# Patient Record
Sex: MALE | Race: BLACK OR AFRICAN AMERICAN | NOT HISPANIC OR LATINO | Employment: OTHER | URBAN - METROPOLITAN AREA
[De-identification: names, ages, dates, MRNs, and addresses within clinical notes are randomized per-mention and may not be internally consistent; named-entity substitution may affect disease eponyms.]

---

## 2017-01-26 ENCOUNTER — OFFICE VISIT (OUTPATIENT)
Dept: INTERNAL MEDICINE | Facility: CLINIC | Age: 62
End: 2017-01-26
Payer: MEDICARE

## 2017-01-26 ENCOUNTER — TELEPHONE (OUTPATIENT)
Dept: INTERNAL MEDICINE | Facility: CLINIC | Age: 62
End: 2017-01-26

## 2017-01-26 VITALS
OXYGEN SATURATION: 97 % | BODY MASS INDEX: 31.4 KG/M2 | HEART RATE: 72 BPM | TEMPERATURE: 98 F | SYSTOLIC BLOOD PRESSURE: 144 MMHG | WEIGHT: 257.94 LBS | DIASTOLIC BLOOD PRESSURE: 90 MMHG

## 2017-01-26 DIAGNOSIS — G89.29 CHRONIC MIDLINE LOW BACK PAIN WITHOUT SCIATICA: ICD-10-CM

## 2017-01-26 DIAGNOSIS — M25.562 CHRONIC PAIN OF BOTH KNEES: ICD-10-CM
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
## 2017-01-26 DIAGNOSIS — M54.2 NECK PAIN ON LEFT SIDE: Primary | ICD-10-CM

## 2017-01-26 DIAGNOSIS — G89.29 CHRONIC PAIN OF BOTH KNEES: ICD-10-CM

## 2017-01-26 DIAGNOSIS — M54.50 CHRONIC MIDLINE LOW BACK PAIN WITHOUT SCIATICA: ICD-10-CM

## 2017-01-26 DIAGNOSIS — M25.561 CHRONIC PAIN OF BOTH KNEES: ICD-10-CM

## 2017-01-26 DIAGNOSIS — Z23 IMMUNIZATION DUE: ICD-10-CM

## 2017-01-26 PROCEDURE — 90686 IIV4 VACC NO PRSV 0.5 ML IM: CPT | Mod: S$GLB,,, | Performed by: FAMILY MEDICINE

## 2017-01-26 PROCEDURE — 3080F DIAST BP >= 90 MM HG: CPT | Mod: S$GLB,,, | Performed by: FAMILY MEDICINE

## 2017-01-26 PROCEDURE — 1159F MED LIST DOCD IN RCRD: CPT | Mod: S$GLB,,, | Performed by: FAMILY MEDICINE

## 2017-01-26 PROCEDURE — G0008 ADMIN INFLUENZA VIRUS VAC: HCPCS | Mod: S$GLB,,, | Performed by: FAMILY MEDICINE

## 2017-01-26 PROCEDURE — 3077F SYST BP >= 140 MM HG: CPT | Mod: S$GLB,,, | Performed by: FAMILY MEDICINE

## 2017-01-26 PROCEDURE — 99999 PR PBB SHADOW E&M-EST. PATIENT-LVL IV: CPT | Mod: PBBFAC,,, | Performed by: FAMILY MEDICINE

## 2017-01-26 PROCEDURE — 99214 OFFICE O/P EST MOD 30 MIN: CPT | Mod: S$GLB,,, | Performed by: FAMILY MEDICINE

## 2017-01-26 RX ORDER — MELOXICAM 15 MG/1
15 TABLET ORAL DAILY
Qty: 30 TABLET | Refills: 0 | Status: SHIPPED | OUTPATIENT
Start: 2017-01-26 | End: 2017-08-21 | Stop reason: SDUPTHER
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
## 2017-01-26 NOTE — TELEPHONE ENCOUNTER
----- Message from Debbie Herrera sent at 1/26/2017  2:29 PM CST -----  Contact: pt  Pt is calling Nurse staff regarding orders for a handicap tag. Pt call back to be advise today about how pt is to get the orders. [phone] thanks

## 2017-01-27 NOTE — TELEPHONE ENCOUNTER
Unfortunately, patient did not bring up the topic of a handicapped license with me.   Extended discussion with the patient over the phone reveals that his knees are a constant problem.  He feels they may give out if he walks a distance.  He would like a handicapped license so that he doesn't have to walk a long distance in a parking lot.    He was seen by Dr. Markos Ibrahim in the past and had some injections done which helped.  This is been about 4 years ago.  He currently wears these copper fit braces on both knees but they're wearing out.    I advised the patient that I will place orders for x-rays for both his knees and refer to orthopedics.    Please schedule patient to see orthopedics department.  Schedule his x-ray to be obtained here prior to that appt or prior to his appointment at the same location on the day he will be seen by the orthopedist.

## 2017-02-01 ENCOUNTER — HOSPITAL ENCOUNTER (OUTPATIENT)
Dept: RADIOLOGY | Facility: HOSPITAL | Age: 62
Discharge: HOME | End: 2017-02-01
Attending: FAMILY MEDICINE
Payer: MEDICARE

## 2017-02-01 ENCOUNTER — OFFICE VISIT (OUTPATIENT)
Dept: ORTHOPEDICS | Facility: CLINIC | Age: 62
End: 2017-02-01
Payer: MEDICARE

## 2017-02-01 VITALS
HEART RATE: 90 BPM | SYSTOLIC BLOOD PRESSURE: 121 MMHG | HEIGHT: 76 IN | DIASTOLIC BLOOD PRESSURE: 72 MMHG | BODY MASS INDEX: 31.29 KG/M2 | WEIGHT: 257 LBS

## 2017-02-01 DIAGNOSIS — M25.561 BILATERAL CHRONIC KNEE PAIN: ICD-10-CM

## 2017-02-01 DIAGNOSIS — M17.0 PRIMARY OSTEOARTHRITIS OF BOTH KNEES: Primary | ICD-10-CM

## 2017-02-01 DIAGNOSIS — M25.562 CHRONIC PAIN OF BOTH KNEES: ICD-10-CM

## 2017-02-01 DIAGNOSIS — Z87.828 HISTORY OF CLOSED HIP DISLOCATION: ICD-10-CM

## 2017-02-01 DIAGNOSIS — M21.161 GENU VARUM OF BOTH LOWER EXTREMITIES: ICD-10-CM

## 2017-02-01 DIAGNOSIS — M25.562 BILATERAL CHRONIC KNEE PAIN: ICD-10-CM

## 2017-02-01 DIAGNOSIS — G89.29 CHRONIC PAIN OF BOTH KNEES: ICD-10-CM

## 2017-02-01 DIAGNOSIS — G89.29 BILATERAL CHRONIC KNEE PAIN: ICD-10-CM

## 2017-02-01 DIAGNOSIS — M21.162 GENU VARUM OF BOTH LOWER EXTREMITIES: ICD-10-CM

## 2017-02-01 DIAGNOSIS — M25.561 CHRONIC PAIN OF BOTH KNEES: ICD-10-CM

## 2017-02-01 PROCEDURE — 99999 PR PBB SHADOW E&M-EST. PATIENT-LVL III: CPT | Mod: PBBFAC,,, | Performed by: PHYSICIAN ASSISTANT

## 2017-02-01 PROCEDURE — 3078F DIAST BP <80 MM HG: CPT | Mod: S$GLB,,, | Performed by: PHYSICIAN ASSISTANT

## 2017-02-01 PROCEDURE — 99204 OFFICE O/P NEW MOD 45 MIN: CPT | Mod: S$GLB,,, | Performed by: PHYSICIAN ASSISTANT

## 2017-02-01 PROCEDURE — 3074F SYST BP LT 130 MM HG: CPT | Mod: S$GLB,,, | Performed by: PHYSICIAN ASSISTANT

## 2017-02-01 PROCEDURE — 73562 X-RAY EXAM OF KNEE 3: CPT | Mod: TC,50

## 2017-02-01 PROCEDURE — 73562 X-RAY EXAM OF KNEE 3: CPT | Mod: 26,50,, | Performed by: RADIOLOGY

## 2017-02-01 NOTE — LETTER
February 1, 2017      Kimber Thompson MD  [address] [place] [address] [address] [place] [address]           [facility]  [address] [place] [address]  [place] [address]  Phone: [phone]  Fax: [phone]          Patient: Mitchell Mcmillan   MR Number: [id number]   YOB: 1955   Date of Visit: 2/1/2017       Dear Dr. Kimber Thompson:    Thank you for referring Mitchell Mcmillan to me for evaluation. Attached you will find relevant portions of my assessment and plan of care.    If you have questions, please do not hesitate to call me. I look forward to following Mitchell Mcmillan along with you.    Sincerely,    Harshil Hyman PA-C    Enclosure  CC:  No Recipients    If you would like to receive this communication electronically, please contact [email or web address] or [phone] to request more information on [employer] [facility] access.    For providers and/or their staff who would like to refer a patient to [facility], please contact us through our one-stop-shop provider referral line, [facility], at [phone].    If you feel you have received this communication in error or would no longer like to receive these types of communications, please e-mail [email or web address]

## 2017-02-01 NOTE — PROGRESS NOTES
Subjective:      Patient ID: Mitchell Mcmillan is a 61 y.o. male.    Chief Complaint: Pain of the Left Knee and Pain of the Right Knee    HPI Comments: Body part: Bilateral Knee    Occupation: Retired/ Worked for [employer]     Dominant hand: Right    Referred by: Kimber Thompson MD    Date of Injury: None    Patient's visit goal: To find out what his condition is; diagnosis    Problem Description: Bilateral Knee Pain for over 3 years, R>L. He has pain with extended walking. He has stiffness with standing. He also has hx of L hip dislocation on the job many years ago. This was treated by Dr. Lisa with ORIF for stabilization, he states.   He has tried the copper fit braces and biofreeze. Also, he has taken mobic and muscle relaxer with some benefit.  No physical therapy. Has used cold compresses in the past. Hx of knee injections with minimal relief.  Has been told he will need knee surgery.  He denies any numbness or tingling in his legs.  The knees do often attempt to give way on him.  There is some locking at times.    Pain   This is a new problem. The current episode started more than 1 year ago (pain for about 3 years). The problem occurs intermittently. The problem has been gradually worsening. Associated symptoms include joint swelling. Pertinent negatives include no abdominal pain, chest pain, chills, congestion, coughing, fever, nausea, numbness, rash or vomiting. The symptoms are aggravated by standing and walking (climbing, sitting to standing, ). He has tried NSAIDs (Bio Freeze, copper fit knee sleeve, exercise, knee aspiration) for the symptoms. The treatment provided no relief.       Review of Systems   Constitution: Negative for chills, fever and weight loss.   HENT: Positive for hearing loss. Negative for congestion.    Eyes: Negative for double vision and pain.   Cardiovascular: Negative for chest pain and irregular heartbeat.   Respiratory: Negative for cough and shortness of breath.     Endocrine: Negative for polyuria.   Hematologic/Lymphatic: Does not bruise/bleed easily.   Skin: Negative for poor wound healing, rash and suspicious lesions.   Musculoskeletal: Positive for arthritis, joint pain, joint swelling and stiffness.   Gastrointestinal: Negative for abdominal pain, nausea and vomiting.   Genitourinary: Negative for bladder incontinence and frequency.   Neurological: Positive for loss of balance. Negative for numbness, paresthesias, sensory change and tremors.   Psychiatric/Behavioral: Negative for depression. The patient is not nervous/anxious.    Allergic/Immunologic: Negative for hives.         Objective:            General    Nursing note and vitals reviewed.  Constitutional: He is oriented to person, place, and time. He appears well-developed and well-nourished. No distress.   Neurological: He is alert and oriented to person, place, and time.   Psychiatric: He has a normal mood and affect. His behavior is normal. Judgment and thought content normal.     General Musculoskeletal Exam   Gait: abnormal and antalgic     Right Ankle/Foot Exam     Alignment   Knee Alignment: varus    Left Ankle/Foot Exam     Alignment   Knee Alignment: varus    Right Knee Exam     Inspection   Erythema: absent  Swelling: present  Effusion: present  Deformity: deformity  Bruising: absent    Tenderness   The patient is tender to palpation of the medial joint line.    Range of Motion   The patient has normal right knee ROM.    Tests   Meniscus   Roseanne:  Medial - negative Lateral - negative  Ligament Examination   MCL - Valgus: normal (0 to 2mm)  LCL - Varus: normal  Patella   Patellar Apprehension: negative  Passive Patellar Tilt: squinting  Patellar Grind: negative    Other   Muscle Tightness: hamstring tightness and quadriceps tightness  Sensation: normal    Left Knee Exam     Inspection   Erythema: absent  Swelling: present  Effusion: present  Deformity: deformity  Bruising: absent    Tenderness   The  patient tender to palpation of the medial joint line.    Range of Motion   The patient has normal left knee ROM.    Tests   Meniscus   Roseanne:  Medial - negative Lateral - negative  Stability   MCL - Valgus: normal (0 to 2mm)  LCL - Varus: normal (0 to 2mm)  Patella   Patellar Apprehension: negative  Passive Patellar Tilt: squinting  Patellar Grind: negative    Other   Muscle Tightness: hamstring tightness and quadriceps tightness  Sensation: normal    Right Hip Exam     Range of Motion   Internal Rotation: normal   External Rotation: normal   Abduction: normal   Adduction: normal     Tests   Pain w/ forced internal rotation (STELLA): absent  Pain w/ forced external rotation (FADIR): absent  Left Hip Exam     Range of Motion   Internal Rotation: abnormal   External Rotation: abnormal   Abduction: abnormal   Adduction: normal     Tests   Pain w/ forced internal rotation (STELLA): present  Pain w/ forced external rotation (FADIR): present      Back (L-Spine & T-Spine) / Neck (C-Spine) Exam     Back (L-Spine & T-Spine) Range of Motion   The patient has abnormal back ROM.  Extension: normal   Flexion: abnormal   Lateral Bend Right: normal   Lateral Bend Left: normal   Rotation Right: normal   Rotation Left: normal     Neck (C-Spine) Range of Motion   Flexion:     Normal  Extension: Mild and limited  Right Lateral Bend: normal  Left Lateral Bend: normal  Right Rotation: abnormal  Left Rotation: abnormal      Muscle Strength   Right Lower Extremity   Hip Abduction: 4/5   Hip Adduction: 4/5 (+)   Hip Flexion: 4/5 (+)   Quadriceps:  4/5 and 5/5   Hamstrin/5 and 5/5   Left Lower Extremity   Hip Abduction: 4/5   Hip Adduction: 4/5   Hip Flexion: 4/5   Quadriceps:  4/5   Hamstrin/5     Vascular Exam       Edema  Right Upper Leg: absent  Right Lower Leg: absent  Left Upper Leg: absent  Left Lower Leg: absent    TESTS: Xray films and report were reviewed by myself and demonstrate med. js narrowing bilaterally. There is  squaring of the medial and lat tib and fem condyles. Narrowing PFJS with facet arthropathy and anterior bossing.          Assessment:       Encounter Diagnoses   Name Primary?    Primary osteoarthritis of both knees Yes    Genu varum of both lower extremities     History of closed hip dislocation     Bilateral chronic knee pain           Plan:       Mitchell was seen today for pain and pain.    Diagnoses and all orders for this visit:    Primary osteoarthritis of both knees  -     Ambulatory Referral to Physical/Occupational Therapy    Genu varum of both lower extremities  -     Ambulatory Referral to Physical/Occupational Therapy    History of closed hip dislocation  -     Ambulatory Referral to Physical/Occupational Therapy    Bilateral chronic knee pain  -     Ambulatory Referral to Physical/Occupational Therapy     we reviewed his x-rays in the room today.  He has advanced degenerative knee arthritis.  We discussed his treatment options include doing nothing at all, conservative care, and surgical intervention.  Surgically, he needs bilateral total knee arthroplasty, but he is not interested in this at this time.  He would like to further contemplate doing this.  Conservatively, he takes anti-inflammatory medication already.  He has tried and failed injection therapy and likely will not give much benefit given his advanced changes.    I think we should concentrate on function and so we've placed him in physical therapy for his hips and knees to work on core conditioning, motion, and strength.  He will follow-up with physiatry in 1 month for evaluation.  In the future, he may see orthopedic surgery again depending on his decision to move forward with surgical intervention.    The patient understands, chooses and consents to this plan and accepts all   the risks which include but are not limited to the risks mentioned above.   Pt understands the alternative of having no testing, intervention or       treatment  at this time. Pt left content and without questions.     Disclaimer: This note was prepared using a voice recognition system and is likely to have sound alike errors within the text.

## 2017-02-28 DIAGNOSIS — M54.2 NECK PAIN ON LEFT SIDE: ICD-10-CM

## 2017-02-28 DIAGNOSIS — M54.50 LEFT-SIDED LOW BACK PAIN WITHOUT SCIATICA: ICD-10-CM

## 2017-02-28 RX ORDER — TIZANIDINE 4 MG/1
TABLET ORAL
Qty: 20 TABLET | Refills: 0 | Status: SHIPPED | OUTPATIENT
Start: 2017-02-28 | End: 2017-09-21 | Stop reason: SDUPTHER

## 2017-03-10 DIAGNOSIS — I10 ESSENTIAL HYPERTENSION: ICD-10-CM

## 2017-03-10 RX ORDER — LISINOPRIL AND HYDROCHLOROTHIAZIDE 20; 25 MG/1; MG/1
1 TABLET ORAL DAILY
Qty: 90 TABLET | Refills: 3 | Status: SHIPPED | OUTPATIENT
Start: 2017-03-10 | End: 2017-09-21 | Stop reason: SDUPTHER

## 2017-03-10 NOTE — TELEPHONE ENCOUNTER
Last appt 1/26/2017   Would like a handicap license plate due to his knees, he states that he has bone rubbing against bone and his knees hurt.

## 2017-03-10 NOTE — TELEPHONE ENCOUNTER
----- Message from Laurence Willis sent at 3/10/2017  8:27 AM CST -----  Contact: pt  Patient needs a refill on Lisinpriol called into [facility] pharmacy at [facility] [place], pt also need a form for handicap license plate.Please call patient at [phone], if you have any questions. Thanks!

## 2017-03-11 NOTE — TELEPHONE ENCOUNTER
See 1/26/17 visit - i have completed a handicapped form and placed in the basket in nurse's station.    His BP med has been refilled.

## 2017-03-28 ENCOUNTER — TELEPHONE (OUTPATIENT)
Dept: INTERNAL MEDICINE | Facility: CLINIC | Age: 62
End: 2017-03-28

## 2017-03-28 NOTE — TELEPHONE ENCOUNTER
Pt was calling to make sure that his paperwork for his handicap tag was filled out and ready for .  Advised pt that it was ready for . Pt will be picking it up today after 12pm

## 2017-08-07 ENCOUNTER — OFFICE VISIT (OUTPATIENT)
Dept: FAMILY MEDICINE | Facility: CLINIC | Age: 62
End: 2017-08-07
Payer: MEDICARE

## 2017-08-07 VITALS
BODY MASS INDEX: 31.41 KG/M2 | HEIGHT: 76 IN | DIASTOLIC BLOOD PRESSURE: 70 MMHG | HEART RATE: 91 BPM | OXYGEN SATURATION: 97 % | WEIGHT: 257.94 LBS | SYSTOLIC BLOOD PRESSURE: 110 MMHG

## 2017-08-07 DIAGNOSIS — E66.9 OBESITY (BMI 30.0-34.9): ICD-10-CM

## 2017-08-07 DIAGNOSIS — G89.29 CHRONIC HIP PAIN, LEFT: ICD-10-CM

## 2017-08-07 DIAGNOSIS — E78.5 HYPERLIPIDEMIA, UNSPECIFIED HYPERLIPIDEMIA TYPE: ICD-10-CM

## 2017-08-07 DIAGNOSIS — H91.93 HEARING PROBLEM OF BOTH EARS: ICD-10-CM

## 2017-08-07 DIAGNOSIS — Z00.00 ENCOUNTER FOR PREVENTIVE HEALTH EXAMINATION: Primary | ICD-10-CM

## 2017-08-07 DIAGNOSIS — M25.552 CHRONIC HIP PAIN, LEFT: ICD-10-CM

## 2017-08-07 DIAGNOSIS — M17.0 PRIMARY OSTEOARTHRITIS OF BOTH KNEES: ICD-10-CM

## 2017-08-07 DIAGNOSIS — I10 ESSENTIAL HYPERTENSION: ICD-10-CM

## 2017-08-07 PROCEDURE — 99499 UNLISTED E&M SERVICE: CPT | Mod: S$GLB,,, | Performed by: NURSE PRACTITIONER

## 2017-08-07 PROCEDURE — G0439 PPPS, SUBSEQ VISIT: HCPCS | Mod: S$GLB,,, | Performed by: FAMILY MEDICINE

## 2017-08-07 PROCEDURE — 99999 PR PBB SHADOW E&M-EST. PATIENT-LVL III: CPT | Mod: PBBFAC,,, | Performed by: NURSE PRACTITIONER

## 2017-08-07 NOTE — PATIENT INSTRUCTIONS
Counseling and Referral of Other Preventative  (Italic type indicates deductible and co-insurance are waived)    Patient Name: Mitchell Mcmillan  Today's Date: 8/7/2017      SERVICE LIMITATIONS RECOMMENDATION    Vaccines    · Pneumococcal (once after 65)    · Influenza (annually)    · Hepatitis B (if medium/high risk)    · Prevnar 13      Hepatitis B medium/high risk factors:       - End-stage renal disease       - Hemophiliacs who received Factor VII or         IX concentrates       - Clients of institutions for the mentally             retarded       - Persons who live in the same house as          a HepB carrier       - Homosexual men       - Illicit injectable drug abusers     Pneumococcal: Done, no repeat necessary     Influenza: DUE in [place]     Hepatitis B: N/A     Prevnar 13: N/A    Prostate cancer screening (annually to age 75)     Prostate specific antigen (PSA) Shared decision making with Provider. Sometimes a co-pay may be required if the patient decides to have this test. The USPSTF no longer recommends prostate cancer screening routinely in medicine: every 1 year    Colorectal cancer screening (to age 75)    · Fecal occult blood test (annual)  · Flexible sigmoidoscopy (5y)  · Screening colonoscopy (10y)  · Barium enema   up date    Diabetes self-management training (no USPSTF recommendations)  Requires referral by treating physician for patient with diabetes or renal disease. 10 hours of initial DSMT sessions of no less than 30 minutes each in a continuous 12-month period. 2 hours of follow-up DSMT in subsequent years.  N/A    Glaucoma screening (no USPSTF recommendation)  Diabetes mellitus, family history   , age 50 or over    American, age 65 or over  Scheduled, see appointments    Medical nutrition therapy for diabetes or renal disease (no recommended schedule)  Requires referral by treating physician for patient with diabetes or renal disease or kidney transplant within the  past 3 years.  Can be provided in same year as diabetes self-management training (DSMT), and CMS recommends medical nutrition therapy take place after DSMT. Up to 3 hours for initial year and 2 hours in subsequent years.  N/A    Cardiovascular screening blood tests (every 5 years)  · Fasting lipid panel  Order as a panel if possible  Scheduled, see appointments    Diabetes screening tests (at least every 3 years, Medicare covers annually or at 6-month intervals for prediabetic patients)  · Fasting blood sugar (FBS) or glucose tolerance test (GTT)  Patient must be diagnosed with one of the following:       - Hypertension       - Dyslipidemia       - Obesity (BMI 30kg/m2)       - Previous elevated impaired FBS or GTT       ... or any two of the following:       - Overweight (BMI 25 but <30)       - Family history of diabetes       - Age 65 or older       - History of gestational diabetes or birth of baby weighing more than 9 pounds  Done this year, repeat every year    Abdominal aortic aneurysm screening (once)  · Sonogram   Limited to patients who meet one of the following criteria:       - Men who are 65-75 years old and have smoked more than 100 cigarette in their lifetime       - Anyone with a family history of abdominal aortic aneurysm       - Anyone recommended for screening by the USPSTF  N/A    HIV screening (annually for increased risk patients)  · HIV-1 and HIV-2 by EIA, or BIGG, rapid antibody test or oral mucosa transudate  Patients must be at increased risk for HIV infection per USPSTF guidelines or pregnant. Tests covered annually for patient at increased risk or as requested by the patient. Pregnant patients may receive up to 3 tests during pregnancy.  Risks discussed, screening is not recommended    Smoking cessation counseling (up to 8 sessions per year)  Patients must be asymptomatic of tobacco-related conditions to receive as a preventative service.  Non-smoker    Subsequent annual wellness visit   At least 12 months since last AWV  Return in one year     The following information is provided to all patients.  This information is to help you find resources for any of the problems found today that may be affecting your health:                Living healthy guide: www.[facility].[place].[facility]      Understanding Diabetes: www.diabetes.org      Eating healthy: www.cdc.gov/healthyweight      [facility] home safety checklist: www.cdc.gov/steadi/patient.html      Agency on Aging: www.goea.[place].[facility]      Alcoholics anonymous (AA): www.aa.org      Physical Activity: www.bharathi.nih.gov/db8foho      Tobacco use: www.quitwithusla.org

## 2017-08-07 NOTE — Clinical Note
Your patient was seen today for a HRA visit. Abnormalities have been identified during this visit that may require additional testing and follow up. I have included a copy of my visit note, please review the note and feel free to contact me with any questions.  Thank you for allowing me to participate in the care of your patients.  Gisselle Palomino NP

## 2017-08-07 NOTE — PROGRESS NOTES
"Mitchell Mcmillan presented for a  Medicare AWV and comprehensive Health Risk Assessment today. The following components were reviewed and updated:    · Medical history  · Family History  · Social history  · Allergies and Current Medications  · Health Risk Assessment  · Health Maintenance  · Care Team     ** See Completed Assessments for Annual Wellness Visit within the encounter summary.**       The following assessments were completed:  · Living Situation  · CAGE  · Depression Screening  · Timed Get Up and Go  · Whisper Test  · Cognitive Function Screening  · Nutrition Screening  · ADL Screening  · PAQ Screening    Vitals:    08/07/17 1257   BP: 110/70   BP Location: Right arm   Patient Position: Sitting   Pulse: 91   SpO2: 97%   Weight: 117 kg (257 lb 15 oz)   Height: 6' 4" (1.93 m)     Body mass index is 31.4 kg/m².  Physical Exam   Constitutional: He appears well-developed. No distress.   HENT:   Head: Normocephalic and atraumatic.   Eyes: Pupils are equal, round, and reactive to light.   Neck: Carotid bruit is not present.   Cardiovascular: Normal rate, regular rhythm, normal heart sounds, intact distal pulses and normal pulses.  Exam reveals no gallop.    No murmur heard.  Pulmonary/Chest: Effort normal and breath sounds normal.   Abdominal: Soft. Bowel sounds are normal. He exhibits no distension. There is no tenderness.   Musculoskeletal: Normal range of motion. He exhibits no edema.   Neurological: He exhibits normal muscle tone.   Skin: Skin is warm, dry and intact.   Psychiatric: He has a normal mood and affect. His speech is normal and behavior is normal. Judgment and thought content normal. Cognition and memory are normal.   Nursing note and vitals reviewed.        Diagnoses and health risks identified today and associated recommendations/orders:    1. Encounter for preventive health examination    2. Essential hypertension  Stable and controlled on Zestoretic daily.. Continue current treatment plan as " previously prescribed with your PCP.       3. Obesity (BMI 30.0-34.9)  This problem is currently not controlled BMI 31.4- states no diet or exercise plan at this time. Please follow up with your PCP as planned to discuss adjustments to your treatment plan.      4. Primary osteoarthritis of both knees  Chronic and ongoing problem. Takes Zanaflex and Mobic daily. Followed by orthopedic- recommended bilateral knee replacement. S/P arthroscope 2013.    5. Chronic hip pain, left  Chronic and ongoing problem. History of left  Hip replacement  2006 by Dr. Lisa Takes Zanaflex and Mobic daily. Followed by orthopedic.      6. Hyperlipidemia, unspecified hyperlipidemia type  Stable and controlled on Lipitor daily . Continue current treatment plan as previously prescribed with your PCP.       7. Hearing problem of both ears  This is a new problem that has been identified during today's visit. Pt sates  Decreasing low tone hearing for few yrs. Pt referred to audiologist for exam    Pt referred to Dr Chinchilla for physical exam, eye exam and hearing test.    Provided Mitchell with a 5-10 year written screening schedule and personal prevention plan. Recommendations were developed using the USPSTF age appropriate recommendations. Education, counseling, and referrals were provided as needed. After Visit Summary printed and given to patient which includes a list of additional screenings\tests needed.    Return in about 1 year (around 8/7/2018).    Gisselle Palomino NP

## 2017-08-07 NOTE — Clinical Note
Amor Leblanc, this pt request to change PCP to you. I have scheduled him annual exam  Your patient was seen today for a HRA visit. . I have included a copy of my visit note, please review the note and feel free to contact me with any questions.  Thank you for allowing me to participate in the care of your patients.  Gisselle Palomino NP

## 2017-08-17 ENCOUNTER — OFFICE VISIT (OUTPATIENT)
Dept: OTOLARYNGOLOGY | Facility: CLINIC | Age: 62
End: 2017-08-17
Payer: MEDICARE

## 2017-08-17 ENCOUNTER — CLINICAL SUPPORT (OUTPATIENT)
Dept: AUDIOLOGY | Facility: CLINIC | Age: 62
End: 2017-08-17
Payer: MEDICARE

## 2017-08-17 VITALS
HEART RATE: 76 BPM | RESPIRATION RATE: 18 BRPM | TEMPERATURE: 97 F | SYSTOLIC BLOOD PRESSURE: 122 MMHG | DIASTOLIC BLOOD PRESSURE: 78 MMHG | WEIGHT: 256.19 LBS | HEIGHT: 76 IN | BODY MASS INDEX: 31.2 KG/M2

## 2017-08-17 DIAGNOSIS — H90.3 HEARING LOSS, SENSORINEURAL, ASYMMETRICAL: Primary | ICD-10-CM

## 2017-08-17 DIAGNOSIS — H90.3 ASYMMETRIC SNHL (SENSORINEURAL HEARING LOSS): Primary | ICD-10-CM

## 2017-08-17 DIAGNOSIS — H93.13 TINNITUS, BILATERAL: ICD-10-CM

## 2017-08-17 PROCEDURE — 92557 COMPREHENSIVE HEARING TEST: CPT | Mod: S$GLB,,, | Performed by: AUDIOLOGIST

## 2017-08-17 PROCEDURE — 3008F BODY MASS INDEX DOCD: CPT | Mod: S$GLB,,, | Performed by: PHYSICIAN ASSISTANT

## 2017-08-17 PROCEDURE — 3074F SYST BP LT 130 MM HG: CPT | Mod: S$GLB,,, | Performed by: PHYSICIAN ASSISTANT

## 2017-08-17 PROCEDURE — 99999 PR PBB SHADOW E&M-EST. PATIENT-LVL III: CPT | Mod: PBBFAC,,, | Performed by: PHYSICIAN ASSISTANT

## 2017-08-17 PROCEDURE — 3078F DIAST BP <80 MM HG: CPT | Mod: S$GLB,,, | Performed by: PHYSICIAN ASSISTANT

## 2017-08-17 PROCEDURE — 99203 OFFICE O/P NEW LOW 30 MIN: CPT | Mod: S$GLB,,, | Performed by: PHYSICIAN ASSISTANT

## 2017-08-17 PROCEDURE — 92567 TYMPANOMETRY: CPT | Mod: S$GLB,,, | Performed by: AUDIOLOGIST

## 2017-08-17 NOTE — PROGRESS NOTES
Mitchell Mcmillan was seen 08/17/2017 for an audiological evaluation.  Patient complains of possible gradual bilateral hearing loss.  His wife has been complaining for a while now.  He has a history of noise exposure which includes working at [employer] for over 20 years.     Results reveal a mild-to-moderate sensorineural hearing loss 3402-8151 Hz for the right ear, and  mild-to-severe sensorineural hearing loss 500 & 3417-9128 Hz for the left ear.   Speech Reception Thresholds were  10 dBHL for the right ear and 20 dBHL for the left ear.   Word recognition scores were excellent for the right ear and excellent for the left ear.   Tympanograms were Type A, normal for the right ear and Type A, normal for the left ear.    Patient was counseled on the above findings.    Recommendations include:    1.  ENT followup re:asymmetry  2.  Hearing aid consult (information was given to patient at today's visit)  3.  Wear hearing protective devices around loud noise  4.  Annual audiograms

## 2017-08-17 NOTE — PROGRESS NOTES
Subjective:       Patient ID: Mitchell Mcmillan is a 62 y.o. male.    Chief Complaint: Hearing Loss (review audio)    Patient is a very pleasant 62 year old male here to see me today for the first time for evaluation of hearing loss.  He reports hearing loss that has been gradually progressing over the last 10 years.  He has noted any difference in hearing between the ears, with the right ear being the better hearing ear.  He has noted occasional tinnitus in both ears.  He has not had any recent issues with ear pain or ear drainage.  He denies a family history of hearing loss, and has not had any previous otologic surgery.  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 He admits to issues with dizziness only if bending over for prolonged period of time.        Review of Systems   Constitutional: Negative for activity change, appetite change and fever.   HENT: Positive for hearing loss and tinnitus. Negative for congestion, ear discharge, ear pain, nosebleeds, rhinorrhea, sinus pressure and sore throat.    Eyes: Negative for discharge.   Respiratory: Negative for cough, shortness of breath and wheezing.    Cardiovascular: Negative for chest pain and palpitations.   Gastrointestinal: Negative for diarrhea, nausea and vomiting.   Musculoskeletal: Positive for arthralgias (hips). Negative for neck pain.   Allergic/Immunologic: Negative for food allergies.   Neurological: Negative for light-headedness and headaches.   Hematological: Negative for adenopathy.   Psychiatric/Behavioral: Negative for sleep disturbance.       Objective:      Physical Exam   Constitutional: He is oriented to person, place, and time. He appears well-developed and well-nourished. He is cooperative.   HENT:   Head: Normocephalic and atraumatic.   Right Ear: Tympanic membrane, external ear and ear canal normal. No middle ear effusion. Decreased hearing is noted.   Left Ear: Tympanic membrane, external  ear and ear canal normal.  No middle ear effusion. Decreased hearing is noted.   Nose: Nose normal. No mucosal edema, rhinorrhea, nasal deformity or septal deviation. No epistaxis. Right sinus exhibits no maxillary sinus tenderness and no frontal sinus tenderness. Left sinus exhibits no maxillary sinus tenderness and no frontal sinus tenderness.   Mouth/Throat: Uvula is midline, oropharynx is clear and moist and mucous membranes are normal. Mucous membranes are not pale and not dry. No trismus in the jaw. No uvula swelling or dental caries. No oropharyngeal exudate or posterior oropharyngeal erythema.   Eyes: Conjunctivae, EOM and lids are normal. Pupils are equal, round, and reactive to light. Right eye exhibits no chemosis. Left eye exhibits no chemosis. Right conjunctiva is not injected. Left conjunctiva is not injected. No scleral icterus. Right eye exhibits normal extraocular motion and no nystagmus. Left eye exhibits normal extraocular motion and no nystagmus.   eyeglasses   Neck: Trachea normal and phonation normal. No tracheal tenderness present. No tracheal deviation present. No thyroid mass and no thyromegaly present.   Cardiovascular: Intact distal pulses.    Pulmonary/Chest: Effort normal. No stridor. No respiratory distress.   Abdominal: He exhibits no distension.   Lymphadenopathy:        Head (right side): No submental, no submandibular, no preauricular and no posterior auricular adenopathy present.        Head (left side): No submental, no submandibular, no preauricular and no posterior auricular adenopathy present.     He has no cervical adenopathy.   Neurological: He is alert and oriented to person, place, and time. No cranial nerve deficit.   Skin: Skin is warm and dry. No rash noted. No erythema.   Psychiatric: He has a normal mood and affect. His speech is normal and behavior is normal.   Vitals reviewed.        AUDIOGRAM:  Results reveal a mild-to-moderate sensorineural hearing loss 7159-9451  Hz for the right ear, and  mild-to-severe sensorineural hearing loss 500 & 6990-5514 Hz for the left ear.   Speech Reception Thresholds were  10 dBHL for the right ear and 20 dBHL for the left ear.   Word recognition scores were excellent for the right ear and excellent for the left ear.   Tympanograms were Type A, normal for the right ear and Type A, normal for the left ear.         Assessment:       1. Asymmetric SNHL (sensorineural hearing loss)    2. Tinnitus, bilateral        Plan:         1.  Asymmetrical SNHL:  We reviewed the recent audiogram, and the fact that there is a distinct asymmetry of at least 20 dB across 3 frequencies.  We discussed that as humans we are not entirely symmetric, and that many people have one ear that hears better than the other.  However, considering the degree of asymmetry, I would recommend an MRI of the IAC with contrast to evaluate for any retrocochlear lesions.  If that is normal, the patient will continue to follow with annual audiograms.  He's not interested in MRI or hearing aids at this point.      2.  Tinnitus:  We reviewed his audiogram together in detail.  We also discussed that tinnitus is most often caused by a hearing loss, and that as the hair cells are damaged, either genetic or as a result of loud noise exposure, they then cause tinnitus.  Some patients find that restricting the salt or caffeine in their diet helps, and there is also an OTC supplement, lipoflavinoids, that some people find to be effective though their benefit is not fully proven.  Tinnitus tends to be louder in times of stress and fatigue, and may decrease with time.  Sound machines may also be an effective masking technique if needed at night.

## 2017-08-17 NOTE — LETTER
August 17, 2017      [place]  [address] [place] [address]           [facility] - ENT  [address] [place] [address]  Phone: [phone]  Fax: [phone]          Patient: Mitchell Mcmillan   MR Number: [id number]   YOB: 1955   Date of Visit: 8/17/2017       Dear Gisselle Palomino:    Thank you for referring Mitchell Mcmillan to me for evaluation. Attached you will find relevant portions of my assessment and plan of care.    If you have questions, please do not hesitate to call me. I look forward to following Mitchell Mcmillan along with you.    Sincerely,    Eun Palomino PA-C    Enclosure  CC:  No Recipients    If you would like to receive this communication electronically, please contact [email or web address] or [phone] to request more information on [employer] access.    For providers and/or their staff who would like to refer a patient to [facility], please contact us through our one-stop-shop provider referral line, [facility] , at [phone].    If you feel you have received this communication in error or would no longer like to receive these types of communications, please e-mail [email or web address]

## 2017-08-21 DIAGNOSIS — M54.50 CHRONIC MIDLINE LOW BACK PAIN WITHOUT SCIATICA: ICD-10-CM

## 2017-08-21 DIAGNOSIS — G89.29 CHRONIC MIDLINE LOW BACK PAIN WITHOUT SCIATICA: ICD-10-CM

## 2017-08-21 DIAGNOSIS — M54.2 NECK PAIN ON LEFT SIDE: ICD-10-CM

## 2017-08-21 RX ORDER — MELOXICAM 15 MG/1
TABLET ORAL
Qty: 30 TABLET | Refills: 0 | Status: SHIPPED | OUTPATIENT
Start: 2017-08-21 | End: 2019-01-14

## 2017-08-24 ENCOUNTER — OFFICE VISIT (OUTPATIENT)
Dept: OPHTHALMOLOGY | Facility: CLINIC | Age: 62
End: 2017-08-24
Payer: MEDICARE

## 2017-08-24 DIAGNOSIS — H25.13 NUCLEAR SCLEROSIS, BILATERAL: Primary | ICD-10-CM

## 2017-08-24 DIAGNOSIS — H52.03 HYPEROPIA WITH ASTIGMATISM AND PRESBYOPIA, BILATERAL: ICD-10-CM

## 2017-08-24 DIAGNOSIS — H52.4 HYPEROPIA WITH ASTIGMATISM AND PRESBYOPIA, BILATERAL: ICD-10-CM

## 2017-08-24 DIAGNOSIS — H25.012 CORTICAL AGE-RELATED CATARACT OF LEFT EYE: ICD-10-CM

## 2017-08-24 DIAGNOSIS — H52.203 HYPEROPIA WITH ASTIGMATISM AND PRESBYOPIA, BILATERAL: ICD-10-CM

## 2017-08-24 PROCEDURE — 99999 PR PBB SHADOW E&M-EST. PATIENT-LVL I: CPT | Mod: PBBFAC,,, | Performed by: OPTOMETRIST

## 2017-08-24 PROCEDURE — 92015 DETERMINE REFRACTIVE STATE: CPT | Mod: S$GLB,,, | Performed by: OPTOMETRIST

## 2017-08-24 PROCEDURE — 92004 COMPRE OPH EXAM NEW PT 1/>: CPT | Mod: S$GLB,,, | Performed by: OPTOMETRIST

## 2017-08-24 NOTE — PROGRESS NOTES
HPI     Concerns About Ocular Health    Additional comments: new pt to dr. aguilera /  it has been about 1 year   since his last exam           Comments   No dm  No sx's       Last edited by Lyn Allan on 8/24/2017 12:59 PM. (History)            Assessment /Plan     For exam results, see Encounter Report.    Nuclear sclerosis, bilateral  Cortical age-related cataract of left eye  Mild with good va OU  Surgery is not indicated at this time. Monitor 12 months.    Hyperopia with astigmatism and presbyopia, bilateral  Eyeglass Final Rx     Eyeglass Final Rx       Sphere Cylinder Axis Dist VA Add    Right +1.50 +1.50 075 20/20-1 +2.75    Left +3.00 +0.25 130 20/20-1 +2.75    Expiration Date:  8/25/2018              RTC 1 yr for dilated eye exam or PRN if any problems.   Discussed above and answered questions.

## 2017-09-21 ENCOUNTER — OFFICE VISIT (OUTPATIENT)
Dept: FAMILY MEDICINE | Facility: CLINIC | Age: 62
End: 2017-09-21
Payer: MEDICARE

## 2017-09-21 VITALS
DIASTOLIC BLOOD PRESSURE: 72 MMHG | HEART RATE: 91 BPM | WEIGHT: 255.75 LBS | HEIGHT: 76 IN | OXYGEN SATURATION: 97 % | BODY MASS INDEX: 31.14 KG/M2 | SYSTOLIC BLOOD PRESSURE: 120 MMHG | TEMPERATURE: 98 F

## 2017-09-21 DIAGNOSIS — E78.5 HYPERLIPIDEMIA, UNSPECIFIED HYPERLIPIDEMIA TYPE: ICD-10-CM

## 2017-09-21 DIAGNOSIS — M17.0 PRIMARY OSTEOARTHRITIS OF BOTH KNEES: ICD-10-CM

## 2017-09-21 DIAGNOSIS — Z00.00 ANNUAL PHYSICAL EXAM: ICD-10-CM

## 2017-09-21 DIAGNOSIS — E66.9 OBESITY (BMI 30-39.9): ICD-10-CM

## 2017-09-21 DIAGNOSIS — I10 ESSENTIAL HYPERTENSION: ICD-10-CM

## 2017-09-21 DIAGNOSIS — Z12.5 PROSTATE CANCER SCREENING: ICD-10-CM

## 2017-09-21 PROCEDURE — 99499 UNLISTED E&M SERVICE: CPT | Mod: S$GLB,,, | Performed by: FAMILY MEDICINE

## 2017-09-21 PROCEDURE — 99396 PREV VISIT EST AGE 40-64: CPT | Mod: S$GLB,,, | Performed by: FAMILY MEDICINE

## 2017-09-21 PROCEDURE — 99999 PR PBB SHADOW E&M-EST. PATIENT-LVL III: CPT | Mod: PBBFAC,,, | Performed by: FAMILY MEDICINE

## 2017-09-21 RX ORDER — ATORVASTATIN CALCIUM 40 MG/1
40 TABLET, FILM COATED ORAL DAILY
Qty: 30 TABLET | Refills: 5 | Status: SHIPPED | OUTPATIENT
Start: 2017-09-21 | End: 2020-06-09 | Stop reason: SDUPTHER

## 2017-09-21 RX ORDER — LISINOPRIL AND HYDROCHLOROTHIAZIDE 20; 25 MG/1; MG/1
1 TABLET ORAL DAILY
Qty: 30 TABLET | Refills: 5 | Status: SHIPPED | OUTPATIENT
Start: 2017-09-21 | End: 2018-04-10 | Stop reason: SDUPTHER

## 2017-09-21 RX ORDER — TIZANIDINE 4 MG/1
4 TABLET ORAL NIGHTLY PRN
Qty: 30 TABLET | Refills: 5 | Status: SHIPPED | OUTPATIENT
Start: 2017-09-21 | End: 2020-08-28

## 2017-09-21 NOTE — PROGRESS NOTES
HISTORY OF PRESENT ILLNESS:  Mr. Mcmillan comes in today to establish care with me and for annual wellness examination. He is not fasting but has taken medications today.    END OF LIFE DECISION:  He does not have a living will but desires life support.    Current Outpatient Prescriptions   Medication Sig    lisinopril-hydrochlorothiazide (PRINZIDE,ZESTORETIC) 20-25 mg Tab Take 1 tablet by mouth once daily.    meloxicam (MOBIC) 15 MG tablet TAKE ONE TABLET BY MOUTH ONCE DAILY AS NEEDED WITH FOOD    tizanidine (ZANAFLEX) 4 MG tablet TAKE ONE TABLET BY MOUTH THREE TIMES DAILY AS NEEDED FOR MUSCLE SPASM    atorvastatin (LIPITOR) 40 MG tablet Take 1 tablet (40 mg total) by mouth once daily.     SCREENINGS:       Cholesterol: February 17, 2016.     FFS/Colonoscopy: October 5, 2016 - okay; repeat in 10 years.     Prostate/PSA: Prostate/Genital exam at [place] this year - okay per patient.     Dexa Scan: Never.     Eye Exam: August 24, 2017 with Dr. Piper.  He wears glasses.     PPD: Negative in the past.     Immunizations:  Td/Tdap - Unsure.  Advised insurance-covered benefit only if injury.                              Zostavax - March 2, 2016.                              Pneumovax - February 17, 2016.                              Seasonal Flu - January 26, 2017.                         ROS:  GENERAL: No fever, chills, fatigue or unusual weight change. Appetite normal. Exercises 2 - 3 times per week.  Monitors diet.  SKIN: No rashes, itching, changes in mole, color or texture of skin or easy bruising.  HEAD: No headaches or recent head trauma.  EYES: No change in vision,   no pain, diplopia, redness or discharge. Wears glasses.  EARS: Denies ear pain, discharge, vertigo but reports decreased hearing.  NOSE: No epistaxis or rhinitis. Nontender external nose.  MOUTH & THROAT: No hoarseness or change in voice. No excessive gum bleeding or mouth sores.  No sore throat.  NODES: Denies swollen glands.  CHEST: Denies  "BUTCHER, wheezing, cough, hemoptysis or sputum production.  CARDIOVASCULAR: Denies chest pain, No palpitations.  ABDOMEN: Denies diarrhea, constipation, vomiting, abdominal pain, or blood in stool.  GENITOURINARY: No flank pain, dysuria or hematuria.No nocturia or frequency. No lesions, pain or swelling in genital area. Performs monthly self testicular exam.  ENDOCRINE: Denies diabetes or thyroid problems.  HEME/LYMPH: Denies bleeding problems.  PERIPHERAL VASCULAR: No claudication or cyanosis  MUSCULOSKELETAL: No joint stiffness, pain or swelling except report chronic arthritis hip and knee pains; takes NSAID's with help. No edema. [facility] osteoarthritis.  NEUROLOGIC: No history of seizures, tremors, alteration of gait or coordination.  PSYCHIATRIC: Denies mood swings, depression, anxiety, homicidal or suicidal thoughts. Denies sleep problems.    PE:   VS:  /72 (BP Location: Left arm, Patient Position: Sitting)   Pulse 91   Temp 97.8 °F (36.6 °C) (Tympanic)   Ht 6' 4" (1.93 m)   Wt 116 kg (255 lb 11.7 oz)   SpO2 97%   BMI 31.13 kg/m²   APPEARANCE:  Well nourished, well developed male, obese and pleasant, alert and oriented in no acute distress.    HEAD: Non tender . Full range of motion.  EYES: PERRL, conjunctiva pink, lids no edema. He wears glasses.  EARS: External canal patent, no swelling or redness. TM's shiny and clear.  NOSE: Mucosa and turbinates pink, not swollen. No discharge  THROAT: No pharyngeal erythema or exudate. No stridor.    NECK: Supple, no mass, thyroid not enlarged, no mass. No carotid bruit.  NODES: No cervical, axillary lymph node enlargement.  CHEST: Normal respiratory effort. Lungs clear to auscultation.  CARDIOVASCULAR: Normal S1, S2. No rubs, murmurs or gallops.No edema.Pedal pulses palpable bilaterally.  ABDOMEN: Bowel sounds present. Not distended. Soft. No tenderness, masses or organomegaly.  BREAST: Nontender, no asymmetry, " nipple discharge, abnormal masses, nodules, lumps.  GENITALIA: Not examined per patient request.  RECTAL: Not examined per patient request.  MUSCULOSKELETAL: No joint deformities or stiffness. He is ambulatory without problems.  SKIN: No rashes or suspicious lesions, normal color and turgor.  NEUROLOGIC:   Cranial Nerves: II-XII grossly intact.  DTR's: Knees, Ankles 2+ and equal bilaterally Gait & Posture: Normal gait and fine motion.  PSYCHIATRIC: Patient alert, oriented x 3. Mood/Affect normal without acute anxiety and depression noted.Judgement and insight-good as he makes appropriate decisions on today's examination.    DIAGNOSIS:    ICD-10-CM ICD-9-CM    1. Annual physical exam Z00.00 V70.0    2. Hyperlipidemia, unspecified hyperlipidemia type E78.5 272.4 atorvastatin (LIPITOR) 40 MG tablet      Comprehensive metabolic panel      Lipid panel   3. Essential hypertension I10 401.9 lisinopril-hydrochlorothiazide (PRINZIDE,ZESTORETIC) 20-25 mg Tab      CBC auto differential      TSH      Urinalysis      Comprehensive metabolic panel      Lipid panel   4. Primary osteoarthritis of both knees M17.0 715.16    5. Obesity (BMI 30-39.9) E66.9 278.00    6. Prostate cancer screening Z12.5 V76.44 PSA, Screening     PLAN:   1. Age-appropriate counseling-appropriate low-sodium, low-cholesterol, low carbohydrate diet and exercise daily, annual wellness examination.  2. Patient advised to call for results.  3. Continue current medications.  4. Prescription refill - Tizanidine 4 mg nightly, #30, 5 refills; Lipitor 40 mg nightly, #30, 5 refills; lisinopril-hydrochlorothiazide 20-25 mg daily, #30, 5 refills.   5. See me in 6 months for hypertension follow up.    6. Flu shot this fall.  7. Mobility impairment form completed and given to patient.

## 2017-10-23 ENCOUNTER — LAB VISIT (OUTPATIENT)
Dept: LAB | Facility: HOSPITAL | Age: 62
End: 2017-10-23
Attending: FAMILY MEDICINE
Payer: MEDICARE

## 2017-10-23 DIAGNOSIS — I10 ESSENTIAL HYPERTENSION: ICD-10-CM

## 2017-10-23 DIAGNOSIS — E78.5 HYPERLIPIDEMIA, UNSPECIFIED HYPERLIPIDEMIA TYPE: ICD-10-CM

## 2017-10-23 DIAGNOSIS — Z12.5 PROSTATE CANCER SCREENING: ICD-10-CM

## 2017-10-23 LAB
ALBUMIN SERPL BCP-MCNC: 3.9 G/DL
ALP SERPL-CCNC: 62 U/L
ALT SERPL W/O P-5'-P-CCNC: 34 U/L
ANION GAP SERPL CALC-SCNC: 10 MMOL/L
AST SERPL-CCNC: 27 U/L
BASOPHILS # BLD AUTO: 0.03 K/UL
BASOPHILS NFR BLD: 0.4 %
BILIRUB SERPL-MCNC: 0.6 MG/DL
BUN SERPL-MCNC: 14 MG/DL
CALCIUM SERPL-MCNC: 9.8 MG/DL
CHLORIDE SERPL-SCNC: 102 MMOL/L
CHOLEST SERPL-MCNC: 216 MG/DL
CHOLEST/HDLC SERPL: 3 {RATIO}
CO2 SERPL-SCNC: 27 MMOL/L
COMPLEXED PSA SERPL-MCNC: 6.4 NG/ML
CREAT SERPL-MCNC: 1 MG/DL
DIFFERENTIAL METHOD: ABNORMAL
EOSINOPHIL # BLD AUTO: 0.1 K/UL
EOSINOPHIL NFR BLD: 1.2 %
ERYTHROCYTE [DISTWIDTH] IN BLOOD BY AUTOMATED COUNT: 13.8 %
EST. GFR  (AFRICAN AMERICAN): >60 ML/MIN/1.73 M^2
EST. GFR  (NON AFRICAN AMERICAN): >60 ML/MIN/1.73 M^2
GLUCOSE SERPL-MCNC: 97 MG/DL
HCT VFR BLD AUTO: 41.9 %
HDLC SERPL-MCNC: 73 MG/DL
HDLC SERPL: 33.8 %
HGB BLD-MCNC: 13.4 G/DL
IMM GRANULOCYTES # BLD AUTO: 0.01 K/UL
IMM GRANULOCYTES NFR BLD AUTO: 0.1 %
LDLC SERPL CALC-MCNC: 110.6 MG/DL
LYMPHOCYTES # BLD AUTO: 3.5 K/UL
LYMPHOCYTES NFR BLD: 52.1 %
MCH RBC QN AUTO: 27.8 PG
MCHC RBC AUTO-ENTMCNC: 32 G/DL
MCV RBC AUTO: 87 FL
MONOCYTES # BLD AUTO: 0.6 K/UL
MONOCYTES NFR BLD: 8.5 %
NEUTROPHILS # BLD AUTO: 2.6 K/UL
NEUTROPHILS NFR BLD: 37.7 %
NONHDLC SERPL-MCNC: 143 MG/DL
NRBC BLD-RTO: 0 /100 WBC
PLATELET # BLD AUTO: 255 K/UL
PMV BLD AUTO: 10.9 FL
POTASSIUM SERPL-SCNC: 3.8 MMOL/L
PROT SERPL-MCNC: 8.1 G/DL
RBC # BLD AUTO: 4.82 M/UL
SODIUM SERPL-SCNC: 139 MMOL/L
TRIGL SERPL-MCNC: 162 MG/DL
TSH SERPL DL<=0.005 MIU/L-ACNC: 1.3 UIU/ML
WBC # BLD AUTO: 6.8 K/UL

## 2017-10-23 PROCEDURE — 84443 ASSAY THYROID STIM HORMONE: CPT

## 2017-10-23 PROCEDURE — 80061 LIPID PANEL: CPT

## 2017-10-23 PROCEDURE — 85025 COMPLETE CBC W/AUTO DIFF WBC: CPT

## 2017-10-23 PROCEDURE — 80053 COMPREHEN METABOLIC PANEL: CPT

## 2017-10-23 PROCEDURE — 84153 ASSAY OF PSA TOTAL: CPT

## 2017-10-23 PROCEDURE — 36415 COLL VENOUS BLD VENIPUNCTURE: CPT | Mod: PO

## 2018-04-10 DIAGNOSIS — I10 ESSENTIAL HYPERTENSION: ICD-10-CM

## 2018-04-11 RX ORDER — LISINOPRIL AND HYDROCHLOROTHIAZIDE 20; 25 MG/1; MG/1
TABLET ORAL
Qty: 90 TABLET | Refills: 0 | Status: SHIPPED | OUTPATIENT
Start: 2018-04-11 | End: 2018-08-08 | Stop reason: SDUPTHER

## 2018-04-20 ENCOUNTER — HOSPITAL ENCOUNTER (OUTPATIENT)
Dept: RADIOLOGY | Facility: HOSPITAL | Age: 63
Discharge: HOME | End: 2018-04-20
Attending: FAMILY MEDICINE
Payer: MEDICARE

## 2018-04-20 ENCOUNTER — OFFICE VISIT (OUTPATIENT)
Dept: FAMILY MEDICINE | Facility: CLINIC | Age: 63
End: 2018-04-20
Payer: MEDICARE

## 2018-04-20 VITALS
DIASTOLIC BLOOD PRESSURE: 80 MMHG | HEART RATE: 100 BPM | HEIGHT: 76 IN | SYSTOLIC BLOOD PRESSURE: 126 MMHG | OXYGEN SATURATION: 97 % | BODY MASS INDEX: 31.46 KG/M2 | RESPIRATION RATE: 18 BRPM | TEMPERATURE: 99 F | WEIGHT: 258.38 LBS

## 2018-04-20 DIAGNOSIS — I10 ESSENTIAL HYPERTENSION: Primary | ICD-10-CM

## 2018-04-20 DIAGNOSIS — M79.672 CHRONIC FOOT PAIN, LEFT: ICD-10-CM

## 2018-04-20 DIAGNOSIS — G89.29 CHRONIC HIP PAIN, UNSPECIFIED LATERALITY: ICD-10-CM

## 2018-04-20 DIAGNOSIS — G89.29 CHRONIC FOOT PAIN, LEFT: ICD-10-CM

## 2018-04-20 DIAGNOSIS — M25.559 CHRONIC HIP PAIN, UNSPECIFIED LATERALITY: ICD-10-CM

## 2018-04-20 DIAGNOSIS — E66.9 OBESITY (BMI 30-39.9): ICD-10-CM

## 2018-04-20 DIAGNOSIS — R97.20 ELEVATED PSA: ICD-10-CM

## 2018-04-20 DIAGNOSIS — E78.5 HYPERLIPIDEMIA, UNSPECIFIED HYPERLIPIDEMIA TYPE: ICD-10-CM

## 2018-04-20 PROCEDURE — 73630 X-RAY EXAM OF FOOT: CPT | Mod: TC,FY,PO,LT

## 2018-04-20 PROCEDURE — 99214 OFFICE O/P EST MOD 30 MIN: CPT | Mod: S$GLB,,, | Performed by: FAMILY MEDICINE

## 2018-04-20 PROCEDURE — 3074F SYST BP LT 130 MM HG: CPT | Mod: CPTII,S$GLB,, | Performed by: FAMILY MEDICINE

## 2018-04-20 PROCEDURE — 99499 UNLISTED E&M SERVICE: CPT | Mod: S$GLB,,, | Performed by: FAMILY MEDICINE

## 2018-04-20 PROCEDURE — 73521 X-RAY EXAM HIPS BI 2 VIEWS: CPT | Mod: 26,,, | Performed by: RADIOLOGY

## 2018-04-20 PROCEDURE — 99999 PR PBB SHADOW E&M-EST. PATIENT-LVL IV: CPT | Mod: PBBFAC,,, | Performed by: FAMILY MEDICINE

## 2018-04-20 PROCEDURE — 3079F DIAST BP 80-89 MM HG: CPT | Mod: CPTII,S$GLB,, | Performed by: FAMILY MEDICINE

## 2018-04-20 PROCEDURE — 73630 X-RAY EXAM OF FOOT: CPT | Mod: 26,LT,, | Performed by: RADIOLOGY

## 2018-04-20 PROCEDURE — 73521 X-RAY EXAM HIPS BI 2 VIEWS: CPT | Mod: TC,FY,PO

## 2018-04-20 NOTE — PROGRESS NOTES
Subjective:       Patient ID: Mitchell Mcmillan is a 62 y.o. male.    Chief Complaint: Hypertension; Hyperlipidemia; and Follow-up    Mr. Mcmillan comes in today for 6-month hypertension and hyperlipidemia follow-up.  He is not fasting but has taken medication today.  He states he monitors his diet some times and performs yard work for exercise.    He reports having left foot pain with walking on his feet in the morning; he states foot pain resolves across the day.  However, he does state foot hurts more with standing for too long.    He also reports having bilateral knee pain due to arthritis and right hip pain now which causes insomnia.  He states he takes Advil p.m. for pain which seems to work better than taking Mobic and tizanidine.      He states he takes pumpkin seed with help for urine flow.  He reports today not reporting in the past history of elevated PSA despite information listed on the chart.    He reports having rare headache.    Otherwise, he denies having fever, chills, fatigue, appetite change; shortness of breath, cough, wheezing; chest pain, palpitations, leg swelling; abdominal pain, nausea, vomiting, diarrhea, constipation; unusual urinary symptoms; back pain; anxiety, depression, homicidal or suicidal thoughts.      Current Outpatient Prescriptions:  atorvastatin (LIPITOR) 40 MG tablet, Take 1 tablet (40 mg total) by mouth once daily.  lisinopril-hydrochlorothiazide (PRINZIDE,ZESTORETIC) 20-25 mg Tab, TAKE 1 TABLET EVERY DAY  meloxicam (MOBIC) 15 MG tablet, TAKE ONE TABLET BY MOUTH ONCE DAILY AS NEEDED WITH FOOD  tizanidine (ZANAFLEX) 4 MG tablet, Take 1 tablet (4 mg total) by mouth nightly as needed.        Labs:                   WBC                      6.80                10/23/2017                 HGB                      13.4 (L)            10/23/2017                 HCT                      41.9                10/23/2017                 PLT                      255                  10/23/2017                 CHOL                     216 (H)             10/23/2017                 TRIG                     162 (H)             10/23/2017                 HDL                      73                  10/23/2017                 ALT                      34                  10/23/2017                 AST                      27                  10/23/2017                 NA                       139                 10/23/2017                 K                        3.8                 10/23/2017                 CL                       102                 10/23/2017                 CREATININE               1.0                 10/23/2017                 BUN                      14                  10/23/2017                 CO2                      27                  10/23/2017                 TSH                      1.302               10/23/2017                 PSA                      6.4 (H)             10/23/2017               LDLCALC                  110.6               10/23/2017                Hypertension   Associated symptoms include headaches. Pertinent negatives include no chest pain, palpitations or shortness of breath.   Hyperlipidemia   Associated symptoms include myalgias. Pertinent negatives include no chest pain or shortness of breath.     Review of Systems   Constitutional: Negative for activity change, appetite change, chills, fatigue and fever.        Weight  116 kg (255 lb 11.7 oz) at September 21, 2017 visit.   Respiratory: Negative for cough, shortness of breath and wheezing.    Cardiovascular: Negative for chest pain, palpitations and leg swelling.   Gastrointestinal: Negative for abdominal pain, constipation, diarrhea, nausea and vomiting.   Genitourinary: Negative for difficulty urinating.        See history of present illness.   Musculoskeletal: Positive for arthralgias and myalgias. Negative for back pain.   Neurological: Positive for headaches.        Rare.    Psychiatric/Behavioral: Negative for dysphoric mood and suicidal ideas. The patient is not nervous/anxious.         Negative for homicidal ideas.       Objective:      Physical Exam   Constitutional: He is oriented to person, place, and time. He appears well-developed and well-nourished. No distress.   Pleasant.   Neck: Normal range of motion. Neck supple. No thyromegaly present.   Cardiovascular: Normal rate, regular rhythm, normal heart sounds and intact distal pulses.    No murmur heard.  Pulses:       Dorsalis pedis pulses are 3+ on the right side, and 3+ on the left side.   Pulmonary/Chest: Effort normal and breath sounds normal. No respiratory distress. He has no wheezes.   Abdominal: Soft. Bowel sounds are normal. He exhibits no distension and no mass. There is no tenderness. There is no rebound and no guarding.   Musculoskeletal: Normal range of motion. He exhibits no edema or tenderness.   He is ambulatory without problems. Non tender hips, knees, left foot with full range motion noted.   Feet:   Left Foot:   Protective Sensation: 5 sites tested. 5 sites sensed.   Skin Integrity: Negative for ulcer or skin breakdown.   Lymphadenopathy:     He has no cervical adenopathy.   Neurological: He is alert and oriented to person, place, and time.   Skin: He is not diaphoretic.   Psychiatric: He has a normal mood and affect. His behavior is normal. Judgment and thought content normal.   Vitals reviewed.      Assessment:       1. Essential hypertension    2. Hyperlipidemia, unspecified hyperlipidemia type    3. Elevated PSA    4. Chronic hip pain, unspecified laterality    5. Chronic foot pain, left    6. Obesity (BMI 30-39.9)        Plan:       1.  Lab:  PSA.  Left foot; bilateral hip and pelvic x-rays. Patient advised to call for results.  2.  If PSA elevation persists, urology consultation advised.  3.  Based on x-ray results, orthopedic versus physiatry (pain management) consultation.  4.  Continue current  medications, follow low sodium, low cholesterol, low carb diet, daily walks.  5.  Flu shot this fall.  6.  See me in September 2018 for fasting annual wellness examination.

## 2018-04-26 ENCOUNTER — TELEPHONE (OUTPATIENT)
Dept: FAMILY MEDICINE | Facility: CLINIC | Age: 63
End: 2018-04-26

## 2018-04-26 DIAGNOSIS — R97.20 ELEVATED PSA: Primary | ICD-10-CM

## 2018-04-26 NOTE — TELEPHONE ENCOUNTER
----- Message from Shani Kasper sent at 4/26/2018  9:15 AM CDT -----  Contact: pt  He's calling in regards to results pls call pt back at [phone] (home)

## 2018-04-26 NOTE — TELEPHONE ENCOUNTER
Advise pt left foot x-ray shows arthritis, bone spur.  Pelvic x-ray shows  Possible sclerosis in left hip where previous surgery done.  He did not get PSA lab done; really need that.  Come back for it, then call for result Thanks.

## 2018-04-29 PROBLEM — R97.20 ELEVATED PSA: Status: ACTIVE | Noted: 2018-04-29

## 2018-05-15 ENCOUNTER — LAB VISIT (OUTPATIENT)
Dept: LAB | Facility: HOSPITAL | Age: 63
End: 2018-05-15
Attending: FAMILY MEDICINE
Payer: MEDICARE

## 2018-05-15 DIAGNOSIS — R97.20 ELEVATED PSA: ICD-10-CM

## 2018-05-15 LAB — COMPLEXED PSA SERPL-MCNC: 10.1 NG/ML

## 2018-05-15 PROCEDURE — 36415 COLL VENOUS BLD VENIPUNCTURE: CPT | Mod: PO

## 2018-05-15 PROCEDURE — 84153 ASSAY OF PSA TOTAL: CPT

## 2018-05-21 ENCOUNTER — TELEPHONE (OUTPATIENT)
Dept: FAMILY MEDICINE | Facility: CLINIC | Age: 63
End: 2018-05-21

## 2018-05-21 DIAGNOSIS — R97.20 ELEVATED PSA: Primary | ICD-10-CM

## 2018-05-22 DIAGNOSIS — R97.20 ELEVATED PSA: Primary | ICD-10-CM

## 2018-07-17 ENCOUNTER — PES CALL (OUTPATIENT)
Dept: ADMINISTRATIVE | Facility: CLINIC | Age: 63
End: 2018-07-17

## 2018-08-01 ENCOUNTER — OFFICE VISIT (OUTPATIENT)
Dept: UROLOGY | Facility: CLINIC | Age: 63
End: 2018-08-01
Payer: MEDICARE

## 2018-08-01 VITALS — BODY MASS INDEX: 31.06 KG/M2 | WEIGHT: 255.19 LBS

## 2018-08-01 DIAGNOSIS — R97.20 ELEVATED PSA: Primary | ICD-10-CM

## 2018-08-01 DIAGNOSIS — N40.0 BENIGN PROSTATIC HYPERPLASIA, UNSPECIFIED WHETHER LOWER URINARY TRACT SYMPTOMS PRESENT: ICD-10-CM

## 2018-08-01 DIAGNOSIS — Z12.5 ENCOUNTER FOR SCREENING FOR MALIGNANT NEOPLASM OF PROSTATE: ICD-10-CM

## 2018-08-01 LAB
BILIRUB SERPL-MCNC: NORMAL MG/DL
BLOOD URINE, POC: NORMAL
COLOR, POC UA: NORMAL
GLUCOSE UR QL STRIP: NORMAL
KETONES UR QL STRIP: NORMAL
LEUKOCYTE ESTERASE URINE, POC: NORMAL
NITRITE, POC UA: NORMAL
PH, POC UA: 5
PROTEIN, POC: NORMAL
SPECIFIC GRAVITY, POC UA: 1.01
UROBILINOGEN, POC UA: NORMAL

## 2018-08-01 PROCEDURE — 99204 OFFICE O/P NEW MOD 45 MIN: CPT | Mod: 25,S$GLB,, | Performed by: UROLOGY

## 2018-08-01 PROCEDURE — 99999 PR PBB SHADOW E&M-EST. PATIENT-LVL I: CPT | Mod: PBBFAC,,, | Performed by: UROLOGY

## 2018-08-01 PROCEDURE — 81002 URINALYSIS NONAUTO W/O SCOPE: CPT | Mod: S$GLB,,, | Performed by: UROLOGY

## 2018-08-01 PROCEDURE — 3008F BODY MASS INDEX DOCD: CPT | Mod: CPTII,S$GLB,, | Performed by: UROLOGY

## 2018-08-01 RX ORDER — TAMSULOSIN HYDROCHLORIDE 0.4 MG/1
0.4 CAPSULE ORAL DAILY
Qty: 30 CAPSULE | Refills: 11 | Status: SHIPPED | OUTPATIENT
Start: 2018-08-01 | End: 2018-11-01 | Stop reason: SDUPTHER

## 2018-08-01 NOTE — LETTER
August 1, 2018      Jessica Chinchilla MD  [address]  [facility] [address]           [facility]  [address] [place], [place] [address] [facility] [address]  Phone: [phone]  Fax: [phone]          Patient: Mitchell Mcmillan   MR Number: [id number]   YOB: 1955   Date of Visit: 8/1/2018       Dear Dr. Jessica Chinchilla:    Thank you for referring Mitchell Mcmillan to me for evaluation. Attached you will find relevant portions of my assessment and plan of care.    If you have questions, please do not hesitate to call me. I look forward to following Mitchell Mcmillan along with you.    Sincerely,    Shaun Zimmerman IV, MD    Enclosure  CC:  No Recipients    If you would like to receive this communication electronically, please contact [email or web address] or [phone] to request more information on [employer] Link access.    For providers and/or their staff who would like to refer a patient to [facility], please contact us through our one-stop-shop provider referral line, [facility] , at [phone].    If you feel you have received this communication in error or would no longer like to receive these types of communications, please e-mail [email or web address]

## 2018-08-01 NOTE — PROGRESS NOTES
Chief Complaint: Elevated PSA    HPI:   8/1/18:  63 yo man referred by Dr. Chinchilla for elevated PSA.  Prostate biopsy was done long ago, 7-8 years.  No abd/pelvic pain and no exac/rel factors.  No hematuria.  No urolithiasis.  Nocturia x2. No urinary bother.  No  history.  Normal sexual function.    Allergies:  Patient has no known allergies.    Medications:  has a current medication list which includes the following prescription(s): atorvastatin, lisinopril-hydrochlorothiazide, meloxicam, and tizanidine.    Review of Systems:  General: No fever, chills, fatigability, or weight loss.  Skin: No rashes, itching, or changes in color or texture of skin.  Chest: Denies BUTCHER, cyanosis, wheezing, cough, and sputum production.  Abdomen: Appetite fine. No weight loss. Denies diarrhea, abdominal pain, hematemesis, or blood in stool.  Musculoskeletal: No joint stiffness or swelling. Denies back pain.  : As above.  All other review of systems negative.    PMH:   has a past medical history of Arthritis; Asymmetric SNHL (sensorineural hearing loss) (08/17/2017); Chronic left hip pain (2006); Chronic prostatitis; Elevated PSA, less than 10 ng/ml; Hyperlipidemia (2010); Hypertension (2011); Joint effusion of knee; Midline low back pain without sciatica (6/13/2016); Obesity; Special screening for malignant neoplasms, colon (10/5/2016); Tobacco dependence; and Trouble in sleeping.    PSH:   has a past surgical history that includes Hip Arthroplasty (Left, 4/16/2006); arthrocentesis (Right, 2013); Prostate biopsy (2012); and Colonoscopy (N/A, 10/5/2016).    FamHx: family history includes Arthritis in his father; Depression in his mother; Diabetes in his sister; Early death in his mother; Heart disease (age of onset: 53) in his mother; Hypertension in his mother, sister, and sister; No Known Problems in his brother, brother, brother, and daughter; Stroke in his father.    SocHx:  reports that he quit smoking about 14 months ago. His  smoking use included Cigarettes. He started smoking about 7 years ago. He has a 0.50 pack-year smoking history. He has never used smokeless tobacco. He reports that he drinks about 1.2 oz of alcohol per week . He reports that he does not use drugs.      Physical Exam:  There were no vitals filed for this visit.  General: A&Ox3, no apparent distress, no deformities  Neck: No masses, normal thyroid  Lungs: normal inspiration, no use of accessory muscles  Heart: normal pulse, no arrhythmias  Abdomen: Soft, NT, ND, no masses, no hernias, no hepatosplenomegaly  Lymphatic: Neck and groin nodes negative  Skin: The skin is warm and dry. No jaundice.  Ext: No c/c/e.  : Test desc right, no abnormalities of epididymus, left surg absent. Penis normal, with normal penile and scrotal skin. Meatus normal. Normal rectal tone, no hemorrhoids. Prost 40 gm no nodules or masses appreciated. SV not palpable. Perineum and anus normal.    Labs/Studies:   Urinalysis performed in clinic, summary: UA normal  PSA    11/13: 7.1    9/13: 8.1    5/16: 8.7    10/17: 6.4    5/18: 10.2    Impression/Plan:   1. Elevated PSA but negative biopsy in years past with PSA 7-8 baseline.  PSA 3/6 with RTC 6 mo.  2. Flomax to see if it helps LUTS.

## 2018-08-08 DIAGNOSIS — I10 ESSENTIAL HYPERTENSION: ICD-10-CM

## 2018-08-10 RX ORDER — LISINOPRIL AND HYDROCHLOROTHIAZIDE 20; 25 MG/1; MG/1
TABLET ORAL
Qty: 90 TABLET | Refills: 0 | Status: SHIPPED | OUTPATIENT
Start: 2018-08-10 | End: 2018-11-07 | Stop reason: SDUPTHER

## 2018-09-20 ENCOUNTER — OFFICE VISIT (OUTPATIENT)
Dept: FAMILY MEDICINE | Facility: CLINIC | Age: 63
End: 2018-09-20
Payer: MEDICARE

## 2018-09-20 VITALS
DIASTOLIC BLOOD PRESSURE: 72 MMHG | TEMPERATURE: 99 F | BODY MASS INDEX: 30.92 KG/M2 | WEIGHT: 253.88 LBS | HEIGHT: 76 IN | SYSTOLIC BLOOD PRESSURE: 116 MMHG | HEART RATE: 98 BPM | OXYGEN SATURATION: 94 %

## 2018-09-20 DIAGNOSIS — M17.0 PRIMARY OSTEOARTHRITIS OF BOTH KNEES: ICD-10-CM

## 2018-09-20 DIAGNOSIS — I10 ESSENTIAL HYPERTENSION: ICD-10-CM

## 2018-09-20 DIAGNOSIS — Z23 NEED FOR INFLUENZA VACCINATION: ICD-10-CM

## 2018-09-20 DIAGNOSIS — Z00.00 ANNUAL PHYSICAL EXAM: ICD-10-CM

## 2018-09-20 DIAGNOSIS — R97.20 ELEVATED PSA: ICD-10-CM

## 2018-09-20 DIAGNOSIS — E66.9 OBESITY (BMI 30.0-34.9): ICD-10-CM

## 2018-09-20 DIAGNOSIS — Z87.891 HISTORY OF TOBACCO USE: ICD-10-CM

## 2018-09-20 DIAGNOSIS — Z23 NEED FOR PROPHYLACTIC VACCINATION AGAINST STREPTOCOCCUS PNEUMONIAE (PNEUMOCOCCUS): ICD-10-CM

## 2018-09-20 DIAGNOSIS — M19.90 ARTHRITIS: ICD-10-CM

## 2018-09-20 DIAGNOSIS — E78.5 HYPERLIPIDEMIA, UNSPECIFIED HYPERLIPIDEMIA TYPE: ICD-10-CM

## 2018-09-20 LAB
BILIRUB UR QL STRIP: NEGATIVE
CLARITY UR REFRACT.AUTO: CLEAR
COLOR UR AUTO: YELLOW
GLUCOSE UR QL STRIP: NEGATIVE
HGB UR QL STRIP: NEGATIVE
KETONES UR QL STRIP: NEGATIVE
LEUKOCYTE ESTERASE UR QL STRIP: NEGATIVE
NITRITE UR QL STRIP: NEGATIVE
PH UR STRIP: 5 [PH] (ref 5–8)
PROT UR QL STRIP: NEGATIVE
SP GR UR STRIP: 1.02 (ref 1–1.03)
URN SPEC COLLECT METH UR: NORMAL
UROBILINOGEN UR STRIP-ACNC: NEGATIVE EU/DL

## 2018-09-20 PROCEDURE — 3074F SYST BP LT 130 MM HG: CPT | Mod: CPTII,,, | Performed by: FAMILY MEDICINE

## 2018-09-20 PROCEDURE — 99999 PR PBB SHADOW E&M-EST. PATIENT-LVL IV: CPT | Mod: PBBFAC,,, | Performed by: FAMILY MEDICINE

## 2018-09-20 PROCEDURE — 3078F DIAST BP <80 MM HG: CPT | Mod: CPTII,,, | Performed by: FAMILY MEDICINE

## 2018-09-20 PROCEDURE — 81003 URINALYSIS AUTO W/O SCOPE: CPT

## 2018-09-20 PROCEDURE — 99499 UNLISTED E&M SERVICE: CPT | Mod: HCNC,S$GLB,, | Performed by: FAMILY MEDICINE

## 2018-09-20 PROCEDURE — 99397 PER PM REEVAL EST PAT 65+ YR: CPT | Mod: 25,S$PBB,, | Performed by: FAMILY MEDICINE

## 2018-09-20 PROCEDURE — 99214 OFFICE O/P EST MOD 30 MIN: CPT | Mod: PBBFAC,PO | Performed by: FAMILY MEDICINE

## 2018-09-20 PROCEDURE — 90662 IIV NO PRSV INCREASED AG IM: CPT | Mod: PBBFAC,PO

## 2018-09-20 NOTE — PROGRESS NOTES
HISTORY OF PRESENT ILLNESS:  Mr. Mcmillan comes in today for annual wellness examination. He is not fasting but has taken medications today.     END OF LIFE DECISION:  He does not have a living will but desires life support.    Current Outpatient Medications   Medication Sig    atorvastatin (LIPITOR) 40 MG tablet Take 1 tablet (40 mg total) by mouth once daily.    lisinopril-hydrochlorothiazide (PRINZIDE,ZESTORETIC) 20-25 mg Tab TAKE 1 TABLET EVERY DAY    meloxicam (MOBIC) 15 MG tablet TAKE ONE TABLET BY MOUTH ONCE DAILY AS NEEDED WITH FOOD    tamsulosin (FLOMAX) 0.4 mg Cap Take 1 capsule (0.4 mg total) by mouth once daily.    tizanidine (ZANAFLEX) 4 MG tablet Take 1 tablet (4 mg total) by mouth nightly as needed.     SCREENINGS:       Cholesterol: October 23, 2017.     FFS/Colonoscopy: October 5, 2016 - okay; repeat in 10 years.     Prostate/PSA: August 1, 2018 with urologist Dr. Zimmerman. May 15, 2018 - 10.1.      Dexa Scan: Never.     Eye Exam: August 24, 2017 with Dr. Piper.  He wears glasses.     PPD: Negative in the past.     Immunizations:  Td/Tdap - Unsure.  Advised insurance-covered benefit only if injury.                              Zostavax - March 2, 2016.                              Pneumovax - February 17, 2016. Due on/after February 12, 2021.                              Prevnar-13 shot - Never. He desires.                              Seasonal Flu - January 26, 2017.  He desires.                       ROS:  GENERAL: No fever, chills, fatigue or unusual weight change. Appetite normal. Exercises 3 times per week - 15 minutes each time.  Monitors diet. Weight 117.2 kg (258 lb 6.1 oz) at April 10, 2018 visit.  SKIN: No rashes, itching, changes in mole, color or texture of skin or easy bruising.  HEAD: No headaches or recent head trauma.  EYES: No change in vision,   no pain, diplopia, redness or discharge. Wears glasses.  EARS: Denies ear pain, discharge, vertigo but reports decreased  "hearing.  NOSE: No epistaxis or rhinitis. Nontender external nose. Reports slight sneezing cough x few days; took Coricidin-HBP with help.   MOUTH & THROAT: No hoarseness or change in voice. No excessive gum bleeding or mouth sores.  No sore throat.  NODES: Denies swollen glands.  CHEST: Denies BUTCHER, wheezing, cough, hemoptysis or sputum production. Reports slight sneezing cough x few days; took Coricidin-HBP with help.  CARDIOVASCULAR: Denies chest pain, No palpitations.  ABDOMEN: Denies diarrhea, constipation, vomiting, abdominal pain, or blood in stool.  GENITOURINARY: No flank pain, dysuria or hematuria.No nocturia or frequency. No lesions, pain or swelling in genital area. Performs monthly self testicular exam. Saw urologist Dr. Zimmerman on August 1, 2018 for surveillance of elevated PSA, BPH and added Flomax with help for symptoms and advised to follow up with PSA in 6 months - scheduled for February 6, 2019.  ENDOCRINE: Denies diabetes or thyroid problems.  HEME/LYMPH: Denies bleeding problems.  PERIPHERAL VASCULAR: No claudication or cyanosis  MUSCULOSKELETAL: No joint stiffness, pain or swelling except report chronic arthritis hip and knee pains; takes NSAID's with help. Reports occasional heel pain due to spurs. No edema.   NEUROLOGIC: No history of seizures, tremors, alteration of gait or coordination.  PSYCHIATRIC: Denies mood swings, depression, anxiety, homicidal or suicidal thoughts. Denies sleep problems.    PE:   VS:  /72   Pulse 98 Comment: Rechecked by Dr. Chinchilla.  Patient had coffee this morning.  Temp 98.9 °F (37.2 °C) (Oral)   Ht 6' 4" (1.93 m)   Wt 115.2 kg (253 lb 13.8 oz)   SpO2 (!) 94%   BMI 30.90 kg/m²   APPEARANCE:  Well nourished, well developed male, obese and pleasant, alert and oriented in no acute distress.    HEAD: Non tender . Full range of motion.  EYES: PERRL, conjunctiva pink, lids no edema. He wears glasses.  EARS: External canal patent, no swelling or redness. TM's " shiny and clear.  NOSE: Mucosa and turbinates pink, not swollen. No discharge  THROAT: No pharyngeal erythema or exudate. No stridor.    NECK: Supple, no mass, thyroid not enlarged, no mass. No carotid bruit.  NODES: No cervical, axillary lymph node enlargement.  CHEST: Normal respiratory effort. Lungs clear to auscultation.  CARDIOVASCULAR: Normal S1, S2. No rubs, murmurs or gallops.No edema.Pedal pulses palpable bilaterally.  ABDOMEN: Bowel sounds present. Not distended. Soft. No tenderness, masses or organomegaly.  BREAST: Nontender, no asymmetry, nipple discharge, abnormal masses, nodules, lumps.  GENITALIA: Not examined per patient request.  RECTAL: Not examined per patient request.  MUSCULOSKELETAL: No joint deformities or stiffness. He is ambulatory without problems.  SKIN: No rashes or suspicious lesions, normal color and turgor.  NEUROLOGIC:   Cranial Nerves: II-XII grossly intact.  DTR's: Knees, Ankles 2+ and equal bilaterally Gait & Posture: Normal gait and fine motion.  PSYCHIATRIC: Patient alert, oriented x 3. Mood/Affect normal without acute anxiety and depression noted.Judgement and insight-good as he makes appropriate decisions on today's examination.    DIAGNOSIS:    ICD-10-CM ICD-9-CM    1. Annual physical exam Z00.00 V70.0    2. Essential hypertension I10 401.9 CBC auto differential      TSH      Urinalysis      Comprehensive metabolic panel      Lipid panel   3. Hyperlipidemia, unspecified hyperlipidemia type E78.5 272.4 Comprehensive metabolic panel      Lipid panel   4. Elevated PSA R97.20 790.93    5. Arthritis M19.90 716.90    6. Primary osteoarthritis of both knees M17.0 715.16    7. History of tobacco use Z87.891 V15.82 US Abdominal Aorta   8. Obesity (BMI 30.0-34.9) E66.9 278.00    9. Need for influenza vaccination Z23 V04.81 Influenza - High Dose (65+) (PF) (IM)   10. Need for prophylactic vaccination against Streptococcus pneumoniae (pneumococcus) Z23 V03.82 Pneumococcal Conjugate  Vaccine (13 Valent) (IM) (when available)     PLAN:   1.Age-appropriate counseling-appropriate low-sodium, low-cholesterol, low carbohydrate diet and exercise daily, monthly self testicular examination, annual wellness examination.  2. Patient advised to call for results.  3. Continue current medications.  4. Keep follow up with specialists.  5. Follow-up in about 6 months (around 3/20/2019) for hypertension follow up.

## 2018-09-26 ENCOUNTER — TELEPHONE (OUTPATIENT)
Dept: RADIOLOGY | Facility: HOSPITAL | Age: 63
End: 2018-09-26

## 2018-09-27 ENCOUNTER — HOSPITAL ENCOUNTER (OUTPATIENT)
Dept: RADIOLOGY | Facility: HOSPITAL | Age: 63
Discharge: HOME | End: 2018-09-27
Attending: FAMILY MEDICINE
Payer: MEDICARE

## 2018-09-27 DIAGNOSIS — Z87.891 HISTORY OF TOBACCO USE: ICD-10-CM

## 2018-09-27 PROCEDURE — 76775 US EXAM ABDO BACK WALL LIM: CPT | Mod: 26,,, | Performed by: RADIOLOGY

## 2018-09-27 PROCEDURE — 76775 US EXAM ABDO BACK WALL LIM: CPT | Mod: TC,PO

## 2018-11-01 RX ORDER — TAMSULOSIN HYDROCHLORIDE 0.4 MG/1
0.4 CAPSULE ORAL DAILY
Qty: 90 CAPSULE | Refills: 3 | Status: SHIPPED | OUTPATIENT
Start: 2018-11-01 | End: 2019-11-01

## 2018-11-01 NOTE — TELEPHONE ENCOUNTER
----- Message from Lizeth Suaer sent at 11/1/2018 11:31 AM CDT -----  Contact: pt  He's calling to have Pharmacy changed to [facility] and get a refill...     1. What is the name of the medication you are requesting? 1.Tamsulosin 2. Lisinopril   2. What is the dose? 1. 0.4 mg 2. 25 mg  3. How do you take the medication? Orally, topically, etc? orally  4. How often do you take this medication? Once daily for both  5. Do you need a 30 day or 90 day supply? 90 day  6. How many refills are you requesting? 1  7. What is your preferred pharmacy and location of the pharmacy? [facility]  8. Who can we contact with further questions? [phone] (home)

## 2018-11-07 DIAGNOSIS — I10 ESSENTIAL HYPERTENSION: ICD-10-CM

## 2018-11-07 RX ORDER — LISINOPRIL AND HYDROCHLOROTHIAZIDE 20; 25 MG/1; MG/1
1 TABLET ORAL DAILY
Qty: 90 TABLET | Refills: 0 | Status: SHIPPED | OUTPATIENT
Start: 2018-11-07 | End: 2018-11-29 | Stop reason: SDUPTHER

## 2018-11-07 NOTE — TELEPHONE ENCOUNTER
----- Message from Lizeth Loaizae sent at 11/7/2018  9:14 AM CST -----  Contact: pt  He's calling to get a refill...    1. What is the name of the medication you are requesting? Lisinopril  2. What is the dose? 25 mg  3. How do you take the medication? Orally, topically, etc? orally  4. How often do you take this medication? Once daily  5. Do you need a 30 day or 90 day supply? 90 day  6. How many refills are you requesting? 1  7. What is your preferred pharmacy and location of the pharmacy? [facility]  8. Who can we contact with further questions? [phone] ([facility])

## 2018-11-29 DIAGNOSIS — I10 ESSENTIAL HYPERTENSION: ICD-10-CM

## 2018-11-29 RX ORDER — LISINOPRIL AND HYDROCHLOROTHIAZIDE 20; 25 MG/1; MG/1
1 TABLET ORAL DAILY
Qty: 90 TABLET | Refills: 0 | Status: SHIPPED | OUTPATIENT
Start: 2018-11-29 | End: 2019-02-20 | Stop reason: SDUPTHER

## 2018-11-29 NOTE — TELEPHONE ENCOUNTER
----- Message from Nicolasa Crystal sent at 11/29/2018  3:24 PM CST -----  Contact: self- [phone]  Would like to consult with the nurse. Patient is trying to get refill on blood pressure medication. patients states he is complete out of his medication. Please call back at [phone]. Thanks rosemary.       Pt use. [facility].  ..  [facility] [address] [facility] - [address] [address] [facility]  [address] [facility] [address]  Phone: [phone] Fax: [phone]    [facility] [address]  [address] [facility] [address]  Phone: [phone] Fax: [phone]    [facility] - [place], [place] - [address]  [address] [facility] [address]  Phone: [phone] Fax: [phone]

## 2018-11-29 NOTE — TELEPHONE ENCOUNTER
----- Message from Katharien Perez sent at 11/29/2018  2:14 PM CST -----  1. What is the name of the medication you are requesting? Lisinopril  2. What is the dose? 20/25mg  3. How do you take the medication? Orally, topically, etc? orally  4. How often do you take this medication? Takes once daily  5. Do you need a 30 day or 90 day supply? Would like to get a 90 day supply  6. How many refills are you requesting? 1  7. What is your preferred pharmacy and location of the pharmacy? [facility]  8. Who can we contact with further questions? [phone]/////He is out of the med for 3 days now//he thinks he may need to get the med at a local pharmacy//uses//[facility]//please call//sincere//dangelo

## 2019-01-14 ENCOUNTER — OFFICE VISIT (OUTPATIENT)
Dept: FAMILY MEDICINE | Facility: CLINIC | Age: 64
End: 2019-01-14
Payer: MEDICARE

## 2019-01-14 ENCOUNTER — HOSPITAL ENCOUNTER (OUTPATIENT)
Dept: RADIOLOGY | Facility: HOSPITAL | Age: 64
Discharge: HOME | End: 2019-01-14
Attending: REGISTERED NURSE
Payer: MEDICARE

## 2019-01-14 ENCOUNTER — TELEPHONE (OUTPATIENT)
Dept: FAMILY MEDICINE | Facility: CLINIC | Age: 64
End: 2019-01-14

## 2019-01-14 VITALS
HEART RATE: 86 BPM | DIASTOLIC BLOOD PRESSURE: 82 MMHG | SYSTOLIC BLOOD PRESSURE: 118 MMHG | OXYGEN SATURATION: 100 % | BODY MASS INDEX: 32.19 KG/M2 | TEMPERATURE: 98 F | WEIGHT: 264.31 LBS | HEIGHT: 76 IN

## 2019-01-14 DIAGNOSIS — M25.511 ACUTE PAIN OF RIGHT SHOULDER: ICD-10-CM

## 2019-01-14 DIAGNOSIS — M25.511 ACUTE PAIN OF RIGHT SHOULDER: Primary | ICD-10-CM

## 2019-01-14 PROCEDURE — 3079F PR MOST RECENT DIASTOLIC BLOOD PRESSURE 80-89 MM HG: ICD-10-PCS | Mod: CPTII,HCNC,S$GLB, | Performed by: REGISTERED NURSE

## 2019-01-14 PROCEDURE — 3079F DIAST BP 80-89 MM HG: CPT | Mod: CPTII,HCNC,S$GLB, | Performed by: REGISTERED NURSE

## 2019-01-14 PROCEDURE — 3074F PR MOST RECENT SYSTOLIC BLOOD PRESSURE < 130 MM HG: ICD-10-PCS | Mod: CPTII,HCNC,S$GLB, | Performed by: REGISTERED NURSE

## 2019-01-14 PROCEDURE — 99214 PR OFFICE/OUTPT VISIT, EST, LEVL IV, 30-39 MIN: ICD-10-PCS | Mod: HCNC,S$GLB,, | Performed by: REGISTERED NURSE

## 2019-01-14 PROCEDURE — 1101F PR PT FALLS ASSESS DOC 0-1 FALLS W/OUT INJ PAST YR: ICD-10-PCS | Mod: CPTII,HCNC,S$GLB, | Performed by: REGISTERED NURSE

## 2019-01-14 PROCEDURE — 73030 X-RAY EXAM OF SHOULDER: CPT | Mod: 26,HCNC,RT, | Performed by: RADIOLOGY

## 2019-01-14 PROCEDURE — 99999 PR PBB SHADOW E&M-EST. PATIENT-LVL IV: CPT | Mod: PBBFAC,HCNC,, | Performed by: REGISTERED NURSE

## 2019-01-14 PROCEDURE — 3008F BODY MASS INDEX DOCD: CPT | Mod: CPTII,HCNC,S$GLB, | Performed by: REGISTERED NURSE

## 2019-01-14 PROCEDURE — 1101F PT FALLS ASSESS-DOCD LE1/YR: CPT | Mod: CPTII,HCNC,S$GLB, | Performed by: REGISTERED NURSE

## 2019-01-14 PROCEDURE — 3008F PR BODY MASS INDEX (BMI) DOCUMENTED: ICD-10-PCS | Mod: CPTII,HCNC,S$GLB, | Performed by: REGISTERED NURSE

## 2019-01-14 PROCEDURE — 99999 PR PBB SHADOW E&M-EST. PATIENT-LVL IV: ICD-10-PCS | Mod: PBBFAC,HCNC,, | Performed by: REGISTERED NURSE

## 2019-01-14 PROCEDURE — 99214 OFFICE O/P EST MOD 30 MIN: CPT | Mod: HCNC,S$GLB,, | Performed by: REGISTERED NURSE

## 2019-01-14 PROCEDURE — 3074F SYST BP LT 130 MM HG: CPT | Mod: CPTII,HCNC,S$GLB, | Performed by: REGISTERED NURSE

## 2019-01-14 PROCEDURE — 73030 X-RAY EXAM OF SHOULDER: CPT | Mod: TC,FY,HCNC,PO,RT

## 2019-01-14 PROCEDURE — 73030 XR SHOULDER COMPLETE 2 OR MORE VIEWS RIGHT: ICD-10-PCS | Mod: 26,HCNC,RT, | Performed by: RADIOLOGY

## 2019-01-14 RX ORDER — MELOXICAM 15 MG/1
15 TABLET ORAL DAILY
Qty: 30 TABLET | Refills: 2 | Status: SHIPPED | OUTPATIENT
Start: 2019-01-14 | End: 2019-07-19 | Stop reason: SDUPTHER

## 2019-01-14 NOTE — PATIENT INSTRUCTIONS
Bursitis  You have bursitis. This is an inflammation of the bursa. These are small, fluid-filled sacs that surround the larger joints of the body. The bursa help the muscles and tendons move smoothly over the joints.  Bursitis often happens in the shoulder. But it can also affect the elbows, hips, pelvis, knees, toes, and heels. Bursitis can be caused by injury, overuse of the joint, or infection of the bursa. Symptoms include pain and tenderness over a joint. Symptoms get worse with movement.  Bursitis is treated with an anti-inflammatory medicine and by resting the joint. More severe cases require injection of medicine directly into the bursa.    Home care  · Rest the painful joint and protect it from movement. This will allow the inflammation to heal faster.  · Apply an ice pack over the injured area for no more than 15 to 20 minutes. Do this every 3 to 6 hours for the first 24 to 48 hours. Keep using ice packs 3 to 4 times a day until the pain and swelling improves.   · To make an ice pack, put ice cubes in a sealed plastic zip-lock bag. Wrap the bag in a clean, thin towel or cloth. Never put ice or an ice pack directly on the skin. As the ice melts, be careful to avoid getting any wrap or splint wet.  · You may take over-the-counter pain medicine to treat pain and inflammation, unless another medicine was prescribed. Anti-inflammatory pain medicines may be more effective. Talk with your provider beforeusing these medicines if you have chronic liver or kidney disease, or ever had a stomach ulcer or GI (gastrointestinal) bleeding.  · As your symptoms improve, slowly begin to move the joint. Do not overuse the joint. This may cause the symptoms to flare up again.  When to seek medical advice  Call your healthcare provider right away if any of these occur:  · Redness over the painful area  · Increasing pain or swelling at the joint  · Fever of 100.4°F (38°C) or above lasting for 24 to 48 hours  Date Last  Reviewed: 11/21/2015  © 7408-9829 [employer]. [address] [place] [address], [place] [address]. All rights reserved. This information is not intended as a substitute for professional medical care. Always follow your healthcare professional's instructions.

## 2019-01-14 NOTE — TELEPHONE ENCOUNTER
Shoulder xray shows chronic degenerative/arthritic changes, nothing acute.  Follow instructions as discussed at appt, contact us back if pain worsens or persists.

## 2019-01-15 NOTE — PROGRESS NOTES
"Subjective:       Patient ID: Mitchell Mcmillan is a 63 y.o. male.    Chief Complaint: Shoulder Pain      HPI    Mitchell Mcmillan is here today with c/o RT shoulder pain since heavy lifting last week.  Does work with the [employer] --- frequent lifting and handling of heavy objects, boxes, etc.  He has used heat to the area and has been taking ibuprofen 800 with slight noted improvement in pain.  ROM intact.      Review of Systems   Constitutional: Negative.    Respiratory: Negative.    Cardiovascular: Negative.    Musculoskeletal: Positive for arthralgias. Negative for back pain, joint swelling and neck pain.   Neurological: Negative.        Review of patient's allergies indicates:  No Known Allergies    Patient Active Problem List   Diagnosis    Hypertension    Hyperlipidemia    Chronic hip pain    Neck pain    Midline low back pain without sciatica    Genu varum of both lower extremities    Primary osteoarthritis of both knees    Obesity (BMI 30-39.9)    Hearing problem of both ears    Elevated PSA    Arthritis       Current Outpatient Medications on File Prior to Visit   Medication Sig Dispense Refill    lisinopril-hydrochlorothiazide (PRINZIDE,ZESTORETIC) 20-25 mg Tab Take 1 tablet by mouth once daily. 90 tablet 0    tamsulosin (FLOMAX) 0.4 mg Cap Take 1 capsule (0.4 mg total) by mouth once daily. 90 capsule 3    tizanidine (ZANAFLEX) 4 MG tablet Take 1 tablet (4 mg total) by mouth nightly as needed. 30 tablet 5    atorvastatin (LIPITOR) 40 MG tablet Take 1 tablet (40 mg total) by mouth once daily. 30 tablet 5     No current facility-administered medications on file prior to visit.        Past medical, surgical, family and social histories have been reviewed today.        Objective:     Vitals:    01/14/19 1450   BP: 118/82   Pulse: 86   Temp: 97.5 °F (36.4 °C)   TempSrc: Oral   SpO2: 100%   Weight: 119.9 kg (264 lb 5.3 oz)   Height: 6' 4" (1.93 m)   PainSc:   9   PainLoc: Shoulder "         Physical Exam   Constitutional: He is oriented to person, place, and time. He appears well-developed and well-nourished.   Musculoskeletal:        Right shoulder: He exhibits tenderness. He exhibits normal range of motion, no swelling, no effusion, no crepitus, normal pulse and normal strength.        Arms:  Neurological: He is alert and oriented to person, place, and time.   Vitals reviewed.        Diagnosis       1. Acute pain of right shoulder          Assessment/ Plan     Acute pain of right shoulder  · Pain to RT shoulder x 1 week after report of heavy lifting at work; does lift heavy objects frequently --- OA vs bursitis ???  · Ice/heat, rest, stretches.  · Xray pending.  · Medication as directed.  -     X-ray Shoulder 2 or More Views Right; Future; Expected date: 01/14/2019  -     meloxicam (MOBIC) 15 MG tablet; Take 1 tablet (15 mg total) by mouth once daily.  Dispense: 30 tablet; Refill: 2      Follow-up in clinic as needed.        RAFA Simmons  [facility]

## 2019-02-20 DIAGNOSIS — I10 ESSENTIAL HYPERTENSION: ICD-10-CM

## 2019-02-20 NOTE — TELEPHONE ENCOUNTER
----- Message from Judith Ye sent at 2019  1:50 PM CST -----  Contact: pt   Type:  RX Refill Request    Who Called: pt   Refill or New Rx:New Rx   RX Name and Strength:lisinopril-hydrochlorothiazide (PRINZIDE,ZESTORETIC) 20-25 mg Tab  How is the patient currently taking it? (ex. 1XDay): 1 x a day   Is this a 30 day or 90 day RX: 90   Preferred Pharmacy with phone number:  [facility] - [place], [place] - [address]  [address] [facility] [place] [address]  Phone: [phone] Fax: [phone]  Local or Mail Order:mail order  Ordering Provider:Dr. Chinchilla  Would the patient rather a call back or a response via [facility]? Call back   Best Call Back Number:[phone] (home)   Additional Information: pt Rx  and need a new Rx

## 2019-02-22 ENCOUNTER — LAB VISIT (OUTPATIENT)
Dept: LAB | Facility: HOSPITAL | Age: 64
End: 2019-02-22
Attending: UROLOGY
Payer: MEDICARE

## 2019-02-22 DIAGNOSIS — N40.0 BENIGN PROSTATIC HYPERPLASIA, UNSPECIFIED WHETHER LOWER URINARY TRACT SYMPTOMS PRESENT: ICD-10-CM

## 2019-02-22 DIAGNOSIS — Z12.5 ENCOUNTER FOR SCREENING FOR MALIGNANT NEOPLASM OF PROSTATE: ICD-10-CM

## 2019-02-22 DIAGNOSIS — R97.20 ELEVATED PSA: ICD-10-CM

## 2019-02-22 LAB — COMPLEXED PSA SERPL-MCNC: 7.1 NG/ML

## 2019-02-22 PROCEDURE — 36415 COLL VENOUS BLD VENIPUNCTURE: CPT | Mod: HCNC,PO

## 2019-02-22 PROCEDURE — 84153 ASSAY OF PSA TOTAL: CPT | Mod: HCNC

## 2019-02-26 RX ORDER — LISINOPRIL AND HYDROCHLOROTHIAZIDE 20; 25 MG/1; MG/1
1 TABLET ORAL DAILY
Qty: 90 TABLET | Refills: 0 | Status: SHIPPED | OUTPATIENT
Start: 2019-02-26 | End: 2019-05-27 | Stop reason: SDUPTHER

## 2019-03-01 ENCOUNTER — OFFICE VISIT (OUTPATIENT)
Dept: FAMILY MEDICINE | Facility: CLINIC | Age: 64
End: 2019-03-01
Payer: MEDICARE

## 2019-03-01 ENCOUNTER — LAB VISIT (OUTPATIENT)
Dept: LAB | Facility: HOSPITAL | Age: 64
End: 2019-03-01
Attending: FAMILY MEDICINE
Payer: MEDICARE

## 2019-03-01 VITALS
SYSTOLIC BLOOD PRESSURE: 124 MMHG | TEMPERATURE: 98 F | OXYGEN SATURATION: 96 % | BODY MASS INDEX: 32.13 KG/M2 | WEIGHT: 263.88 LBS | HEIGHT: 76 IN | DIASTOLIC BLOOD PRESSURE: 76 MMHG | HEART RATE: 90 BPM | RESPIRATION RATE: 17 BRPM

## 2019-03-01 DIAGNOSIS — Z23 NEED FOR PROPHYLACTIC VACCINATION AGAINST STREPTOCOCCUS PNEUMONIAE (PNEUMOCOCCUS): ICD-10-CM

## 2019-03-01 DIAGNOSIS — R97.20 ELEVATED PSA: ICD-10-CM

## 2019-03-01 DIAGNOSIS — E78.5 HYPERLIPIDEMIA, UNSPECIFIED HYPERLIPIDEMIA TYPE: ICD-10-CM

## 2019-03-01 DIAGNOSIS — E66.9 OBESITY (BMI 30-39.9): ICD-10-CM

## 2019-03-01 DIAGNOSIS — I10 ESSENTIAL HYPERTENSION: ICD-10-CM

## 2019-03-01 DIAGNOSIS — M19.90 ARTHRITIS: ICD-10-CM

## 2019-03-01 LAB
ALBUMIN SERPL BCP-MCNC: 3.9 G/DL
ALP SERPL-CCNC: 58 U/L
ALT SERPL W/O P-5'-P-CCNC: 25 U/L
ANION GAP SERPL CALC-SCNC: 7 MMOL/L
AST SERPL-CCNC: 20 U/L
BILIRUB SERPL-MCNC: 0.5 MG/DL
BUN SERPL-MCNC: 13 MG/DL
CALCIUM SERPL-MCNC: 9.6 MG/DL
CHLORIDE SERPL-SCNC: 103 MMOL/L
CO2 SERPL-SCNC: 29 MMOL/L
CREAT SERPL-MCNC: 1 MG/DL
EST. GFR  (AFRICAN AMERICAN): >60 ML/MIN/1.73 M^2
EST. GFR  (NON AFRICAN AMERICAN): >60 ML/MIN/1.73 M^2
GLUCOSE SERPL-MCNC: 77 MG/DL
POTASSIUM SERPL-SCNC: 4.3 MMOL/L
PROT SERPL-MCNC: 7.6 G/DL
SODIUM SERPL-SCNC: 139 MMOL/L

## 2019-03-01 PROCEDURE — 99214 OFFICE O/P EST MOD 30 MIN: CPT | Mod: HCNC,S$GLB,, | Performed by: FAMILY MEDICINE

## 2019-03-01 PROCEDURE — 99999 PR PBB SHADOW E&M-EST. PATIENT-LVL III: CPT | Mod: PBBFAC,HCNC,, | Performed by: FAMILY MEDICINE

## 2019-03-01 PROCEDURE — 1101F PR PT FALLS ASSESS DOC 0-1 FALLS W/OUT INJ PAST YR: ICD-10-PCS | Mod: HCNC,CPTII,S$GLB, | Performed by: FAMILY MEDICINE

## 2019-03-01 PROCEDURE — 3078F PR MOST RECENT DIASTOLIC BLOOD PRESSURE < 80 MM HG: ICD-10-PCS | Mod: HCNC,CPTII,S$GLB, | Performed by: FAMILY MEDICINE

## 2019-03-01 PROCEDURE — 3074F PR MOST RECENT SYSTOLIC BLOOD PRESSURE < 130 MM HG: ICD-10-PCS | Mod: HCNC,CPTII,S$GLB, | Performed by: FAMILY MEDICINE

## 2019-03-01 PROCEDURE — 36415 COLL VENOUS BLD VENIPUNCTURE: CPT | Mod: HCNC,PO

## 2019-03-01 PROCEDURE — 3078F DIAST BP <80 MM HG: CPT | Mod: HCNC,CPTII,S$GLB, | Performed by: FAMILY MEDICINE

## 2019-03-01 PROCEDURE — G0009 PNEUMOCOCCAL CONJUGATE VACCINE 13-VALENT LESS THAN 5YO & GREATER THAN: ICD-10-PCS | Mod: S$GLB,,, | Performed by: FAMILY MEDICINE

## 2019-03-01 PROCEDURE — 3008F BODY MASS INDEX DOCD: CPT | Mod: HCNC,CPTII,S$GLB, | Performed by: FAMILY MEDICINE

## 2019-03-01 PROCEDURE — 1101F PT FALLS ASSESS-DOCD LE1/YR: CPT | Mod: HCNC,CPTII,S$GLB, | Performed by: FAMILY MEDICINE

## 2019-03-01 PROCEDURE — 99999 PR PBB SHADOW E&M-EST. PATIENT-LVL III: ICD-10-PCS | Mod: PBBFAC,HCNC,, | Performed by: FAMILY MEDICINE

## 2019-03-01 PROCEDURE — 3074F SYST BP LT 130 MM HG: CPT | Mod: HCNC,CPTII,S$GLB, | Performed by: FAMILY MEDICINE

## 2019-03-01 PROCEDURE — 90670 PCV13 VACCINE IM: CPT | Mod: S$GLB,,, | Performed by: FAMILY MEDICINE

## 2019-03-01 PROCEDURE — 3008F PR BODY MASS INDEX (BMI) DOCUMENTED: ICD-10-PCS | Mod: HCNC,CPTII,S$GLB, | Performed by: FAMILY MEDICINE

## 2019-03-01 PROCEDURE — G0009 ADMIN PNEUMOCOCCAL VACCINE: HCPCS | Mod: S$GLB,,, | Performed by: FAMILY MEDICINE

## 2019-03-01 PROCEDURE — 90670 PNEUMOCOCCAL CONJUGATE VACCINE 13-VALENT LESS THAN 5YO & GREATER THAN: ICD-10-PCS | Mod: S$GLB,,, | Performed by: FAMILY MEDICINE

## 2019-03-01 PROCEDURE — 80053 COMPREHEN METABOLIC PANEL: CPT | Mod: HCNC

## 2019-03-01 PROCEDURE — 99214 PR OFFICE/OUTPT VISIT, EST, LEVL IV, 30-39 MIN: ICD-10-PCS | Mod: HCNC,S$GLB,, | Performed by: FAMILY MEDICINE

## 2019-03-01 NOTE — PROGRESS NOTES
Mitchell Mcmillan    Chief Complaint   Patient presents with    Hypertension    Follow-up       History of Present Illness:   Mr. Mcmillan comes in today for 6-month hypertension follow-up.  He is not fasting but has taken medications today.  He states he exercises a little more and monitors his diet.  He states home blood pressure levels ranged 120's/60's.    He reports having chronic, occasional left hip pain and knee(s) pain due to osteoarthritis and takes Mobic and tizanidine with help.    He follows with urologist Dr. Zimmerman for surveillance of elevated PSA, BPH with last visit on August 1, 2018 and 6-month follow-up advised and scheduled for March 6, 2019.    Otherwise, he denies having fever, chills, fatigue, appetite changes; shortness of breath, cough, wheezing; chest pain, palpitations, leg swelling; abdominal pain, nausea, vomiting, diarrhea, constipation; unusual urinary symptoms; back pain; headache; anxiety, depression, homicidal or suicidal thoughts.         Screening:  Pneumovax - February 17, 2016. Due on/after February 12, 2021.  Prevnar-13 shot - Never. He desires.    Labs:                 WBC                      6.80                10/23/2017                 HGB                      13.4 (L)            10/23/2017                 HCT                      41.9                10/23/2017                 PLT                      255                 10/23/2017                 CHOL                     216 (H)             10/23/2017                 TRIG                     162 (H)             10/23/2017                 HDL                      73                  10/23/2017                 ALT                      34                  10/23/2017                 AST                      27                  10/23/2017                 NA                       139                 10/23/2017                 K                        3.8                 10/23/2017                 CL                       102                  10/23/2017                 CREATININE               1.0                 10/23/2017                 BUN                      14                  10/23/2017                 CO2                      27                  10/23/2017                 TSH                      1.302               10/23/2017                 PSA                      7.1 (H)             02/22/2019               LDLCALC                  110.6               10/23/2017                  Current Outpatient Medications   Medication Sig    atorvastatin (LIPITOR) 40 MG tablet Take 1 tablet (40 mg total) by mouth once daily.    lisinopril-hydrochlorothiazide (PRINZIDE,ZESTORETIC) 20-25 mg Tab Take 1 tablet by mouth once daily.    meloxicam (MOBIC) 15 MG tablet Take 1 tablet (15 mg total) by mouth once daily.    tamsulosin (FLOMAX) 0.4 mg Cap Take 1 capsule (0.4 mg total) by mouth once daily.    tizanidine (ZANAFLEX) 4 MG tablet Take 1 tablet (4 mg total) by mouth nightly as needed.       Review of Systems   Constitutional: Negative for activity change, appetite change, chills, fatigue and fever.        Weight  115.2 kg (253 lb 13.8 oz) at September 20, 2018 visit.   Respiratory: Negative for cough, shortness of breath and wheezing.    Cardiovascular: Negative for chest pain, palpitations and leg swelling.   Gastrointestinal: Negative for abdominal pain, constipation, diarrhea, nausea and vomiting.   Genitourinary: Negative for difficulty urinating.        See history of present illness.   Musculoskeletal: Positive for arthralgias. Negative for back pain.   Neurological: Negative for headaches.   Psychiatric/Behavioral: Negative for dysphoric mood and suicidal ideas. The patient is not nervous/anxious.         Negative for homicidal ideas.       Objective:  Physical Exam   Constitutional: He is oriented to person, place, and time. He appears well-developed and well-nourished. No distress.   Pleasant.   Neck: Normal range of motion. Neck  supple. No thyromegaly present.   Cardiovascular: Normal rate, regular rhythm, normal heart sounds and intact distal pulses.   No murmur heard.  Pulses:       Dorsalis pedis pulses are 3+ on the right side, and 3+ on the left side.   Pulmonary/Chest: Effort normal and breath sounds normal. No respiratory distress. He has no wheezes.   Abdominal: Soft. Bowel sounds are normal. He exhibits no distension and no mass. There is no tenderness. There is no rebound and no guarding.   Musculoskeletal: Normal range of motion. He exhibits no edema or tenderness.   He is ambulatory without problems. Non tender hips, knees with full range motion noted.   Feet:   Left Foot:   Protective Sensation: 5 sites tested. 5 sites sensed.   Skin Integrity: Negative for ulcer or skin breakdown.   Lymphadenopathy:     He has no cervical adenopathy.   Neurological: He is alert and oriented to person, place, and time.   Skin: He is not diaphoretic.   Psychiatric: He has a normal mood and affect. His behavior is normal. Judgment and thought content normal.   Vitals reviewed.      ASSESSMENT:  1. Essential hypertension    2. Hyperlipidemia, unspecified hyperlipidemia type    3. Arthritis    4. Elevated PSA    5. Obesity (BMI 30-39.9)    6. Need for prophylactic vaccination against Streptococcus pneumoniae (pneumococcus)        PLAN:  Mitchell was seen today for hypertension and follow-up.    Diagnoses and all orders for this visit:    Essential hypertension  -     Comprehensive metabolic panel; Future    Hyperlipidemia, unspecified hyperlipidemia type  -     Comprehensive metabolic panel; Future    Arthritis    Elevated PSA    Obesity (BMI 30-39.9)    Need for prophylactic vaccination against Streptococcus pneumoniae (pneumococcus)  -     Pneumococcal Conjugate Vaccine (13 Valent) (IM)        Patient advised to call for results.  Continue current medications, follow low sodium, low cholesterol, low carb diet, daily walks.  Keep follow up with  specialists.  Follow-up in about 7 months (around 9/20/2019) for physical.

## 2019-03-06 ENCOUNTER — OFFICE VISIT (OUTPATIENT)
Dept: UROLOGY | Facility: CLINIC | Age: 64
End: 2019-03-06
Payer: MEDICARE

## 2019-03-06 VITALS — WEIGHT: 256 LBS | BODY MASS INDEX: 31.16 KG/M2

## 2019-03-06 DIAGNOSIS — Z12.5 PROSTATE CANCER SCREENING: ICD-10-CM

## 2019-03-06 DIAGNOSIS — N40.0 BENIGN PROSTATIC HYPERPLASIA, UNSPECIFIED WHETHER LOWER URINARY TRACT SYMPTOMS PRESENT: Primary | ICD-10-CM

## 2019-03-06 LAB
BILIRUB SERPL-MCNC: NORMAL MG/DL
BLOOD URINE, POC: NORMAL
COLOR, POC UA: YELLOW
GLUCOSE UR QL STRIP: NORMAL
KETONES UR QL STRIP: NORMAL
LEUKOCYTE ESTERASE URINE, POC: NORMAL
NITRITE, POC UA: NORMAL
PH, POC UA: 6
PROTEIN, POC: NORMAL
SPECIFIC GRAVITY, POC UA: 1.01
UROBILINOGEN, POC UA: NORMAL

## 2019-03-06 PROCEDURE — 99999 PR PBB SHADOW E&M-EST. PATIENT-LVL II: CPT | Mod: PBBFAC,HCNC,, | Performed by: UROLOGY

## 2019-03-06 PROCEDURE — 81002 POCT URINE DIPSTICK WITHOUT MICROSCOPE: ICD-10-PCS | Mod: HCNC,S$GLB,, | Performed by: UROLOGY

## 2019-03-06 PROCEDURE — 1101F PT FALLS ASSESS-DOCD LE1/YR: CPT | Mod: HCNC,CPTII,S$GLB, | Performed by: UROLOGY

## 2019-03-06 PROCEDURE — 3008F BODY MASS INDEX DOCD: CPT | Mod: HCNC,CPTII,S$GLB, | Performed by: UROLOGY

## 2019-03-06 PROCEDURE — 81002 URINALYSIS NONAUTO W/O SCOPE: CPT | Mod: HCNC,S$GLB,, | Performed by: UROLOGY

## 2019-03-06 PROCEDURE — 3008F PR BODY MASS INDEX (BMI) DOCUMENTED: ICD-10-PCS | Mod: HCNC,CPTII,S$GLB, | Performed by: UROLOGY

## 2019-03-06 PROCEDURE — 99214 OFFICE O/P EST MOD 30 MIN: CPT | Mod: 25,HCNC,S$GLB, | Performed by: UROLOGY

## 2019-03-06 PROCEDURE — 99999 PR PBB SHADOW E&M-EST. PATIENT-LVL II: ICD-10-PCS | Mod: PBBFAC,HCNC,, | Performed by: UROLOGY

## 2019-03-06 PROCEDURE — 99214 PR OFFICE/OUTPT VISIT, EST, LEVL IV, 30-39 MIN: ICD-10-PCS | Mod: 25,HCNC,S$GLB, | Performed by: UROLOGY

## 2019-03-06 PROCEDURE — 1101F PR PT FALLS ASSESS DOC 0-1 FALLS W/OUT INJ PAST YR: ICD-10-PCS | Mod: HCNC,CPTII,S$GLB, | Performed by: UROLOGY

## 2019-03-06 RX ORDER — FINASTERIDE 5 MG/1
5 TABLET, FILM COATED ORAL DAILY
Qty: 30 TABLET | Refills: 11 | Status: SHIPPED | OUTPATIENT
Start: 2019-03-06 | End: 2019-08-29 | Stop reason: SDUPTHER

## 2019-03-06 RX ORDER — SILDENAFIL CITRATE 20 MG/1
20 TABLET ORAL
Qty: 50 TABLET | Refills: 11 | Status: SHIPPED | OUTPATIENT
Start: 2019-03-06 | End: 2020-03-09

## 2019-03-06 NOTE — PROGRESS NOTES
Chief Complaint: Elevated PSA    HPI:   3/6/19:  PSA about the same again.  Flomax made his stream a lot better.   8/1/18:  63 yo man referred by Dr. Chinchilla for elevated PSA.  Prostate biopsy was done long ago, 7-8 years.  No abd/pelvic pain and no exac/rel factors.  No hematuria.  No urolithiasis.  Nocturia x2. No urinary bother.  No  history.  Normal sexual function.    Allergies:  Patient has no known allergies.    Medications:  has a current medication list which includes the following prescription(s): atorvastatin, lisinopril-hydrochlorothiazide, meloxicam, tamsulosin, and tizanidine.    Review of Systems:  General: No fever, chills, fatigability, or weight loss.  Skin: No rashes, itching, or changes in color or texture of skin.  Chest: Denies BUTCHER, cyanosis, wheezing, cough, and sputum production.  Abdomen: Appetite fine. No weight loss. Denies diarrhea, abdominal pain, hematemesis, or blood in stool.  Musculoskeletal: No joint stiffness or swelling. Denies back pain.  : As above.  All other review of systems negative.    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    PSH:   has a past surgical history that includes Hip Arthroplasty (Left, 4/16/2006); arthrocentesis (Right, 2013); Prostate biopsy (2012); Colonoscopy (N/A, 10/5/2016); and left orchiectomy.    FamHx: family history includes Arthritis in his father; Depression in his mother; Diabetes in his sister; Early death in his mother; Heart disease (age of onset: 53) in his mother; Hypertension in his mother, sister, and sister; No Known Problems in his brother, brother,  brother, and daughter; Stroke in his father.    SocHx:  reports that he quit smoking about 21 months ago. His smoking use included cigarettes. He started smoking about 8 years ago. He has a 0.50 pack-year smoking history. he has never used smokeless tobacco. He reports that he drinks about 1.2 oz of alcohol per week. He reports that he does not use drugs.      Physical Exam:  There were no vitals filed for this visit.  General: A&Ox3, no apparent distress, no deformities  Neck: No masses, normal thyroid  Lungs: normal inspiration, no use of accessory muscles  Heart: normal pulse, no arrhythmias  Abdomen: Soft, NT, ND  Skin: The skin is warm and dry. No jaundice.  Ext: No c/c/e.  :   8/18: Test desc right, no abnormalities of epididymus, left surg absent. Penis normal, with normal penile and scrotal skin. Meatus normal. Normal rectal tone, no hemorrhoids. Prost 40 gm no nodules or masses appreciated. SV not palpable. Perineum and anus normal.    Labs/Studies:   Urinalysis performed in clinic, summary: UA normal  PSA    11/13: 7.1    9/13: 8.1    5/16: 8.7    10/17: 6.4    5/18: 10.2    2/19: 7.1    Impression/Plan:   1. Elevated PSA but negative biopsy in years past with PSA 7-8 baseline.  PSA in 6 mo with PSA/RTC 12 mo.  2. Flomax continues, adding finasteride.  3. Sildenafil rx.

## 2019-04-15 ENCOUNTER — PES CALL (OUTPATIENT)
Dept: ADMINISTRATIVE | Facility: CLINIC | Age: 64
End: 2019-04-15

## 2019-05-27 DIAGNOSIS — I10 ESSENTIAL HYPERTENSION: ICD-10-CM

## 2019-05-27 RX ORDER — LISINOPRIL AND HYDROCHLOROTHIAZIDE 20; 25 MG/1; MG/1
1 TABLET ORAL DAILY
Qty: 90 TABLET | Refills: 0 | Status: SHIPPED | OUTPATIENT
Start: 2019-05-27 | End: 2019-08-16 | Stop reason: SDUPTHER

## 2019-05-27 NOTE — TELEPHONE ENCOUNTER
----- Message from Gregoria Scott sent at 5/27/2019 10:33 AM CDT -----  Contact: pt   Type:  RX Refill Request    Who Called: BRIANNA KIDD   Refill or New Rx: refill refill   RX Name and Strength: lisinopril 25 mg   How is the patient currently taking it? (ex. 1XDay): 1 daily   Is this a 30 day or 90 day RX: 90 day   Preferred Pharmacy with phone number:     [facility] - [facility] [address]  [address] [address]  Phone: [phone] Fax: [phone]    Local or Mail Order: mail   Ordering Provider: sachi   Would the patient rather a call back or a response via [facility]?  Call   Best Call Back Number: [phone] (home)   Additional Information:

## 2019-05-29 ENCOUNTER — TELEPHONE (OUTPATIENT)
Dept: FAMILY MEDICINE | Facility: CLINIC | Age: 64
End: 2019-05-29

## 2019-05-29 NOTE — TELEPHONE ENCOUNTER
Attempted call to patient to advise July appointment with Dr Chinchilla is unnecessary for medication refills. Dr Chinchilla wants him to cancel and come in to see her in September as previously discussed. Advised on voicemail to return call.

## 2019-05-31 ENCOUNTER — TELEPHONE (OUTPATIENT)
Dept: FAMILY MEDICINE | Facility: CLINIC | Age: 64
End: 2019-05-31

## 2019-05-31 NOTE — TELEPHONE ENCOUNTER
----- Message from Laurence Willis sent at 5/31/2019  8:07 AM CDT -----  Contact: pt  Type:  RX Refill Request    Who Called: Patient  Refill or New Rx:Refill  RX Name and Strength:Blood Pressure Medication  How is the patient currently taking it? (ex. 1XDay):na  Is this a 30 day or 90 day RX:na  Preferred Pharmacy with phone number:[facility]  Local or Mail Order:Local  Ordering Provider:Dr Chinchilla  Would the patient rather a call back or a response via [facility]?call back  Best Call Back Number:[phone]  Additional Information: pt states he left message last week, pt is out meds  
Pt notified of meds sent to the pharmacy.   
none

## 2019-07-01 ENCOUNTER — PES CALL (OUTPATIENT)
Dept: ADMINISTRATIVE | Facility: CLINIC | Age: 64
End: 2019-07-01

## 2019-07-19 DIAGNOSIS — M25.511 ACUTE PAIN OF RIGHT SHOULDER: ICD-10-CM

## 2019-07-22 RX ORDER — MELOXICAM 15 MG/1
TABLET ORAL
Qty: 30 TABLET | Refills: 2 | Status: SHIPPED | OUTPATIENT
Start: 2019-07-22 | End: 2020-08-28

## 2019-08-15 RX ORDER — TAMSULOSIN HYDROCHLORIDE 0.4 MG/1
CAPSULE ORAL
Qty: 90 CAPSULE | Refills: 3 | Status: SHIPPED | OUTPATIENT
Start: 2019-08-15 | End: 2020-03-09 | Stop reason: SDUPTHER

## 2019-08-16 DIAGNOSIS — I10 ESSENTIAL HYPERTENSION: ICD-10-CM

## 2019-08-16 RX ORDER — LISINOPRIL AND HYDROCHLOROTHIAZIDE 20; 25 MG/1; MG/1
1 TABLET ORAL DAILY
Qty: 90 TABLET | Refills: 0 | Status: SHIPPED | OUTPATIENT
Start: 2019-08-16 | End: 2019-12-04 | Stop reason: SDUPTHER

## 2019-08-29 RX ORDER — FINASTERIDE 5 MG/1
5 TABLET, FILM COATED ORAL DAILY
Qty: 90 TABLET | Refills: 3 | Status: SHIPPED | OUTPATIENT
Start: 2019-08-29 | End: 2020-03-09 | Stop reason: SDUPTHER

## 2019-10-14 ENCOUNTER — IMMUNIZATION (OUTPATIENT)
Dept: FAMILY MEDICINE | Facility: CLINIC | Age: 64
End: 2019-10-14
Payer: MEDICARE

## 2019-10-14 ENCOUNTER — TELEPHONE (OUTPATIENT)
Dept: FAMILY MEDICINE | Facility: CLINIC | Age: 64
End: 2019-10-14

## 2019-10-14 PROCEDURE — 90662 FLU VACCINE - HIGH DOSE (65+) PRESERVATIVE FREE IM: ICD-10-PCS | Mod: HCNC,S$GLB,, | Performed by: FAMILY MEDICINE

## 2019-10-14 PROCEDURE — G0008 ADMIN INFLUENZA VIRUS VAC: HCPCS | Mod: HCNC,S$GLB,, | Performed by: FAMILY MEDICINE

## 2019-10-14 PROCEDURE — G0008 FLU VACCINE - HIGH DOSE (65+) PRESERVATIVE FREE IM: ICD-10-PCS | Mod: HCNC,S$GLB,, | Performed by: FAMILY MEDICINE

## 2019-10-14 PROCEDURE — 90662 IIV NO PRSV INCREASED AG IM: CPT | Mod: HCNC,S$GLB,, | Performed by: FAMILY MEDICINE

## 2019-10-28 DIAGNOSIS — I10 ESSENTIAL HYPERTENSION: ICD-10-CM

## 2019-10-28 RX ORDER — LISINOPRIL AND HYDROCHLOROTHIAZIDE 20; 25 MG/1; MG/1
TABLET ORAL
Qty: 90 TABLET | Refills: 0 | Status: SHIPPED | OUTPATIENT
Start: 2019-10-28 | End: 2020-02-25

## 2019-11-13 ENCOUNTER — PES CALL (OUTPATIENT)
Dept: ADMINISTRATIVE | Facility: CLINIC | Age: 64
End: 2019-11-13

## 2019-12-04 ENCOUNTER — OFFICE VISIT (OUTPATIENT)
Dept: FAMILY MEDICINE | Facility: CLINIC | Age: 64
End: 2019-12-04
Payer: MEDICARE

## 2019-12-04 VITALS
TEMPERATURE: 98 F | BODY MASS INDEX: 31.62 KG/M2 | OXYGEN SATURATION: 99 % | HEART RATE: 79 BPM | DIASTOLIC BLOOD PRESSURE: 80 MMHG | WEIGHT: 259.69 LBS | HEIGHT: 76 IN | SYSTOLIC BLOOD PRESSURE: 130 MMHG

## 2019-12-04 DIAGNOSIS — J32.9 SINUSITIS, UNSPECIFIED CHRONICITY, UNSPECIFIED LOCATION: Primary | ICD-10-CM

## 2019-12-04 PROCEDURE — 3079F DIAST BP 80-89 MM HG: CPT | Mod: HCNC,CPTII,S$GLB, | Performed by: REGISTERED NURSE

## 2019-12-04 PROCEDURE — 3075F PR MOST RECENT SYSTOLIC BLOOD PRESS GE 130-139MM HG: ICD-10-PCS | Mod: HCNC,CPTII,S$GLB, | Performed by: REGISTERED NURSE

## 2019-12-04 PROCEDURE — 99999 PR PBB SHADOW E&M-EST. PATIENT-LVL IV: CPT | Mod: PBBFAC,HCNC,, | Performed by: REGISTERED NURSE

## 2019-12-04 PROCEDURE — 1159F MED LIST DOCD IN RCRD: CPT | Mod: HCNC,S$GLB,, | Performed by: REGISTERED NURSE

## 2019-12-04 PROCEDURE — 99214 PR OFFICE/OUTPT VISIT, EST, LEVL IV, 30-39 MIN: ICD-10-PCS | Mod: HCNC,S$GLB,, | Performed by: REGISTERED NURSE

## 2019-12-04 PROCEDURE — 1101F PR PT FALLS ASSESS DOC 0-1 FALLS W/OUT INJ PAST YR: ICD-10-PCS | Mod: HCNC,CPTII,S$GLB, | Performed by: REGISTERED NURSE

## 2019-12-04 PROCEDURE — 1101F PT FALLS ASSESS-DOCD LE1/YR: CPT | Mod: HCNC,CPTII,S$GLB, | Performed by: REGISTERED NURSE

## 2019-12-04 PROCEDURE — 3075F SYST BP GE 130 - 139MM HG: CPT | Mod: HCNC,CPTII,S$GLB, | Performed by: REGISTERED NURSE

## 2019-12-04 PROCEDURE — 99214 OFFICE O/P EST MOD 30 MIN: CPT | Mod: HCNC,S$GLB,, | Performed by: REGISTERED NURSE

## 2019-12-04 PROCEDURE — 99999 PR PBB SHADOW E&M-EST. PATIENT-LVL IV: ICD-10-PCS | Mod: PBBFAC,HCNC,, | Performed by: REGISTERED NURSE

## 2019-12-04 PROCEDURE — 1126F PR PAIN SEVERITY QUANTIFIED, NO PAIN PRESENT: ICD-10-PCS | Mod: HCNC,S$GLB,, | Performed by: REGISTERED NURSE

## 2019-12-04 PROCEDURE — 1159F PR MEDICATION LIST DOCUMENTED IN MEDICAL RECORD: ICD-10-PCS | Mod: HCNC,S$GLB,, | Performed by: REGISTERED NURSE

## 2019-12-04 PROCEDURE — 1126F AMNT PAIN NOTED NONE PRSNT: CPT | Mod: HCNC,S$GLB,, | Performed by: REGISTERED NURSE

## 2019-12-04 PROCEDURE — 3079F PR MOST RECENT DIASTOLIC BLOOD PRESSURE 80-89 MM HG: ICD-10-PCS | Mod: HCNC,CPTII,S$GLB, | Performed by: REGISTERED NURSE

## 2019-12-04 RX ORDER — PROMETHAZINE HYDROCHLORIDE AND DEXTROMETHORPHAN HYDROBROMIDE 6.25; 15 MG/5ML; MG/5ML
5 SYRUP ORAL NIGHTLY PRN
Qty: 35 ML | Refills: 0 | Status: SHIPPED | OUTPATIENT
Start: 2019-12-04 | End: 2020-08-28

## 2019-12-04 RX ORDER — AZITHROMYCIN 250 MG/1
TABLET, FILM COATED ORAL
Qty: 6 TABLET | Refills: 0 | Status: SHIPPED | OUTPATIENT
Start: 2019-12-04 | End: 2019-12-09

## 2019-12-04 RX ORDER — BENZONATATE 100 MG/1
100-200 CAPSULE ORAL 3 TIMES DAILY PRN
Qty: 60 CAPSULE | Refills: 0 | Status: SHIPPED | OUTPATIENT
Start: 2019-12-04 | End: 2020-08-28

## 2019-12-04 NOTE — PROGRESS NOTES
Subjective:       Patient ID: Mitchell Mcmillan is a 64 y.o. male.    Chief Complaint   Patient presents with    Cough       Cough   This is a new problem. The current episode started in the past 7 days. The problem has been gradually worsening. The problem occurs every few hours. The cough is productive of sputum. Associated symptoms include chills, ear congestion, a fever, headaches, myalgias, nasal congestion, postnasal drip, rhinorrhea, a sore throat and sweats. Pertinent negatives include no chest pain, ear pain, hemoptysis, shortness of breath or wheezing. The symptoms are aggravated by lying down. Treatments tried: Coricidin, cough drops, honey/lemon in tea, Magalys-[place]. The treatment provided mild relief. There is no history of COPD or emphysema.         Review of Systems   Constitutional: Positive for chills, diaphoresis, fatigue and fever.   HENT: Positive for congestion, postnasal drip, rhinorrhea, sinus pressure, sinus pain and sore throat. Negative for ear pain, nosebleeds, sneezing and tinnitus.    Eyes: Negative.    Respiratory: Positive for cough. Negative for hemoptysis, shortness of breath and wheezing.    Cardiovascular: Negative for chest pain.   Gastrointestinal: Negative for abdominal pain, diarrhea, nausea and vomiting.   Musculoskeletal: Positive for myalgias.   Neurological: Positive for light-headedness and headaches. Negative for weakness and numbness.   Hematological: Negative for adenopathy.         Review of patient's allergies indicates:  No Known Allergies      Medication List with Changes/Refills   Current Medications    ATORVASTATIN (LIPITOR) 40 MG TABLET    Take 1 tablet (40 mg total) by mouth once daily.    FINASTERIDE (PROSCAR) 5 MG TABLET    Take 1 tablet (5 mg total) by mouth once daily.    LISINOPRIL-HYDROCHLOROTHIAZIDE (PRINZIDE,ZESTORETIC) 20-25 MG TAB    TAKE 1 TABLET BY MOUTH ONCE DAILY **LAST  REFILL  NEED  APPT**    MELOXICAM (MOBIC) 15 MG TABLET    TAKE 1 TABLET BY MOUTH  "ONCE DAILY    SILDENAFIL (REVATIO) 20 MG TAB    Take 1 tablet (20 mg total) by mouth as needed. Take on empty stomach with no alcohol.  Safe to take up to 5 as needed.    TAMSULOSIN (FLOMAX) 0.4 MG CAP    TAKE 1 CAPSULE EVERY DAY    TIZANIDINE (ZANAFLEX) 4 MG TABLET    Take 1 tablet (4 mg total) by mouth nightly as needed.   Discontinued Medications    LISINOPRIL-HYDROCHLOROTHIAZIDE (PRINZIDE,ZESTORETIC) 20-25 MG TAB    Take 1 tablet by mouth once daily.       Patient Active Problem List   Diagnosis    Hypertension    Hyperlipidemia    Chronic hip pain    Midline low back pain without sciatica    Genu varum of both lower extremities    Primary osteoarthritis of both knees    Obesity (BMI 30-39.9)    Hearing problem of both ears    Elevated PSA    Arthritis         Past medical, surgical, family and social histories have been reviewed today.        Objective:     Vitals:    12/04/19 1011   BP: 130/80   Pulse: 79   Temp: 98.2 °F (36.8 °C)   SpO2: 99%   Weight: 117.8 kg (259 lb 11.2 oz)   Height: 6' 4" (1.93 m)   PainSc: 0-No pain         Physical Exam   Constitutional: He is oriented to person, place, and time. He appears well-developed and well-nourished.   HENT:   Head: Normocephalic and atraumatic.   Right Ear: Tympanic membrane is retracted (both TM dull, absent LR). Tympanic membrane is not injected, not perforated and not erythematous. No middle ear effusion.   Left Ear: Tympanic membrane is retracted. Tympanic membrane is not injected, not perforated and not erythematous.  No middle ear effusion.   Nose: Mucosal edema and rhinorrhea present. Right sinus exhibits frontal sinus tenderness. Left sinus exhibits frontal sinus tenderness.   Mouth/Throat: Mucous membranes are normal. No oropharyngeal exudate, posterior oropharyngeal edema or posterior oropharyngeal erythema (increased amount of PND noted). No tonsillar exudate.   Eyes: Pupils are equal, round, and reactive to light. Conjunctivae are normal. " Right eye exhibits no discharge. Left eye exhibits no discharge.   Cardiovascular: Normal rate and regular rhythm.   Pulmonary/Chest: Effort normal and breath sounds normal.   Musculoskeletal: Normal range of motion. He exhibits no edema.   Lymphadenopathy:     He has no cervical adenopathy.   Neurological: He is alert and oriented to person, place, and time.   Skin: Skin is warm and dry. Capillary refill takes less than 2 seconds. No rash noted.   Psychiatric: He has a normal mood and affect. His behavior is normal. Judgment and thought content normal.   Vitals reviewed.        Diagnosis       1. Sinusitis, unspecified chronicity, unspecified location          Assessment/ Plan     Sinusitis, unspecified chronicity, unspecified location  -     azithromycin (Z-IZZY) 250 MG tablet; Take 2 tablets by mouth on day 1; Take 1 tablet by mouth on days 2-5  Dispense: 6 tablet; Refill: 0  -     benzonatate (TESSALON) 100 MG capsule; Take 1-2 capsules (100-200 mg total) by mouth 3 (three) times daily as needed for Cough.  Dispense: 60 capsule; Refill: 0  -     promethazine-dextromethorphan (PROMETHAZINE-DM) 6.25-15 mg/5 mL Syrp; Take 5 mLs by mouth nightly as needed.  Dispense: 35 mL; Refill: 0      Medication discussed, take as directed.  Symptomatic care, rest and fluids.  Follow-up in clinic as needed.        Patient Care Team:  Jessica Chinchilla MD as PCP - General (Family Medicine)  David Don LPN as Care Coordinator (Internal Medicine)      RAFA Simmons  [facility]

## 2020-01-14 ENCOUNTER — OFFICE VISIT (OUTPATIENT)
Dept: FAMILY MEDICINE | Facility: CLINIC | Age: 65
End: 2020-01-14
Payer: MEDICARE

## 2020-01-14 VITALS
HEART RATE: 80 BPM | DIASTOLIC BLOOD PRESSURE: 78 MMHG | HEIGHT: 76 IN | BODY MASS INDEX: 31.69 KG/M2 | SYSTOLIC BLOOD PRESSURE: 128 MMHG | TEMPERATURE: 98 F | RESPIRATION RATE: 18 BRPM | OXYGEN SATURATION: 98 % | WEIGHT: 260.25 LBS

## 2020-01-14 DIAGNOSIS — E78.5 HYPERLIPIDEMIA, UNSPECIFIED HYPERLIPIDEMIA TYPE: ICD-10-CM

## 2020-01-14 DIAGNOSIS — H91.93 HEARING PROBLEM OF BOTH EARS: ICD-10-CM

## 2020-01-14 DIAGNOSIS — M17.0 PRIMARY OSTEOARTHRITIS OF BOTH KNEES: ICD-10-CM

## 2020-01-14 DIAGNOSIS — I10 ESSENTIAL HYPERTENSION: ICD-10-CM

## 2020-01-14 DIAGNOSIS — Z00.00 ENCOUNTER FOR PREVENTIVE HEALTH EXAMINATION: Primary | ICD-10-CM

## 2020-01-14 DIAGNOSIS — E66.9 OBESITY (BMI 30-39.9): ICD-10-CM

## 2020-01-14 DIAGNOSIS — G89.29 CHRONIC LEFT HIP PAIN: ICD-10-CM

## 2020-01-14 DIAGNOSIS — M25.552 CHRONIC LEFT HIP PAIN: ICD-10-CM

## 2020-01-14 DIAGNOSIS — R97.20 ELEVATED PSA: ICD-10-CM

## 2020-01-14 PROCEDURE — 3074F SYST BP LT 130 MM HG: CPT | Mod: HCNC,CPTII,S$GLB, | Performed by: NURSE PRACTITIONER

## 2020-01-14 PROCEDURE — 99999 PR PBB SHADOW E&M-EST. PATIENT-LVL IV: CPT | Mod: PBBFAC,HCNC,, | Performed by: NURSE PRACTITIONER

## 2020-01-14 PROCEDURE — G0439 PPPS, SUBSEQ VISIT: HCPCS | Mod: HCNC,S$GLB,, | Performed by: NURSE PRACTITIONER

## 2020-01-14 PROCEDURE — 99999 PR PBB SHADOW E&M-EST. PATIENT-LVL IV: ICD-10-PCS | Mod: PBBFAC,HCNC,, | Performed by: NURSE PRACTITIONER

## 2020-01-14 PROCEDURE — 3078F PR MOST RECENT DIASTOLIC BLOOD PRESSURE < 80 MM HG: ICD-10-PCS | Mod: HCNC,CPTII,S$GLB, | Performed by: NURSE PRACTITIONER

## 2020-01-14 PROCEDURE — 3078F DIAST BP <80 MM HG: CPT | Mod: HCNC,CPTII,S$GLB, | Performed by: NURSE PRACTITIONER

## 2020-01-14 PROCEDURE — G0439 PR MEDICARE ANNUAL WELLNESS SUBSEQUENT VISIT: ICD-10-PCS | Mod: HCNC,S$GLB,, | Performed by: NURSE PRACTITIONER

## 2020-01-14 PROCEDURE — 3074F PR MOST RECENT SYSTOLIC BLOOD PRESSURE < 130 MM HG: ICD-10-PCS | Mod: HCNC,CPTII,S$GLB, | Performed by: NURSE PRACTITIONER

## 2020-01-14 NOTE — PATIENT INSTRUCTIONS
Counseling and Referral of Other Preventative  (Italic type indicates deductible and co-insurance are waived)    Patient Name: Mitchell Mcmillan  Today's Date: 1/14/2020    Health Maintenance       Date Due Completion Date    Shingles Vaccine (2 of 3) 04/27/2016 3/2/2016    Lipid Panel 10/23/2018 10/23/2017    Pneumococcal Vaccine (65+ Low/Medium Risk) (2 of 2 - PPSV23) 02/17/2021 3/1/2019    Colonoscopy 10/05/2026 10/5/2016        No orders of the defined types were placed in this encounter.    The following information is provided to all patients.  This information is to help you find resources for any of the problems found today that may be affecting your health:                Living healthy guide: www.[facility].[place].gov      Understanding Diabetes: www.diabetes.org      Eating healthy: www.cdc.gov/healthyweight      [facility] [place] home safety checklist: www.cdc.gov/steadi/patient.html      Agency on Aging: www.goea.[place].gov      Alcoholics anonymous (AA): www.aa.org      Physical Activity: www.bharathi.nih.gov/fu3kabt      Tobacco use: www.quitwithusla.org

## 2020-01-14 NOTE — PROGRESS NOTES
"Mitchell Mcmillan presented for a  Medicare AWV and comprehensive Health Risk Assessment today. The following components were reviewed and updated:    · Medical history  · Family History  · Social history  · Allergies and Current Medications  · Health Risk Assessment  · Health Maintenance  · Care Team     ** See Completed Assessments for Annual Wellness Visit within the encounter summary.**       The following assessments were completed:  · Living Situation  · CAGE  · Depression Screening  · Timed Get Up and Go  · Whisper Test  · Cognitive Function Screening  · Nutrition Screening  · ADL Screening  · PAQ Screening    Vitals:    01/14/20 0801   BP: 128/78   BP Location: Right arm   Patient Position: Sitting   BP Method: Medium (Manual)   Pulse: 80   Resp: 18   Temp: 97.7 °F (36.5 °C)   TempSrc: Temporal   SpO2: 98%   Weight: 118.1 kg (260 lb 4.1 oz)   Height: 6' 4" (1.93 m)     Body mass index is 31.68 kg/m².  Physical Exam   Constitutional: He appears well-developed. No distress.   HENT:   Head: Normocephalic and atraumatic.   Eyes: Pupils are equal, round, and reactive to light.   Neck: Carotid bruit is not present.   Cardiovascular: Normal rate, regular rhythm, normal heart sounds, intact distal pulses and normal pulses. Exam reveals no gallop.   No murmur heard.  Pulmonary/Chest: Effort normal and breath sounds normal.   Abdominal: Soft. Bowel sounds are normal. He exhibits no distension. There is no tenderness.   Musculoskeletal: Normal range of motion. He exhibits no edema.   Neurological: He exhibits normal muscle tone.   Skin: Skin is warm, dry and intact.   Psychiatric: He has a normal mood and affect. His speech is normal and behavior is normal. Judgment and thought content normal. Cognition and memory are normal.   Nursing note and vitals reviewed.        Diagnoses and health risks identified today and associated recommendations/orders:    1. Encounter for preventive health examination  Completed     2. " Essential hypertension  Chronic on  Zestoretic daily.. Continue current treatment plan as previously prescribed with your PCP.     3. Hyperlipidemia, unspecified hyperlipidemia type  Chronic and Stable.- Due for labs. Schedule for annual with PCP    4. Primary osteoarthritis of both knees  Chronic and Stable on Mobic. Continue current treatment plan as previously prescribed with your PCP    5. Obesity (BMI 30-39.9)  This problem is currently not controlled . BMI 31.68 kg -encourage diet and exercise   Please follow up with your PCP as planned to discuss adjustments to your treatment plan.    6. Elevated PSA  PSA, SCREEN 0.00 - 4.00 ng/mL 7.High   6.4   Chronic and Stable urine follow on Proscar and Flomax . Pt has had neg prostate in the past  Followed by urologist     7. Chronic left hip pain  Chronic and Ongoing. States Mobic  Helps temporary. Decline to see orthopedic again  Continue current treatment plan as previously prescribed with your PCP    8. Hearing problem of both ears  Chronic and Stable. States hearing has not worsen and decline the need for hearing aides at this time     I offered to discuss end of life issues, including information on how to make advance directives that the patient could use to name someone who would make medical decisions on their behalf if they became too ill to make themselves.  _X_Patient is interested, I provided paper work and offered to discuss.    Provided Mitchell with a 5-10 year written screening schedule and personal prevention plan. Recommendations were developed using the USPSTF age appropriate recommendations. Education, counseling, and referrals were provided as needed. After Visit Summary printed and given to patient which includes a list of additional screenings\tests needed.    Follow up in about 1 year (around 1/14/2021).    Gisselle Palomino NP

## 2020-02-25 DIAGNOSIS — I10 ESSENTIAL HYPERTENSION: ICD-10-CM

## 2020-02-25 RX ORDER — LISINOPRIL AND HYDROCHLOROTHIAZIDE 20; 25 MG/1; MG/1
TABLET ORAL
Qty: 90 TABLET | Refills: 0 | Status: SHIPPED | OUTPATIENT
Start: 2020-02-25 | End: 2020-06-06

## 2020-03-02 ENCOUNTER — LAB VISIT (OUTPATIENT)
Dept: LAB | Facility: HOSPITAL | Age: 65
End: 2020-03-02
Attending: UROLOGY
Payer: MEDICARE

## 2020-03-02 DIAGNOSIS — N40.0 BENIGN PROSTATIC HYPERPLASIA, UNSPECIFIED WHETHER LOWER URINARY TRACT SYMPTOMS PRESENT: ICD-10-CM

## 2020-03-02 DIAGNOSIS — Z12.5 PROSTATE CANCER SCREENING: ICD-10-CM

## 2020-03-02 PROCEDURE — 84153 ASSAY OF PSA TOTAL: CPT | Mod: HCNC

## 2020-03-02 PROCEDURE — 36415 COLL VENOUS BLD VENIPUNCTURE: CPT | Mod: HCNC,PO

## 2020-03-03 LAB — COMPLEXED PSA SERPL-MCNC: 7.6 NG/ML (ref 0–4)

## 2020-03-09 ENCOUNTER — OFFICE VISIT (OUTPATIENT)
Dept: UROLOGY | Facility: CLINIC | Age: 65
End: 2020-03-09
Payer: MEDICARE

## 2020-03-09 VITALS
TEMPERATURE: 97 F | WEIGHT: 258.19 LBS | DIASTOLIC BLOOD PRESSURE: 80 MMHG | BODY MASS INDEX: 31.42 KG/M2 | SYSTOLIC BLOOD PRESSURE: 132 MMHG

## 2020-03-09 DIAGNOSIS — Z12.5 PROSTATE CANCER SCREENING: Primary | ICD-10-CM

## 2020-03-09 DIAGNOSIS — I10 HYPERTENSION, UNSPECIFIED TYPE: ICD-10-CM

## 2020-03-09 DIAGNOSIS — N52.9 ERECTILE DYSFUNCTION, UNSPECIFIED ERECTILE DYSFUNCTION TYPE: ICD-10-CM

## 2020-03-09 DIAGNOSIS — N40.0 BENIGN PROSTATIC HYPERPLASIA, UNSPECIFIED WHETHER LOWER URINARY TRACT SYMPTOMS PRESENT: ICD-10-CM

## 2020-03-09 PROCEDURE — 99214 OFFICE O/P EST MOD 30 MIN: CPT | Mod: 25,HCNC,S$GLB, | Performed by: UROLOGY

## 2020-03-09 PROCEDURE — 1126F PR PAIN SEVERITY QUANTIFIED, NO PAIN PRESENT: ICD-10-PCS | Mod: HCNC,S$GLB,, | Performed by: UROLOGY

## 2020-03-09 PROCEDURE — 3075F SYST BP GE 130 - 139MM HG: CPT | Mod: HCNC,CPTII,S$GLB, | Performed by: UROLOGY

## 2020-03-09 PROCEDURE — 99999 PR PBB SHADOW E&M-EST. PATIENT-LVL II: ICD-10-PCS | Mod: PBBFAC,HCNC,, | Performed by: UROLOGY

## 2020-03-09 PROCEDURE — 3079F PR MOST RECENT DIASTOLIC BLOOD PRESSURE 80-89 MM HG: ICD-10-PCS | Mod: HCNC,CPTII,S$GLB, | Performed by: UROLOGY

## 2020-03-09 PROCEDURE — 1101F PT FALLS ASSESS-DOCD LE1/YR: CPT | Mod: HCNC,CPTII,S$GLB, | Performed by: UROLOGY

## 2020-03-09 PROCEDURE — 99214 PR OFFICE/OUTPT VISIT, EST, LEVL IV, 30-39 MIN: ICD-10-PCS | Mod: 25,HCNC,S$GLB, | Performed by: UROLOGY

## 2020-03-09 PROCEDURE — 1159F MED LIST DOCD IN RCRD: CPT | Mod: HCNC,S$GLB,, | Performed by: UROLOGY

## 2020-03-09 PROCEDURE — 81002 URINALYSIS NONAUTO W/O SCOPE: CPT | Mod: HCNC,S$GLB,, | Performed by: UROLOGY

## 2020-03-09 PROCEDURE — 99999 PR PBB SHADOW E&M-EST. PATIENT-LVL II: CPT | Mod: PBBFAC,HCNC,, | Performed by: UROLOGY

## 2020-03-09 PROCEDURE — 3079F DIAST BP 80-89 MM HG: CPT | Mod: HCNC,CPTII,S$GLB, | Performed by: UROLOGY

## 2020-03-09 PROCEDURE — 3075F PR MOST RECENT SYSTOLIC BLOOD PRESS GE 130-139MM HG: ICD-10-PCS | Mod: HCNC,CPTII,S$GLB, | Performed by: UROLOGY

## 2020-03-09 PROCEDURE — 1101F PR PT FALLS ASSESS DOC 0-1 FALLS W/OUT INJ PAST YR: ICD-10-PCS | Mod: HCNC,CPTII,S$GLB, | Performed by: UROLOGY

## 2020-03-09 PROCEDURE — 1126F AMNT PAIN NOTED NONE PRSNT: CPT | Mod: HCNC,S$GLB,, | Performed by: UROLOGY

## 2020-03-09 PROCEDURE — 81002 POCT URINE DIPSTICK WITHOUT MICROSCOPE: ICD-10-PCS | Mod: HCNC,S$GLB,, | Performed by: UROLOGY

## 2020-03-09 PROCEDURE — 1159F PR MEDICATION LIST DOCUMENTED IN MEDICAL RECORD: ICD-10-PCS | Mod: HCNC,S$GLB,, | Performed by: UROLOGY

## 2020-03-09 RX ORDER — TAMSULOSIN HYDROCHLORIDE 0.4 MG/1
1 CAPSULE ORAL DAILY
Qty: 90 CAPSULE | Refills: 3 | Status: SHIPPED | OUTPATIENT
Start: 2020-03-09 | End: 2020-03-09 | Stop reason: SDUPTHER

## 2020-03-09 RX ORDER — FINASTERIDE 5 MG/1
5 TABLET, FILM COATED ORAL DAILY
Qty: 90 TABLET | Refills: 3 | Status: SHIPPED | OUTPATIENT
Start: 2020-03-09 | End: 2020-03-09 | Stop reason: SDUPTHER

## 2020-03-09 RX ORDER — FINASTERIDE 5 MG/1
5 TABLET, FILM COATED ORAL DAILY
Qty: 90 TABLET | Refills: 3 | Status: SHIPPED | OUTPATIENT
Start: 2020-03-09 | End: 2020-08-28 | Stop reason: SDUPTHER

## 2020-03-09 RX ORDER — SILDENAFIL 100 MG/1
100 TABLET, FILM COATED ORAL DAILY PRN
Qty: 30 TABLET | Refills: 11 | Status: SHIPPED | OUTPATIENT
Start: 2020-03-09 | End: 2020-08-28 | Stop reason: SDUPTHER

## 2020-03-09 RX ORDER — TAMSULOSIN HYDROCHLORIDE 0.4 MG/1
1 CAPSULE ORAL DAILY
Qty: 90 CAPSULE | Refills: 3 | Status: SHIPPED | OUTPATIENT
Start: 2020-03-09 | End: 2020-08-28 | Stop reason: SDUPTHER

## 2020-03-09 NOTE — PROGRESS NOTES
Chief Complaint: Elevated PSA    HPI:   3/9/20: Voiding better after a year on finasteride.  Reviewed history in detail. Sildenafil helps ED.    3/6/19:  PSA about the same again.  Flomax made his stream a lot better.   8/1/18:  63 yo man referred by Dr. Chinchilla for elevated PSA.  Prostate biopsy was done long ago, 7-8 years.  No abd/pelvic pain and no exac/rel factors.  No hematuria.  No urolithiasis.  Nocturia x2. No urinary bother.  No  history.  Normal sexual function.    Allergies:  Patient has no known allergies.    Medications:  has a current medication list which includes the following prescription(s): benzonatate, finasteride, lisinopril-hydrochlorothiazide, tamsulosin, tizanidine, atorvastatin, meloxicam, promethazine-dextromethorphan, and sildenafil.    Review of Systems:  General: No fever, chills, fatigability, or weight loss.  Skin: No rashes, itching, or changes in color or texture of skin.  Chest: Denies BUTCHER, cyanosis, wheezing, cough, and sputum production.  Abdomen: Appetite fine. No weight loss. Denies diarrhea, abdominal pain, hematemesis, or blood in stool.  Musculoskeletal: No joint stiffness or swelling. Denies back pain.  : As above.  All other review of systems negative.    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    PSH:   has a past surgical history that includes Hip Arthroplasty (Left, 4/16/2006); arthrocentesis (Right, 2013); Prostate biopsy (2012); Colonoscopy (N/A, 10/5/2016); and left orchiectomy.    FamHx: family history includes Arthritis in his father; Depression in his mother; Diabetes in his sister;  Early death in his mother; Heart disease (age of onset: 53) in his mother; Hypertension in his mother, sister, and sister; No Known Problems in his brother, brother, brother, and daughter; Stroke in his father.    SocHx:  reports that he quit smoking about 2 years ago. His smoking use included cigarettes. He started smoking about 9 years ago. He has a 0.50 pack-year smoking history. He has never used smokeless tobacco. He reports that he drinks about 2.0 standard drinks of alcohol per week. He reports that he does not use drugs.      Physical Exam:  Vitals:    03/09/20 0809   BP: 132/80   Temp: 97.3 °F (36.3 °C)     General: A&Ox3, no apparent distress, no deformities  Neck: No masses, normal thyroid  Lungs: normal inspiration, no use of accessory muscles  Heart: normal pulse, no arrhythmias  Abdomen: Soft, NT, ND  Skin: The skin is warm and dry. No jaundice.  Ext: No c/c/e.  :   8/18: Test desc right, no abnormalities of epididymus, left surg absent. Penis normal, with normal penile and scrotal skin. Meatus normal. Normal rectal tone, no hemorrhoids. Prost 40 gm no nodules or masses appreciated. SV not palpable. Perineum and anus normal.    Labs/Studies:   Urinalysis performed in clinic, summary: UA normal  PSA    11/13: 7.1    9/13: 8.1    5/16: 8.7    10/17: 6.4    5/18: 10.2    2/19: 7.1    3/20: 7.6    Impression/Plan:   1. Elevated PSA but negative biopsy in years past with PSA 7-8 baseline. PSA/RTC 12 mo.  2. BPH: Flomax/finasteride.  3. ED: Sildenafil rx.  4. HTN: stable on current meds

## 2020-06-06 DIAGNOSIS — E78.5 HYPERLIPIDEMIA, UNSPECIFIED HYPERLIPIDEMIA TYPE: ICD-10-CM

## 2020-06-06 DIAGNOSIS — I10 ESSENTIAL HYPERTENSION: ICD-10-CM

## 2020-06-06 RX ORDER — LISINOPRIL AND HYDROCHLOROTHIAZIDE 20; 25 MG/1; MG/1
TABLET ORAL
Qty: 90 TABLET | Refills: 0 | Status: SHIPPED | OUTPATIENT
Start: 2020-06-06 | End: 2020-06-09 | Stop reason: SDUPTHER

## 2020-06-08 ENCOUNTER — TELEPHONE (OUTPATIENT)
Dept: FAMILY MEDICINE | Facility: CLINIC | Age: 65
End: 2020-06-08

## 2020-06-08 DIAGNOSIS — Z01.84 ANTIBODY RESPONSE EXAM: Primary | ICD-10-CM

## 2020-06-08 NOTE — TELEPHONE ENCOUNTER
Spoke with patient. Lab appointment booked on Rose.  Annual booked in August. Patient voiced understanding.

## 2020-06-08 NOTE — TELEPHONE ENCOUNTER
----- Message from Hemant Pathak sent at 6/8/2020  9:57 AM CDT -----  .Type:  Patient Returning Call    Who Called:Mitchell Mcmillan  Who Left Message for Patient: Angelique  Does the patient know what this is regarding?:returning pt's call  Would the patient rather a call back or a response via [facility]? Call back  Best Call Back Number:[phone]  Additional Information:

## 2020-06-08 NOTE — TELEPHONE ENCOUNTER
----- Message from Antwan Francois sent at 6/8/2020  9:18 AM CDT -----  Contact: PT   Type:  RX Refill Request    Who Called: Pt   Refill or New Rx:refill   RX Name and Strength: lisinopriL-hydrochlorothiazide (PRINZIDE,ZESTORETIC) 20-25 mg Tab   How is the patient currently taking it? (ex. 1XDay): once daily   Is this a 30 day or 90 day RX: 90 day   Preferred Pharmacy with phone number:   [facility] [address] [place], [place] [address]  [address] [facility] [address]  Phone: [phone] Fax: [phone]  Local or Mail Order:local   Ordering Provider: sachi   Would the patient rather a call back or a response via [facility]? Call back   Best Call Back Number: [phone] (home)   Additional Information: n/a

## 2020-06-08 NOTE — TELEPHONE ENCOUNTER
Pt needs to be scheduled back in w/me for physical this summer. Order for Covid-19 antibody test is in. Thanks.

## 2020-06-08 NOTE — TELEPHONE ENCOUNTER
----- Message from Antwan Francois sent at 6/8/2020  9:21 AM CDT -----  Contact: PT   The patient is requesting a order for a antibodies COVID-19 test and he would like the appointment to scheduled at the '[place] location if possible, please advise #[phone] (home)

## 2020-06-09 DIAGNOSIS — I10 ESSENTIAL HYPERTENSION: ICD-10-CM

## 2020-06-09 RX ORDER — LISINOPRIL AND HYDROCHLOROTHIAZIDE 20; 25 MG/1; MG/1
TABLET ORAL
Qty: 30 TABLET | Refills: 0 | Status: SHIPPED | OUTPATIENT
Start: 2020-06-09 | End: 2020-06-10

## 2020-06-09 NOTE — TELEPHONE ENCOUNTER
----- Message from Cathleen Dunn sent at 6/9/2020 10:36 AM CDT -----  ..Type:  Patient Returning Call    Who Called:pt   Who Left Message for Patient:  Does the patient know what this is regarding?: refill   Would the patient rather a call back or a response via [employer]? Call back   Best Call Back Number: [phone]  Additional Information: Pt is requesting a call from nurse to f/u refill on lipitor/ pt sates he was approved for his medication. ( precription was called to the pharmacy

## 2020-06-10 ENCOUNTER — LAB VISIT (OUTPATIENT)
Dept: LAB | Facility: HOSPITAL | Age: 65
End: 2020-06-10
Attending: FAMILY MEDICINE
Payer: MEDICARE

## 2020-06-10 DIAGNOSIS — Z01.84 ANTIBODY RESPONSE EXAM: ICD-10-CM

## 2020-06-10 LAB — SARS-COV-2 IGG SERPLBLD QL IA.RAPID: NEGATIVE

## 2020-06-10 PROCEDURE — 86769 SARS-COV-2 COVID-19 ANTIBODY: CPT | Mod: HCNC

## 2020-06-10 PROCEDURE — 36415 COLL VENOUS BLD VENIPUNCTURE: CPT | Mod: HCNC

## 2020-06-10 RX ORDER — ATORVASTATIN CALCIUM 40 MG/1
40 TABLET, FILM COATED ORAL DAILY
Qty: 30 TABLET | Refills: 1 | Status: SHIPPED | OUTPATIENT
Start: 2020-06-10 | End: 2020-08-28 | Stop reason: SDUPTHER

## 2020-06-22 DIAGNOSIS — I10 ESSENTIAL HYPERTENSION: ICD-10-CM

## 2020-06-22 NOTE — TELEPHONE ENCOUNTER
----- Message from Jessica Chinchilla MD sent at 6/22/2020  3:44 AM CDT -----  Advise pt result is normal and shows he likely has not had Covid-19 infection in the past. Thanks.

## 2020-06-23 RX ORDER — LISINOPRIL AND HYDROCHLOROTHIAZIDE 20; 25 MG/1; MG/1
TABLET ORAL
Qty: 90 TABLET | Refills: 0 | Status: SHIPPED | OUTPATIENT
Start: 2020-06-23 | End: 2020-08-28 | Stop reason: SDUPTHER

## 2020-06-25 NOTE — TELEPHONE ENCOUNTER
----- Message from Rea Palomino sent at 3/28/2017  9:48 AM CDT -----  Contact: pt  Pt request all concerning handicap jesse, pt can be reached at [phone]///thxMW   How Severe Is This Condition?: mild Additional History: Patient states 0/10 on pain scale.

## 2020-08-28 ENCOUNTER — OFFICE VISIT (OUTPATIENT)
Dept: FAMILY MEDICINE | Facility: CLINIC | Age: 65
End: 2020-08-28
Payer: MEDICARE

## 2020-08-28 VITALS
SYSTOLIC BLOOD PRESSURE: 122 MMHG | TEMPERATURE: 98 F | HEIGHT: 76 IN | WEIGHT: 254.31 LBS | DIASTOLIC BLOOD PRESSURE: 76 MMHG | BODY MASS INDEX: 30.97 KG/M2 | HEART RATE: 82 BPM | OXYGEN SATURATION: 98 %

## 2020-08-28 DIAGNOSIS — I10 ESSENTIAL HYPERTENSION: ICD-10-CM

## 2020-08-28 DIAGNOSIS — M17.0 PRIMARY OSTEOARTHRITIS OF BOTH KNEES: ICD-10-CM

## 2020-08-28 DIAGNOSIS — R97.20 ELEVATED PSA: ICD-10-CM

## 2020-08-28 DIAGNOSIS — M19.90 ARTHRITIS: ICD-10-CM

## 2020-08-28 DIAGNOSIS — E66.9 OBESITY (BMI 30-39.9): ICD-10-CM

## 2020-08-28 DIAGNOSIS — E78.5 HYPERLIPIDEMIA, UNSPECIFIED HYPERLIPIDEMIA TYPE: ICD-10-CM

## 2020-08-28 DIAGNOSIS — Z00.00 ANNUAL PHYSICAL EXAM: Primary | ICD-10-CM

## 2020-08-28 LAB
BILIRUB UR QL STRIP: NEGATIVE
CLARITY UR REFRACT.AUTO: CLEAR
COLOR UR AUTO: YELLOW
GLUCOSE UR QL STRIP: NEGATIVE
HGB UR QL STRIP: NEGATIVE
KETONES UR QL STRIP: NEGATIVE
LEUKOCYTE ESTERASE UR QL STRIP: NEGATIVE
NITRITE UR QL STRIP: NEGATIVE
PH UR STRIP: 5 [PH] (ref 5–8)
PROT UR QL STRIP: NEGATIVE
SP GR UR STRIP: 1.02 (ref 1–1.03)
URN SPEC COLLECT METH UR: NORMAL

## 2020-08-28 PROCEDURE — 3078F PR MOST RECENT DIASTOLIC BLOOD PRESSURE < 80 MM HG: ICD-10-PCS | Mod: HCNC,CPTII,S$GLB, | Performed by: FAMILY MEDICINE

## 2020-08-28 PROCEDURE — 81003 URINALYSIS AUTO W/O SCOPE: CPT | Mod: HCNC

## 2020-08-28 PROCEDURE — 99999 PR PBB SHADOW E&M-EST. PATIENT-LVL III: ICD-10-PCS | Mod: PBBFAC,HCNC,, | Performed by: FAMILY MEDICINE

## 2020-08-28 PROCEDURE — 3078F DIAST BP <80 MM HG: CPT | Mod: HCNC,CPTII,S$GLB, | Performed by: FAMILY MEDICINE

## 2020-08-28 PROCEDURE — 99499 RISK ADDL DX/OHS AUDIT: ICD-10-PCS | Mod: HCNC,S$GLB,, | Performed by: FAMILY MEDICINE

## 2020-08-28 PROCEDURE — 99999 PR PBB SHADOW E&M-EST. PATIENT-LVL III: CPT | Mod: PBBFAC,HCNC,, | Performed by: FAMILY MEDICINE

## 2020-08-28 PROCEDURE — 3074F SYST BP LT 130 MM HG: CPT | Mod: HCNC,CPTII,S$GLB, | Performed by: FAMILY MEDICINE

## 2020-08-28 PROCEDURE — 99397 PER PM REEVAL EST PAT 65+ YR: CPT | Mod: HCNC,S$GLB,, | Performed by: FAMILY MEDICINE

## 2020-08-28 PROCEDURE — 3074F PR MOST RECENT SYSTOLIC BLOOD PRESSURE < 130 MM HG: ICD-10-PCS | Mod: HCNC,CPTII,S$GLB, | Performed by: FAMILY MEDICINE

## 2020-08-28 PROCEDURE — 99499 UNLISTED E&M SERVICE: CPT | Mod: HCNC,S$GLB,, | Performed by: FAMILY MEDICINE

## 2020-08-28 PROCEDURE — 99397 PR PREVENTIVE VISIT,EST,65 & OVER: ICD-10-PCS | Mod: HCNC,S$GLB,, | Performed by: FAMILY MEDICINE

## 2020-08-28 RX ORDER — ATORVASTATIN CALCIUM 40 MG/1
40 TABLET, FILM COATED ORAL DAILY
Qty: 30 TABLET | Refills: 1 | Status: CANCELLED | OUTPATIENT
Start: 2020-08-28 | End: 2021-08-28

## 2020-08-28 RX ORDER — LISINOPRIL AND HYDROCHLOROTHIAZIDE 20; 25 MG/1; MG/1
TABLET ORAL
Qty: 90 TABLET | Refills: 0 | Status: CANCELLED | OUTPATIENT
Start: 2020-08-28

## 2020-08-28 RX ORDER — ATORVASTATIN CALCIUM 40 MG/1
40 TABLET, FILM COATED ORAL DAILY
Qty: 90 TABLET | Refills: 1 | Status: SHIPPED | OUTPATIENT
Start: 2020-08-28 | End: 2020-09-03 | Stop reason: SDUPTHER

## 2020-08-28 RX ORDER — TIZANIDINE 4 MG/1
4 TABLET ORAL NIGHTLY PRN
Qty: 30 TABLET | Refills: 5 | OUTPATIENT
Start: 2020-08-28

## 2020-08-28 RX ORDER — LISINOPRIL AND HYDROCHLOROTHIAZIDE 20; 25 MG/1; MG/1
TABLET ORAL
Qty: 90 TABLET | Refills: 1 | Status: SHIPPED | OUTPATIENT
Start: 2020-08-28 | End: 2020-10-08 | Stop reason: SDUPTHER

## 2020-08-28 NOTE — PROGRESS NOTES
HISTORY OF PRESENT ILLNESS:  Mr. Mcmillan comes in today for annual wellness examination. He is not fasting but has taken medications today.     END OF LIFE DECISION:  He does not have a living will but desires life support.    Current Outpatient Medications   Medication Sig    atorvastatin (LIPITOR) 40 MG tablet Take 1 tablet (40 mg total) by mouth once daily.    finasteride (PROSCAR) 5 mg tablet Take 1 tablet (5 mg total) by mouth once daily.    lisinopriL-hydrochlorothiazide (PRINZIDE,ZESTORETIC) 20-25 mg Tab TAKE 1 TABLET BY MOUTH ONCE DAILY . APPOINTMENT REQUIRED FOR FUTURE REFILLS    sildenafiL (VIAGRA) 100 MG tablet Take 1 tablet (100 mg total) by mouth daily as needed.    tamsulosin (FLOMAX) 0.4 mg Cap Take 1 capsule (0.4 mg total) by mouth once daily.     SCREENINGS:       Cholesterol: October 23, 2017.     FFS/Colonoscopy: October 5, 2016 - okay; repeat in 10 years.     Prostate/PSA: March 9, 2020 with urologist Dr. Zimmerman. March 2, 2020 - 7.6.      Dexa Scan: Never.     Eye Exam: August 24, 2017 with Dr. Piper but reports [facility] since then.  He wears glasses.     PPD: Negative in the past.     Immunizations:  Td/Tdap - Unsure.  Advised insurance-covered benefit only if injury.                              Zostavax - March 2, 2016.                              Shingrix - Never. Advised patient insurance-covered benefit only at local pharmacy.                              Pneumovax - February 17, 2016. Due on/after February 17, 2021.                              Prevnar-13 shot - March 1, 2019.                              Seasonal Flu - October 14, 2019.                       ROS:  GENERAL: No fever, chills, fatigue or unusual weight change. Appetite normal. Exercises 3 times per week - 15 minutes each time.  Monitors diet. Weight 119.7 kg (263 lb 14.3 oz) at March 1, 2019 visit.  SKIN: No rashes, itching, changes in mole, color or texture of skin or easy bruising.  HEAD: No headaches or recent  "head trauma.  EYES: No change in vision,   no pain, diplopia, redness or discharge. Wears glasses.  EARS: Denies ear pain, discharge, vertigo but reports decreased hearing.  NOSE: No epistaxis or rhinitis. Nontender external nose.   MOUTH & THROAT: No hoarseness or change in voice. No excessive gum bleeding or mouth sores.  No sore throat.  NODES: Denies swollen glands.  CHEST: Denies BUTCHER, wheezing, cough, hemoptysis or sputum production.   CARDIOVASCULAR: Denies chest pain, No palpitations.  ABDOMEN: Denies diarrhea, constipation, vomiting, abdominal pain, or blood in stool.  GENITOURINARY: No flank pain, dysuria or hematuria.No nocturia or frequency. No lesions, pain or swelling in genital area. Performs monthly self testicular exam. Saw urologist Dr. Zimmerman on March 9, 2020 for surveillance of elevated PSA, BPH with 1-year follow up.   ENDOCRINE: Denies diabetes or thyroid problems.  HEME/LYMPH: Denies bleeding problems.  PERIPHERAL VASCULAR: No claudication or cyanosis  MUSCULOSKELETAL: No joint stiffness, pain or swelling except report chronic arthritis hip and knee pains; rarely takes NSAID's or Tylenol with help. No edema.   NEUROLOGIC: No history of seizures, tremors, alteration of gait or coordination.  PSYCHIATRIC: Denies mood swings, depression, anxiety, homicidal or suicidal thoughts. Denies sleep problems.    PE:   VS:  /76 (BP Location: Right arm, Patient Position: Sitting, BP Method: Medium (Manual))   Pulse 82   Temp 97.9 °F (36.6 °C) (Temporal)   Ht 6' 4" (1.93 m)   Wt 115.4 kg (254 lb 4.8 oz)   SpO2 98%   BMI 30.95 kg/m²   APPEARANCE:  Well nourished, well developed male, obese and pleasant, alert and oriented in no acute distress.    HEAD: Non tender . Full range of motion.  EYES: PERRL, conjunctiva pink, lids no edema. He wears glasses.  EARS: External canal patent, no swelling or redness. TM's shiny and clear.  NOSE: Mucosa and turbinates pink, not swollen. No discharge  THROAT: No " pharyngeal erythema or exudate. No stridor.    NECK: Supple, no mass, thyroid not enlarged, no mass. No carotid bruit.  NODES: No cervical, axillary lymph node enlargement.  CHEST: Normal respiratory effort. Lungs clear to auscultation.  CARDIOVASCULAR: Normal S1, S2. No rubs, murmurs or gallops.No edema.Pedal pulses palpable bilaterally.  ABDOMEN: Bowel sounds present. Not distended. Soft. No tenderness, masses or organomegaly.  BREAST: Nontender, no asymmetry, nipple discharge, abnormal masses, nodules, lumps.  GENITALIA: Not examined per patient request.  RECTAL: Not examined per patient request.  MUSCULOSKELETAL: No joint deformities or stiffness. He is ambulatory without problems.  SKIN: No rashes or suspicious lesions, normal color and turgor.  NEUROLOGIC:   Cranial Nerves: II-XII grossly intact.  DTR's: Knees, Ankles 2+ and equal bilaterally Gait & Posture: Normal gait and fine motion.  PSYCHIATRIC: Patient alert, oriented x 3. Mood/Affect normal without acute anxiety and depression noted.Judgement and insight-good as he makes appropriate decisions on today's examination.    DIAGNOSIS:    ICD-10-CM ICD-9-CM    1. Annual physical exam  Z00.00 V70.0    2. Essential hypertension  I10 401.9 CBC auto differential      TSH      Comprehensive metabolic panel      Lipid Panel      Urinalysis      lisinopriL-hydrochlorothiazide (PRINZIDE,ZESTORETIC) 20-25 mg Tab   3. Hyperlipidemia, unspecified hyperlipidemia type  E78.5 272.4 atorvastatin (LIPITOR) 40 MG tablet   4. Elevated PSA  R97.20 790.93    5. Arthritis  M19.90 716.90    6. Primary osteoarthritis of both knees  M17.0 715.16    7. Obesity (BMI 30-39.9)  E66.9 278.00      PLAN:   1. Age-appropriate counseling-appropriate low-sodium, low-cholesterol, low carbohydrate diet and exercise daily, monthly self testicular examination, annual wellness examination.  2. Patient advised to call for results.  3. Continue current medications.  4. Prescription refills as noted  above.   5. Keep follow up with specialists.  6. Flu shot this fall.   7. Follow up in about 6 months (around 3/1/2021) for hypertension follow up with Pneumovax.

## 2020-08-30 RX ORDER — SILDENAFIL 100 MG/1
100 TABLET, FILM COATED ORAL DAILY PRN
Qty: 30 TABLET | Refills: 11 | Status: SHIPPED | OUTPATIENT
Start: 2020-08-30 | End: 2021-03-10 | Stop reason: SDUPTHER

## 2020-08-30 RX ORDER — FINASTERIDE 5 MG/1
5 TABLET, FILM COATED ORAL DAILY
Qty: 90 TABLET | Refills: 3 | Status: SHIPPED | OUTPATIENT
Start: 2020-08-30 | End: 2021-03-10 | Stop reason: SDUPTHER

## 2020-08-30 RX ORDER — TAMSULOSIN HYDROCHLORIDE 0.4 MG/1
1 CAPSULE ORAL DAILY
Qty: 90 CAPSULE | Refills: 3 | Status: SHIPPED | OUTPATIENT
Start: 2020-08-30 | End: 2021-03-10 | Stop reason: SDUPTHER

## 2020-09-01 DIAGNOSIS — E78.5 HYPERLIPIDEMIA, UNSPECIFIED HYPERLIPIDEMIA TYPE: ICD-10-CM

## 2020-09-01 NOTE — TELEPHONE ENCOUNTER
Advise pt lab results are within acceptable range except he has stable, chronic mild anemia and slightly higher triglyceride-cholesterol this time.  Is he taking Lipitor every day? Thanks for call.

## 2020-09-01 NOTE — TELEPHONE ENCOUNTER
Pt requesting lab results, please advise.     ----- Message from Katharine Perez sent at 9/1/2020  2:21 PM CDT -----  Type:  Test Results    Who Called:  iris Medrano   Name of Test (Lab/Mammo/Etc):   Lab   Date of Test:   8/28/20  Ordering Provider:   Dr Chinchilla   Where the test was performed:   [facility]   Would the patient rather a call back or a response via [facility]?   Call back   Best Call Back Number:   [phone]  Additional Information:   please call//thanks/

## 2020-09-02 NOTE — TELEPHONE ENCOUNTER
----- Message from Cortney Hardin sent at 9/2/2020  1:16 PM CDT -----  Regarding: RESULTS  Type:  Test Results    Who Called: PT  Name of Test (Lab/Mammo/Etc): LAB  Date of Test: 08/28  Ordering Provider: MARCO A  Where the test was performed: [facility]  Would the patient rather a call back or a response via [facility]? CALL BACK  Best Call Back Number: [phone]  Additional Information:

## 2020-09-02 NOTE — TELEPHONE ENCOUNTER
Pt notified, states he has been taking his lipitor daily but has been out the last few days but is supposed to receive more from [facility] mail order pharmacy.

## 2020-09-03 RX ORDER — ATORVASTATIN CALCIUM 40 MG/1
40 TABLET, FILM COATED ORAL DAILY
Qty: 90 TABLET | Refills: 1 | Status: SHIPPED | OUTPATIENT
Start: 2020-09-03 | End: 2020-12-03 | Stop reason: SDUPTHER

## 2020-09-03 NOTE — TELEPHONE ENCOUNTER
----- Message from Ilana Bansal sent at 9/3/2020 11:45 AM CDT -----  Regarding: medication status  Good morning,      Pt is calling to check status of medication that was supposed to be sent to his pharmacy and cna be reached at [phone]        [facility] [address] [place] [address] [address] [facility]  [address] [facility] [address]  Phone: [phone] Fax: [phone]    Thanks,  Ilana Bansal

## 2020-09-03 NOTE — TELEPHONE ENCOUNTER
----- Message from Diamante Wiggins sent at 9/3/2020  1:44 PM CDT -----  Contact: self/[phone]  Type:  RX Refill Request    Who Called: Patient  Refill or New Rx:refill  RX Name and Strength:atorvastatin (LIPITOR) 40 MG tablet  How is the patient currently taking it? (ex. 1XDay):  Is this a 30 day or 90 day RX:90  Preferred Pharmacy with phone number:  [facility] [address] [place], [place] - [address]  [address] [facility] [address]  Phone: [phone] Fax: [phone]    Local or Mail Order:local  Ordering Provider:Dr Chinchilla  Would the patient rather a call back or a response via [facility]? Call Back  Best Call Back Number:[phone]  Additional Information: Patient is Out of Cholesterol Medication      Horacio/KENJI

## 2020-09-29 ENCOUNTER — TELEPHONE (OUTPATIENT)
Dept: ORTHOPEDICS | Facility: CLINIC | Age: 65
End: 2020-09-29

## 2020-09-29 DIAGNOSIS — M25.561 RIGHT KNEE PAIN, UNSPECIFIED CHRONICITY: Primary | ICD-10-CM

## 2020-10-01 ENCOUNTER — PATIENT OUTREACH (OUTPATIENT)
Dept: ADMINISTRATIVE | Facility: OTHER | Age: 65
End: 2020-10-01

## 2020-10-01 ENCOUNTER — HOSPITAL ENCOUNTER (OUTPATIENT)
Dept: RADIOLOGY | Facility: HOSPITAL | Age: 65
Discharge: HOME | End: 2020-10-01
Attending: PHYSICIAN ASSISTANT
Payer: MEDICARE

## 2020-10-01 ENCOUNTER — OFFICE VISIT (OUTPATIENT)
Dept: ORTHOPEDICS | Facility: CLINIC | Age: 65
End: 2020-10-01
Payer: MEDICARE

## 2020-10-01 VITALS
HEART RATE: 75 BPM | DIASTOLIC BLOOD PRESSURE: 85 MMHG | BODY MASS INDEX: 30.93 KG/M2 | WEIGHT: 254 LBS | SYSTOLIC BLOOD PRESSURE: 129 MMHG | HEIGHT: 76 IN

## 2020-10-01 DIAGNOSIS — M17.0 PRIMARY OSTEOARTHRITIS OF BOTH KNEES: Primary | ICD-10-CM

## 2020-10-01 DIAGNOSIS — M25.561 RIGHT KNEE PAIN, UNSPECIFIED CHRONICITY: ICD-10-CM

## 2020-10-01 DIAGNOSIS — M21.162 GENU VARUM OF BOTH LOWER EXTREMITIES: ICD-10-CM

## 2020-10-01 DIAGNOSIS — M25.562 CHRONIC PAIN OF BOTH KNEES: ICD-10-CM

## 2020-10-01 DIAGNOSIS — M25.561 CHRONIC PAIN OF BOTH KNEES: ICD-10-CM

## 2020-10-01 DIAGNOSIS — M21.161 GENU VARUM OF BOTH LOWER EXTREMITIES: ICD-10-CM

## 2020-10-01 DIAGNOSIS — G89.29 CHRONIC PAIN OF BOTH KNEES: ICD-10-CM

## 2020-10-01 PROCEDURE — 73564 XR KNEE ORTHO RIGHT WITH FLEXION: ICD-10-PCS | Mod: 26,HCNC,RT, | Performed by: RADIOLOGY

## 2020-10-01 PROCEDURE — 1101F PR PT FALLS ASSESS DOC 0-1 FALLS W/OUT INJ PAST YR: ICD-10-PCS | Mod: HCNC,CPTII,S$GLB, | Performed by: PHYSICIAN ASSISTANT

## 2020-10-01 PROCEDURE — 73562 X-RAY EXAM OF KNEE 3: CPT | Mod: 26,59,HCNC,LT | Performed by: RADIOLOGY

## 2020-10-01 PROCEDURE — 99204 OFFICE O/P NEW MOD 45 MIN: CPT | Mod: 25,HCNC,S$GLB, | Performed by: PHYSICIAN ASSISTANT

## 2020-10-01 PROCEDURE — 1159F MED LIST DOCD IN RCRD: CPT | Mod: HCNC,S$GLB,, | Performed by: PHYSICIAN ASSISTANT

## 2020-10-01 PROCEDURE — 73564 X-RAY EXAM KNEE 4 OR MORE: CPT | Mod: 26,HCNC,RT, | Performed by: RADIOLOGY

## 2020-10-01 PROCEDURE — 99204 PR OFFICE/OUTPT VISIT, NEW, LEVL IV, 45-59 MIN: ICD-10-PCS | Mod: 25,HCNC,S$GLB, | Performed by: PHYSICIAN ASSISTANT

## 2020-10-01 PROCEDURE — 3074F SYST BP LT 130 MM HG: CPT | Mod: HCNC,CPTII,S$GLB, | Performed by: PHYSICIAN ASSISTANT

## 2020-10-01 PROCEDURE — 20610 LARGE JOINT ASPIRATION/INJECTION: BILATERAL KNEE: ICD-10-PCS | Mod: 50,HCNC,S$GLB, | Performed by: PHYSICIAN ASSISTANT

## 2020-10-01 PROCEDURE — 20610 DRAIN/INJ JOINT/BURSA W/O US: CPT | Mod: 50,HCNC,S$GLB, | Performed by: PHYSICIAN ASSISTANT

## 2020-10-01 PROCEDURE — 1101F PT FALLS ASSESS-DOCD LE1/YR: CPT | Mod: HCNC,CPTII,S$GLB, | Performed by: PHYSICIAN ASSISTANT

## 2020-10-01 PROCEDURE — 3008F PR BODY MASS INDEX (BMI) DOCUMENTED: ICD-10-PCS | Mod: HCNC,CPTII,S$GLB, | Performed by: PHYSICIAN ASSISTANT

## 2020-10-01 PROCEDURE — 73562 X-RAY EXAM OF KNEE 3: CPT | Mod: TC,HCNC,LT,59

## 2020-10-01 PROCEDURE — 3079F DIAST BP 80-89 MM HG: CPT | Mod: HCNC,CPTII,S$GLB, | Performed by: PHYSICIAN ASSISTANT

## 2020-10-01 PROCEDURE — 1125F PR PAIN SEVERITY QUANTIFIED, PAIN PRESENT: ICD-10-PCS | Mod: HCNC,S$GLB,, | Performed by: PHYSICIAN ASSISTANT

## 2020-10-01 PROCEDURE — 1159F PR MEDICATION LIST DOCUMENTED IN MEDICAL RECORD: ICD-10-PCS | Mod: HCNC,S$GLB,, | Performed by: PHYSICIAN ASSISTANT

## 2020-10-01 PROCEDURE — 3008F BODY MASS INDEX DOCD: CPT | Mod: HCNC,CPTII,S$GLB, | Performed by: PHYSICIAN ASSISTANT

## 2020-10-01 PROCEDURE — 1125F AMNT PAIN NOTED PAIN PRSNT: CPT | Mod: HCNC,S$GLB,, | Performed by: PHYSICIAN ASSISTANT

## 2020-10-01 PROCEDURE — 99999 PR PBB SHADOW E&M-EST. PATIENT-LVL IV: ICD-10-PCS | Mod: PBBFAC,HCNC,, | Performed by: PHYSICIAN ASSISTANT

## 2020-10-01 PROCEDURE — 3074F PR MOST RECENT SYSTOLIC BLOOD PRESSURE < 130 MM HG: ICD-10-PCS | Mod: HCNC,CPTII,S$GLB, | Performed by: PHYSICIAN ASSISTANT

## 2020-10-01 PROCEDURE — 99999 PR PBB SHADOW E&M-EST. PATIENT-LVL IV: CPT | Mod: PBBFAC,HCNC,, | Performed by: PHYSICIAN ASSISTANT

## 2020-10-01 PROCEDURE — 3079F PR MOST RECENT DIASTOLIC BLOOD PRESSURE 80-89 MM HG: ICD-10-PCS | Mod: HCNC,CPTII,S$GLB, | Performed by: PHYSICIAN ASSISTANT

## 2020-10-01 PROCEDURE — 73562 XR KNEE ORTHO RIGHT WITH FLEXION: ICD-10-PCS | Mod: 26,59,HCNC,LT | Performed by: RADIOLOGY

## 2020-10-01 RX ORDER — SILDENAFIL CITRATE 20 MG/1
20 TABLET ORAL
Qty: 50 TABLET | Refills: 11 | Status: SHIPPED | OUTPATIENT
Start: 2020-10-01 | End: 2021-10-01

## 2020-10-01 RX ORDER — METHYLPREDNISOLONE ACETATE 80 MG/ML
80 INJECTION, SUSPENSION INTRA-ARTICULAR; INTRALESIONAL; INTRAMUSCULAR; SOFT TISSUE
Status: DISCONTINUED | OUTPATIENT
Start: 2020-10-01 | End: 2020-10-01 | Stop reason: HOSPADM

## 2020-10-01 RX ORDER — DICLOFENAC SODIUM 10 MG/G
2 GEL TOPICAL 3 TIMES DAILY PRN
Qty: 2 TUBE | Refills: 6 | Status: SHIPPED | OUTPATIENT
Start: 2020-10-01

## 2020-10-01 RX ADMIN — METHYLPREDNISOLONE ACETATE 80 MG: 80 INJECTION, SUSPENSION INTRA-ARTICULAR; INTRALESIONAL; INTRAMUSCULAR; SOFT TISSUE at 07:10

## 2020-10-01 NOTE — PROCEDURES
Large Joint Aspiration/Injection: bilateral knee    Date/Time: 10/1/2020 7:40 AM  Performed by: Lyn Mak PA-C  Authorized by: Lyn Mak PA-C     Indications:  Pain  Site marked: the procedure site was marked    Timeout: prior to procedure the correct patient, procedure, and site was verified    Prep: patient was prepped and draped in usual sterile fashion      Local anesthesia used?: Yes    Anesthesia:  Local infiltration  Local anesthetic:  Lidocaine 1% without epinephrine  Anesthetic total (ml):  2      Details:  Needle Size:  22 G  Ultrasonic Guidance for needle placement?: No    Approach:  Anterolateral  Location:  Knee  Laterality:  Bilateral  Site:  Bilateral knee  Medications (Right):  80 mg methylPREDNISolone acetate 80 mg/mL  Medications (Left):  80 mg methylPREDNISolone acetate 80 mg/mL  Patient tolerance:  Patient tolerated the procedure well with no immediate complications     After verbal consent was obtained for bilateral knee injection, patient ID, site, and side were verified.  The bilateral knees were sterilly prepped in the standard fashion.  A 22-gauge needle was introduced into each knee joint from an tammy-lateral site without complication and injected with 20 mg lidocaine plain and 80 mg depomedrol.  A sterile bandaid was applied.  The patient was informed to apply an ice pack approximately 10min once arriving home and not to do anything strenuous for 24hours.  Elevation of glucose in diabetic patients was discussed.  He was instructed to call if there were any problems. The patient was discharged in stable condition

## 2020-10-01 NOTE — PROGRESS NOTES
Health Maintenance Due   Topic Date Due    Shingles Vaccine (2 of 3) 04/27/2016    Influenza Vaccine (1) 08/01/2020     Updates were requested from care everywhere.  Chart was reviewed for overdue [facility]active [facility] Encounters (DOTTIE) topics (CRS, Breast Cancer Screening, Eye exam)  Health Maintenance has been updated.  LINKS immunization registry triggered.  Immunizations were reconciled.

## 2020-10-01 NOTE — PROGRESS NOTES
Subjective:      Patient ID: Mitchell Mcmillan is a 65 y.o. male.    Chief Complaint: Pain of the Right Knee      HPI: Mitchell Mcmillan  is a 65 y.o. male who c/o Pain of the Right Knee   for duration of more than 3 years.  He denies a specific recent inciting injury.  The right knee is worse than the left knee.  He is using a cane for ambulation today.  He uses over-the-counter copper fit knee braces as needed as well.  He has been taking Tylenol and ibuprofen 800 mg in the past.  Initially those worked pretty well for him but do not seem to do much anymore.  He uses occasional Advil p.m. at nighttime which seems to help him get some sleep.  Severity is 10/10.  Quality is aching, sharp, shooting.  Constant but worsened with squatting kneeling and at nighttime.  Improved minimally with Biofreeze, icy Hot, the knee brace as above, Advil p.m..  He complains of associated swelling as well.  Starting to limit his day-to-day function, so he wanted to get it checked out.  He has not had any treatment in the past for this.    Past Medical History:   Diagnosis Date    Arthritis     knees    Asymmetric SNHL (sensorineural hearing loss) 08/17/2017    Chronic left hip pain 2006    Dislocated left hip, job injury    Chronic prostatitis     per pt, last episode 2013    Elevated PSA, less than 10 ng/ml     per pt last elevation 2013    Hyperlipidemia 2010    Hypertension 2011    Joint effusion of knee     per pt rt knee, seen by Dr. Duke 2013    Midline low back pain without sciatica 6/13/2016    Obesity     Special screening for malignant neoplasms, colon 10/5/2016    Tobacco dependence     Trouble in sleeping      Past Surgical History:   Procedure Laterality Date    arthrocentesis Right 2013    Per pt , knee by Dr. BONNIE Duke    COLONOSCOPY N/A 10/5/2016    Normal exam repeat 10 yrs. Procedure: COLONOSCOPY;  Surgeon: Alexander Goetz MD;  Location: [facility];  Service: Endoscopy;  Laterality: N/A;    HIP  ARTHROPLASTY Left 2006    Per pt, dislocated left hip/ hardware placed  by Dr. Lisa    left orchiectomy      PROSTATE BIOPSY  2012    [facility] at [facility] due to elevated PSA - benign     Family History   Problem Relation Age of Onset    Depression Mother     Early death Mother     Hypertension Mother     Heart disease Mother 53         from MI    Arthritis Father     Stroke Father     Diabetes Sister     No Known Problems Brother     Hypertension Sister     Hypertension Sister     No Known Problems Brother     No Known Problems Brother     No Known Problems Daughter     Cancer Neg Hx     COPD Neg Hx     Kidney disease Neg Hx      Social History     Socioeconomic History    Marital status:      Spouse name: Nida    Number of children: 0    Years of education: Not on file    Highest education level: Not on file   Occupational History    Occupation: Disabled since      Comment: Dislocated left hip   Social Needs    Financial resource strain: Not hard at all    Food insecurity     Worry: Never true     Inability: Never true    Transportation needs     Medical: No     Non-medical: No   Tobacco Use    Smoking status: Former Smoker     Packs/day: 0.10     Years: 5.00     Pack years: 0.50     Types: Cigarettes     Start date: 3/3/2011     Quit date: 2017     Years since quitting: 3.4    Smokeless tobacco: Never Used   Substance and Sexual Activity    Alcohol use: Yes     Alcohol/week: 2.0 standard drinks     Types: 2 Shots of liquor per week     Frequency: 2-3 times a week     Drinks per session: 1 or 2     Binge frequency: Never     Comment: every  3 days    Drug use: No    Sexual activity: Yes     Partners: Female     Birth control/protection: None   Lifestyle    Physical activity     Days per week: 3 days     Minutes per session: 20 min    Stress: Only a little   Relationships    Social connections     Talks on phone: Once a week      Gets together: Three times a week     Attends Judaism service: More than 4 times per year     Active member of club or organization: Yes     Attends meetings of clubs or organizations: More than 4 times per year     Relationship status:    Other Topics Concern    Not on file   Social History Narrative    Disabled since 2006.  Job injury.  Dislocated left hip.  Has 4 brothers and 4 sisters. Wears seatbelt.     Medication List with Changes/Refills   New Medications    DICLOFENAC SODIUM (VOLTAREN) 1 % GEL    Apply 2 g topically 3 (three) times daily as needed.   Current Medications    ATORVASTATIN (LIPITOR) 40 MG TABLET    Take 1 tablet (40 mg total) by mouth once daily.    FINASTERIDE (PROSCAR) 5 MG TABLET    Take 1 tablet (5 mg total) by mouth once daily.    LISINOPRIL-HYDROCHLOROTHIAZIDE (PRINZIDE,ZESTORETIC) 20-25 MG TAB    TAKE 1 TABLET BY MOUTH ONCE DAILY .    SILDENAFIL (VIAGRA) 100 MG TABLET    Take 1 tablet (100 mg total) by mouth daily as needed.    TAMSULOSIN (FLOMAX) 0.4 MG CAP    Take 1 capsule (0.4 mg total) by mouth once daily.     Review of patient's allergies indicates:  No Known Allergies    Review of Systems   Constitution: Negative for fever.   Cardiovascular: Negative for chest pain.   Respiratory: Negative for cough and shortness of breath.    Skin: Negative for rash.   Musculoskeletal: Positive for joint pain, joint swelling and stiffness.   Gastrointestinal: Negative for heartburn.   Neurological: Negative for headaches and numbness.         Objective:        General    Nursing note and vitals reviewed.  Constitutional: He is oriented to person, place, and time. He appears well-developed and well-nourished.   HENT:   Head: Normocephalic and atraumatic.   Eyes: EOM are normal.   Cardiovascular: Normal rate and regular rhythm.    Pulmonary/Chest: Effort normal.   Abdominal: Soft.   Neurological: He is alert and oriented to person, place, and time.   Psychiatric: He has a normal mood and  affect. His behavior is normal.           Right Knee Exam     Inspection   Erythema: absent  Swelling: present  Effusion: absent  Deformity: absent  Bruising: absent    Tenderness   The patient is tender to palpation of the medial joint line and condyle.    Crepitus   The patient has crepitus of the patella.    Range of Motion   Extension: normal   Flexion:  110 abnormal     Tests   Meniscus   Roseanne:  Medial - negative Lateral - negative  Ligament Examination Lachman: normal (-1 to 2mm) PCL-Posterior Drawer: normal (0 to 2mm)     MCL - Valgus: abnormal - grade I  LCL - Varus: normal  Patella   Patellar apprehension: negative  Passive Patellar Tilt: neutral  Patellar Grind: positive    Other   Meniscal Cyst: absent  Popliteal (Baker's) Cyst: absent  Sensation: normal    Left Knee Exam     Inspection   Erythema: absent  Swelling: present  Effusion: absent  Deformity: absent  Bruising: absent    Tenderness   The patient tender to palpation of the condyle and medial joint line.    Crepitus   The patient has crepitus of the patella.    Range of Motion   Extension: normal   Flexion:  110 abnormal     Tests   Meniscus   Roseanne:  Medial - negative Lateral - negative  Stability Lachman: normal (-1 to 2mm) PCL-Posterior Drawer: normal (0 to 2mm)  MCL - Valgus: abnormal - grade I  LCL - Varus: normal (0 to 2mm)  Patella   Patellar apprehension: negative  Passive Patellar Tilt: neutral  Patellar Grind: positive    Other   Meniscal Cyst: absent  Popliteal (Baker's) Cyst: absent  Sensation: normal    Right Hip Exam     Tests   Black: negative  Left Hip Exam     Tests   Black: negative          Muscle Strength   Right Lower Extremity   Quadriceps:  5/5   Hamstrin/5   Left Lower Extremity   Quadriceps:  5/5   Hamstrin/5     Vascular Exam       Edema  Right Lower Leg: absent  Left Lower Leg: absent              Xray images and report were reviewed today.  I agree with the radiologist's interpretation.    X-ray Knee  Ortho Right with Flexion  Narrative: EXAMINATION:  XR KNEE ORTHO RIGHT WITH FLEXION    CLINICAL HISTORY:  Pain in right knee    TECHNIQUE:  AP standing as well as PA flexion standing and Merchant views of both knees were performed.  A lateral view of the right knee is also performed.    COMPARISON:  Knee radiographs February 1, 2017    FINDINGS:  There is severe medial compartment predominant tricompartmental right knee osteoarthritis which is not significantly changed in comparison to prior examination.  No right knee fracture identified.  No osseous erosion or suspicious osseous lesion within the right knee.  No right knee effusion.  No acute soft tissue abnormality identified.    Limited evaluation of the left knee demonstrates severe medial compartment predominant tricompartmental osteoarthritis which is not significantly changed in appearance since prior exam.  Impression: Unchanged bilateral knee severe medial compartment predominant tricompartmental osteoarthritis.    Electronically signed by: Hermes Finn  Date:    10/01/2020  Time:    07:42        Assessment:       Encounter Diagnoses   Name Primary?    Primary osteoarthritis of both knees Yes    Chronic pain of both knees     Genu varum of both lower extremities           Plan:       Mitchell was seen today for pain.    Diagnoses and all orders for this visit:    Primary osteoarthritis of both knees  -     Prior authorization Order  -     diclofenac sodium (VOLTAREN) 1 % Gel; Apply 2 g topically 3 (three) times daily as needed.    Chronic pain of both knees  -     Prior authorization Order  -     diclofenac sodium (VOLTAREN) 1 % Gel; Apply 2 g topically 3 (three) times daily as needed.    Genu varum of both lower extremities  -     Prior authorization Order  -     diclofenac sodium (VOLTAREN) 1 % Gel; Apply 2 g topically 3 (three) times daily as needed.        Mitchell Mcmillan is a new pt who comes in today for the above problems.  We had a long  discussion today regarding degenerative arthritis in the knees. The patient understands that arthritis is chronic and will worsen over time.  The patient also understands that arthritis may cause episodic flare-ups in pain. Management or if arthritis is achieved through a multi-modal approach including weight loss in obese individuals, activity modification, NSAIDs (topical vs oral) where appropriate, periodic intra-articular steroid injections, viscosupplementation, physical therapy, knee bracing, ambulatory aids, as well as geniculate nerve blocks.      After discussion of risks and benefits of all of the above were discussed, the decision was made to move forward with corticosteroid injection bilateral knees.  I have also given him a home exercise program to work on knee strengthening.  He may continue with the copper fit braces that he has.  If he wants to try something with a little bit more support he will let me know and we will get him in to see me neoprene hinged knee sleeves.  He can continue Advil p.m. at night as needed.  I have also given him a script prescription for Voltaren gel as above.  I have submitted an authorization request for Monovisc bilateral knees.  We will plan to proceed with that about a month if approved.  He verbalizes understanding and agrees with the above plan.    Kellgren Kevin Scale 4 bilateral knees      No follow-ups on file.          The patient understands, chooses and consents to this plan and accepts all   the risks which include but are not limited to the risks mentioned above.     Disclaimer: This note was prepared using a voice recognition system and is likely to have sound alike errors within the text.

## 2020-10-01 NOTE — PATIENT INSTRUCTIONS
Pre-Knee Replacement: Ankle Pumps, Quad Sets, Leg Raises  Doing these exercises BEFORE your knee replacement can help speed your recovery. Ask your physical therapist (PT) whether you should exercise one or both legs. Unless youre told otherwise, start by doing each exercise five to 10 times (5 to 10 reps) twice a day (2 sets). As you get stronger, slowly increase the number of reps and sets.  Stop any exercise that causes sharp or increased knee pain, dizziness, shortness of breath, or chest pain.   Ankle pumps    Ankle pumps can help prevent circulation problems, such as blood clots. Do ankle pumps by pointing and flexing your feet.  Quadriceps sets    · Lie on your back in bed, legs straight.  · Tighten the muscle at the front of the thigh as you press the back of your knee down toward the bed. Hold for a few seconds. Then relax the leg.  Straight leg raises    · Lie in bed. Bend one leg. Keep your other leg straight on the bed.  · Lift your straight leg as high as you comfortably can, but not higher than 12 inches. Hold for a few seconds. Then slowly lower the leg.  Date Last Reviewed: 10/1/2015  © 9486-0524 [employer]. [address] [place] [address], [place] [address]. All rights reserved. This information is not intended as a substitute for professional medical care. Always follow your healthcare professional's instructions.

## 2020-10-02 RX ORDER — SILDENAFIL 100 MG/1
100 TABLET, FILM COATED ORAL DAILY PRN
Qty: 30 TABLET | Refills: 11 | Status: CANCELLED | OUTPATIENT
Start: 2020-10-02 | End: 2021-10-02

## 2020-10-05 RX ORDER — SILDENAFIL 100 MG/1
100 TABLET, FILM COATED ORAL DAILY PRN
Qty: 30 TABLET | Refills: 11 | Status: SHIPPED | OUTPATIENT
Start: 2020-10-05 | End: 2021-03-01 | Stop reason: SDUPTHER

## 2020-10-08 ENCOUNTER — TELEPHONE (OUTPATIENT)
Dept: ORTHOPEDICS | Facility: CLINIC | Age: 65
End: 2020-10-08

## 2020-10-08 DIAGNOSIS — I10 ESSENTIAL HYPERTENSION: ICD-10-CM

## 2020-10-08 RX ORDER — LISINOPRIL AND HYDROCHLOROTHIAZIDE 20; 25 MG/1; MG/1
TABLET ORAL
Qty: 90 TABLET | Refills: 1 | Status: SHIPPED | OUTPATIENT
Start: 2020-10-08 | End: 2021-07-20

## 2020-10-08 NOTE — TELEPHONE ENCOUNTER
----- Message from Maria Antonia Pickard sent at 10/8/2020  8:47 AM CDT -----  Regarding: refill  Type:  RX Refill Request    Who Called: pt  Refill or New Rx:refill  RX Name and Strength:lisinopriL-hydrochlorothiazide (PRINZIDE,ZESTORETIC) 20-25 mg Ta  How is the patient currently taking it? (ex. 1XDay):once a day  Is this a 30 day or 90 day RX: 90 day  Preferred Pharmacy with phone number:  [facility] - [place], [place] - [address]  [address] [address]  Phone: [phone] Fax: [phone]  Local or Mail Order:Mail  Ordering Provider:Amor  Would the patient rather a call back or a response via [facility]? Clal back   Best Call Back Number:[phone]  Additional Information: Please call back.Thanks

## 2020-10-08 NOTE — TELEPHONE ENCOUNTER
----- Message from Cathleen Dunn sent at 10/8/2020  8:43 AM CDT -----  ..Type:  Patient Returning Call    Who Called: pt   Who Left Message for Patient:  Does the patient know what this is regarding?: knee brace   Would the patient rather a call back or a response via [facility]?  Call back   Best Call Back Number: [phone]  Additional Information: Pt is requesting a call from nurse to f/u on if a knee brace ( right ) was ordered

## 2020-11-02 ENCOUNTER — TELEPHONE (OUTPATIENT)
Dept: FAMILY MEDICINE | Facility: CLINIC | Age: 65
End: 2020-11-02

## 2020-11-02 NOTE — TELEPHONE ENCOUNTER
Pt requesting pneumonia shot, is this appropriate?    ----- Message from Ilana Bansal sent at 11/2/2020  2:56 PM CST -----  Regarding: vaccine  Good afternoon,    Pt would like to schedule a pneumonia shot and can be reached at [phone]      Thanks,  Ilana Bansal

## 2020-11-02 NOTE — TELEPHONE ENCOUNTER
Yes, that's fine.  Also, let pt know lab results within acceptable range except borderline anemia and mildly high triglyceride level. Make sure taking Lipitor daily and following strict low chol/low fat diet. Thanks.

## 2020-11-06 ENCOUNTER — IMMUNIZATION (OUTPATIENT)
Dept: FAMILY MEDICINE | Facility: CLINIC | Age: 65
End: 2020-11-06
Payer: MEDICARE

## 2020-11-06 PROCEDURE — G0008 FLU VACCINE - QUADRIVALENT - ADJUVANTED: ICD-10-PCS | Mod: HCNC,S$GLB,, | Performed by: FAMILY MEDICINE

## 2020-11-06 PROCEDURE — G0008 ADMIN INFLUENZA VIRUS VAC: HCPCS | Mod: HCNC,S$GLB,, | Performed by: FAMILY MEDICINE

## 2020-11-06 PROCEDURE — 90694 VACC AIIV4 NO PRSRV 0.5ML IM: CPT | Mod: HCNC,S$GLB,, | Performed by: FAMILY MEDICINE

## 2020-11-06 PROCEDURE — 90694 FLU VACCINE - QUADRIVALENT - ADJUVANTED: ICD-10-PCS | Mod: HCNC,S$GLB,, | Performed by: FAMILY MEDICINE

## 2020-12-03 DIAGNOSIS — E78.5 HYPERLIPIDEMIA, UNSPECIFIED HYPERLIPIDEMIA TYPE: ICD-10-CM

## 2020-12-03 RX ORDER — ATORVASTATIN CALCIUM 40 MG/1
40 TABLET, FILM COATED ORAL DAILY
Qty: 90 TABLET | Refills: 1 | Status: SHIPPED | OUTPATIENT
Start: 2020-12-03 | End: 2021-03-01 | Stop reason: SDUPTHER

## 2021-02-24 ENCOUNTER — PATIENT OUTREACH (OUTPATIENT)
Dept: ADMINISTRATIVE | Facility: HOSPITAL | Age: 66
End: 2021-02-24

## 2021-03-01 ENCOUNTER — OFFICE VISIT (OUTPATIENT)
Dept: FAMILY MEDICINE | Facility: CLINIC | Age: 66
End: 2021-03-01
Payer: MEDICARE

## 2021-03-01 VITALS
BODY MASS INDEX: 31.58 KG/M2 | HEART RATE: 90 BPM | RESPIRATION RATE: 18 BRPM | SYSTOLIC BLOOD PRESSURE: 120 MMHG | OXYGEN SATURATION: 97 % | DIASTOLIC BLOOD PRESSURE: 82 MMHG | TEMPERATURE: 99 F | WEIGHT: 259.38 LBS | HEIGHT: 76 IN

## 2021-03-01 DIAGNOSIS — M17.0 PRIMARY OSTEOARTHRITIS OF BOTH KNEES: ICD-10-CM

## 2021-03-01 DIAGNOSIS — I10 ESSENTIAL HYPERTENSION: ICD-10-CM

## 2021-03-01 DIAGNOSIS — E66.9 OBESITY (BMI 30-39.9): ICD-10-CM

## 2021-03-01 DIAGNOSIS — R97.20 ELEVATED PSA: ICD-10-CM

## 2021-03-01 DIAGNOSIS — E78.5 HYPERLIPIDEMIA, UNSPECIFIED HYPERLIPIDEMIA TYPE: ICD-10-CM

## 2021-03-01 PROCEDURE — 1159F MED LIST DOCD IN RCRD: CPT | Mod: S$GLB,,, | Performed by: FAMILY MEDICINE

## 2021-03-01 PROCEDURE — 99999 PR PBB SHADOW E&M-EST. PATIENT-LVL IV: ICD-10-PCS | Mod: PBBFAC,,, | Performed by: FAMILY MEDICINE

## 2021-03-01 PROCEDURE — 3079F DIAST BP 80-89 MM HG: CPT | Mod: CPTII,S$GLB,, | Performed by: FAMILY MEDICINE

## 2021-03-01 PROCEDURE — 99214 PR OFFICE/OUTPT VISIT, EST, LEVL IV, 30-39 MIN: ICD-10-PCS | Mod: S$GLB,,, | Performed by: FAMILY MEDICINE

## 2021-03-01 PROCEDURE — 1101F PT FALLS ASSESS-DOCD LE1/YR: CPT | Mod: CPTII,S$GLB,, | Performed by: FAMILY MEDICINE

## 2021-03-01 PROCEDURE — 3008F BODY MASS INDEX DOCD: CPT | Mod: CPTII,S$GLB,, | Performed by: FAMILY MEDICINE

## 2021-03-01 PROCEDURE — 3288F PR FALLS RISK ASSESSMENT DOCUMENTED: ICD-10-PCS | Mod: CPTII,S$GLB,, | Performed by: FAMILY MEDICINE

## 2021-03-01 PROCEDURE — 1126F AMNT PAIN NOTED NONE PRSNT: CPT | Mod: S$GLB,,, | Performed by: FAMILY MEDICINE

## 2021-03-01 PROCEDURE — 3288F FALL RISK ASSESSMENT DOCD: CPT | Mod: CPTII,S$GLB,, | Performed by: FAMILY MEDICINE

## 2021-03-01 PROCEDURE — 1159F PR MEDICATION LIST DOCUMENTED IN MEDICAL RECORD: ICD-10-PCS | Mod: S$GLB,,, | Performed by: FAMILY MEDICINE

## 2021-03-01 PROCEDURE — 1126F PR PAIN SEVERITY QUANTIFIED, NO PAIN PRESENT: ICD-10-PCS | Mod: S$GLB,,, | Performed by: FAMILY MEDICINE

## 2021-03-01 PROCEDURE — 3074F PR MOST RECENT SYSTOLIC BLOOD PRESSURE < 130 MM HG: ICD-10-PCS | Mod: CPTII,S$GLB,, | Performed by: FAMILY MEDICINE

## 2021-03-01 PROCEDURE — 3074F SYST BP LT 130 MM HG: CPT | Mod: CPTII,S$GLB,, | Performed by: FAMILY MEDICINE

## 2021-03-01 PROCEDURE — 1101F PR PT FALLS ASSESS DOC 0-1 FALLS W/OUT INJ PAST YR: ICD-10-PCS | Mod: CPTII,S$GLB,, | Performed by: FAMILY MEDICINE

## 2021-03-01 PROCEDURE — 99999 PR PBB SHADOW E&M-EST. PATIENT-LVL IV: CPT | Mod: PBBFAC,,, | Performed by: FAMILY MEDICINE

## 2021-03-01 PROCEDURE — 99214 OFFICE O/P EST MOD 30 MIN: CPT | Mod: S$GLB,,, | Performed by: FAMILY MEDICINE

## 2021-03-01 PROCEDURE — 3008F PR BODY MASS INDEX (BMI) DOCUMENTED: ICD-10-PCS | Mod: CPTII,S$GLB,, | Performed by: FAMILY MEDICINE

## 2021-03-01 PROCEDURE — 3079F PR MOST RECENT DIASTOLIC BLOOD PRESSURE 80-89 MM HG: ICD-10-PCS | Mod: CPTII,S$GLB,, | Performed by: FAMILY MEDICINE

## 2021-03-01 RX ORDER — ATORVASTATIN CALCIUM 40 MG/1
40 TABLET, FILM COATED ORAL DAILY
Qty: 90 TABLET | Refills: 1 | Status: SHIPPED | OUTPATIENT
Start: 2021-03-01 | End: 2021-07-09

## 2021-03-04 ENCOUNTER — LAB VISIT (OUTPATIENT)
Dept: LAB | Facility: HOSPITAL | Age: 66
End: 2021-03-04
Attending: UROLOGY
Payer: MEDICARE

## 2021-03-04 DIAGNOSIS — Z12.5 PROSTATE CANCER SCREENING: ICD-10-CM

## 2021-03-04 DIAGNOSIS — I10 HYPERTENSION, UNSPECIFIED TYPE: ICD-10-CM

## 2021-03-04 DIAGNOSIS — N52.9 ERECTILE DYSFUNCTION, UNSPECIFIED ERECTILE DYSFUNCTION TYPE: ICD-10-CM

## 2021-03-04 DIAGNOSIS — N40.0 BENIGN PROSTATIC HYPERPLASIA, UNSPECIFIED WHETHER LOWER URINARY TRACT SYMPTOMS PRESENT: ICD-10-CM

## 2021-03-04 LAB — COMPLEXED PSA SERPL-MCNC: 5.1 NG/ML (ref 0–4)

## 2021-03-04 PROCEDURE — 36415 COLL VENOUS BLD VENIPUNCTURE: CPT | Mod: PO | Performed by: UROLOGY

## 2021-03-04 PROCEDURE — 84153 ASSAY OF PSA TOTAL: CPT | Performed by: UROLOGY

## 2021-03-08 ENCOUNTER — PES CALL (OUTPATIENT)
Dept: ADMINISTRATIVE | Facility: CLINIC | Age: 66
End: 2021-03-08

## 2021-03-10 ENCOUNTER — OFFICE VISIT (OUTPATIENT)
Dept: UROLOGY | Facility: CLINIC | Age: 66
End: 2021-03-10
Payer: MEDICARE

## 2021-03-10 VITALS
WEIGHT: 260.13 LBS | BODY MASS INDEX: 31.67 KG/M2 | SYSTOLIC BLOOD PRESSURE: 118 MMHG | DIASTOLIC BLOOD PRESSURE: 86 MMHG | TEMPERATURE: 99 F

## 2021-03-10 DIAGNOSIS — Z12.5 ENCOUNTER FOR SCREENING FOR MALIGNANT NEOPLASM OF PROSTATE: ICD-10-CM

## 2021-03-10 DIAGNOSIS — R97.20 ELEVATED PSA: Primary | ICD-10-CM

## 2021-03-10 DIAGNOSIS — N52.9 ERECTILE DYSFUNCTION, UNSPECIFIED ERECTILE DYSFUNCTION TYPE: ICD-10-CM

## 2021-03-10 DIAGNOSIS — I10 HYPERTENSION, UNSPECIFIED TYPE: ICD-10-CM

## 2021-03-10 DIAGNOSIS — N40.0 BENIGN PROSTATIC HYPERPLASIA, UNSPECIFIED WHETHER LOWER URINARY TRACT SYMPTOMS PRESENT: ICD-10-CM

## 2021-03-10 PROCEDURE — 3288F PR FALLS RISK ASSESSMENT DOCUMENTED: ICD-10-PCS | Mod: CPTII,S$GLB,, | Performed by: UROLOGY

## 2021-03-10 PROCEDURE — 3074F SYST BP LT 130 MM HG: CPT | Mod: CPTII,S$GLB,, | Performed by: UROLOGY

## 2021-03-10 PROCEDURE — 1126F AMNT PAIN NOTED NONE PRSNT: CPT | Mod: S$GLB,,, | Performed by: UROLOGY

## 2021-03-10 PROCEDURE — 3079F DIAST BP 80-89 MM HG: CPT | Mod: CPTII,S$GLB,, | Performed by: UROLOGY

## 2021-03-10 PROCEDURE — 99999 PR PBB SHADOW E&M-EST. PATIENT-LVL II: CPT | Mod: PBBFAC,,, | Performed by: UROLOGY

## 2021-03-10 PROCEDURE — 1126F PR PAIN SEVERITY QUANTIFIED, NO PAIN PRESENT: ICD-10-PCS | Mod: S$GLB,,, | Performed by: UROLOGY

## 2021-03-10 PROCEDURE — 3288F FALL RISK ASSESSMENT DOCD: CPT | Mod: CPTII,S$GLB,, | Performed by: UROLOGY

## 2021-03-10 PROCEDURE — 3008F BODY MASS INDEX DOCD: CPT | Mod: CPTII,S$GLB,, | Performed by: UROLOGY

## 2021-03-10 PROCEDURE — 99214 OFFICE O/P EST MOD 30 MIN: CPT | Mod: S$GLB,,, | Performed by: UROLOGY

## 2021-03-10 PROCEDURE — 1101F PR PT FALLS ASSESS DOC 0-1 FALLS W/OUT INJ PAST YR: ICD-10-PCS | Mod: CPTII,S$GLB,, | Performed by: UROLOGY

## 2021-03-10 PROCEDURE — 1159F MED LIST DOCD IN RCRD: CPT | Mod: S$GLB,,, | Performed by: UROLOGY

## 2021-03-10 PROCEDURE — 1101F PT FALLS ASSESS-DOCD LE1/YR: CPT | Mod: CPTII,S$GLB,, | Performed by: UROLOGY

## 2021-03-10 PROCEDURE — 3074F PR MOST RECENT SYSTOLIC BLOOD PRESSURE < 130 MM HG: ICD-10-PCS | Mod: CPTII,S$GLB,, | Performed by: UROLOGY

## 2021-03-10 PROCEDURE — 1159F PR MEDICATION LIST DOCUMENTED IN MEDICAL RECORD: ICD-10-PCS | Mod: S$GLB,,, | Performed by: UROLOGY

## 2021-03-10 PROCEDURE — 3079F PR MOST RECENT DIASTOLIC BLOOD PRESSURE 80-89 MM HG: ICD-10-PCS | Mod: CPTII,S$GLB,, | Performed by: UROLOGY

## 2021-03-10 PROCEDURE — 3008F PR BODY MASS INDEX (BMI) DOCUMENTED: ICD-10-PCS | Mod: CPTII,S$GLB,, | Performed by: UROLOGY

## 2021-03-10 PROCEDURE — 99999 PR PBB SHADOW E&M-EST. PATIENT-LVL II: ICD-10-PCS | Mod: PBBFAC,,, | Performed by: UROLOGY

## 2021-03-10 PROCEDURE — 99214 PR OFFICE/OUTPT VISIT, EST, LEVL IV, 30-39 MIN: ICD-10-PCS | Mod: S$GLB,,, | Performed by: UROLOGY

## 2021-03-10 RX ORDER — FINASTERIDE 5 MG/1
5 TABLET, FILM COATED ORAL DAILY
Qty: 90 TABLET | Refills: 3 | Status: SHIPPED | OUTPATIENT
Start: 2021-03-10 | End: 2021-09-08 | Stop reason: SDUPTHER

## 2021-03-10 RX ORDER — TAMSULOSIN HYDROCHLORIDE 0.4 MG/1
1 CAPSULE ORAL DAILY
Qty: 90 CAPSULE | Refills: 3 | Status: SHIPPED | OUTPATIENT
Start: 2021-03-10 | End: 2021-08-26

## 2021-03-10 RX ORDER — SILDENAFIL 100 MG/1
100 TABLET, FILM COATED ORAL DAILY PRN
Qty: 30 TABLET | Refills: 11 | Status: SHIPPED | OUTPATIENT
Start: 2021-03-10 | End: 2022-03-16

## 2021-04-15 ENCOUNTER — OFFICE VISIT (OUTPATIENT)
Dept: FAMILY MEDICINE | Facility: CLINIC | Age: 66
End: 2021-04-15
Payer: MEDICARE

## 2021-04-15 VITALS
RESPIRATION RATE: 20 BRPM | WEIGHT: 261.69 LBS | TEMPERATURE: 99 F | HEIGHT: 76 IN | BODY MASS INDEX: 31.87 KG/M2 | HEART RATE: 86 BPM | OXYGEN SATURATION: 99 %

## 2021-04-15 DIAGNOSIS — I10 ESSENTIAL HYPERTENSION: ICD-10-CM

## 2021-04-15 DIAGNOSIS — E78.5 HYPERLIPIDEMIA, UNSPECIFIED HYPERLIPIDEMIA TYPE: ICD-10-CM

## 2021-04-15 DIAGNOSIS — H90.3 SENSORINEURAL HEARING LOSS (SNHL) OF BOTH EARS: ICD-10-CM

## 2021-04-15 DIAGNOSIS — Z00.00 ENCOUNTER FOR PREVENTIVE HEALTH EXAMINATION: Primary | ICD-10-CM

## 2021-04-15 DIAGNOSIS — R97.20 ELEVATED PSA: ICD-10-CM

## 2021-04-15 DIAGNOSIS — M17.0 PRIMARY OSTEOARTHRITIS OF BOTH KNEES: ICD-10-CM

## 2021-04-15 DIAGNOSIS — E66.9 OBESITY (BMI 30-39.9): ICD-10-CM

## 2021-04-15 PROCEDURE — 1101F PR PT FALLS ASSESS DOC 0-1 FALLS W/OUT INJ PAST YR: ICD-10-PCS | Mod: CPTII,S$GLB,, | Performed by: NURSE PRACTITIONER

## 2021-04-15 PROCEDURE — 99499 UNLISTED E&M SERVICE: CPT | Mod: S$GLB,,, | Performed by: NURSE PRACTITIONER

## 2021-04-15 PROCEDURE — 3008F PR BODY MASS INDEX (BMI) DOCUMENTED: ICD-10-PCS | Mod: CPTII,S$GLB,, | Performed by: NURSE PRACTITIONER

## 2021-04-15 PROCEDURE — 99999 PR PBB SHADOW E&M-EST. PATIENT-LVL IV: CPT | Mod: PBBFAC,,, | Performed by: NURSE PRACTITIONER

## 2021-04-15 PROCEDURE — 3288F PR FALLS RISK ASSESSMENT DOCUMENTED: ICD-10-PCS | Mod: CPTII,S$GLB,, | Performed by: NURSE PRACTITIONER

## 2021-04-15 PROCEDURE — G0439 PR MEDICARE ANNUAL WELLNESS SUBSEQUENT VISIT: ICD-10-PCS | Mod: S$GLB,,, | Performed by: NURSE PRACTITIONER

## 2021-04-15 PROCEDURE — 1158F ADVNC CARE PLAN TLK DOCD: CPT | Mod: S$GLB,,, | Performed by: NURSE PRACTITIONER

## 2021-04-15 PROCEDURE — 3008F BODY MASS INDEX DOCD: CPT | Mod: CPTII,S$GLB,, | Performed by: NURSE PRACTITIONER

## 2021-04-15 PROCEDURE — 99999 PR PBB SHADOW E&M-EST. PATIENT-LVL IV: ICD-10-PCS | Mod: PBBFAC,,, | Performed by: NURSE PRACTITIONER

## 2021-04-15 PROCEDURE — 3288F FALL RISK ASSESSMENT DOCD: CPT | Mod: CPTII,S$GLB,, | Performed by: NURSE PRACTITIONER

## 2021-04-15 PROCEDURE — 1101F PT FALLS ASSESS-DOCD LE1/YR: CPT | Mod: CPTII,S$GLB,, | Performed by: NURSE PRACTITIONER

## 2021-04-15 PROCEDURE — 1126F PR PAIN SEVERITY QUANTIFIED, NO PAIN PRESENT: ICD-10-PCS | Mod: S$GLB,,, | Performed by: NURSE PRACTITIONER

## 2021-04-15 PROCEDURE — G0439 PPPS, SUBSEQ VISIT: HCPCS | Mod: S$GLB,,, | Performed by: NURSE PRACTITIONER

## 2021-04-15 PROCEDURE — 1126F AMNT PAIN NOTED NONE PRSNT: CPT | Mod: S$GLB,,, | Performed by: NURSE PRACTITIONER

## 2021-04-15 PROCEDURE — 99499 RISK ADDL DX/OHS AUDIT: ICD-10-PCS | Mod: S$GLB,,, | Performed by: NURSE PRACTITIONER

## 2021-04-15 PROCEDURE — 1158F PR ADVANCE CARE PLANNING DISCUSS DOCUMENTED IN MEDICAL RECORD: ICD-10-PCS | Mod: S$GLB,,, | Performed by: NURSE PRACTITIONER

## 2021-07-07 DIAGNOSIS — E78.5 HYPERLIPIDEMIA, UNSPECIFIED HYPERLIPIDEMIA TYPE: ICD-10-CM

## 2021-07-09 RX ORDER — ATORVASTATIN CALCIUM 40 MG/1
TABLET, FILM COATED ORAL
Qty: 90 TABLET | Refills: 0 | Status: SHIPPED | OUTPATIENT
Start: 2021-07-09 | End: 2021-10-23

## 2021-07-21 DIAGNOSIS — M25.561 PAIN IN BOTH KNEES, UNSPECIFIED CHRONICITY: Primary | ICD-10-CM

## 2021-07-21 DIAGNOSIS — M25.562 PAIN IN BOTH KNEES, UNSPECIFIED CHRONICITY: Primary | ICD-10-CM

## 2021-07-28 ENCOUNTER — OFFICE VISIT (OUTPATIENT)
Dept: ORTHOPEDICS | Facility: CLINIC | Age: 66
End: 2021-07-28
Payer: MEDICARE

## 2021-07-28 ENCOUNTER — PATIENT OUTREACH (OUTPATIENT)
Dept: ADMINISTRATIVE | Facility: OTHER | Age: 66
End: 2021-07-28

## 2021-07-28 VITALS
HEART RATE: 81 BPM | WEIGHT: 261.69 LBS | HEIGHT: 76 IN | BODY MASS INDEX: 31.87 KG/M2 | SYSTOLIC BLOOD PRESSURE: 122 MMHG | DIASTOLIC BLOOD PRESSURE: 68 MMHG

## 2021-07-28 DIAGNOSIS — M17.0 PRIMARY OSTEOARTHRITIS OF BOTH KNEES: ICD-10-CM

## 2021-07-28 DIAGNOSIS — M25.562 CHRONIC PAIN OF BOTH KNEES: Primary | ICD-10-CM

## 2021-07-28 DIAGNOSIS — M25.561 CHRONIC PAIN OF BOTH KNEES: Primary | ICD-10-CM

## 2021-07-28 DIAGNOSIS — G89.29 CHRONIC PAIN OF BOTH KNEES: Primary | ICD-10-CM

## 2021-07-28 DIAGNOSIS — M21.161 GENU VARUM OF BOTH LOWER EXTREMITIES: ICD-10-CM

## 2021-07-28 DIAGNOSIS — M21.162 GENU VARUM OF BOTH LOWER EXTREMITIES: ICD-10-CM

## 2021-07-28 PROCEDURE — 1159F MED LIST DOCD IN RCRD: CPT | Mod: CPTII,S$GLB,, | Performed by: PHYSICIAN ASSISTANT

## 2021-07-28 PROCEDURE — 1160F PR REVIEW ALL MEDS BY PRESCRIBER/CLIN PHARMACIST DOCUMENTED: ICD-10-PCS | Mod: CPTII,S$GLB,, | Performed by: PHYSICIAN ASSISTANT

## 2021-07-28 PROCEDURE — 3074F SYST BP LT 130 MM HG: CPT | Mod: CPTII,S$GLB,, | Performed by: PHYSICIAN ASSISTANT

## 2021-07-28 PROCEDURE — 99214 OFFICE O/P EST MOD 30 MIN: CPT | Mod: 25,S$GLB,, | Performed by: PHYSICIAN ASSISTANT

## 2021-07-28 PROCEDURE — 1160F RVW MEDS BY RX/DR IN RCRD: CPT | Mod: CPTII,S$GLB,, | Performed by: PHYSICIAN ASSISTANT

## 2021-07-28 PROCEDURE — 3288F FALL RISK ASSESSMENT DOCD: CPT | Mod: CPTII,S$GLB,, | Performed by: PHYSICIAN ASSISTANT

## 2021-07-28 PROCEDURE — 99999 PR PBB SHADOW E&M-EST. PATIENT-LVL III: ICD-10-PCS | Mod: PBBFAC,,, | Performed by: PHYSICIAN ASSISTANT

## 2021-07-28 PROCEDURE — 99214 PR OFFICE/OUTPT VISIT, EST, LEVL IV, 30-39 MIN: ICD-10-PCS | Mod: 25,S$GLB,, | Performed by: PHYSICIAN ASSISTANT

## 2021-07-28 PROCEDURE — 3008F BODY MASS INDEX DOCD: CPT | Mod: CPTII,S$GLB,, | Performed by: PHYSICIAN ASSISTANT

## 2021-07-28 PROCEDURE — 1159F PR MEDICATION LIST DOCUMENTED IN MEDICAL RECORD: ICD-10-PCS | Mod: CPTII,S$GLB,, | Performed by: PHYSICIAN ASSISTANT

## 2021-07-28 PROCEDURE — 20610 LARGE JOINT ASPIRATION/INJECTION: BILATERAL KNEE: ICD-10-PCS | Mod: 50,S$GLB,, | Performed by: PHYSICIAN ASSISTANT

## 2021-07-28 PROCEDURE — 1125F AMNT PAIN NOTED PAIN PRSNT: CPT | Mod: CPTII,S$GLB,, | Performed by: PHYSICIAN ASSISTANT

## 2021-07-28 PROCEDURE — 3074F PR MOST RECENT SYSTOLIC BLOOD PRESSURE < 130 MM HG: ICD-10-PCS | Mod: CPTII,S$GLB,, | Performed by: PHYSICIAN ASSISTANT

## 2021-07-28 PROCEDURE — 3078F PR MOST RECENT DIASTOLIC BLOOD PRESSURE < 80 MM HG: ICD-10-PCS | Mod: CPTII,S$GLB,, | Performed by: PHYSICIAN ASSISTANT

## 2021-07-28 PROCEDURE — 3078F DIAST BP <80 MM HG: CPT | Mod: CPTII,S$GLB,, | Performed by: PHYSICIAN ASSISTANT

## 2021-07-28 PROCEDURE — 3008F PR BODY MASS INDEX (BMI) DOCUMENTED: ICD-10-PCS | Mod: CPTII,S$GLB,, | Performed by: PHYSICIAN ASSISTANT

## 2021-07-28 PROCEDURE — 1101F PR PT FALLS ASSESS DOC 0-1 FALLS W/OUT INJ PAST YR: ICD-10-PCS | Mod: CPTII,S$GLB,, | Performed by: PHYSICIAN ASSISTANT

## 2021-07-28 PROCEDURE — 1125F PR PAIN SEVERITY QUANTIFIED, PAIN PRESENT: ICD-10-PCS | Mod: CPTII,S$GLB,, | Performed by: PHYSICIAN ASSISTANT

## 2021-07-28 PROCEDURE — 99999 PR PBB SHADOW E&M-EST. PATIENT-LVL III: CPT | Mod: PBBFAC,,, | Performed by: PHYSICIAN ASSISTANT

## 2021-07-28 PROCEDURE — 20610 DRAIN/INJ JOINT/BURSA W/O US: CPT | Mod: 50,S$GLB,, | Performed by: PHYSICIAN ASSISTANT

## 2021-07-28 PROCEDURE — 3288F PR FALLS RISK ASSESSMENT DOCUMENTED: ICD-10-PCS | Mod: CPTII,S$GLB,, | Performed by: PHYSICIAN ASSISTANT

## 2021-07-28 PROCEDURE — 1101F PT FALLS ASSESS-DOCD LE1/YR: CPT | Mod: CPTII,S$GLB,, | Performed by: PHYSICIAN ASSISTANT

## 2021-07-28 RX ORDER — METHYLPREDNISOLONE ACETATE 80 MG/ML
80 INJECTION, SUSPENSION INTRA-ARTICULAR; INTRALESIONAL; INTRAMUSCULAR; SOFT TISSUE
Status: DISCONTINUED | OUTPATIENT
Start: 2021-07-28 | End: 2021-07-28 | Stop reason: HOSPADM

## 2021-07-28 RX ADMIN — METHYLPREDNISOLONE ACETATE 80 MG: 80 INJECTION, SUSPENSION INTRA-ARTICULAR; INTRALESIONAL; INTRAMUSCULAR; SOFT TISSUE at 02:07

## 2021-09-09 ENCOUNTER — OFFICE VISIT (OUTPATIENT)
Dept: FAMILY MEDICINE | Facility: CLINIC | Age: 66
End: 2021-09-09
Payer: MEDICARE

## 2021-09-09 VITALS — SYSTOLIC BLOOD PRESSURE: 124 MMHG | DIASTOLIC BLOOD PRESSURE: 70 MMHG

## 2021-09-09 DIAGNOSIS — E78.5 HYPERLIPIDEMIA, UNSPECIFIED HYPERLIPIDEMIA TYPE: ICD-10-CM

## 2021-09-09 DIAGNOSIS — R97.20 ELEVATED PSA: ICD-10-CM

## 2021-09-09 DIAGNOSIS — Z00.00 ANNUAL PHYSICAL EXAM: Primary | ICD-10-CM

## 2021-09-09 DIAGNOSIS — Z23 NEED FOR PROPHYLACTIC VACCINATION AGAINST STREPTOCOCCUS PNEUMONIAE (PNEUMOCOCCUS): ICD-10-CM

## 2021-09-09 DIAGNOSIS — I10 ESSENTIAL HYPERTENSION: ICD-10-CM

## 2021-09-09 DIAGNOSIS — M17.0 PRIMARY OSTEOARTHRITIS OF BOTH KNEES: ICD-10-CM

## 2021-09-09 DIAGNOSIS — M19.90 ARTHRITIS: ICD-10-CM

## 2021-09-09 PROCEDURE — 3074F SYST BP LT 130 MM HG: CPT | Mod: CPTII,95,, | Performed by: FAMILY MEDICINE

## 2021-09-09 PROCEDURE — 3078F DIAST BP <80 MM HG: CPT | Mod: CPTII,95,, | Performed by: FAMILY MEDICINE

## 2021-09-09 PROCEDURE — 4010F ACE/ARB THERAPY RXD/TAKEN: CPT | Mod: CPTII,95,, | Performed by: FAMILY MEDICINE

## 2021-09-09 PROCEDURE — 99443 PR PHYSICIAN TELEPHONE EVALUATION 21-30 MIN: CPT | Mod: 95,,, | Performed by: FAMILY MEDICINE

## 2021-09-09 PROCEDURE — 1159F PR MEDICATION LIST DOCUMENTED IN MEDICAL RECORD: ICD-10-PCS | Mod: CPTII,95,, | Performed by: FAMILY MEDICINE

## 2021-09-09 PROCEDURE — 3074F PR MOST RECENT SYSTOLIC BLOOD PRESSURE < 130 MM HG: ICD-10-PCS | Mod: CPTII,95,, | Performed by: FAMILY MEDICINE

## 2021-09-09 PROCEDURE — 3078F PR MOST RECENT DIASTOLIC BLOOD PRESSURE < 80 MM HG: ICD-10-PCS | Mod: CPTII,95,, | Performed by: FAMILY MEDICINE

## 2021-09-09 PROCEDURE — 99443 PR PHYSICIAN TELEPHONE EVALUATION 21-30 MIN: ICD-10-PCS | Mod: 95,,, | Performed by: FAMILY MEDICINE

## 2021-09-09 PROCEDURE — 1160F RVW MEDS BY RX/DR IN RCRD: CPT | Mod: CPTII,95,, | Performed by: FAMILY MEDICINE

## 2021-09-09 PROCEDURE — 1160F PR REVIEW ALL MEDS BY PRESCRIBER/CLIN PHARMACIST DOCUMENTED: ICD-10-PCS | Mod: CPTII,95,, | Performed by: FAMILY MEDICINE

## 2021-09-09 PROCEDURE — 1159F MED LIST DOCD IN RCRD: CPT | Mod: CPTII,95,, | Performed by: FAMILY MEDICINE

## 2021-09-09 PROCEDURE — 4010F PR ACE/ARB THEARPY RXD/TAKEN: ICD-10-PCS | Mod: CPTII,95,, | Performed by: FAMILY MEDICINE

## 2021-09-11 ENCOUNTER — PATIENT OUTREACH (OUTPATIENT)
Dept: ADMINISTRATIVE | Facility: OTHER | Age: 66
End: 2021-09-11

## 2021-09-13 ENCOUNTER — OFFICE VISIT (OUTPATIENT)
Dept: ORTHOPEDICS | Facility: CLINIC | Age: 66
End: 2021-09-13
Payer: MEDICARE

## 2021-09-13 VITALS
HEIGHT: 76 IN | WEIGHT: 261.69 LBS | HEART RATE: 67 BPM | SYSTOLIC BLOOD PRESSURE: 126 MMHG | DIASTOLIC BLOOD PRESSURE: 80 MMHG | BODY MASS INDEX: 31.87 KG/M2

## 2021-09-13 DIAGNOSIS — M25.562 CHRONIC PAIN OF BOTH KNEES: Primary | ICD-10-CM

## 2021-09-13 DIAGNOSIS — M17.0 PRIMARY OSTEOARTHRITIS OF BOTH KNEES: ICD-10-CM

## 2021-09-13 DIAGNOSIS — G89.29 CHRONIC PAIN OF BOTH KNEES: Primary | ICD-10-CM

## 2021-09-13 DIAGNOSIS — M21.161 GENU VARUM OF BOTH LOWER EXTREMITIES: ICD-10-CM

## 2021-09-13 DIAGNOSIS — M25.561 CHRONIC PAIN OF BOTH KNEES: Primary | ICD-10-CM

## 2021-09-13 DIAGNOSIS — M21.162 GENU VARUM OF BOTH LOWER EXTREMITIES: ICD-10-CM

## 2021-09-13 PROCEDURE — 1125F AMNT PAIN NOTED PAIN PRSNT: CPT | Mod: CPTII,S$GLB,, | Performed by: PHYSICIAN ASSISTANT

## 2021-09-13 PROCEDURE — 1159F MED LIST DOCD IN RCRD: CPT | Mod: CPTII,S$GLB,, | Performed by: PHYSICIAN ASSISTANT

## 2021-09-13 PROCEDURE — 3008F PR BODY MASS INDEX (BMI) DOCUMENTED: ICD-10-PCS | Mod: CPTII,S$GLB,, | Performed by: PHYSICIAN ASSISTANT

## 2021-09-13 PROCEDURE — 1101F PT FALLS ASSESS-DOCD LE1/YR: CPT | Mod: CPTII,S$GLB,, | Performed by: PHYSICIAN ASSISTANT

## 2021-09-13 PROCEDURE — 1159F PR MEDICATION LIST DOCUMENTED IN MEDICAL RECORD: ICD-10-PCS | Mod: CPTII,S$GLB,, | Performed by: PHYSICIAN ASSISTANT

## 2021-09-13 PROCEDURE — 20610 DRAIN/INJ JOINT/BURSA W/O US: CPT | Mod: 50,S$GLB,, | Performed by: PHYSICIAN ASSISTANT

## 2021-09-13 PROCEDURE — 1101F PR PT FALLS ASSESS DOC 0-1 FALLS W/OUT INJ PAST YR: ICD-10-PCS | Mod: CPTII,S$GLB,, | Performed by: PHYSICIAN ASSISTANT

## 2021-09-13 PROCEDURE — 3288F PR FALLS RISK ASSESSMENT DOCUMENTED: ICD-10-PCS | Mod: CPTII,S$GLB,, | Performed by: PHYSICIAN ASSISTANT

## 2021-09-13 PROCEDURE — 3074F SYST BP LT 130 MM HG: CPT | Mod: CPTII,S$GLB,, | Performed by: PHYSICIAN ASSISTANT

## 2021-09-13 PROCEDURE — 1160F PR REVIEW ALL MEDS BY PRESCRIBER/CLIN PHARMACIST DOCUMENTED: ICD-10-PCS | Mod: CPTII,S$GLB,, | Performed by: PHYSICIAN ASSISTANT

## 2021-09-13 PROCEDURE — 4010F ACE/ARB THERAPY RXD/TAKEN: CPT | Mod: CPTII,S$GLB,, | Performed by: PHYSICIAN ASSISTANT

## 2021-09-13 PROCEDURE — 4010F PR ACE/ARB THEARPY RXD/TAKEN: ICD-10-PCS | Mod: CPTII,S$GLB,, | Performed by: PHYSICIAN ASSISTANT

## 2021-09-13 PROCEDURE — 20610 LARGE JOINT ASPIRATION/INJECTION: BILATERAL KNEE: ICD-10-PCS | Mod: 50,S$GLB,, | Performed by: PHYSICIAN ASSISTANT

## 2021-09-13 PROCEDURE — 3079F PR MOST RECENT DIASTOLIC BLOOD PRESSURE 80-89 MM HG: ICD-10-PCS | Mod: CPTII,S$GLB,, | Performed by: PHYSICIAN ASSISTANT

## 2021-09-13 PROCEDURE — 3288F FALL RISK ASSESSMENT DOCD: CPT | Mod: CPTII,S$GLB,, | Performed by: PHYSICIAN ASSISTANT

## 2021-09-13 PROCEDURE — 1160F RVW MEDS BY RX/DR IN RCRD: CPT | Mod: CPTII,S$GLB,, | Performed by: PHYSICIAN ASSISTANT

## 2021-09-13 PROCEDURE — 99999 PR PBB SHADOW E&M-EST. PATIENT-LVL III: CPT | Mod: PBBFAC,,, | Performed by: PHYSICIAN ASSISTANT

## 2021-09-13 PROCEDURE — 3074F PR MOST RECENT SYSTOLIC BLOOD PRESSURE < 130 MM HG: ICD-10-PCS | Mod: CPTII,S$GLB,, | Performed by: PHYSICIAN ASSISTANT

## 2021-09-13 PROCEDURE — 99499 NO LOS: ICD-10-PCS | Mod: S$GLB,,, | Performed by: PHYSICIAN ASSISTANT

## 2021-09-13 PROCEDURE — 3079F DIAST BP 80-89 MM HG: CPT | Mod: CPTII,S$GLB,, | Performed by: PHYSICIAN ASSISTANT

## 2021-09-13 PROCEDURE — 99499 UNLISTED E&M SERVICE: CPT | Mod: S$GLB,,, | Performed by: PHYSICIAN ASSISTANT

## 2021-09-13 PROCEDURE — 3008F BODY MASS INDEX DOCD: CPT | Mod: CPTII,S$GLB,, | Performed by: PHYSICIAN ASSISTANT

## 2021-09-13 PROCEDURE — 99999 PR PBB SHADOW E&M-EST. PATIENT-LVL III: ICD-10-PCS | Mod: PBBFAC,,, | Performed by: PHYSICIAN ASSISTANT

## 2021-09-13 PROCEDURE — 1125F PR PAIN SEVERITY QUANTIFIED, PAIN PRESENT: ICD-10-PCS | Mod: CPTII,S$GLB,, | Performed by: PHYSICIAN ASSISTANT

## 2021-10-29 ENCOUNTER — IMMUNIZATION (OUTPATIENT)
Dept: PRIMARY CARE CLINIC | Facility: CLINIC | Age: 66
End: 2021-10-29
Payer: MEDICARE

## 2021-10-29 DIAGNOSIS — Z23 NEED FOR VACCINATION: Primary | ICD-10-CM

## 2021-10-29 PROCEDURE — 91301 COVID-19, MRNA, LNP-S, PF, 100 MCG/0.25 ML DOSE VACCINE (MODERNA BOOSTER): CPT | Mod: PBBFAC | Performed by: FAMILY MEDICINE

## 2021-12-16 ENCOUNTER — TELEPHONE (OUTPATIENT)
Dept: ADMINISTRATIVE | Facility: HOSPITAL | Age: 66
End: 2021-12-16
Payer: MEDICARE

## 2022-01-01 NOTE — Clinical Note
Mother and father of infant completed rooming in and are independent of all cares/ feeds. Infant maintaining temps in open crib. Infant nippled all feeds. Voiding and stooling. Discharge teaching completed via discharge video and questions addressed. Basic Baby Care Guide reviewed and copy given to parents/caregivers.  Discussed Safe Sleep for baby. Stressed to always place baby on back when sleeping.  Discussed that infants should have tummy time a few times per day and only on tummy when infant is awake and someone is actively watching infant. Discussed the importance of infant having his/her own bed to sleep in and to never have infant sleep in the bed with the parents. Discussed Shaken Baby Syndrome and to never shake the infant.   Informed parents that once the baby has been discharged from the NICU, if readmission is necessary, it must be a pediatric facility. Discussed with parents the available pediatric facilities for readmission.      Your patient was seen today for a HRA visit.I have included a copy of my visit note, please review the note and feel free to contact me with any questions. Thank you for allowing me to participate in the care of your patients. Gisselle Palomino NP

## 2022-01-04 ENCOUNTER — TELEPHONE (OUTPATIENT)
Dept: INTERNAL MEDICINE | Facility: CLINIC | Age: 67
End: 2022-01-04
Payer: MEDICARE

## 2022-01-04 NOTE — TELEPHONE ENCOUNTER
----- Message from Awa Boyer sent at 1/4/2022  9:45 AM CST -----  Contact: self [phone]  Type:  Patient Returning Call    Who Called: Mitchell Mcmillan    Who Left Message for Patient: nurse  Does the patient know what this is regarding?: flu shot appt  Would the patient rather a call back or a response via [facility]? Call back   Best Call Back Number:[phone]  Additional Information:

## 2022-01-06 ENCOUNTER — TELEPHONE (OUTPATIENT)
Dept: ORTHOPEDICS | Facility: CLINIC | Age: 67
End: 2022-01-06
Payer: MEDICARE

## 2022-01-06 NOTE — TELEPHONE ENCOUNTER
Phoned patient to reschedule their Orthopedic  appointment with with Lyn Mak PA-C due to her no longer being an Orthopedic provider. Patient will call back to reschedule their appointment if needed.

## 2022-01-07 ENCOUNTER — IMMUNIZATION (OUTPATIENT)
Dept: INTERNAL MEDICINE | Facility: CLINIC | Age: 67
End: 2022-01-07
Payer: MEDICARE

## 2022-01-07 PROCEDURE — G0008 ADMIN INFLUENZA VIRUS VAC: HCPCS | Mod: HCNC,S$GLB,, | Performed by: FAMILY MEDICINE

## 2022-01-07 PROCEDURE — G0008 FLU VACCINE - QUADRIVALENT - ADJUVANTED: ICD-10-PCS | Mod: HCNC,S$GLB,, | Performed by: FAMILY MEDICINE

## 2022-01-07 PROCEDURE — 90694 FLU VACCINE - QUADRIVALENT - ADJUVANTED: ICD-10-PCS | Mod: HCNC,S$GLB,, | Performed by: FAMILY MEDICINE

## 2022-01-07 PROCEDURE — 90694 VACC AIIV4 NO PRSRV 0.5ML IM: CPT | Mod: HCNC,S$GLB,, | Performed by: FAMILY MEDICINE

## 2022-01-20 DIAGNOSIS — I10 ESSENTIAL HYPERTENSION: ICD-10-CM

## 2022-01-20 DIAGNOSIS — E78.5 HYPERLIPIDEMIA, UNSPECIFIED HYPERLIPIDEMIA TYPE: ICD-10-CM

## 2022-01-20 NOTE — TELEPHONE ENCOUNTER
Care Due:                  Date            Visit Type   Department     Provider  --------------------------------------------------------------------------------                                             [facility]  Last Visit: 09-      None         MEDICINE       Jessica Chinchilla  Next Visit: None Scheduled  None         None Found                                                            Last  Test          Frequency    Reason                     Performed    Due Date  --------------------------------------------------------------------------------    CMP.........  12 months..  atorvastatin,              Not Found    Overdue                             lisinopriL-hydrochlorothi                             azide....................    Lipid Panel.  12 months..  atorvastatin.............  Not Found    Overdue    Powered by [employer] by [employer]. Reference number: [id number].   1/20/2022 1:50:50 PM CST

## 2022-01-22 RX ORDER — ATORVASTATIN CALCIUM 40 MG/1
TABLET, FILM COATED ORAL
Qty: 90 TABLET | Refills: 0 | Status: SHIPPED | OUTPATIENT
Start: 2022-01-22 | End: 2022-04-22 | Stop reason: SDUPTHER

## 2022-01-22 RX ORDER — LISINOPRIL AND HYDROCHLOROTHIAZIDE 20; 25 MG/1; MG/1
TABLET ORAL
Qty: 90 TABLET | Refills: 1 | Status: SHIPPED | OUTPATIENT
Start: 2022-01-22 | End: 2022-07-11

## 2022-03-10 ENCOUNTER — LAB VISIT (OUTPATIENT)
Dept: LAB | Facility: HOSPITAL | Age: 67
End: 2022-03-10
Attending: UROLOGY
Payer: MEDICARE

## 2022-03-10 DIAGNOSIS — N40.0 BENIGN PROSTATIC HYPERPLASIA, UNSPECIFIED WHETHER LOWER URINARY TRACT SYMPTOMS PRESENT: ICD-10-CM

## 2022-03-10 DIAGNOSIS — N52.9 ERECTILE DYSFUNCTION, UNSPECIFIED ERECTILE DYSFUNCTION TYPE: ICD-10-CM

## 2022-03-10 DIAGNOSIS — Z12.5 ENCOUNTER FOR SCREENING FOR MALIGNANT NEOPLASM OF PROSTATE: ICD-10-CM

## 2022-03-10 DIAGNOSIS — I10 HYPERTENSION, UNSPECIFIED TYPE: ICD-10-CM

## 2022-03-10 DIAGNOSIS — R97.20 ELEVATED PSA: ICD-10-CM

## 2022-03-10 LAB — COMPLEXED PSA SERPL-MCNC: 7.3 NG/ML (ref 0–4)

## 2022-03-10 PROCEDURE — 36415 COLL VENOUS BLD VENIPUNCTURE: CPT | Mod: PO | Performed by: UROLOGY

## 2022-03-10 PROCEDURE — 84153 ASSAY OF PSA TOTAL: CPT | Performed by: UROLOGY

## 2022-03-16 ENCOUNTER — OFFICE VISIT (OUTPATIENT)
Dept: UROLOGY | Facility: CLINIC | Age: 67
End: 2022-03-16
Payer: MEDICARE

## 2022-03-16 VITALS
HEART RATE: 73 BPM | SYSTOLIC BLOOD PRESSURE: 126 MMHG | WEIGHT: 256.94 LBS | DIASTOLIC BLOOD PRESSURE: 77 MMHG | BODY MASS INDEX: 31.28 KG/M2 | TEMPERATURE: 98 F

## 2022-03-16 DIAGNOSIS — N52.9 ERECTILE DYSFUNCTION, UNSPECIFIED ERECTILE DYSFUNCTION TYPE: ICD-10-CM

## 2022-03-16 DIAGNOSIS — R97.20 ELEVATED PSA: Primary | ICD-10-CM

## 2022-03-16 DIAGNOSIS — N40.0 BENIGN PROSTATIC HYPERPLASIA, UNSPECIFIED WHETHER LOWER URINARY TRACT SYMPTOMS PRESENT: ICD-10-CM

## 2022-03-16 PROCEDURE — 1126F AMNT PAIN NOTED NONE PRSNT: CPT | Mod: CPTII,S$GLB,, | Performed by: UROLOGY

## 2022-03-16 PROCEDURE — 3074F SYST BP LT 130 MM HG: CPT | Mod: CPTII,S$GLB,, | Performed by: UROLOGY

## 2022-03-16 PROCEDURE — 3074F PR MOST RECENT SYSTOLIC BLOOD PRESSURE < 130 MM HG: ICD-10-PCS | Mod: CPTII,S$GLB,, | Performed by: UROLOGY

## 2022-03-16 PROCEDURE — 99214 PR OFFICE/OUTPT VISIT, EST, LEVL IV, 30-39 MIN: ICD-10-PCS | Mod: S$GLB,,, | Performed by: UROLOGY

## 2022-03-16 PROCEDURE — 1159F PR MEDICATION LIST DOCUMENTED IN MEDICAL RECORD: ICD-10-PCS | Mod: CPTII,S$GLB,, | Performed by: UROLOGY

## 2022-03-16 PROCEDURE — 1159F MED LIST DOCD IN RCRD: CPT | Mod: CPTII,S$GLB,, | Performed by: UROLOGY

## 2022-03-16 PROCEDURE — 99999 PR PBB SHADOW E&M-EST. PATIENT-LVL III: ICD-10-PCS | Mod: PBBFAC,,, | Performed by: UROLOGY

## 2022-03-16 PROCEDURE — 3288F PR FALLS RISK ASSESSMENT DOCUMENTED: ICD-10-PCS | Mod: CPTII,S$GLB,, | Performed by: UROLOGY

## 2022-03-16 PROCEDURE — 1126F PR PAIN SEVERITY QUANTIFIED, NO PAIN PRESENT: ICD-10-PCS | Mod: CPTII,S$GLB,, | Performed by: UROLOGY

## 2022-03-16 PROCEDURE — 3078F PR MOST RECENT DIASTOLIC BLOOD PRESSURE < 80 MM HG: ICD-10-PCS | Mod: CPTII,S$GLB,, | Performed by: UROLOGY

## 2022-03-16 PROCEDURE — 1160F PR REVIEW ALL MEDS BY PRESCRIBER/CLIN PHARMACIST DOCUMENTED: ICD-10-PCS | Mod: CPTII,S$GLB,, | Performed by: UROLOGY

## 2022-03-16 PROCEDURE — 1101F PT FALLS ASSESS-DOCD LE1/YR: CPT | Mod: CPTII,S$GLB,, | Performed by: UROLOGY

## 2022-03-16 PROCEDURE — 4010F ACE/ARB THERAPY RXD/TAKEN: CPT | Mod: CPTII,S$GLB,, | Performed by: UROLOGY

## 2022-03-16 PROCEDURE — 3078F DIAST BP <80 MM HG: CPT | Mod: CPTII,S$GLB,, | Performed by: UROLOGY

## 2022-03-16 PROCEDURE — 1160F RVW MEDS BY RX/DR IN RCRD: CPT | Mod: CPTII,S$GLB,, | Performed by: UROLOGY

## 2022-03-16 PROCEDURE — 1101F PR PT FALLS ASSESS DOC 0-1 FALLS W/OUT INJ PAST YR: ICD-10-PCS | Mod: CPTII,S$GLB,, | Performed by: UROLOGY

## 2022-03-16 PROCEDURE — 99999 PR PBB SHADOW E&M-EST. PATIENT-LVL III: CPT | Mod: PBBFAC,,, | Performed by: UROLOGY

## 2022-03-16 PROCEDURE — 3288F FALL RISK ASSESSMENT DOCD: CPT | Mod: CPTII,S$GLB,, | Performed by: UROLOGY

## 2022-03-16 PROCEDURE — 99214 OFFICE O/P EST MOD 30 MIN: CPT | Mod: S$GLB,,, | Performed by: UROLOGY

## 2022-03-16 PROCEDURE — 3008F PR BODY MASS INDEX (BMI) DOCUMENTED: ICD-10-PCS | Mod: CPTII,S$GLB,, | Performed by: UROLOGY

## 2022-03-16 PROCEDURE — 3008F BODY MASS INDEX DOCD: CPT | Mod: CPTII,S$GLB,, | Performed by: UROLOGY

## 2022-03-16 PROCEDURE — 4010F PR ACE/ARB THEARPY RXD/TAKEN: ICD-10-PCS | Mod: CPTII,S$GLB,, | Performed by: UROLOGY

## 2022-03-16 RX ORDER — TAMSULOSIN HYDROCHLORIDE 0.4 MG/1
0.4 CAPSULE ORAL DAILY
Qty: 90 CAPSULE | Refills: 3 | Status: SHIPPED | OUTPATIENT
Start: 2022-03-16 | End: 2023-02-17

## 2022-03-16 RX ORDER — FINASTERIDE 5 MG/1
5 TABLET, FILM COATED ORAL DAILY
Qty: 90 TABLET | Refills: 3 | OUTPATIENT
Start: 2022-03-16

## 2022-03-16 RX ORDER — SILDENAFIL 100 MG/1
100 TABLET, FILM COATED ORAL DAILY PRN
Qty: 30 TABLET | Refills: 10 | Status: SHIPPED | OUTPATIENT
Start: 2022-03-16 | End: 2023-06-12 | Stop reason: SDUPTHER

## 2022-03-16 RX ORDER — FINASTERIDE 5 MG/1
5 TABLET, FILM COATED ORAL DAILY
Qty: 90 TABLET | Refills: 3 | Status: SHIPPED | OUTPATIENT
Start: 2022-03-16 | End: 2023-02-17

## 2022-03-16 NOTE — PROGRESS NOTES
Chief Complaint:   Encounter Diagnoses   Name Primary?    Elevated PSA Yes    Benign prostatic hyperplasia, unspecified whether lower urinary tract symptoms present     Erectile dysfunction, unspecified erectile dysfunction type          HPI:   3/16/22- here today to establish care in my clinic, known history of elevated PSA and negative biopsy by an outside physician.  Patient states that he is voiding well on medical management.  Erections are stable on medical therapy.  8/1/18:  61 yo man referred by Dr. Chinchilla for elevated PSA.  Prostate biopsy was done long ago, 7-8 years.  No abd/pelvic pain and no exac/rel factors.  No hematuria.  No urolithiasis.  Nocturia x2. No urinary bother.  No  history.  Normal sexual function.    Allergies:  Patient has no known allergies.    Medications:  has a current medication list which includes the following prescription(s): atorvastatin, diclofenac sodium, finasteride, lisinopril-hydrochlorothiazide, tamsulosin, and sildenafil.    Review of Systems:  General: No fever, chills, fatigability, or weight loss.  Skin: No rashes, itching, or changes in color or texture of skin.  Chest: Denies BUTCHER, cyanosis, wheezing, cough, and sputum production.  Abdomen: Appetite fine. No weight loss. Denies diarrhea, abdominal pain, hematemesis, or blood in stool.  Musculoskeletal: No joint stiffness or swelling. Denies back pain.  : As above.  All other review of systems negative.    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    PSH:   has a past surgical history that includes Hip Arthroplasty (Left, 4/16/2006);  arthrocentesis (Right, 2013); Prostate biopsy (2012); Colonoscopy (N/A, 10/5/2016); and left orchiectomy.    FamHx: family history includes Arthritis in his father; Depression in his mother; Diabetes in his sister; Early death in his mother; Heart disease (age of onset: 53) in his mother; Hypertension in his mother, sister, and sister; No Known Problems in his brother, brother, brother, and daughter; Stroke in his father.    SocHx:  reports that he quit smoking about 4 years ago. His smoking use included cigarettes. He started smoking about 11 years ago. He has a 0.50 pack-year smoking history. He has never used smokeless tobacco. He reports current alcohol use of about 2.0 standard drinks of alcohol per week. He reports that he does not use drugs.      Physical Exam:  Vitals:    03/16/22 0930   BP: 126/77   Pulse: 73   Temp: 98.2 °F (36.8 °C)     General: A&Ox3, no apparent distress, no deformities  Neck: No masses, normal thyroid  Lungs: normal inspiration, no use of accessory muscles  Heart: normal pulse, no arrhythmias  Abdomen: Soft, NT, ND  Skin: The skin is warm and dry. No jaundice.  Ext: No c/c/e.    Labs/Studies:   Reported history of negative biopsy years ago.  PSA 7.3 3/22  PSA 5.1 3/21  PSA 7.6 3/20    Impression/Plan:      1. BPH- tamsulosin and finasteride appear to be controlling his symptoms, refills have been sent in to his pharmacy.    2. Elevated PSA- PSA while elevated appears to be stable, he does not want to pursue a repeat biopsy and wants to continue to monitor.  Therefore I will see him in a year with a PSA as previously planned.    3. Erectile dysfunction- sildenafil 100 mg works well and has been refilled today.

## 2022-04-21 DIAGNOSIS — E78.5 HYPERLIPIDEMIA, UNSPECIFIED HYPERLIPIDEMIA TYPE: ICD-10-CM

## 2022-04-21 NOTE — TELEPHONE ENCOUNTER
Care Due:                  Date            Visit Type   Department     Provider  --------------------------------------------------------------------------------                                [facility]      [facility]  Last Visit: 09-      AUDIO ONLY   MEDICINE       Jessica Chinchilla  Next Visit: None Scheduled  None         None Found                                                            Last  Test          Frequency    Reason                     Performed    Due Date  --------------------------------------------------------------------------------    CMP.........  12 months..  atorvastatin,              08- 08-                             lisinopriL-hydrochlorothi                             azide....................    Lipid Panel.  12 months..  atorvastatin.............  08- 08-    Powered by [employer] by [employer]OMAR. Reference number: [id number].   4/21/2022 5:37:19 PM CDT

## 2022-04-22 RX ORDER — ATORVASTATIN CALCIUM 40 MG/1
TABLET, FILM COATED ORAL
Qty: 90 TABLET | Refills: 0 | Status: SHIPPED | OUTPATIENT
Start: 2022-04-22 | End: 2022-09-11

## 2022-04-22 NOTE — TELEPHONE ENCOUNTER
Refill Routing Note   Medication(s) are not appropriate for processing by [facility] for the following reason(s):      - Required laboratory values are outdated    [facility] action(s):  Defer          Medication reconciliation completed: No     Appointments  past 12m or future 3m with PCP    Date Provider   Last Visit   9/9/2021 Jessica Chinchilla MD   Next Visit   Visit date not found Jessica Chinchilla MD   ED visits in past 90 days: 0        Note composed:11:21 PM 04/21/2022

## 2022-05-13 ENCOUNTER — IMMUNIZATION (OUTPATIENT)
Dept: PHARMACY | Facility: CLINIC | Age: 67
End: 2022-05-13
Payer: MEDICARE

## 2022-05-13 DIAGNOSIS — Z23 NEED FOR VACCINATION: Primary | ICD-10-CM

## 2022-07-28 ENCOUNTER — PES CALL (OUTPATIENT)
Dept: ADMINISTRATIVE | Facility: CLINIC | Age: 67
End: 2022-07-28
Payer: MEDICARE

## 2022-08-02 ENCOUNTER — TELEPHONE (OUTPATIENT)
Dept: ADMINISTRATIVE | Facility: CLINIC | Age: 67
End: 2022-08-02
Payer: MEDICARE

## 2022-08-02 NOTE — TELEPHONE ENCOUNTER
----- Message from Evelyne Trejo sent at 8/2/2022 12:07 PM CDT -----  Contact: Mitchell  The patient need to reschedule this visit please 08/03/2022.    Please call Mitchell at [phone] (home)      Thanks

## 2022-08-03 ENCOUNTER — OFFICE VISIT (OUTPATIENT)
Dept: FAMILY MEDICINE | Facility: CLINIC | Age: 67
End: 2022-08-03
Payer: MEDICARE

## 2022-08-03 VITALS
HEIGHT: 76 IN | WEIGHT: 258.63 LBS | HEART RATE: 74 BPM | DIASTOLIC BLOOD PRESSURE: 82 MMHG | RESPIRATION RATE: 18 BRPM | SYSTOLIC BLOOD PRESSURE: 134 MMHG | OXYGEN SATURATION: 96 % | BODY MASS INDEX: 31.49 KG/M2

## 2022-08-03 DIAGNOSIS — K21.9 GASTROESOPHAGEAL REFLUX DISEASE, UNSPECIFIED WHETHER ESOPHAGITIS PRESENT: ICD-10-CM

## 2022-08-03 DIAGNOSIS — I10 ESSENTIAL HYPERTENSION: ICD-10-CM

## 2022-08-03 DIAGNOSIS — R07.89 CHEST PAIN, ATYPICAL: Primary | ICD-10-CM

## 2022-08-03 DIAGNOSIS — E66.9 OBESITY (BMI 30.0-34.9): ICD-10-CM

## 2022-08-03 DIAGNOSIS — F17.200 TOBACCO DEPENDENCE: ICD-10-CM

## 2022-08-03 PROCEDURE — 93005 PR ELECTROCARDIOGRAM, TRACING: ICD-10-PCS | Mod: S$GLB,,, | Performed by: FAMILY MEDICINE

## 2022-08-03 PROCEDURE — 93005 ELECTROCARDIOGRAM TRACING: CPT | Mod: S$GLB,,, | Performed by: FAMILY MEDICINE

## 2022-08-03 PROCEDURE — 3008F BODY MASS INDEX DOCD: CPT | Mod: CPTII,S$GLB,, | Performed by: FAMILY MEDICINE

## 2022-08-03 PROCEDURE — 3288F FALL RISK ASSESSMENT DOCD: CPT | Mod: CPTII,S$GLB,, | Performed by: FAMILY MEDICINE

## 2022-08-03 PROCEDURE — 99214 OFFICE O/P EST MOD 30 MIN: CPT | Mod: 25,S$GLB,, | Performed by: FAMILY MEDICINE

## 2022-08-03 PROCEDURE — 1159F PR MEDICATION LIST DOCUMENTED IN MEDICAL RECORD: ICD-10-PCS | Mod: CPTII,S$GLB,, | Performed by: FAMILY MEDICINE

## 2022-08-03 PROCEDURE — 4010F PR ACE/ARB THEARPY RXD/TAKEN: ICD-10-PCS | Mod: CPTII,S$GLB,, | Performed by: FAMILY MEDICINE

## 2022-08-03 PROCEDURE — 1159F MED LIST DOCD IN RCRD: CPT | Mod: CPTII,S$GLB,, | Performed by: FAMILY MEDICINE

## 2022-08-03 PROCEDURE — 4010F ACE/ARB THERAPY RXD/TAKEN: CPT | Mod: CPTII,S$GLB,, | Performed by: FAMILY MEDICINE

## 2022-08-03 PROCEDURE — 93010 ELECTROCARDIOGRAM REPORT: CPT | Mod: S$GLB,,, | Performed by: STUDENT IN AN ORGANIZED HEALTH CARE EDUCATION/TRAINING PROGRAM

## 2022-08-03 PROCEDURE — 1101F PT FALLS ASSESS-DOCD LE1/YR: CPT | Mod: CPTII,S$GLB,, | Performed by: FAMILY MEDICINE

## 2022-08-03 PROCEDURE — 99999 PR PBB SHADOW E&M-EST. PATIENT-LVL III: ICD-10-PCS | Mod: PBBFAC,,, | Performed by: FAMILY MEDICINE

## 2022-08-03 PROCEDURE — 1125F PR PAIN SEVERITY QUANTIFIED, PAIN PRESENT: ICD-10-PCS | Mod: CPTII,S$GLB,, | Performed by: FAMILY MEDICINE

## 2022-08-03 PROCEDURE — 99214 PR OFFICE/OUTPT VISIT, EST, LEVL IV, 30-39 MIN: ICD-10-PCS | Mod: 25,S$GLB,, | Performed by: FAMILY MEDICINE

## 2022-08-03 PROCEDURE — 93010 EKG 12-LEAD: ICD-10-PCS | Mod: S$GLB,,, | Performed by: STUDENT IN AN ORGANIZED HEALTH CARE EDUCATION/TRAINING PROGRAM

## 2022-08-03 PROCEDURE — 1125F AMNT PAIN NOTED PAIN PRSNT: CPT | Mod: CPTII,S$GLB,, | Performed by: FAMILY MEDICINE

## 2022-08-03 PROCEDURE — 99999 PR PBB SHADOW E&M-EST. PATIENT-LVL III: CPT | Mod: PBBFAC,,, | Performed by: FAMILY MEDICINE

## 2022-08-03 PROCEDURE — 1160F PR REVIEW ALL MEDS BY PRESCRIBER/CLIN PHARMACIST DOCUMENTED: ICD-10-PCS | Mod: CPTII,S$GLB,, | Performed by: FAMILY MEDICINE

## 2022-08-03 PROCEDURE — 1160F RVW MEDS BY RX/DR IN RCRD: CPT | Mod: CPTII,S$GLB,, | Performed by: FAMILY MEDICINE

## 2022-08-03 PROCEDURE — 1101F PR PT FALLS ASSESS DOC 0-1 FALLS W/OUT INJ PAST YR: ICD-10-PCS | Mod: CPTII,S$GLB,, | Performed by: FAMILY MEDICINE

## 2022-08-03 PROCEDURE — 3288F PR FALLS RISK ASSESSMENT DOCUMENTED: ICD-10-PCS | Mod: CPTII,S$GLB,, | Performed by: FAMILY MEDICINE

## 2022-08-03 PROCEDURE — 3008F PR BODY MASS INDEX (BMI) DOCUMENTED: ICD-10-PCS | Mod: CPTII,S$GLB,, | Performed by: FAMILY MEDICINE

## 2022-08-03 RX ORDER — PANTOPRAZOLE SODIUM 40 MG/1
40 TABLET, DELAYED RELEASE ORAL DAILY
Qty: 30 TABLET | Refills: 1 | Status: SHIPPED | OUTPATIENT
Start: 2022-08-03 | End: 2022-09-27 | Stop reason: SDUPTHER

## 2022-08-03 NOTE — PROGRESS NOTES
Mitchell Mcmillan    Chief Complaint   Patient presents with    Chest Pain       History of Present Illness:   Mr. Mcmillan comes in today with complaint of having new symptom of mid anterior chest pain which occurs 30-60 minutes after meal for the last 2-3 days.  He states discomfort is relieved by drinking baking soda with water followed by belching and also eases with laying on his back.  He denies having diaphoresis, shortness of breath, abdominal pain, nausea, vomiting but possible heartburn.  He states he exercises 3 times a week, 15 minutes each time and states he has no problems with chest pain while exercising.  He does admit once a few weeks ago with mowing his lawn during very hot temperature he felt short of breath with chest pain.      Otherwise, he denies having fever, chills, fatigue, appetite changes; shortness of breath, cough, wheezing; diaphoresis, palpitations, leg swelling; abdominal pain, nausea, vomiting, diarrhea, constipation; unusual urinary symptoms; polydipsia, polyphagia, polyuria, hot or cold intolerance; back pain; headache, numbness, acute visual changes; anxiety, depression, homicidal or suicidal thoughts.      He states he occasionally smokes cigarettes and restarted smoking 2 months ago after quitting on May 7, 2017. He states he drinks wine twice a week.  He states he drinks coffee every morning, eats peppermint, and takes Advil p.m. 3 times per week.  He denies consumption of spicy foods or late night eating.    He states he does not perform home blood pressure checks.  He states his mother () had heart disease.    He states he is non fasting but has taken medications today.    EKG ordered and interpreted by me today - vent rate 73 bpm, normal sinus rhythm, normal ECG.      Current Outpatient Medications   Medication Sig    atorvastatin (LIPITOR) 40 MG tablet TAKE 1 TABLET EVERY DAY    diclofenac sodium (VOLTAREN) 1 % Gel Apply 2 g topically 3 (three) times daily as  needed.    finasteride (PROSCAR) 5 mg tablet Take 1 tablet (5 mg total) by mouth once daily.    lisinopriL-hydrochlorothiazide (PRINZIDE,ZESTORETIC) 20-25 mg Tab TAKE 1 TABLET EVERY DAY    sildenafiL (VIAGRA) 100 MG tablet Take 1 tablet (100 mg total) by mouth daily as needed for Erectile Dysfunction.    tamsulosin (FLOMAX) 0.4 mg Cap Take 1 capsule (0.4 mg total) by mouth once daily.       Review of Systems   Constitutional: Negative for activity change, appetite change, chills, diaphoresis, fatigue and fever.        Weight 117.7 kg (259 lb 5.9 oz) at March 1, 2021 visit.   Eyes: Negative for visual disturbance.   Respiratory: Negative for cough, shortness of breath and wheezing.    Cardiovascular: Positive for chest pain. Negative for palpitations and leg swelling.        See history of present illness.   Gastrointestinal: Negative for abdominal pain, constipation, diarrhea, nausea and vomiting.        See history of present illness.   Endocrine: Negative for cold intolerance, heat intolerance, polydipsia, polyphagia and polyuria.   Genitourinary: Negative for difficulty urinating.   Musculoskeletal: Negative for arthralgias and back pain.   Neurological: Negative for numbness and headaches.   Psychiatric/Behavioral: Negative for dysphoric mood and suicidal ideas. The patient is not nervous/anxious.         Negative for homicidal ideas.       Objective:  Physical Exam  Vitals reviewed.   Constitutional:       General: He is not in acute distress.     Appearance: Normal appearance. He is well-developed. He is obese. He is not ill-appearing, toxic-appearing or diaphoretic.      Comments: Pleasant.   HENT:      Mouth/Throat:      Mouth: Mucous membranes are moist.      Pharynx: No oropharyngeal exudate or posterior oropharyngeal erythema.   Neck:      Thyroid: No thyromegaly.   Cardiovascular:      Rate and Rhythm: Normal rate and regular rhythm.      Pulses:           Dorsalis pedis pulses are 3+ on the right  side and 3+ on the left side.      Heart sounds: Normal heart sounds. No murmur heard.  Pulmonary:      Effort: Pulmonary effort is normal. No respiratory distress.      Breath sounds: Normal breath sounds. No wheezing.   Chest:      Chest wall: No tenderness.   Abdominal:      General: Bowel sounds are normal. There is no distension.      Palpations: Abdomen is soft. There is no mass.      Tenderness: There is no abdominal tenderness. There is no guarding or rebound.   Musculoskeletal:         General: No swelling or tenderness. Normal range of motion.      Cervical back: Normal range of motion and neck supple. No tenderness.      Comments: He is ambulatory without problems.    Feet:      Left foot:      Protective Sensation: 5 sites tested. 5 sites sensed.      Skin integrity: No ulcer or skin breakdown.   Lymphadenopathy:      Cervical: No cervical adenopathy.   Neurological:      General: No focal deficit present.      Mental Status: He is alert and oriented to person, place, and time.   Psychiatric:         Mood and Affect: Mood normal.         Behavior: Behavior normal.         Thought Content: Thought content normal.         Judgment: Judgment normal.         ASSESSMENT:  1. Chest pain, atypical    2. Gastroesophageal reflux disease, unspecified whether esophagitis present    3. Tobacco dependence    4. Essential hypertension    5. Obesity (BMI 30.0-34.9)        PLAN:  Mitchell was seen today for chest pain.    Diagnoses and all orders for this visit:    Chest pain, atypical  -     IN OFFICE EKG 12-LEAD (to Muse)    Gastroesophageal reflux disease, unspecified whether esophagitis present  -     pantoprazole (PROTONIX) 40 MG tablet; Take 1 tablet (40 mg total) by mouth once daily.    Tobacco dependence    Essential hypertension    Obesity (BMI 30.0-34.9)       Patient advised to call for results.  Continue current medications, follow low sodium, low cholesterol, low carb diet, daily walks.  GERD lifestyle  changes discussed/advised.  Protonix 40 mg daily x 2 months; medication precautions discussed with patient.  Smoking cessation advised.  Flu shot this fall.  Follow up if symptoms worsen or fail to improve. However, call 911 for worsening symptoms. And, keep 9/16/2022 scheduled physical appointment with me.

## 2022-08-11 ENCOUNTER — PATIENT OUTREACH (OUTPATIENT)
Dept: ADMINISTRATIVE | Facility: HOSPITAL | Age: 67
End: 2022-08-11
Payer: MEDICARE

## 2022-08-11 NOTE — PROGRESS NOTES
Working HTN report:     Called to discuss scheduling appointment and to get updated BP reading. Pt had normal BP on annual with PCP 08/03/22

## 2022-09-19 ENCOUNTER — OFFICE VISIT (OUTPATIENT)
Dept: FAMILY MEDICINE | Facility: CLINIC | Age: 67
End: 2022-09-19
Payer: MEDICARE

## 2022-09-19 VITALS
SYSTOLIC BLOOD PRESSURE: 130 MMHG | WEIGHT: 254 LBS | TEMPERATURE: 98 F | BODY MASS INDEX: 30.93 KG/M2 | HEART RATE: 87 BPM | DIASTOLIC BLOOD PRESSURE: 80 MMHG | HEIGHT: 76 IN | OXYGEN SATURATION: 98 %

## 2022-09-19 DIAGNOSIS — E78.1 HYPERTRIGLYCERIDEMIA: ICD-10-CM

## 2022-09-19 DIAGNOSIS — I10 ESSENTIAL HYPERTENSION: ICD-10-CM

## 2022-09-19 DIAGNOSIS — E66.9 OBESITY, CLASS I, BMI 30-34.9: ICD-10-CM

## 2022-09-19 DIAGNOSIS — N40.0 BENIGN PROSTATIC HYPERPLASIA, UNSPECIFIED WHETHER LOWER URINARY TRACT SYMPTOMS PRESENT: ICD-10-CM

## 2022-09-19 DIAGNOSIS — D64.9 ANEMIA, UNSPECIFIED TYPE: ICD-10-CM

## 2022-09-19 DIAGNOSIS — K21.9 GASTROESOPHAGEAL REFLUX DISEASE, UNSPECIFIED WHETHER ESOPHAGITIS PRESENT: ICD-10-CM

## 2022-09-19 DIAGNOSIS — Z00.00 PREVENTATIVE HEALTH CARE: Primary | ICD-10-CM

## 2022-09-19 DIAGNOSIS — Z83.3 FAMILY HISTORY OF DIABETES MELLITUS (DM): ICD-10-CM

## 2022-09-19 DIAGNOSIS — Z23 NEED FOR PNEUMOCOCCAL VACCINATION: ICD-10-CM

## 2022-09-19 DIAGNOSIS — R97.20 ELEVATED PSA: ICD-10-CM

## 2022-09-19 DIAGNOSIS — Z23 NEED FOR IMMUNIZATION AGAINST INFLUENZA: ICD-10-CM

## 2022-09-19 PROCEDURE — 3008F PR BODY MASS INDEX (BMI) DOCUMENTED: ICD-10-PCS | Mod: CPTII,S$GLB,, | Performed by: REGISTERED NURSE

## 2022-09-19 PROCEDURE — 1159F PR MEDICATION LIST DOCUMENTED IN MEDICAL RECORD: ICD-10-PCS | Mod: CPTII,S$GLB,, | Performed by: REGISTERED NURSE

## 2022-09-19 PROCEDURE — 90694 VACC AIIV4 NO PRSRV 0.5ML IM: CPT | Mod: S$GLB,,, | Performed by: REGISTERED NURSE

## 2022-09-19 PROCEDURE — 4010F ACE/ARB THERAPY RXD/TAKEN: CPT | Mod: CPTII,S$GLB,, | Performed by: REGISTERED NURSE

## 2022-09-19 PROCEDURE — 99215 OFFICE O/P EST HI 40 MIN: CPT | Mod: 25,S$GLB,, | Performed by: REGISTERED NURSE

## 2022-09-19 PROCEDURE — 3079F PR MOST RECENT DIASTOLIC BLOOD PRESSURE 80-89 MM HG: ICD-10-PCS | Mod: CPTII,S$GLB,, | Performed by: REGISTERED NURSE

## 2022-09-19 PROCEDURE — 99999 PR PBB SHADOW E&M-EST. PATIENT-LVL III: ICD-10-PCS | Mod: PBBFAC,,, | Performed by: REGISTERED NURSE

## 2022-09-19 PROCEDURE — 90694 FLU VACCINE - QUADRIVALENT - ADJUVANTED: ICD-10-PCS | Mod: S$GLB,,, | Performed by: REGISTERED NURSE

## 2022-09-19 PROCEDURE — 99215 PR OFFICE/OUTPT VISIT, EST, LEVL V, 40-54 MIN: ICD-10-PCS | Mod: 25,S$GLB,, | Performed by: REGISTERED NURSE

## 2022-09-19 PROCEDURE — G0009 ADMIN PNEUMOCOCCAL VACCINE: HCPCS | Mod: S$GLB,,, | Performed by: REGISTERED NURSE

## 2022-09-19 PROCEDURE — 3288F FALL RISK ASSESSMENT DOCD: CPT | Mod: CPTII,S$GLB,, | Performed by: REGISTERED NURSE

## 2022-09-19 PROCEDURE — 3079F DIAST BP 80-89 MM HG: CPT | Mod: CPTII,S$GLB,, | Performed by: REGISTERED NURSE

## 2022-09-19 PROCEDURE — 1126F AMNT PAIN NOTED NONE PRSNT: CPT | Mod: CPTII,S$GLB,, | Performed by: REGISTERED NURSE

## 2022-09-19 PROCEDURE — 3008F BODY MASS INDEX DOCD: CPT | Mod: CPTII,S$GLB,, | Performed by: REGISTERED NURSE

## 2022-09-19 PROCEDURE — 4010F PR ACE/ARB THEARPY RXD/TAKEN: ICD-10-PCS | Mod: CPTII,S$GLB,, | Performed by: REGISTERED NURSE

## 2022-09-19 PROCEDURE — G0008 ADMIN INFLUENZA VIRUS VAC: HCPCS | Mod: S$GLB,,, | Performed by: REGISTERED NURSE

## 2022-09-19 PROCEDURE — 1101F PT FALLS ASSESS-DOCD LE1/YR: CPT | Mod: CPTII,S$GLB,, | Performed by: REGISTERED NURSE

## 2022-09-19 PROCEDURE — 3288F PR FALLS RISK ASSESSMENT DOCUMENTED: ICD-10-PCS | Mod: CPTII,S$GLB,, | Performed by: REGISTERED NURSE

## 2022-09-19 PROCEDURE — 3075F PR MOST RECENT SYSTOLIC BLOOD PRESS GE 130-139MM HG: ICD-10-PCS | Mod: CPTII,S$GLB,, | Performed by: REGISTERED NURSE

## 2022-09-19 PROCEDURE — 90732 PNEUMOCOCCAL POLYSACCHARIDE VACCINE 23-VALENT =>2YO SQ IM: ICD-10-PCS | Mod: S$GLB,,, | Performed by: REGISTERED NURSE

## 2022-09-19 PROCEDURE — 3075F SYST BP GE 130 - 139MM HG: CPT | Mod: CPTII,S$GLB,, | Performed by: REGISTERED NURSE

## 2022-09-19 PROCEDURE — 1159F MED LIST DOCD IN RCRD: CPT | Mod: CPTII,S$GLB,, | Performed by: REGISTERED NURSE

## 2022-09-19 PROCEDURE — 99999 PR PBB SHADOW E&M-EST. PATIENT-LVL III: CPT | Mod: PBBFAC,,, | Performed by: REGISTERED NURSE

## 2022-09-19 PROCEDURE — 90732 PPSV23 VACC 2 YRS+ SUBQ/IM: CPT | Mod: S$GLB,,, | Performed by: REGISTERED NURSE

## 2022-09-19 PROCEDURE — 1101F PR PT FALLS ASSESS DOC 0-1 FALLS W/OUT INJ PAST YR: ICD-10-PCS | Mod: CPTII,S$GLB,, | Performed by: REGISTERED NURSE

## 2022-09-19 PROCEDURE — G0008 PNEUMOCOCCAL POLYSACCHARIDE VACCINE 23-VALENT =>2YO SQ IM: ICD-10-PCS | Mod: S$GLB,,, | Performed by: REGISTERED NURSE

## 2022-09-19 PROCEDURE — G0009 FLU VACCINE - QUADRIVALENT - ADJUVANTED: ICD-10-PCS | Mod: S$GLB,,, | Performed by: REGISTERED NURSE

## 2022-09-19 PROCEDURE — 1126F PR PAIN SEVERITY QUANTIFIED, NO PAIN PRESENT: ICD-10-PCS | Mod: CPTII,S$GLB,, | Performed by: REGISTERED NURSE

## 2022-09-19 NOTE — PROGRESS NOTES
Subjective:     Mitchell Mcmillan is a 67 y.o. male, to the office today for: ANNUAL WELLNESS EXAM    HPI:    Mitchell Mcmillan has not fasted to have bloodwork completed today.  I have reviewed the patient's medical history in detail and updated the computerized patient record.    PCP: Jessica Chinchilla MD --- date of last visit 8/3/2022  The patient's last visit with me:  12/4/2019.    Health Maintenance Topics with due status: Not Due       Topic Last Completion Date    Colorectal Cancer Screening 10/05/2016     Health Maintenance Due   Topic Date Due    Shingles Vaccine (2 of 3) 04/27/2016 --- get at pharmacy    Pneumococcal Vaccines (Age 65+) (3) 02/17/2021 --- order current, prev placed by PCP    Lipid Panel  08/28/2021    Influenza Vaccine (1) 09/01/2022 --- get today, planning on international trip in a few weeks         Review of Systems   Constitutional:  Negative for activity change, appetite change, chills, diaphoresis, fatigue, fever and unexpected weight change.        Wt Readings from Last 3 Encounters:  09/19/22 0944 : 115.2 kg (253 lb 15.9 oz)  08/03/22 1140 : 117.3 kg (258 lb 9.6 oz)  03/16/22 0930 : 116.6 kg (256 lb 15.1 oz)  Reports healthy diet.  Increased water intake, low-salt diet.   HENT:  Negative for congestion, facial swelling, hearing loss, mouth sores, nosebleeds, postnasal drip, rhinorrhea, sinus pressure, sore throat, trouble swallowing and voice change.    Eyes:  Negative for photophobia, pain and visual disturbance.   Respiratory:  Negative for cough, chest tightness, shortness of breath and wheezing.    Cardiovascular:  Negative for chest pain, palpitations and leg swelling.        Reports home bp ~ 130/80.  Checks occasionally and when feeling bad.   Gastrointestinal:  Negative for abdominal pain, blood in stool, constipation, diarrhea, nausea, rectal pain and vomiting.   Genitourinary:  Negative for decreased urine volume, difficulty urinating, dysuria, flank pain, frequency, hematuria and  urgency.   Musculoskeletal:  Negative for arthralgias, back pain, gait problem, joint swelling, myalgias, neck pain and neck stiffness.   Skin:  Negative for color change, pallor, rash and wound.   Neurological:  Negative for dizziness, syncope, facial asymmetry, speech difficulty, weakness, light-headedness, numbness and headaches.   Hematological:  Negative for adenopathy. Does not bruise/bleed easily.   Psychiatric/Behavioral: Negative.         Review of patient's allergies indicates:  No Known Allergies      Patient Active Problem List   Diagnosis    Essential hypertension    Hyperlipidemia    Obesity (BMI 30.0-34.9)    Chronic hip pain    Midline low back pain without sciatica    Genu varum of both lower extremities    Primary osteoarthritis of both knees    Elevated PSA    Arthritis    Sensorineural hearing loss (SNHL) of both ears    Benign prostatic hyperplasia    Erectile dysfunction    Gastroesophageal reflux disease         Current Outpatient Medications:     atorvastatin (LIPITOR) 40 MG tablet, TAKE 1 TABLET EVERY DAY, Disp: 90 tablet, Rfl: 0    diclofenac sodium (VOLTAREN) 1 % Gel, Apply 2 g topically 3 (three) times daily as needed., Disp: 2 Tube, Rfl: 6    finasteride (PROSCAR) 5 mg tablet, Take 1 tablet (5 mg total) by mouth once daily., Disp: 90 tablet, Rfl: 3    lisinopriL-hydrochlorothiazide (PRINZIDE,ZESTORETIC) 20-25 mg Tab, TAKE 1 TABLET EVERY DAY, Disp: 90 tablet, Rfl: 0    pantoprazole (PROTONIX) 40 MG tablet, Take 1 tablet (40 mg total) by mouth once daily., Disp: 30 tablet, Rfl: 1    sildenafiL (VIAGRA) 100 MG tablet, Take 1 tablet (100 mg total) by mouth daily as needed for Erectile Dysfunction., Disp: 30 tablet, Rfl: 10    tamsulosin (FLOMAX) 0.4 mg Cap, Take 1 capsule (0.4 mg total) by mouth once daily., Disp: 90 capsule, Rfl: 3      Past Medical History:   Diagnosis Date    Arthritis     knees    Asymmetric SNHL (sensorineural hearing loss) 08/17/2017    Chronic left hip pain 2006     Dislocated left hip, job injury    Chronic prostatitis     per pt, last episode     Elevated PSA, less than 10 ng/ml     per pt last elevation 2013    Gastroesophageal reflux disease 8/3/2022    Hyperlipidemia 2010    Hypertension     Joint effusion of knee     per pt rt knee, seen by Dr. Duke     Midline low back pain without sciatica 2016    Obesity     Special screening for malignant neoplasms, colon 10/5/2016    Tobacco dependence     Trouble in sleeping        Past Surgical History:   Procedure Laterality Date    arthrocentesis Right     Per pt , knee by Dr. BONNIE Duke    COLONOSCOPY N/A 10/5/2016    Normal exam repeat 10 yrs. Procedure: COLONOSCOPY;  Surgeon: Alexander Goetz MD;  Location: [facility];  Service: Endoscopy;  Laterality: N/A;    HIP ARTHROPLASTY Left 2006    Per pt, dislocated left hip/ hardware placed  by Dr. Lisa    left orchiectomy      PROSTATE BIOPSY      procedure performed at [facility] due to elevated PSA - benign       Family History   Problem Relation Age of Onset    Depression Mother     Early death Mother     Hypertension Mother     Heart disease Mother 53         from MI    Arthritis Father     Stroke Father     Diabetes Sister     No Known Problems Brother     Hypertension Sister     Hypertension Sister     No Known Problems Brother     No Known Problems Brother     No Known Problems Daughter     Cancer Neg Hx     COPD Neg Hx     Kidney disease Neg Hx        Social History     Tobacco Use    Smoking status: Former     Packs/day: 0.10     Years: 5.00     Pack years: 0.50     Types: Cigarettes     Start date: 6/3/2022    Smokeless tobacco: Never    Tobacco comments:     Previoulsy quit on 2017 but restarted in 2022; working on quitting   Substance Use Topics    Alcohol use: Yes     Alcohol/week: 2.0 standard drinks     Types: 2 Shots of liquor per week     Comment: every  3 days    Drug use: No         Objective:     Vitals:  "   09/19/22 0944   BP: 130/80   BP Location: Left arm   Patient Position: Sitting   BP Method: Large (Automatic)   Pulse: 87   Temp: 97.8 °F (36.6 °C)   SpO2: 98%   Weight: 115.2 kg (253 lb 15.9 oz)   Height: 6' 4" (1.93 m)       Body mass index is 30.92 kg/m².    Physical Exam  Constitutional:       General: He is not in acute distress.     Appearance: He is well-developed. He is not diaphoretic.   HENT:      Head: Normocephalic and atraumatic.      Right Ear: Tympanic membrane, ear canal and external ear normal. There is no impacted cerumen.      Left Ear: Tympanic membrane, ear canal and external ear normal. There is no impacted cerumen.      Nose: Nose normal. No congestion or rhinorrhea.      Mouth/Throat:      Mouth: Mucous membranes are moist.      Pharynx: Oropharynx is clear. No oropharyngeal exudate or posterior oropharyngeal erythema.   Eyes:      General: No scleral icterus.        Right eye: No discharge.         Left eye: No discharge.      Conjunctiva/sclera: Conjunctivae normal.      Pupils: Pupils are equal, round, and reactive to light.   Neck:      Thyroid: No thyromegaly.      Vascular: No JVD.      Trachea: No tracheal deviation.   Cardiovascular:      Rate and Rhythm: Normal rate and regular rhythm.      Pulses: Normal pulses.      Heart sounds: Normal heart sounds. No murmur heard.    No friction rub. No gallop.   Pulmonary:      Effort: Pulmonary effort is normal. No respiratory distress.      Breath sounds: Normal breath sounds. No wheezing.   Chest:      Chest wall: No tenderness.   Abdominal:      General: Bowel sounds are normal. There is no distension.      Palpations: Abdomen is soft. There is no mass.      Tenderness: There is no abdominal tenderness.   Genitourinary:     Comments: Per Urology  Musculoskeletal:         General: No swelling, tenderness or deformity. Normal range of motion.      Cervical back: Normal range of motion and neck supple.   Lymphadenopathy:      Cervical: No " cervical adenopathy.   Skin:     General: Skin is warm and dry.      Capillary Refill: Capillary refill takes less than 2 seconds.      Findings: No erythema or rash.   Neurological:      Mental Status: He is alert and oriented to person, place, and time.      Sensory: No sensory deficit.      Motor: No weakness or abnormal muscle tone.      Coordination: Coordination normal.      Gait: Gait normal.   Psychiatric:         Mood and Affect: Mood normal.         Behavior: Behavior normal.         Thought Content: Thought content normal.         Judgment: Judgment normal.       REVIEWED LABS:    Lab Results   Component Value Date    WBC 9.30 08/28/2020    HGB 12.1 (L) 08/28/2020    HCT 38.2 (L) 08/28/2020    MCV 86 08/28/2020     08/28/2020     Sodium   Date Value Ref Range Status   08/28/2020 140 136 - 145 mmol/L Final     Potassium   Date Value Ref Range Status   08/28/2020 4.0 3.5 - 5.1 mmol/L Final     Chloride   Date Value Ref Range Status   08/28/2020 104 95 - 110 mmol/L Final     CO2   Date Value Ref Range Status   08/28/2020 27 23 - 29 mmol/L Final     Glucose   Date Value Ref Range Status   08/28/2020 99 70 - 110 mg/dL Final     BUN   Date Value Ref Range Status   08/28/2020 14 8 - 23 mg/dL Final     Creatinine   Date Value Ref Range Status   08/28/2020 1.1 0.5 - 1.4 mg/dL Final     Calcium   Date Value Ref Range Status   08/28/2020 9.6 8.7 - 10.5 mg/dL Final     Total Protein   Date Value Ref Range Status   08/28/2020 7.7 6.0 - 8.4 g/dL Final     Albumin   Date Value Ref Range Status   08/28/2020 4.0 3.5 - 5.2 g/dL Final     Total Bilirubin   Date Value Ref Range Status   08/28/2020 0.3 0.1 - 1.0 mg/dL Final     Comment:     For infants and newborns, interpretation of results should be based  on gestational age, weight and in agreement with clinical  observations.  Premature Infant recommended reference ranges:  Up to 24 hours.............<8.0 mg/dL  Up to 48 hours............<12.0 mg/dL  3-5  days..................<15.0 mg/dL  6-29 days.................<15.0 mg/dL       Alkaline Phosphatase   Date Value Ref Range Status   08/28/2020 65 55 - 135 U/L Final     AST   Date Value Ref Range Status   08/28/2020 18 10 - 40 U/L Final     ALT   Date Value Ref Range Status   08/28/2020 24 10 - 44 U/L Final     Anion Gap   Date Value Ref Range Status   08/28/2020 9 8 - 16 mmol/L Final       eGFR if    Date Value Ref Range Status   08/28/2020 >60.0 >60 mL/min/1.73 m^2 Final        Lab Results   Component Value Date    CHOL 197 08/28/2020     Lab Results   Component Value Date    HDL 58 08/28/2020     Lab Results   Component Value Date    LDLCALC 94.6 08/28/2020     Lab Results   Component Value Date    TRIG 222 (H) 08/28/2020     Lab Results   Component Value Date    CHOLHDL 29.4 08/28/2020       Lab Results   Component Value Date    TSH 1.595 08/28/2020       Lab Results   Component Value Date    PSA 7.3 (H) 03/10/2022    PSA 5.1 (H) 03/04/2021    PSA 7.6 (H) 03/02/2020       Lab Results   Component Value Date    HEPCAB Negative 02/17/2016         Diagnosis/Assessment:     1. Preventative health care --- Medicare    2. Essential hypertension  - CBC Auto Differential; Future  - Comprehensive Metabolic Panel; Future  - TSH; Future  - Lipid Panel; Future  - Hemoglobin A1C; Future    3. Hypertriglyceridemia -- low fat diet, exercise  - CBC Auto Differential; Future  - Comprehensive Metabolic Panel; Future  - TSH; Future  - Lipid Panel; Future  - Hemoglobin A1C; Future    4. Benign prostatic hyperplasia, unspecified whether lower urinary tract symptoms present --- per Urology    5. Elevated PSA --- per Urology    6. Anemia, unspecified type  - CBC Auto Differential; Future    7. Gastroesophageal reflux disease, unspecified whether esophagitis present --- better w/ lifestyle changes, diet mod.  PPI as needed.    8. Obesity, Class I, BMI 30-34.9 --- healthy diet, exercise, weight reduction.  - CBC Auto  Differential; Future  - Comprehensive Metabolic Panel; Future  - TSH; Future  - Lipid Panel; Future  - Hemoglobin A1C; Future    9. Need for pneumococcal vaccination --- Pneumovax-23 per order prev placed by PCP.    10. Need for immunization against influenza --- high dose flu shot today, standing order.    11. Family history of diabetes mellitus (DM)  - Hemoglobin A1C; Future      Plan:     Lab pending, to come back fasting.    Follow-Up:     Pending lab.  RTC as directed or prn.        RAFA Simmons  [facility]       Patient Care Team:  Jessica Chinchilla MD as PCP - General (Family Medicine)  Jax Mcintyre NP as Nurse Practitioner (Family Medicine)  Brannon Mcbride MD as Consulting Physician (Urology)      Future Appointments   Date Time Provider Department Center   9/30/2022  9:00 AM ROMY Contreras [address]   3/10/2023  9:30 AM LABORATORY, [facility]   3/15/2023  9:30 AM Brannon Mcbride MD [facility] UROLOGY [facility]         45 minutes of total time spent on the encounter, which includes face to face time and non-face to face time preparing to see the patient (eg, review of tests), obtaining and/or reviewing separately obtained history, and documenting clinical information in the electronic or other health record.  Also includes independent interpretation of results (not separately reported) and communicating results to the patient/family/caregiver, with care coordination (not separately reported).

## 2022-09-23 ENCOUNTER — LAB VISIT (OUTPATIENT)
Dept: LAB | Facility: HOSPITAL | Age: 67
End: 2022-09-23
Attending: FAMILY MEDICINE
Payer: MEDICARE

## 2022-09-23 DIAGNOSIS — I10 ESSENTIAL HYPERTENSION: ICD-10-CM

## 2022-09-23 LAB
ALBUMIN SERPL BCP-MCNC: 4 G/DL (ref 3.5–5.2)
ALP SERPL-CCNC: 61 U/L (ref 55–135)
ALT SERPL W/O P-5'-P-CCNC: 19 U/L (ref 10–44)
ANION GAP SERPL CALC-SCNC: 9 MMOL/L (ref 8–16)
AST SERPL-CCNC: 16 U/L (ref 10–40)
BASOPHILS # BLD AUTO: 0.04 K/UL (ref 0–0.2)
BASOPHILS NFR BLD: 0.6 % (ref 0–1.9)
BILIRUB SERPL-MCNC: 0.4 MG/DL (ref 0.1–1)
BUN SERPL-MCNC: 15 MG/DL (ref 8–23)
CALCIUM SERPL-MCNC: 9.6 MG/DL (ref 8.7–10.5)
CHLORIDE SERPL-SCNC: 103 MMOL/L (ref 95–110)
CHOLEST SERPL-MCNC: 277 MG/DL (ref 120–199)
CHOLEST/HDLC SERPL: 4.8 {RATIO} (ref 2–5)
CO2 SERPL-SCNC: 26 MMOL/L (ref 23–29)
CREAT SERPL-MCNC: 1.1 MG/DL (ref 0.5–1.4)
DIFFERENTIAL METHOD: ABNORMAL
EOSINOPHIL # BLD AUTO: 0.1 K/UL (ref 0–0.5)
EOSINOPHIL NFR BLD: 1 % (ref 0–8)
ERYTHROCYTE [DISTWIDTH] IN BLOOD BY AUTOMATED COUNT: 13.6 % (ref 11.5–14.5)
EST. GFR  (NO RACE VARIABLE): >60 ML/MIN/1.73 M^2
GLUCOSE SERPL-MCNC: 102 MG/DL (ref 70–110)
HCT VFR BLD AUTO: 41.1 % (ref 40–54)
HDLC SERPL-MCNC: 58 MG/DL (ref 40–75)
HDLC SERPL: 20.9 % (ref 20–50)
HGB BLD-MCNC: 12.6 G/DL (ref 14–18)
IMM GRANULOCYTES # BLD AUTO: 0.01 K/UL (ref 0–0.04)
IMM GRANULOCYTES NFR BLD AUTO: 0.1 % (ref 0–0.5)
LDLC SERPL CALC-MCNC: 187 MG/DL (ref 63–159)
LYMPHOCYTES # BLD AUTO: 3.9 K/UL (ref 1–4.8)
LYMPHOCYTES NFR BLD: 56.4 % (ref 18–48)
MCH RBC QN AUTO: 27.2 PG (ref 27–31)
MCHC RBC AUTO-ENTMCNC: 30.7 G/DL (ref 32–36)
MCV RBC AUTO: 89 FL (ref 82–98)
MONOCYTES # BLD AUTO: 0.5 K/UL (ref 0.3–1)
MONOCYTES NFR BLD: 7.1 % (ref 4–15)
NEUTROPHILS # BLD AUTO: 2.4 K/UL (ref 1.8–7.7)
NEUTROPHILS NFR BLD: 34.8 % (ref 38–73)
NONHDLC SERPL-MCNC: 219 MG/DL
NRBC BLD-RTO: 0 /100 WBC
PLATELET # BLD AUTO: 296 K/UL (ref 150–450)
PMV BLD AUTO: 10.6 FL (ref 9.2–12.9)
POTASSIUM SERPL-SCNC: 4.3 MMOL/L (ref 3.5–5.1)
PROT SERPL-MCNC: 7.2 G/DL (ref 6–8.4)
RBC # BLD AUTO: 4.64 M/UL (ref 4.6–6.2)
SODIUM SERPL-SCNC: 138 MMOL/L (ref 136–145)
TRIGL SERPL-MCNC: 160 MG/DL (ref 30–150)
TSH SERPL DL<=0.005 MIU/L-ACNC: 1.58 UIU/ML (ref 0.4–4)
WBC # BLD AUTO: 6.88 K/UL (ref 3.9–12.7)

## 2022-09-23 PROCEDURE — 84443 ASSAY THYROID STIM HORMONE: CPT | Performed by: FAMILY MEDICINE

## 2022-09-23 PROCEDURE — 80061 LIPID PANEL: CPT | Performed by: FAMILY MEDICINE

## 2022-09-23 PROCEDURE — 85025 COMPLETE CBC W/AUTO DIFF WBC: CPT | Performed by: FAMILY MEDICINE

## 2022-09-23 PROCEDURE — 80053 COMPREHEN METABOLIC PANEL: CPT | Performed by: FAMILY MEDICINE

## 2022-09-23 PROCEDURE — 36415 COLL VENOUS BLD VENIPUNCTURE: CPT | Mod: PO | Performed by: FAMILY MEDICINE

## 2022-09-23 PROCEDURE — 81003 URINALYSIS AUTO W/O SCOPE: CPT | Performed by: FAMILY MEDICINE

## 2022-09-26 LAB
BILIRUB UR QL STRIP: NEGATIVE
CLARITY UR REFRACT.AUTO: CLEAR
COLOR UR AUTO: YELLOW
GLUCOSE UR QL STRIP: NEGATIVE
HGB UR QL STRIP: NEGATIVE
KETONES UR QL STRIP: NEGATIVE
LEUKOCYTE ESTERASE UR QL STRIP: NEGATIVE
NITRITE UR QL STRIP: NEGATIVE
PH UR STRIP: 6 [PH] (ref 5–8)
PROT UR QL STRIP: NEGATIVE
SP GR UR STRIP: 1.02 (ref 1–1.03)
URN SPEC COLLECT METH UR: NORMAL

## 2022-09-30 ENCOUNTER — OFFICE VISIT (OUTPATIENT)
Dept: HOME HEALTH SERVICES | Facility: CLINIC | Age: 67
End: 2022-09-30
Payer: MEDICARE

## 2022-09-30 VITALS
HEART RATE: 82 BPM | OXYGEN SATURATION: 98 % | WEIGHT: 250 LBS | SYSTOLIC BLOOD PRESSURE: 121 MMHG | BODY MASS INDEX: 30.44 KG/M2 | HEIGHT: 76 IN | DIASTOLIC BLOOD PRESSURE: 79 MMHG | TEMPERATURE: 98 F

## 2022-09-30 DIAGNOSIS — I10 ESSENTIAL HYPERTENSION: ICD-10-CM

## 2022-09-30 DIAGNOSIS — N52.9 ERECTILE DYSFUNCTION, UNSPECIFIED ERECTILE DYSFUNCTION TYPE: ICD-10-CM

## 2022-09-30 DIAGNOSIS — M17.0 PRIMARY OSTEOARTHRITIS OF BOTH KNEES: ICD-10-CM

## 2022-09-30 DIAGNOSIS — Z72.0 TOBACCO USE: ICD-10-CM

## 2022-09-30 DIAGNOSIS — R97.20 ELEVATED PSA: ICD-10-CM

## 2022-09-30 DIAGNOSIS — E66.9 OBESITY (BMI 30.0-34.9): ICD-10-CM

## 2022-09-30 DIAGNOSIS — M54.50 CHRONIC MIDLINE LOW BACK PAIN WITHOUT SCIATICA: ICD-10-CM

## 2022-09-30 DIAGNOSIS — K21.9 GASTROESOPHAGEAL REFLUX DISEASE, UNSPECIFIED WHETHER ESOPHAGITIS PRESENT: ICD-10-CM

## 2022-09-30 DIAGNOSIS — Z00.00 ENCOUNTER FOR PREVENTIVE HEALTH EXAMINATION: Primary | ICD-10-CM

## 2022-09-30 DIAGNOSIS — M25.552 CHRONIC LEFT HIP PAIN: ICD-10-CM

## 2022-09-30 DIAGNOSIS — G89.29 CHRONIC LEFT HIP PAIN: ICD-10-CM

## 2022-09-30 DIAGNOSIS — N40.0 BENIGN PROSTATIC HYPERPLASIA, UNSPECIFIED WHETHER LOWER URINARY TRACT SYMPTOMS PRESENT: ICD-10-CM

## 2022-09-30 DIAGNOSIS — G89.29 CHRONIC MIDLINE LOW BACK PAIN WITHOUT SCIATICA: ICD-10-CM

## 2022-09-30 DIAGNOSIS — E78.5 HYPERLIPIDEMIA, UNSPECIFIED HYPERLIPIDEMIA TYPE: ICD-10-CM

## 2022-09-30 PROCEDURE — 3008F BODY MASS INDEX DOCD: CPT | Mod: CPTII,S$GLB,, | Performed by: NURSE PRACTITIONER

## 2022-09-30 PROCEDURE — 1160F RVW MEDS BY RX/DR IN RCRD: CPT | Mod: CPTII,S$GLB,, | Performed by: NURSE PRACTITIONER

## 2022-09-30 PROCEDURE — 3008F PR BODY MASS INDEX (BMI) DOCUMENTED: ICD-10-PCS | Mod: CPTII,S$GLB,, | Performed by: NURSE PRACTITIONER

## 2022-09-30 PROCEDURE — 1160F PR REVIEW ALL MEDS BY PRESCRIBER/CLIN PHARMACIST DOCUMENTED: ICD-10-PCS | Mod: CPTII,S$GLB,, | Performed by: NURSE PRACTITIONER

## 2022-09-30 PROCEDURE — 1159F PR MEDICATION LIST DOCUMENTED IN MEDICAL RECORD: ICD-10-PCS | Mod: CPTII,S$GLB,, | Performed by: NURSE PRACTITIONER

## 2022-09-30 PROCEDURE — 1126F PR PAIN SEVERITY QUANTIFIED, NO PAIN PRESENT: ICD-10-PCS | Mod: CPTII,S$GLB,, | Performed by: NURSE PRACTITIONER

## 2022-09-30 PROCEDURE — 1159F MED LIST DOCD IN RCRD: CPT | Mod: CPTII,S$GLB,, | Performed by: NURSE PRACTITIONER

## 2022-09-30 PROCEDURE — 1170F PR FUNCTIONAL STATUS ASSESSED: ICD-10-PCS | Mod: CPTII,S$GLB,, | Performed by: NURSE PRACTITIONER

## 2022-09-30 PROCEDURE — 1101F PT FALLS ASSESS-DOCD LE1/YR: CPT | Mod: CPTII,S$GLB,, | Performed by: NURSE PRACTITIONER

## 2022-09-30 PROCEDURE — 3288F PR FALLS RISK ASSESSMENT DOCUMENTED: ICD-10-PCS | Mod: CPTII,S$GLB,, | Performed by: NURSE PRACTITIONER

## 2022-09-30 PROCEDURE — 4010F PR ACE/ARB THEARPY RXD/TAKEN: ICD-10-PCS | Mod: CPTII,S$GLB,, | Performed by: NURSE PRACTITIONER

## 2022-09-30 PROCEDURE — 3078F DIAST BP <80 MM HG: CPT | Mod: CPTII,S$GLB,, | Performed by: NURSE PRACTITIONER

## 2022-09-30 PROCEDURE — 3074F PR MOST RECENT SYSTOLIC BLOOD PRESSURE < 130 MM HG: ICD-10-PCS | Mod: CPTII,S$GLB,, | Performed by: NURSE PRACTITIONER

## 2022-09-30 PROCEDURE — 4010F ACE/ARB THERAPY RXD/TAKEN: CPT | Mod: CPTII,S$GLB,, | Performed by: NURSE PRACTITIONER

## 2022-09-30 PROCEDURE — G0439 PPPS, SUBSEQ VISIT: HCPCS | Mod: S$GLB,,, | Performed by: NURSE PRACTITIONER

## 2022-09-30 PROCEDURE — 1101F PR PT FALLS ASSESS DOC 0-1 FALLS W/OUT INJ PAST YR: ICD-10-PCS | Mod: CPTII,S$GLB,, | Performed by: NURSE PRACTITIONER

## 2022-09-30 PROCEDURE — G0439 PR MEDICARE ANNUAL WELLNESS SUBSEQUENT VISIT: ICD-10-PCS | Mod: S$GLB,,, | Performed by: NURSE PRACTITIONER

## 2022-09-30 PROCEDURE — 3288F FALL RISK ASSESSMENT DOCD: CPT | Mod: CPTII,S$GLB,, | Performed by: NURSE PRACTITIONER

## 2022-09-30 PROCEDURE — 3078F PR MOST RECENT DIASTOLIC BLOOD PRESSURE < 80 MM HG: ICD-10-PCS | Mod: CPTII,S$GLB,, | Performed by: NURSE PRACTITIONER

## 2022-09-30 PROCEDURE — 1170F FXNL STATUS ASSESSED: CPT | Mod: CPTII,S$GLB,, | Performed by: NURSE PRACTITIONER

## 2022-09-30 PROCEDURE — 3074F SYST BP LT 130 MM HG: CPT | Mod: CPTII,S$GLB,, | Performed by: NURSE PRACTITIONER

## 2022-09-30 PROCEDURE — 1126F AMNT PAIN NOTED NONE PRSNT: CPT | Mod: CPTII,S$GLB,, | Performed by: NURSE PRACTITIONER

## 2022-09-30 RX ORDER — AMOXICILLIN 500 MG
1 CAPSULE ORAL DAILY
COMMUNITY

## 2022-09-30 NOTE — Clinical Note
Medicare awv complete. Health maintenance:  shingles vaccine due and covid 19 5th vaccine due when available-encouraged patient to obtain.   12. Tobacco use Smokes 2 cigarettes per day. States he is ready to quit. Patient agreed to smoking cessation program. Referral placed.

## 2022-09-30 NOTE — PROGRESS NOTES
"Mitchell Mcmillan presented for Medicare AW today. The following components were reviewed and updated:    Medical history  Family History  Social history  Allergies and Current Medications  Health Risk Assessment  Health Maintenance  Care Team    **See Completed Assessments for Annual Wellness visit with in the encounter summary    The following assessments were completed:  Depression Screening  Cognitive function Screening      Timed Get Up Test  Whisper Test    Vitals:    09/30/22 0858   BP: 121/79   Pulse: 82   Temp: 98.4 °F (36.9 °C)   TempSrc: Temporal   SpO2: 98%   Weight: 113.4 kg (250 lb)   Height: 6' 4" (1.93 m)     Body mass index is 30.43 kg/m².   ]    Physical Exam  Vitals reviewed.   Constitutional:       Appearance: Normal appearance. He is obese.   HENT:      Head: Normocephalic and atraumatic.   Cardiovascular:      Rate and Rhythm: Normal rate and regular rhythm.      Pulses: Normal pulses.      Heart sounds: Normal heart sounds.   Pulmonary:      Effort: Pulmonary effort is normal.      Breath sounds: Normal breath sounds.   Musculoskeletal:         General: Normal range of motion.      Cervical back: Normal range of motion and neck supple.   Skin:     General: Skin is warm and dry.   Neurological:      Mental Status: He is alert and oriented to person, place, and time.   Psychiatric:         Mood and Affect: Mood normal.         Behavior: Behavior normal.        Outpatient Medications Marked as Taking for the 9/30/22 encounter (Office Visit) with ROMY Contreras   Medication Sig Dispense Refill    atorvastatin (LIPITOR) 40 MG tablet TAKE 1 TABLET EVERY DAY 90 tablet 0    diclofenac sodium (VOLTAREN) 1 % Gel Apply 2 g topically 3 (three) times daily as needed. 2 Tube 6    finasteride (PROSCAR) 5 mg tablet Take 1 tablet (5 mg total) by mouth once daily. 90 tablet 3    lisinopriL-hydrochlorothiazide (PRINZIDE,ZESTORETIC) 20-25 mg Tab TAKE 1 TABLET EVERY DAY 90 tablet 0    omega-3 fatty acids/fish " oil (FISH OIL-OMEGA-3 FATTY ACIDS) 300-1,000 mg capsule Take 1 capsule by mouth once daily.      pantoprazole (PROTONIX) 40 MG tablet Take 1 tablet (40 mg total) by mouth once daily. 30 tablet 1    tamsulosin (FLOMAX) 0.4 mg Cap Take 1 capsule (0.4 mg total) by mouth once daily. 90 capsule 3        Diagnoses and health risks identified today and associated recommendations/orders:  1. Encounter for preventive health examination  Medicare awv complete. Health maintenance:  shingles vaccine due and covid 19 5th vaccine due when available-encouraged patient to obtain.     2. Essential hypertension  Chronic and stable on current management. On lisinopril-hctz. See med list above. Recommend low sodium diet. Follow up with PCP.       3. Hyperlipidemia, unspecified hyperlipidemia type  Lab Results   Component Value Date    CHOL 277 (H) 09/23/2022    CHOL 197 08/28/2020    CHOL 216 (H) 10/23/2017     Lab Results   Component Value Date    HDL 58 09/23/2022    HDL 58 08/28/2020    HDL 73 10/23/2017     Lab Results   Component Value Date    LDLCALC 187.0 (H) 09/23/2022    LDLCALC 94.6 08/28/2020    LDLCALC 110.6 10/23/2017     Lab Results   Component Value Date    TRIG 160 (H) 09/23/2022    TRIG 222 (H) 08/28/2020    TRIG 162 (H) 10/23/2017     Lab Results   Component Value Date    CHOLHDL 20.9 09/23/2022    CHOLHDL 29.4 08/28/2020    CHOLHDL 33.8 10/23/2017    Cholesterol elevated. On statin. Discussed taking statin daily, diet, and exercise. Patient agreed. F/u with pcp.     4. Elevated PSA   Latest Reference Range & Units 02/22/19 08:24 03/02/20 09:58 03/04/21 09:30 03/10/22 09:24   PSA, Screen 0.00 - 4.00 ng/mL 7.1 (H) 7.6 (H) 5.1 (H) 7.3 (H)   (H): Data is abnormally high  Chronic. Stable per urology. F/u with urology yearly with psa.     5. Benign prostatic hyperplasia, unspecified whether lower urinary tract symptoms present  Chronic and stable on current management. On flomax. See med list above. Follow up with PCP.       6. Erectile dysfunction, unspecified erectile dysfunction type  Chronic and stable on current management. See med list above. Follow up with PCP.      7. Obesity (BMI 30.0-34.9)  This problem is currently not controlled. Instructed patient to diet and exercise to lose weight. Follow up with your PCP as planned to discuss adjustments to your treatment plan.      8. Gastroesophageal reflux disease, unspecified whether esophagitis present  Chronic and stable on current management. See med list above. Follow up with PCP.      9. Chronic left hip pain  Chronic and stable on current management. See med list above. Follow up with PCP.      10. Primary osteoarthritis of both knees  Chronic and stable on current management. See med list above. Follow up with PCP.      11. Chronic midline low back pain without sciatica  Chronic and stable on current management. See med list above. Follow up with PCP.      12. Tobacco use  Smokes 2 cigarettes every other day. States he is ready to quit. Patient agreed to smoking cessation program. Referral placed.   - Ambulatory referral/consult to Smoking Cessation Program; Future        Provided Mitchell with a 5-10 year written screening schedule and personal prevention plan. Recommendations were developed using the USPSTF age appropriate recommendations. Education, counseling, and referrals were provided as needed.  After Visit Summary printed and given to patient which includes a list of additional screenings\tests needed.    Follow up in about 1 year (around 9/30/2023) for annual wellness visit.      ROMY Contreras      I offered to discuss advanced care planning, including how to pick a person who would make decisions for you if you were unable to make them for yourself, called a health care power of , and what kind of decisions you might make such as use of life sustaining treatments such as ventilators and tube feeding when faced with a life limiting illness recorded  on a living will that they will need to know. (How you want to be cared for as you near the end of your natural life)     X Patient is interested in learning more about how to make advanced directives.  I provided them paperwork and offered to discuss this with them.

## 2022-09-30 NOTE — PATIENT INSTRUCTIONS
Counseling and Referral of Other Preventative  (Italic type indicates deductible and co-insurance are waived)    Patient Name: Mitchell Mcmillan  Today's Date: 9/30/2022    Health Maintenance       Date Due Completion Date    Shingles Vaccine (2 of 3) 04/27/2016 3/2/2016    COVID-19 Vaccine (5 - Booster) 07/08/2022 5/13/2022    Lipid Panel 09/23/2023 9/23/2022    Colorectal Cancer Screening 10/05/2026 10/5/2016        Orders Placed This Encounter   Procedures    Ambulatory referral/consult to Smoking Cessation Program       The following information is provided to all patients.  This information is to help you find resources for any of the problems found today that may be affecting your health:                Living healthy guide: www.[facility].[place].gov      Understanding Diabetes: www.diabetes.org      Eating healthy: www.cdc.gov/healthyweight      [facility] home safety checklist: www.cdc.gov/steadi/patient.html      Agency on Aging: www.goea.[place].gov      Alcoholics anonymous (AA): www.aa.org      Physical Activity: www.bharathi.nih.gov/qg5hlnj      Tobacco use: www.quitwithusla.org

## 2022-10-17 ENCOUNTER — CLINICAL SUPPORT (OUTPATIENT)
Dept: SMOKING CESSATION | Facility: CLINIC | Age: 67
End: 2022-10-17

## 2022-10-17 DIAGNOSIS — F17.200 NICOTINE DEPENDENCE: Primary | ICD-10-CM

## 2022-10-17 PROCEDURE — 99404 PR PREVENT COUNSEL,INDIV,60 MIN: ICD-10-PCS | Mod: S$GLB,,, | Performed by: GENERAL PRACTICE

## 2022-10-17 PROCEDURE — 99999 PR PBB SHADOW E&M-EST. PATIENT-LVL II: CPT | Mod: PBBFAC,,,

## 2022-10-17 PROCEDURE — 99999 PR PBB SHADOW E&M-EST. PATIENT-LVL II: ICD-10-PCS | Mod: PBBFAC,,,

## 2022-10-17 PROCEDURE — 99404 PREV MED CNSL INDIV APPRX 60: CPT | Mod: S$GLB,,, | Performed by: GENERAL PRACTICE

## 2022-10-17 RX ORDER — DM/P-EPHED/ACETAMINOPH/DOXYLAM 30-7.5/3
2 LIQUID (ML) ORAL
Qty: 72 LOZENGE | Refills: 0 | Status: SHIPPED | OUTPATIENT
Start: 2022-10-17 | End: 2023-05-16 | Stop reason: ALTCHOICE

## 2022-10-17 NOTE — Clinical Note
Patient intake for Quit 1 Episode.  Patient will be participating in bi-weekly tobacco cessation meetings and will begin the prescribed tobacco cessation medication regimen of 2 mg nicotine lozenges as needed. FTND score of 2 indicates a low dependence on nicotine. RACHEL-D score of 16 is perceived as some degree of mental distress / possible depression at this time.

## 2022-10-31 ENCOUNTER — CLINICAL SUPPORT (OUTPATIENT)
Dept: SMOKING CESSATION | Facility: CLINIC | Age: 67
End: 2022-10-31

## 2022-10-31 DIAGNOSIS — F17.200 NICOTINE DEPENDENCE: Primary | ICD-10-CM

## 2022-10-31 PROCEDURE — 99999 PR PBB SHADOW E&M-EST. PATIENT-LVL I: CPT | Mod: PBBFAC,,,

## 2022-10-31 PROCEDURE — 99999 PR PBB SHADOW E&M-EST. PATIENT-LVL I: ICD-10-PCS | Mod: PBBFAC,,,

## 2022-10-31 PROCEDURE — 99404 PR PREVENT COUNSEL,INDIV,60 MIN: ICD-10-PCS | Mod: S$GLB,,, | Performed by: GENERAL PRACTICE

## 2022-10-31 PROCEDURE — 99404 PREV MED CNSL INDIV APPRX 60: CPT | Mod: S$GLB,,, | Performed by: GENERAL PRACTICE

## 2022-10-31 NOTE — PROGRESS NOTES
Individual Follow-Up Form    10/31/2022    Quit Date: 10/19/22    Clinical Status of Patient: Outpatient    Continuing Medication: yes  Nicotine Lozenges     Target Symptoms: Withdrawal and medication side effects. The following were  rated moderate (3) to severe (4) on TCRS:  Moderate (3): none  Severe (4): none    Comments: Patient was seen in the clinic today for a smoking cessation follow up visit. He quit smoking on 10/19/22. Congratulated patient on his accomplishment. The patient states that he got rid of cigarettes, is not having strong urges to smoke, and has not been in any situations that would pressure him to want to smoke. Exhaled CO is 3 ppm. Discussed planning for high risk situations, relapse prevention strategies, timeline improved health benefits to expect after quitting, and personal reasons for quitting. The patient remains on the prescribed tobacco cessation medication regimen of 2 mg nicotine lozenges as needed without any negative side effects at this time.  The patient denies any abnormal behavioral or mental changes at this time.  Will continue to encourage and monitor his progress.    Diagnosis: F17.200    Next Visit: 2 weeks

## 2022-10-31 NOTE — Clinical Note
Patient was seen in the clinic today for a smoking cessation follow up visit. He quit smoking on 10/19/22. Congratulated patient on his accomplishment. The patient states that he got rid of cigarettes, is not having strong urges to smoke, and has not been in any situations that would pressure him to want to smoke. Discussed planning for high risk situations, relapse prevention strategies, timeline improved health benefits to expect after quitting, and personal reasons for quitting. The patient remains on the prescribed tobacco cessation medication regimen of 2 mg nicotine lozenges as needed without any negative side effects at this time.  The patient denies any abnormal behavioral or mental changes at this time.  Will continue to encourage and monitor his progress.

## 2022-11-14 ENCOUNTER — CLINICAL SUPPORT (OUTPATIENT)
Dept: SMOKING CESSATION | Facility: CLINIC | Age: 67
End: 2022-11-14

## 2022-11-14 ENCOUNTER — IMMUNIZATION (OUTPATIENT)
Dept: PRIMARY CARE CLINIC | Facility: CLINIC | Age: 67
End: 2022-11-14
Payer: MEDICARE

## 2022-11-14 DIAGNOSIS — Z23 NEED FOR VACCINATION: Primary | ICD-10-CM

## 2022-11-14 DIAGNOSIS — F17.200 NICOTINE DEPENDENCE: Primary | ICD-10-CM

## 2022-11-14 PROCEDURE — 99999 PR PBB SHADOW E&M-EST. PATIENT-LVL I: CPT | Mod: PBBFAC,,,

## 2022-11-14 PROCEDURE — 99403 PR PREVENT COUNSEL,INDIV,45 MIN: ICD-10-PCS | Mod: S$GLB,,, | Performed by: GENERAL PRACTICE

## 2022-11-14 PROCEDURE — 91313 COVID-19, MRNA, LNP-S, BIVALENT BOOSTER, PF, 50 MCG/0.5 ML: CPT | Mod: PBBFAC | Performed by: FAMILY MEDICINE

## 2022-11-14 PROCEDURE — 99999 PR PBB SHADOW E&M-EST. PATIENT-LVL I: ICD-10-PCS | Mod: PBBFAC,,,

## 2022-11-14 PROCEDURE — 0134A COVID-19, MRNA, LNP-S, BIVALENT BOOSTER, PF, 50 MCG/0.5 ML: CPT | Mod: CV19,PBBFAC | Performed by: FAMILY MEDICINE

## 2022-11-14 PROCEDURE — 99403 PREV MED CNSL INDIV APPRX 45: CPT | Mod: S$GLB,,, | Performed by: GENERAL PRACTICE

## 2022-11-14 NOTE — Clinical Note
Patient was seen in the clinic today for a smoking cessation follow up visit. He remains smoke free since 10/19/22. Congratulated patient on his hard work and success. Exhaled CO 2 ppm. The patient states that he is motivated to remain a non smoker for his health and for his family. Discussed planning for high risk situations, relapse prevention strategies, rewarding oneself for achieving goals, healthy eating habits, adequate water intake, physical activity, and sleep. The patient remains on the prescribed tobacco cessation medication regimen of 2 mg nicotine lozenges as needed without any negative side effects at this time.  The patient denies any abnormal behavioral or mental changes at this time. Provided patient with a certificate for breaking the habit and addiction to smoking and he will call if needed for support in the future. Provided patient with return contact information.

## 2022-11-14 NOTE — PROGRESS NOTES
Individual Follow-Up Form    11/14/2022    Quit Date: 10/19/22    Clinical Status of Patient: Outpatient    Continuing Medication: yes  Nicotine Lozenges     Target Symptoms: Withdrawal and medication side effects. The following were  rated moderate (3) to severe (4) on TCRS:  Moderate (3): none  Severe (4): none    Comments: Patient was seen in the clinic today for a smoking cessation follow up visit. He remains smoke free since 10/19/22. Congratulated patient on his hard work and success. Exhaled CO 2 ppm. The patient states that he is motivated to remain a non smoker for his health and for his family. Discussed planning for high risk situations, relapse prevention strategies, rewarding oneself for achieving goals, healthy eating habits, adequate water intake, physical activity, and sleep. The patient remains on the prescribed tobacco cessation medication regimen of 2 mg nicotine lozenges as needed without any negative side effects at this time.  The patient denies any abnormal behavioral or mental changes at this time. Provided patient with a certificate for breaking the habit and addiction to smoking and he will call if needed for support in the future. Provided patient with return contact information.     Diagnosis: F17.200    Next Visit: PRN

## 2022-11-16 DIAGNOSIS — K21.9 GASTROESOPHAGEAL REFLUX DISEASE, UNSPECIFIED WHETHER ESOPHAGITIS PRESENT: ICD-10-CM

## 2022-11-16 RX ORDER — PANTOPRAZOLE SODIUM 40 MG/1
TABLET, DELAYED RELEASE ORAL
Refills: 0 | OUTPATIENT
Start: 2022-11-16

## 2022-11-16 NOTE — TELEPHONE ENCOUNTER
No new care gaps identified.  [facility] Embedded Care Gaps. Reference number: [id number]. 11/16/2022   12:14:31 PM CST

## 2022-11-16 NOTE — TELEPHONE ENCOUNTER
Refill Decision Note   Mitchell Mcmillan  is requesting a refill authorization.  Brief Assessment and Rationale for Refill:  Quick Discontinue     Medication Therapy Plan: Pharmacy is requesting new scripts for the following medications without required information, (sig/ frequency/qty/etc)     Medication Reconciliation Completed: No   Comments:     No Care Gaps recommended.     Note composed:3:23 PM 11/16/2022

## 2023-01-03 ENCOUNTER — TELEPHONE (OUTPATIENT)
Dept: ORTHOPEDICS | Facility: CLINIC | Age: 68
End: 2023-01-03
Payer: MEDICARE

## 2023-01-03 NOTE — TELEPHONE ENCOUNTER
Informed pt that he is scheduled with a surgeon and is above MD's age restriction. Scheduled sooner appt w/ non-op. Pt verbalized understanding of appt time, date, provider, and location.

## 2023-01-04 ENCOUNTER — TELEPHONE (OUTPATIENT)
Dept: ORTHOPEDICS | Facility: CLINIC | Age: 68
End: 2023-01-04
Payer: MEDICARE

## 2023-01-04 DIAGNOSIS — M25.561 PAIN IN BOTH KNEES, UNSPECIFIED CHRONICITY: Primary | ICD-10-CM

## 2023-01-04 DIAGNOSIS — M25.562 PAIN IN BOTH KNEES, UNSPECIFIED CHRONICITY: Primary | ICD-10-CM

## 2023-01-04 NOTE — TELEPHONE ENCOUNTER
Called patient and let him know that we have scheduled x-rays on 1/13 at 7am prior to his appointment with Patricia Vázquez PA-C at 7:30am. Patient verbalized understanding and was grateful for the call.

## 2023-01-13 ENCOUNTER — HOSPITAL ENCOUNTER (OUTPATIENT)
Dept: RADIOLOGY | Facility: HOSPITAL | Age: 68
Discharge: HOME | End: 2023-01-13
Attending: PHYSICIAN ASSISTANT
Payer: MEDICARE

## 2023-01-13 ENCOUNTER — OFFICE VISIT (OUTPATIENT)
Dept: ORTHOPEDICS | Facility: CLINIC | Age: 68
End: 2023-01-13
Payer: MEDICARE

## 2023-01-13 VITALS — WEIGHT: 250 LBS | BODY MASS INDEX: 30.44 KG/M2 | HEIGHT: 76 IN

## 2023-01-13 DIAGNOSIS — M17.0 PRIMARY OSTEOARTHRITIS OF BOTH KNEES: Primary | ICD-10-CM

## 2023-01-13 DIAGNOSIS — M25.562 PAIN IN BOTH KNEES, UNSPECIFIED CHRONICITY: ICD-10-CM

## 2023-01-13 DIAGNOSIS — M25.561 PAIN IN BOTH KNEES, UNSPECIFIED CHRONICITY: ICD-10-CM

## 2023-01-13 PROCEDURE — 99214 PR OFFICE/OUTPT VISIT, EST, LEVL IV, 30-39 MIN: ICD-10-PCS | Mod: HCNC,25,S$GLB, | Performed by: PHYSICIAN ASSISTANT

## 2023-01-13 PROCEDURE — 73564 XR KNEE ORTHO BILAT WITH FLEXION: ICD-10-PCS | Mod: 26,50,HCNC, | Performed by: RADIOLOGY

## 2023-01-13 PROCEDURE — 73564 X-RAY EXAM KNEE 4 OR MORE: CPT | Mod: TC,50,HCNC

## 2023-01-13 PROCEDURE — 99999 PR PBB SHADOW E&M-EST. PATIENT-LVL III: CPT | Mod: PBBFAC,HCNC,, | Performed by: PHYSICIAN ASSISTANT

## 2023-01-13 PROCEDURE — 1159F PR MEDICATION LIST DOCUMENTED IN MEDICAL RECORD: ICD-10-PCS | Mod: HCNC,CPTII,S$GLB, | Performed by: PHYSICIAN ASSISTANT

## 2023-01-13 PROCEDURE — 3288F FALL RISK ASSESSMENT DOCD: CPT | Mod: HCNC,CPTII,S$GLB, | Performed by: PHYSICIAN ASSISTANT

## 2023-01-13 PROCEDURE — 99214 OFFICE O/P EST MOD 30 MIN: CPT | Mod: HCNC,25,S$GLB, | Performed by: PHYSICIAN ASSISTANT

## 2023-01-13 PROCEDURE — 20610 DRAIN/INJ JOINT/BURSA W/O US: CPT | Mod: 50,HCNC,S$GLB, | Performed by: PHYSICIAN ASSISTANT

## 2023-01-13 PROCEDURE — 3008F BODY MASS INDEX DOCD: CPT | Mod: HCNC,CPTII,S$GLB, | Performed by: PHYSICIAN ASSISTANT

## 2023-01-13 PROCEDURE — 3288F PR FALLS RISK ASSESSMENT DOCUMENTED: ICD-10-PCS | Mod: HCNC,CPTII,S$GLB, | Performed by: PHYSICIAN ASSISTANT

## 2023-01-13 PROCEDURE — 73564 X-RAY EXAM KNEE 4 OR MORE: CPT | Mod: 26,50,HCNC, | Performed by: RADIOLOGY

## 2023-01-13 PROCEDURE — 1159F MED LIST DOCD IN RCRD: CPT | Mod: HCNC,CPTII,S$GLB, | Performed by: PHYSICIAN ASSISTANT

## 2023-01-13 PROCEDURE — 99999 PR PBB SHADOW E&M-EST. PATIENT-LVL III: ICD-10-PCS | Mod: PBBFAC,HCNC,, | Performed by: PHYSICIAN ASSISTANT

## 2023-01-13 PROCEDURE — 1160F RVW MEDS BY RX/DR IN RCRD: CPT | Mod: HCNC,CPTII,S$GLB, | Performed by: PHYSICIAN ASSISTANT

## 2023-01-13 PROCEDURE — 3008F PR BODY MASS INDEX (BMI) DOCUMENTED: ICD-10-PCS | Mod: HCNC,CPTII,S$GLB, | Performed by: PHYSICIAN ASSISTANT

## 2023-01-13 PROCEDURE — 1101F PR PT FALLS ASSESS DOC 0-1 FALLS W/OUT INJ PAST YR: ICD-10-PCS | Mod: HCNC,CPTII,S$GLB, | Performed by: PHYSICIAN ASSISTANT

## 2023-01-13 PROCEDURE — 1101F PT FALLS ASSESS-DOCD LE1/YR: CPT | Mod: HCNC,CPTII,S$GLB, | Performed by: PHYSICIAN ASSISTANT

## 2023-01-13 PROCEDURE — 1160F PR REVIEW ALL MEDS BY PRESCRIBER/CLIN PHARMACIST DOCUMENTED: ICD-10-PCS | Mod: HCNC,CPTII,S$GLB, | Performed by: PHYSICIAN ASSISTANT

## 2023-01-13 PROCEDURE — 1125F PR PAIN SEVERITY QUANTIFIED, PAIN PRESENT: ICD-10-PCS | Mod: HCNC,CPTII,S$GLB, | Performed by: PHYSICIAN ASSISTANT

## 2023-01-13 PROCEDURE — 1125F AMNT PAIN NOTED PAIN PRSNT: CPT | Mod: HCNC,CPTII,S$GLB, | Performed by: PHYSICIAN ASSISTANT

## 2023-01-13 PROCEDURE — 20610 LARGE JOINT ASPIRATION/INJECTION: BILATERAL KNEE: ICD-10-PCS | Mod: 50,HCNC,S$GLB, | Performed by: PHYSICIAN ASSISTANT

## 2023-01-13 RX ORDER — TRIAMCINOLONE ACETONIDE 40 MG/ML
40 INJECTION, SUSPENSION INTRA-ARTICULAR; INTRAMUSCULAR
Status: DISCONTINUED | OUTPATIENT
Start: 2023-01-13 | End: 2023-01-13 | Stop reason: HOSPADM

## 2023-01-13 RX ADMIN — TRIAMCINOLONE ACETONIDE 40 MG: 40 INJECTION, SUSPENSION INTRA-ARTICULAR; INTRAMUSCULAR at 07:01

## 2023-01-13 NOTE — PROGRESS NOTES
Orthopaedics Sports Medicine     Knee Initial Visit         1/13/2023    Referring MD: Self, Aaareferral    Chief Complaint   Patient presents with    Left Knee - Pain, Swelling    Right Knee - Pain, Swelling       History of Present Illness:   Mitchell Mcmillan is a 67 y.o. year old male patient presents with pain and dysfunction involving BILATERAL knees.  This patient is new to me.  Patient was last seen by Estephania Mak PA-C on 09/13/2021 at which time he completed the Monovisc Visco in bilateral knees.  He reports he got about 6 months of relief with those injections.    Onset of the symptoms was about 3-4 years ago.     Inciting event:  No recent injury or trauma    Current symptoms include intermittent swelling, aching, throbbing, and sharp pain of bilateral knees. Patient states that the pain is worse in his right knee compared to the left.  His pain is located medially on both knees.      Pain is aggravated by bearing weight, ambulation. He reports that his pain is worse in the morning with associated stiffness, and worse at night.    Evaluation to date: X-ray.     Treatment to date:  Rest, activity modification, anti-inflammatories (Advil p.m.), Tylenol, Voltaren gel, Previous CSI, previous Visco, knee bracing, home exercise program       Past Medical History:   Past Medical History:   Diagnosis Date    Arthritis     knees    Asymmetric SNHL (sensorineural hearing loss) 08/17/2017    Chronic left hip pain 2006    Dislocated left hip, job injury    Chronic prostatitis     per pt, last episode 2013    Elevated PSA, less than 10 ng/ml     per pt last elevation 2013    Gastroesophageal reflux disease 8/3/2022    Hyperlipidemia 2010    Hypertension 2011    Joint effusion of knee     per pt rt knee, seen by Dr. Duke 2013    Midline low back pain without sciatica 6/13/2016    Obesity     Special screening for malignant neoplasms, colon 10/5/2016    Tobacco dependence     Trouble in sleeping        Past  Surgical History:   Past Surgical History:   Procedure Laterality Date    arthrocentesis Right 2013    Per pt , knee by Dr. BONNIE Duke    COLONOSCOPY N/A 10/5/2016    Normal exam repeat 10 yrs. Procedure: COLONOSCOPY;  Surgeon: Alexander Goetz MD;  Location: [facility];  Service: Endoscopy;  Laterality: N/A;    HIP ARTHROPLASTY Left 4/16/2006    Per pt, dislocated left hip/ hardware placed  by Dr. Lisa    left orchiectomy      PROSTATE BIOPSY  2012    procedure performed at [facility] due to elevated PSA - benign       Medications:  Patient's Medications   New Prescriptions    No medications on file   Previous Medications    ATORVASTATIN (LIPITOR) 40 MG TABLET    TAKE 1 TABLET EVERY DAY    DICLOFENAC SODIUM (VOLTAREN) 1 % GEL    Apply 2 g topically 3 (three) times daily as needed.    FINASTERIDE (PROSCAR) 5 MG TABLET    Take 1 tablet (5 mg total) by mouth once daily.    LISINOPRIL-HYDROCHLOROTHIAZIDE (PRINZIDE,ZESTORETIC) 20-25 MG TAB    TAKE 1 TABLET EVERY DAY    NICOTINE POLACRILEX 2 MG LOZG    Take 1 lozenge (2 mg total) by mouth as needed (Use 5-6 lozenges per day in the place of cigarettes).    OMEGA-3 FATTY ACIDS/FISH OIL (FISH OIL-OMEGA-3 FATTY ACIDS) 300-1,000 MG CAPSULE    Take 1 capsule by mouth once daily.    PANTOPRAZOLE (PROTONIX) 40 MG TABLET    Take 1 tablet (40 mg total) by mouth once daily.    SILDENAFIL (VIAGRA) 100 MG TABLET    Take 1 tablet (100 mg total) by mouth daily as needed for Erectile Dysfunction.    TAMSULOSIN (FLOMAX) 0.4 MG CAP    Take 1 capsule (0.4 mg total) by mouth once daily.   Modified Medications    No medications on file   Discontinued Medications    No medications on file        Allergies: Review of patient's allergies indicates:  No Known Allergies    Social History:   [place]: [place], [place]  occupation:   alcohol use: He reports that he does not currently use alcohol after a past usage of about 2.0 standard drinks per week.  tobacco use: He reports  "that he quit smoking about 2 months ago. His smoking use included cigarettes. He started smoking about 7 months ago. He has a 4.60 pack-year smoking history. He has never used smokeless tobacco.    Review of systems:  history of recent illness, fevers, shakes, or chills: no  history of cardiac problems or chest pain: no  history of of pulmonary problems or asthma: no  history of diabetes: no  history of prior DVT or clotting problems: no  history of sleep apnea: no    Physical Examination:  Estimated body mass index is 30.43 kg/m² as calculated from the following:    Height as of this encounter: 6' 4" (1.93 m).    Weight as of this encounter: 113.4 kg (250 lb).    Standing exam  stance: normal alignment, no significant leg-length discrepancy  gait: antalgic    Knee      RIGHT  LEFT  Skin:     Intact   Intact  ROM:     0-100  0-110  Effusion:    ++   +  Medial joint line tenderness:  +   +  Lateral joint line tenderness:  Neg   Neg  Roseanne:     Neg   Neg  Patella crepitus:   +   +  Patella tenderness:   Neg   Neg  Patella grind:      Neg   Neg  Lachman:    Neg   Neg  Pivot shift:    Neg   Neg  Valgus stress:    Neg   Neg  Varus stress:    Neg   Neg  Posterior drawer:   Neg   Neg  N-V               intact  intact  Hip:    nml    nml   Lower extremity edema: Negative negative    Neurovascular exam  - motor function grossly intact bilaterally to hip flexion, knee extension and flexion, ankle dorsiflexion and plantarflexion  - sensation intact to light touch bilaterally to femoral, tibial, tibial and peroneal distributions  - symmetrical pedal pulses    Imaging:   XR Results:  Results for orders placed during the hospital encounter of 10/01/20    X-ray Knee Ortho Right with Flexion    Narrative  EXAMINATION:  XR KNEE ORTHO BILAT WITH FLEXION     CLINICAL HISTORY:  Pain in right knee     TECHNIQUE:  AP standing of both knees, PA flexion standing views of both knees, and Merchant views of both knees were performed.  " Lateral views of both knees were also performed.     COMPARISON:  10/01/2020     FINDINGS:  No evidence for fracture.  No significant varus or valgus angulation.  Bilateral end-stage osteoarthrosis of the medial compartments with bone-on-bone appearance.  Marginal joint osteophytes and spurring of the tibial spines.  Bilateral suprapatellar joint effusions without evidence for lipohemarthrosis.  No significant soft tissue swelling.  Bilateral lateral patellar tilt.     Impression:     End-stage osteoarthrosis of the bilateral knees not significantly changed from prior study.  Suprapatellar joint effusions.  No evidence for fracture.        Electronically signed by: Edis Abdi MD  Date:                                            01/13/2023  Time:                                           08:24        Physician Read: I agree with the above impression.  Kellgren Kevin grade 4 bilaterally    Impression:  67 y.o. male with severe bilateral knee osteoarthritis    Plan:  Discussed diagnosis and treatment options with the patient today.  He has known severe bilateral knee osteoarthritis.  He has tried considerable conservative treatment options in the form of rest, activity modification, anti-inflammatories, Voltaren gel, Tylenol, knee bracing, HEP, intermittent CSI, viscosupplementation. Despite these interventions he still continues to have symptoms daily that affects his quality of life and activities of daily living.  Patient reports that he is tired of feeling this way and would like to discuss a knee replacement at this time. I recommend we proceed with a referral to Dr. Mora for further evaluation for possible total knee arthroplasty  In the meantime, patient is requesting something to aid in pain relief until he can get in to see Dr. Mora in March. I recommend we proceed with bilateral knee corticosteroid injections  Discussed with patient in detail that no surgical intervention can occur until 90 days  after steroid injection, patient voiced understanding and would like to proceed  Bilateral knee CSI given in clinic, patient tolerated the procedure well with no immediate complications  Follow-up with Dr. Abby Vázquez PA-C  Sports Medicine Physician Assistant       Disclaimer: This note was prepared using a voice recognition system and is likely to have sound alike errors within the text.

## 2023-01-13 NOTE — PROCEDURES
Large Joint Aspiration/Injection: bilateral knee    Date/Time: 1/13/2023 7:30 AM  Performed by: Patricia Vázquez PA-C  Authorized by: Patricia Vázquez PA-C     Consent Done?:  Yes (Verbal)  Indications:  Pain  Site marked: the procedure site was marked    Timeout: prior to procedure the correct patient, procedure, and site was verified    Prep: patient was prepped and draped in usual sterile fashion      Local anesthesia used?: Yes    Anesthesia:  Local infiltration  Local anesthetic:  Lidocaine 1% without epinephrine    Details:  Needle Size:  22 G  Ultrasonic Guidance for needle placement?: No    Approach:  Anterolateral  Location:  Knee  Laterality:  Bilateral  Site:  Bilateral knee  Medications (Right):  40 mg triamcinolone acetonide 40 mg/mL  Medications (Left):  40 mg triamcinolone acetonide 40 mg/mL  Patient tolerance:  Patient tolerated the procedure well with no immediate complications

## 2023-01-31 ENCOUNTER — CLINICAL SUPPORT (OUTPATIENT)
Dept: SMOKING CESSATION | Facility: CLINIC | Age: 68
End: 2023-01-31

## 2023-01-31 DIAGNOSIS — F17.200 NICOTINE DEPENDENCE: Primary | ICD-10-CM

## 2023-01-31 PROCEDURE — 99999 PR PBB SHADOW E&M-EST. PATIENT-LVL I: ICD-10-PCS | Mod: PBBFAC,,,

## 2023-01-31 PROCEDURE — 99407 BEHAV CHNG SMOKING > 10 MIN: CPT | Mod: S$GLB,,,

## 2023-01-31 PROCEDURE — 99407 PR TOBACCO USE CESSATION INTENSIVE >10 MINUTES: ICD-10-PCS | Mod: S$GLB,,,

## 2023-01-31 PROCEDURE — 99999 PR PBB SHADOW E&M-EST. PATIENT-LVL I: CPT | Mod: PBBFAC,,,

## 2023-01-31 NOTE — PROGRESS NOTES
Spoke with patient today in regard to smoking cessation progress for 3 month telephone follow up, he states tobacco free. Commended patient on the accomplishment thus far. Informed patient of benefit period, future follow ups, and contact information if any further help or support is needed. Will complete smart form for 3 month follow up on Quit attempt #1.

## 2023-02-09 ENCOUNTER — TELEPHONE (OUTPATIENT)
Dept: ADMINISTRATIVE | Facility: HOSPITAL | Age: 68
End: 2023-02-09
Payer: MEDICARE

## 2023-02-10 DIAGNOSIS — E78.5 HYPERLIPIDEMIA, UNSPECIFIED HYPERLIPIDEMIA TYPE: ICD-10-CM

## 2023-02-10 RX ORDER — ATORVASTATIN CALCIUM 40 MG/1
TABLET, FILM COATED ORAL
Qty: 90 TABLET | Refills: 1 | Status: SHIPPED | OUTPATIENT
Start: 2023-02-10 | End: 2023-10-04

## 2023-02-10 NOTE — TELEPHONE ENCOUNTER
No new care gaps identified.  [facility] Embedded Care Gaps. Reference number: [id number]. 2/10/2023   11:52:15 AM CST

## 2023-02-10 NOTE — TELEPHONE ENCOUNTER
----- Message from Jessica Chinchilla MD sent at 2/10/2023 12:20 PM CST -----  Needs b/p f/u appt w/me for march 2023. Thanks.

## 2023-02-10 NOTE — TELEPHONE ENCOUNTER
Calling patient twice to set up a month follow up for blood pressure check with Dr. Chinchilla. No answer. Left message to give us a call back.

## 2023-02-10 NOTE — TELEPHONE ENCOUNTER
Refill Decision Note   Mitchell Mcmillan  is requesting a refill authorization.  Brief Assessment and Rationale for Refill:  Approve     Medication Therapy Plan:       Medication Reconciliation Completed: No   Comments:     No Care Gaps recommended.     Note composed:11:59 AM 02/10/2023

## 2023-02-15 ENCOUNTER — OFFICE VISIT (OUTPATIENT)
Dept: FAMILY MEDICINE | Facility: CLINIC | Age: 68
End: 2023-02-15
Payer: MEDICARE

## 2023-02-15 VITALS
DIASTOLIC BLOOD PRESSURE: 78 MMHG | WEIGHT: 261.69 LBS | OXYGEN SATURATION: 98 % | SYSTOLIC BLOOD PRESSURE: 132 MMHG | TEMPERATURE: 98 F | HEART RATE: 78 BPM | BODY MASS INDEX: 31.85 KG/M2 | RESPIRATION RATE: 20 BRPM

## 2023-02-15 DIAGNOSIS — I10 ESSENTIAL HYPERTENSION: ICD-10-CM

## 2023-02-15 DIAGNOSIS — E66.9 OBESITY (BMI 30.0-34.9): ICD-10-CM

## 2023-02-15 DIAGNOSIS — K21.9 GASTROESOPHAGEAL REFLUX DISEASE, UNSPECIFIED WHETHER ESOPHAGITIS PRESENT: ICD-10-CM

## 2023-02-15 DIAGNOSIS — Z00.00 ENCOUNTER FOR PREVENTIVE HEALTH EXAMINATION: Primary | ICD-10-CM

## 2023-02-15 DIAGNOSIS — E78.5 HYPERLIPIDEMIA, UNSPECIFIED HYPERLIPIDEMIA TYPE: ICD-10-CM

## 2023-02-15 DIAGNOSIS — M17.0 PRIMARY OSTEOARTHRITIS OF BOTH KNEES: ICD-10-CM

## 2023-02-15 DIAGNOSIS — N40.0 BENIGN PROSTATIC HYPERPLASIA, UNSPECIFIED WHETHER LOWER URINARY TRACT SYMPTOMS PRESENT: ICD-10-CM

## 2023-02-15 DIAGNOSIS — Z87.891 HISTORY OF TOBACCO USE: ICD-10-CM

## 2023-02-15 PROCEDURE — 1159F MED LIST DOCD IN RCRD: CPT | Mod: HCNC,CPTII,S$GLB, | Performed by: NURSE PRACTITIONER

## 2023-02-15 PROCEDURE — 1159F PR MEDICATION LIST DOCUMENTED IN MEDICAL RECORD: ICD-10-PCS | Mod: HCNC,CPTII,S$GLB, | Performed by: NURSE PRACTITIONER

## 2023-02-15 PROCEDURE — 3288F FALL RISK ASSESSMENT DOCD: CPT | Mod: HCNC,CPTII,S$GLB, | Performed by: NURSE PRACTITIONER

## 2023-02-15 PROCEDURE — 3008F BODY MASS INDEX DOCD: CPT | Mod: HCNC,CPTII,S$GLB, | Performed by: NURSE PRACTITIONER

## 2023-02-15 PROCEDURE — 3288F PR FALLS RISK ASSESSMENT DOCUMENTED: ICD-10-PCS | Mod: HCNC,CPTII,S$GLB, | Performed by: NURSE PRACTITIONER

## 2023-02-15 PROCEDURE — 3078F PR MOST RECENT DIASTOLIC BLOOD PRESSURE < 80 MM HG: ICD-10-PCS | Mod: HCNC,CPTII,S$GLB, | Performed by: NURSE PRACTITIONER

## 2023-02-15 PROCEDURE — 3008F PR BODY MASS INDEX (BMI) DOCUMENTED: ICD-10-PCS | Mod: HCNC,CPTII,S$GLB, | Performed by: NURSE PRACTITIONER

## 2023-02-15 PROCEDURE — G0439 PR MEDICARE ANNUAL WELLNESS SUBSEQUENT VISIT: ICD-10-PCS | Mod: HCNC,S$GLB,, | Performed by: NURSE PRACTITIONER

## 2023-02-15 PROCEDURE — 3075F SYST BP GE 130 - 139MM HG: CPT | Mod: HCNC,CPTII,S$GLB, | Performed by: NURSE PRACTITIONER

## 2023-02-15 PROCEDURE — 3078F DIAST BP <80 MM HG: CPT | Mod: HCNC,CPTII,S$GLB, | Performed by: NURSE PRACTITIONER

## 2023-02-15 PROCEDURE — 1160F PR REVIEW ALL MEDS BY PRESCRIBER/CLIN PHARMACIST DOCUMENTED: ICD-10-PCS | Mod: HCNC,CPTII,S$GLB, | Performed by: NURSE PRACTITIONER

## 2023-02-15 PROCEDURE — 1160F RVW MEDS BY RX/DR IN RCRD: CPT | Mod: HCNC,CPTII,S$GLB, | Performed by: NURSE PRACTITIONER

## 2023-02-15 PROCEDURE — 1101F PR PT FALLS ASSESS DOC 0-1 FALLS W/OUT INJ PAST YR: ICD-10-PCS | Mod: HCNC,CPTII,S$GLB, | Performed by: NURSE PRACTITIONER

## 2023-02-15 PROCEDURE — 99999 PR PBB SHADOW E&M-EST. PATIENT-LVL IV: ICD-10-PCS | Mod: PBBFAC,HCNC,, | Performed by: NURSE PRACTITIONER

## 2023-02-15 PROCEDURE — 1101F PT FALLS ASSESS-DOCD LE1/YR: CPT | Mod: HCNC,CPTII,S$GLB, | Performed by: NURSE PRACTITIONER

## 2023-02-15 PROCEDURE — 3075F PR MOST RECENT SYSTOLIC BLOOD PRESS GE 130-139MM HG: ICD-10-PCS | Mod: HCNC,CPTII,S$GLB, | Performed by: NURSE PRACTITIONER

## 2023-02-15 PROCEDURE — 99999 PR PBB SHADOW E&M-EST. PATIENT-LVL IV: CPT | Mod: PBBFAC,HCNC,, | Performed by: NURSE PRACTITIONER

## 2023-02-15 PROCEDURE — G0439 PPPS, SUBSEQ VISIT: HCPCS | Mod: HCNC,S$GLB,, | Performed by: NURSE PRACTITIONER

## 2023-02-15 NOTE — PATIENT INSTRUCTIONS
Counseling and Referral of Other Preventative  (Italic type indicates deductible and co-insurance are waived)    Patient Name: Mitchell Mcmillan  Today's Date: 2/15/2023    Health Maintenance       Date Due Completion Date    Hemoglobin A1c (Diabetic Prevention Screening) Never done ---    Shingles Vaccine (2 of 3) 04/27/2016 3/2/2016    Lipid Panel 09/23/2023 9/23/2022    Colorectal Cancer Screening 10/05/2026 10/5/2016        No orders of the defined types were placed in this encounter.      The following information is provided to all patients.  This information is to help you find resources for any of the problems found today that may be affecting your health:                Living healthy guide: www.[facility].[place].gov      Understanding Diabetes: www.diabetes.org      Eating healthy: www.cdc.gov/healthyweight      CDC home safety checklist: www.cdc.gov/steadi/patient.html      Agency on Aging: www.goea.[place].[facility]      Alcoholics anonymous (AA): www.aa.org      Physical Activity: www.bharathi.nih.gov/gj1oeri      Tobacco use: www.quitwithusla.org

## 2023-02-15 NOTE — PROGRESS NOTES
Mitchell Mcmillan presented for a  Medicare AWV and comprehensive Health Risk Assessment today. The following components were reviewed and updated:    Medical history  Family History  Social history  Allergies and Current Medications  Health Risk Assessment  Health Maintenance  Care Team         ** See Completed Assessments for Annual Wellness Visit within the encounter summary.**         The following assessments were completed:  Living Situation  CAGE  Depression Screening  Timed Get Up and Go  Whisper Test  Cognitive Function Screening  Nutrition Screening  ADL Screening  PAQ Screening        Vitals:    02/15/23 0811   BP: 132/78   Pulse: 78   Resp: 20   Temp: 97.9 °F (36.6 °C)   SpO2: 98%   Weight: 118.7 kg (261 lb 11 oz)     Body mass index is 31.85 kg/m².  Physical Exam  Vitals and nursing note reviewed.   Constitutional:       General: He is not in acute distress.     Appearance: He is well-developed.   HENT:      Head: Normocephalic and atraumatic.   Eyes:      Pupils: Pupils are equal, round, and reactive to light.   Neck:      Vascular: No carotid bruit.   Cardiovascular:      Rate and Rhythm: Normal rate and regular rhythm.      Pulses: Normal pulses.      Heart sounds: Normal heart sounds. No murmur heard.    No gallop.   Pulmonary:      Effort: Pulmonary effort is normal.      Breath sounds: Normal breath sounds.   Abdominal:      General: Bowel sounds are normal. There is no distension.      Palpations: Abdomen is soft.      Tenderness: There is no abdominal tenderness.   Musculoskeletal:         General: Normal range of motion.      Right knee: Swelling present. Tenderness present.      Left knee: Swelling present. Tenderness present.   Skin:     General: Skin is warm and dry.   Neurological:      Motor: No abnormal muscle tone.   Psychiatric:         Speech: Speech normal.         Behavior: Behavior normal.         Thought Content: Thought content normal.         Judgment: Judgment normal.            Current Outpatient Medications   Medication Instructions    atorvastatin (LIPITOR) 40 MG tablet TAKE 1 TABLET EVERY DAY    diclofenac sodium (VOLTAREN) 2 g, Topical (Top), 3 times daily PRN    finasteride (PROSCAR) 5 mg, Oral, Daily    lisinopriL-hydrochlorothiazide (PRINZIDE,ZESTORETIC) 20-25 mg Tab TAKE 1 TABLET EVERY DAY    nicotine polacrilex 2 mg, Oral, As needed (PRN)    omega-3 fatty acids/fish oil (FISH OIL-OMEGA-3 FATTY ACIDS) 300-1,000 mg capsule 1 capsule, Oral, Daily    pantoprazole (PROTONIX) 40 mg, Oral, Daily    sildenafiL (VIAGRA) 100 mg, Oral, Daily PRN    tamsulosin (FLOMAX) 0.4 mg, Oral, Daily         Diagnoses and health risks identified today and associated recommendations/orders:    1. Encounter for preventive health examination  Review for Opioid Screening: Patient does not have rx for Opioids.  Review for Substance Use Disorders: Patient does not use substance.    Discussed shingles vaccines    2. Hyperlipidemia, unspecified hyperlipidemia type  Component      Latest Ref Rng & Units 9/23/2022   Cholesterol      120 - 199 mg/dL 277 (H)   Triglycerides      30 - 150 mg/dL 160 (H)   HDL      40 - 75 mg/dL 58   LDL Cholesterol External      63.0 - 159.0 mg/dL 187.0 (H)   HDL/Cholesterol Ratio      20.0 - 50.0 % 20.9   Total Cholesterol/HDL Ratio      2.0 - 5.0 4.8   Non-HDL Cholesterol      mg/dL 219   Chronic on Lipiotr-reinfore low fat /card diet and exzericse as tolerated - repeat  flp in 6 months with PCP    3. Essential hypertension  Chronic and Stable on Zeotreic. Continue current treatment plan as previously prescribed with your PCP    4. Primary osteoarthritis of both knees  Chronic and Ongoing states previous injections and volatren hasnt helped  . Velia to discuss paramjit knee replacement with orthopedics  on 3/2/7/ 23    5. Benign prostatic hyperplasia, unspecified whether lower urinary tract symptoms present  Chronic and Stable on Flomax- scheduled for psa and f/u with urologist 3/  2023    6. Obesity (BMI 30.0-34.9)  BMI: 31.85 kg/m²  Adj Wt: 99.6 kg (219 lb 7.8 oz)  Pt states wt gain due to recently stopped smoking. Reinforce diet,exerisce and life style modification    7. Gastroesophageal reflux disease, unspecified whether esophagitis present  Chronic and StableOn Protonix Continue current treatment plan as previously prescribed with your PCP    I offered to discuss advanced care planning, including how to pick a person who would make decisions for you if you were unable to make them for yourself, called a health care power of , and what kind of decisions you might make such as use of life sustaining treatments such as ventilators and tube feeding when faced with a life limiting illness recorded on a living will that they will need to know. (How you want to be cared for as you near the end of your natural life)     X Patient is interested in learning more about how to make advanced directives.  I provided them paperwork and offered to discuss this with them.    Provided Mitchell with a 5-10 year written screening schedule and personal prevention plan. Recommendations were developed using the USPSTF age appropriate recommendations. Education, counseling, and referrals were provided as needed. After Visit Summary printed and given to patient which includes a list of additional screenings\tests needed.    Follow up in about 1 year (around 2/15/2024).    Gisselle Palomino NP

## 2023-02-16 RX ORDER — TAMSULOSIN HYDROCHLORIDE 0.4 MG/1
0.4 CAPSULE ORAL DAILY
Qty: 90 CAPSULE | Refills: 3 | Status: CANCELLED | OUTPATIENT
Start: 2023-02-16 | End: 2024-02-16

## 2023-02-16 NOTE — TELEPHONE ENCOUNTER
No new care gaps identified.  [facility] Embedded Care Gaps. Reference number: [id number]. 2/16/2023   3:30:38 PM CST

## 2023-02-17 NOTE — TELEPHONE ENCOUNTER
----- Message from Jessica Chinchilla MD sent at 2/16/2023  5:39 PM CST -----  Needs physical w/me on/around 9/19/2023. Thanks.

## 2023-03-10 ENCOUNTER — LAB VISIT (OUTPATIENT)
Dept: LAB | Facility: HOSPITAL | Age: 68
End: 2023-03-10
Attending: UROLOGY
Payer: MEDICARE

## 2023-03-10 DIAGNOSIS — R97.20 ELEVATED PSA: ICD-10-CM

## 2023-03-10 LAB — COMPLEXED PSA SERPL-MCNC: 5.5 NG/ML (ref 0–4)

## 2023-03-10 PROCEDURE — 36415 COLL VENOUS BLD VENIPUNCTURE: CPT | Mod: HCNC | Performed by: UROLOGY

## 2023-03-10 PROCEDURE — 84153 ASSAY OF PSA TOTAL: CPT | Mod: HCNC | Performed by: UROLOGY

## 2023-03-15 ENCOUNTER — OFFICE VISIT (OUTPATIENT)
Dept: UROLOGY | Facility: CLINIC | Age: 68
End: 2023-03-15
Payer: MEDICARE

## 2023-03-15 VITALS
SYSTOLIC BLOOD PRESSURE: 131 MMHG | HEIGHT: 76 IN | RESPIRATION RATE: 18 BRPM | HEART RATE: 91 BPM | TEMPERATURE: 98 F | WEIGHT: 262.69 LBS | BODY MASS INDEX: 31.99 KG/M2 | DIASTOLIC BLOOD PRESSURE: 74 MMHG

## 2023-03-15 DIAGNOSIS — N52.9 ERECTILE DYSFUNCTION, UNSPECIFIED ERECTILE DYSFUNCTION TYPE: ICD-10-CM

## 2023-03-15 DIAGNOSIS — N40.0 BENIGN PROSTATIC HYPERPLASIA, UNSPECIFIED WHETHER LOWER URINARY TRACT SYMPTOMS PRESENT: ICD-10-CM

## 2023-03-15 DIAGNOSIS — R97.20 ELEVATED PSA: Primary | ICD-10-CM

## 2023-03-15 PROCEDURE — 1159F PR MEDICATION LIST DOCUMENTED IN MEDICAL RECORD: ICD-10-PCS | Mod: HCNC,CPTII,S$GLB, | Performed by: UROLOGY

## 2023-03-15 PROCEDURE — 4010F PR ACE/ARB THEARPY RXD/TAKEN: ICD-10-PCS | Mod: HCNC,CPTII,S$GLB, | Performed by: UROLOGY

## 2023-03-15 PROCEDURE — 99999 PR PBB SHADOW E&M-EST. PATIENT-LVL IV: ICD-10-PCS | Mod: PBBFAC,HCNC,, | Performed by: UROLOGY

## 2023-03-15 PROCEDURE — 3008F PR BODY MASS INDEX (BMI) DOCUMENTED: ICD-10-PCS | Mod: HCNC,CPTII,S$GLB, | Performed by: UROLOGY

## 2023-03-15 PROCEDURE — 3075F SYST BP GE 130 - 139MM HG: CPT | Mod: HCNC,CPTII,S$GLB, | Performed by: UROLOGY

## 2023-03-15 PROCEDURE — 3008F BODY MASS INDEX DOCD: CPT | Mod: HCNC,CPTII,S$GLB, | Performed by: UROLOGY

## 2023-03-15 PROCEDURE — 3078F DIAST BP <80 MM HG: CPT | Mod: HCNC,CPTII,S$GLB, | Performed by: UROLOGY

## 2023-03-15 PROCEDURE — 99999 PR PBB SHADOW E&M-EST. PATIENT-LVL IV: CPT | Mod: PBBFAC,HCNC,, | Performed by: UROLOGY

## 2023-03-15 PROCEDURE — 1126F AMNT PAIN NOTED NONE PRSNT: CPT | Mod: HCNC,CPTII,S$GLB, | Performed by: UROLOGY

## 2023-03-15 PROCEDURE — 99214 PR OFFICE/OUTPT VISIT, EST, LEVL IV, 30-39 MIN: ICD-10-PCS | Mod: HCNC,S$GLB,, | Performed by: UROLOGY

## 2023-03-15 PROCEDURE — 1126F PR PAIN SEVERITY QUANTIFIED, NO PAIN PRESENT: ICD-10-PCS | Mod: HCNC,CPTII,S$GLB, | Performed by: UROLOGY

## 2023-03-15 PROCEDURE — 4010F ACE/ARB THERAPY RXD/TAKEN: CPT | Mod: HCNC,CPTII,S$GLB, | Performed by: UROLOGY

## 2023-03-15 PROCEDURE — 1101F PT FALLS ASSESS-DOCD LE1/YR: CPT | Mod: HCNC,CPTII,S$GLB, | Performed by: UROLOGY

## 2023-03-15 PROCEDURE — 3288F FALL RISK ASSESSMENT DOCD: CPT | Mod: HCNC,CPTII,S$GLB, | Performed by: UROLOGY

## 2023-03-15 PROCEDURE — 3078F PR MOST RECENT DIASTOLIC BLOOD PRESSURE < 80 MM HG: ICD-10-PCS | Mod: HCNC,CPTII,S$GLB, | Performed by: UROLOGY

## 2023-03-15 PROCEDURE — 1101F PR PT FALLS ASSESS DOC 0-1 FALLS W/OUT INJ PAST YR: ICD-10-PCS | Mod: HCNC,CPTII,S$GLB, | Performed by: UROLOGY

## 2023-03-15 PROCEDURE — 1159F MED LIST DOCD IN RCRD: CPT | Mod: HCNC,CPTII,S$GLB, | Performed by: UROLOGY

## 2023-03-15 PROCEDURE — 3288F PR FALLS RISK ASSESSMENT DOCUMENTED: ICD-10-PCS | Mod: HCNC,CPTII,S$GLB, | Performed by: UROLOGY

## 2023-03-15 PROCEDURE — 3075F PR MOST RECENT SYSTOLIC BLOOD PRESS GE 130-139MM HG: ICD-10-PCS | Mod: HCNC,CPTII,S$GLB, | Performed by: UROLOGY

## 2023-03-15 PROCEDURE — 99214 OFFICE O/P EST MOD 30 MIN: CPT | Mod: HCNC,S$GLB,, | Performed by: UROLOGY

## 2023-03-15 NOTE — PROGRESS NOTES
Chief Complaint:   Encounter Diagnoses   Name Primary?    Elevated PSA Yes    Benign prostatic hyperplasia, unspecified whether lower urinary tract symptoms present     Erectile dysfunction, unspecified erectile dysfunction type          HPI:   3/15/23- currently voiding well on medical management, erections are stable with Viagra.  PSA appears to be stable.    8/1/18:  61 yo man referred by Dr. Chinchilla for elevated PSA.  Prostate biopsy was done long ago, 7-8 years.  No abd/pelvic pain and no exac/rel factors.  No hematuria.  No urolithiasis.  Nocturia x2. No urinary bother.  No  history.  Normal sexual function.    Allergies:  Patient has no known allergies.    Medications:  has a current medication list which includes the following prescription(s): atorvastatin, diclofenac sodium, finasteride, lisinopril-hydrochlorothiazide, nicotine polacrilex, fish oil-omega-3 fatty acids, pantoprazole, sildenafil, and tamsulosin.    Review of Systems:  General: No fever, chills, fatigability, or weight loss.  Skin: No rashes, itching, or changes in color or texture of skin.  Chest: Denies BUTCHER, cyanosis, wheezing, cough, and sputum production.  Abdomen: Appetite fine. No weight loss. Denies diarrhea, abdominal pain, hematemesis, or blood in stool.  Musculoskeletal: No joint stiffness or swelling. Denies back pain.  : As above.  All other review of systems negative.    PMH:   has a past medical history of Arthritis, Asymmetric SNHL (sensorineural hearing loss) (08/17/2017), Chronic left hip pain (2006), Chronic prostatitis, Elevated PSA, less than 10 ng/ml, Gastroesophageal reflux disease (8/3/2022), Hyperlipidemia (2010), Hypertension (2011), Joint effusion of knee, Midline low back pain without sciatica (6/13/2016), Obesity, Special screening for malignant neoplasms, colon (10/5/2016), Tobacco dependence, and Trouble in sleeping.    PSH:   has a past surgical history that includes Hip Arthroplasty (Left, 4/16/2006);  arthrocentesis (Right, 2013); Prostate biopsy (2012); Colonoscopy (N/A, 10/5/2016); and left orchiectomy.    FamHx: family history includes Arthritis in his father; Depression in his mother; Diabetes in his sister; Early death in his mother; Heart disease (age of onset: 53) in his mother; Hypertension in his mother, sister, and sister; No Known Problems in his brother, brother, brother, and daughter; Stroke in his father.    SocHx:  reports that he quit smoking about 4 months ago. His smoking use included cigarettes. He started smoking about 9 months ago. He has a 4.60 pack-year smoking history. He has never used smokeless tobacco. He reports that he does not currently use alcohol after a past usage of about 2.0 standard drinks per week. He reports that he does not use drugs.      Physical Exam:  There were no vitals filed for this visit.    General: A&Ox3, no apparent distress, no deformities  Neck: No masses, normal thyroid  Lungs: normal inspiration, no use of accessory muscles  Heart: normal pulse, no arrhythmias  Abdomen: Soft, NT, ND  Skin: The skin is warm and dry. No jaundice.  Ext: No c/c/e.    Labs/Studies:   Reported history of negative biopsy years ago.  PSA 5.5 3/23  PSA 7.3 3/22  PSA 5.1 3/21  PSA 7.6 3/20    Impression/Plan:      1. BPH- tamsulosin and finasteride appear to be controlling his symptoms, call when he needs a refill.    2. Elevated PSA- PSA while elevated appears to be stable, and has actually reduced.  He does not want to pursue a repeat biopsy and wants to continue to monitor.  Therefore I will see him in a year with a PSA.    3. Erectile dysfunction- sildenafil 100 mg works well and has been refilled today.

## 2023-03-27 ENCOUNTER — OFFICE VISIT (OUTPATIENT)
Dept: ORTHOPEDICS | Facility: CLINIC | Age: 68
End: 2023-03-27
Payer: MEDICARE

## 2023-03-27 VITALS — HEIGHT: 76 IN | BODY MASS INDEX: 31.68 KG/M2 | WEIGHT: 260.13 LBS

## 2023-03-27 DIAGNOSIS — M17.12 ARTHRITIS OF KNEE, LEFT: Primary | ICD-10-CM

## 2023-03-27 DIAGNOSIS — M21.162 ACQUIRED VARUS DEFORMITY KNEE, LEFT: ICD-10-CM

## 2023-03-27 DIAGNOSIS — M17.11 ARTHRITIS OF KNEE, RIGHT: ICD-10-CM

## 2023-03-27 DIAGNOSIS — M21.161 ACQUIRED VARUS DEFORMITY KNEE, RIGHT: ICD-10-CM

## 2023-03-27 PROCEDURE — 99214 PR OFFICE/OUTPT VISIT, EST, LEVL IV, 30-39 MIN: ICD-10-PCS | Mod: HCNC,S$GLB,, | Performed by: ORTHOPAEDIC SURGERY

## 2023-03-27 PROCEDURE — 1160F PR REVIEW ALL MEDS BY PRESCRIBER/CLIN PHARMACIST DOCUMENTED: ICD-10-PCS | Mod: HCNC,CPTII,S$GLB, | Performed by: ORTHOPAEDIC SURGERY

## 2023-03-27 PROCEDURE — 4010F ACE/ARB THERAPY RXD/TAKEN: CPT | Mod: HCNC,CPTII,S$GLB, | Performed by: ORTHOPAEDIC SURGERY

## 2023-03-27 PROCEDURE — 3008F BODY MASS INDEX DOCD: CPT | Mod: HCNC,CPTII,S$GLB, | Performed by: ORTHOPAEDIC SURGERY

## 2023-03-27 PROCEDURE — 99214 OFFICE O/P EST MOD 30 MIN: CPT | Mod: HCNC,S$GLB,, | Performed by: ORTHOPAEDIC SURGERY

## 2023-03-27 PROCEDURE — 3008F PR BODY MASS INDEX (BMI) DOCUMENTED: ICD-10-PCS | Mod: HCNC,CPTII,S$GLB, | Performed by: ORTHOPAEDIC SURGERY

## 2023-03-27 PROCEDURE — 1160F RVW MEDS BY RX/DR IN RCRD: CPT | Mod: HCNC,CPTII,S$GLB, | Performed by: ORTHOPAEDIC SURGERY

## 2023-03-27 PROCEDURE — 1159F PR MEDICATION LIST DOCUMENTED IN MEDICAL RECORD: ICD-10-PCS | Mod: HCNC,CPTII,S$GLB, | Performed by: ORTHOPAEDIC SURGERY

## 2023-03-27 PROCEDURE — 1101F PT FALLS ASSESS-DOCD LE1/YR: CPT | Mod: HCNC,CPTII,S$GLB, | Performed by: ORTHOPAEDIC SURGERY

## 2023-03-27 PROCEDURE — 99999 PR PBB SHADOW E&M-EST. PATIENT-LVL III: ICD-10-PCS | Mod: PBBFAC,HCNC,, | Performed by: ORTHOPAEDIC SURGERY

## 2023-03-27 PROCEDURE — 1101F PR PT FALLS ASSESS DOC 0-1 FALLS W/OUT INJ PAST YR: ICD-10-PCS | Mod: HCNC,CPTII,S$GLB, | Performed by: ORTHOPAEDIC SURGERY

## 2023-03-27 PROCEDURE — 4010F PR ACE/ARB THEARPY RXD/TAKEN: ICD-10-PCS | Mod: HCNC,CPTII,S$GLB, | Performed by: ORTHOPAEDIC SURGERY

## 2023-03-27 PROCEDURE — 3288F FALL RISK ASSESSMENT DOCD: CPT | Mod: HCNC,CPTII,S$GLB, | Performed by: ORTHOPAEDIC SURGERY

## 2023-03-27 PROCEDURE — 1125F PR PAIN SEVERITY QUANTIFIED, PAIN PRESENT: ICD-10-PCS | Mod: HCNC,CPTII,S$GLB, | Performed by: ORTHOPAEDIC SURGERY

## 2023-03-27 PROCEDURE — 3288F PR FALLS RISK ASSESSMENT DOCUMENTED: ICD-10-PCS | Mod: HCNC,CPTII,S$GLB, | Performed by: ORTHOPAEDIC SURGERY

## 2023-03-27 PROCEDURE — 99999 PR PBB SHADOW E&M-EST. PATIENT-LVL III: CPT | Mod: PBBFAC,HCNC,, | Performed by: ORTHOPAEDIC SURGERY

## 2023-03-27 PROCEDURE — 1159F MED LIST DOCD IN RCRD: CPT | Mod: HCNC,CPTII,S$GLB, | Performed by: ORTHOPAEDIC SURGERY

## 2023-03-27 PROCEDURE — 1125F AMNT PAIN NOTED PAIN PRSNT: CPT | Mod: HCNC,CPTII,S$GLB, | Performed by: ORTHOPAEDIC SURGERY

## 2023-03-27 RX ORDER — GABAPENTIN 300 MG/1
300 CAPSULE ORAL NIGHTLY
Qty: 30 CAPSULE | Refills: 11 | Status: SHIPPED | OUTPATIENT
Start: 2023-03-27 | End: 2023-08-15 | Stop reason: SDUPTHER

## 2023-03-27 NOTE — PROGRESS NOTES
Subjective:     Patient ID: Mitchell Mcmillan is a 67 y.o. male.    Chief Complaint: Pain of the Left Knee and Pain of the Right Knee    HPI:  03/27/2023   Bilateral knee pain left worse than the right.  Patient states he received numerous injections over couple years now from steroid injections to gel injections.  Received Depo-Medrol on 07/28/2021 in both of his knees followed by Monovisc on 09/13/2021 and most recently Kenalog in both of his knees 01/13/2023.  He said they do help but not lasting long enough.  His pain is 9/10.  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  He put some lotion on his knee and then he puts the cup her neoprene sleeves.  He does use topicals get better.  Patient stated in 2006 fracture dislocated the left hip requiring ORIF of posterior acetabulum by Dr. Tanya Arredondo.  He developed after that footdrop on the left side which eventually got better with time.  He does take Tylenol.  History of GERD and numerous other medical issues that he avoids NSAIDs   He lives with his wife.  He does take Tylenol.  He does not have any kidney or ulcers  No fever no chills no shortness of breath difficulty with chewing swallowing loss of bowel bladder control blurry vision double vision loss sense smell or taste    Past Medical History:   Diagnosis Date    Arthritis     knees    Asymmetric SNHL (sensorineural hearing loss) 08/17/2017    Chronic left hip pain 2006    Dislocated left hip, job injury    Chronic prostatitis     per pt, last episode 2013    Elevated PSA, less than 10 ng/ml     per pt last elevation 2013    Gastroesophageal reflux disease 8/3/2022    Hyperlipidemia 2010    Hypertension 2011    Joint effusion of knee     per pt rt knee, seen by Dr. Duke 2013    Midline low back pain without sciatica 6/13/2016    Obesity     Special screening for malignant neoplasms, colon 10/5/2016    Tobacco dependence     Trouble in sleeping      Past  Surgical History:   Procedure Laterality Date    arthrocentesis Right     Per pt , knee by Dr. BONNIE Duke    COLONOSCOPY N/A 10/5/2016    Normal exam repeat 10 yrs. Procedure: COLONOSCOPY;  Surgeon: Alexander Goetz MD;  Location: [facility];  Service: Endoscopy;  Laterality: N/A;    HIP ARTHROPLASTY Left 2006    Per pt, dislocated left hip/ hardware placed  by Dr. Lisa    left orchiectomy      PROSTATE BIOPSY      procedure performed at [facility] due to elevated PSA - benign     Family History   Problem Relation Age of Onset    Depression Mother     Early death Mother     Hypertension Mother     Heart disease Mother 53         from MI    Arthritis Father     Stroke Father     Diabetes Sister     No Known Problems Brother     Hypertension Sister     Hypertension Sister     No Known Problems Brother     No Known Problems Brother     No Known Problems Daughter     Cancer Neg Hx     COPD Neg Hx     Kidney disease Neg Hx      Social History     Socioeconomic History    Marital status:      Spouse name: Nida    Number of children: 0   Occupational History    Occupation: Disabled since      Comment: Dislocated left hip   Tobacco Use    Smoking status: Former     Packs/day: 0.10     Years: 46.00     Pack years: 4.60     Types: Cigarettes     Start date: 6/3/2022     Quit date: 10/19/2022     Years since quittin.4    Smokeless tobacco: Never   Substance and Sexual Activity    Alcohol use: Not Currently     Alcohol/week: 2.0 standard drinks     Types: 2 Glasses of wine per week    Drug use: No    Sexual activity: Yes     Partners: Female     Birth control/protection: None   Social History Narrative    Disabled since .  Job injury.  Dislocated left hip.  Has 4 brothers and 4 sisters. Wears seatbelt.     Social Determinants of Health     Financial Resource Strain: Low Risk     Difficulty of Paying Living Expenses: Not hard at all   Food Insecurity: No Food Insecurity    Worried  About Running Out of Food in the Last Year: Never true    Ran Out of Food in the Last Year: Never true   Transportation Needs: No Transportation Needs    Lack of Transportation (Medical): No    Lack of Transportation (Non-Medical): No   Physical Activity: Insufficiently Active    Days of Exercise per Week: 2 days    Minutes of Exercise per Session: 20 min   Stress: No Stress Concern Present    Feeling of Stress : Only a little   Social Connections: Socially Integrated    Frequency of Communication with Friends and Family: Once a week    Frequency of Social Gatherings with Friends and Family: Three times a week    Attends Evangelical Services: More than 4 times per year    Active Member of Clubs or Organizations: Yes    Attends Club or Organization Meetings: More than 4 times per year    Marital Status:    Housing Stability: Low Risk     Unable to Pay for Housing in the Last Year: No    Number of Places Lived in the Last Year: 1    Unstable Housing in the Last Year: No     Medication List with Changes/Refills   New Medications    GABAPENTIN (NEURONTIN) 300 MG CAPSULE    Take 1 capsule (300 mg total) by mouth every evening.   Current Medications    ATORVASTATIN (LIPITOR) 40 MG TABLET    TAKE 1 TABLET EVERY DAY    DICLOFENAC SODIUM (VOLTAREN) 1 % GEL    Apply 2 g topically 3 (three) times daily as needed.    FINASTERIDE (PROSCAR) 5 MG TABLET    TAKE 1 TABLET EVERY DAY    LISINOPRIL-HYDROCHLOROTHIAZIDE (PRINZIDE,ZESTORETIC) 20-25 MG TAB    TAKE 1 TABLET EVERY DAY    NICOTINE POLACRILEX 2 MG LOZG    Take 1 lozenge (2 mg total) by mouth as needed (Use 5-6 lozenges per day in the place of cigarettes).    OMEGA-3 FATTY ACIDS/FISH OIL (FISH OIL-OMEGA-3 FATTY ACIDS) 300-1,000 MG CAPSULE    Take 1 capsule by mouth once daily.    PANTOPRAZOLE (PROTONIX) 40 MG TABLET    Take 1 tablet (40 mg total) by mouth once daily.    SILDENAFIL (VIAGRA) 100 MG TABLET    Take 1 tablet (100 mg total) by mouth daily as needed for Erectile  Dysfunction.    TAMSULOSIN (FLOMAX) 0.4 MG CAP    TAKE 1 CAPSULE EVERY DAY     Review of patient's allergies indicates:  No Known Allergies  Review of Systems   Constitutional: Negative for decreased appetite.   HENT:  Negative for tinnitus.    Eyes:  Negative for double vision.   Cardiovascular:  Negative for chest pain.   Respiratory:  Negative for wheezing.    Hematologic/Lymphatic: Negative for bleeding problem.   Skin:  Negative for dry skin.   Musculoskeletal:  Positive for arthritis, joint pain, joint swelling and stiffness. Negative for back pain, gout, muscle weakness and neck pain.   Gastrointestinal:  Negative for abdominal pain.   Genitourinary:  Negative for bladder incontinence.   Neurological:  Negative for numbness, paresthesias and sensory change.   Psychiatric/Behavioral:  Negative for altered mental status.      Objective:   Body mass index is 31.67 kg/m².  There were no vitals filed for this visit.       General    Constitutional: He is oriented to person, place, and time. He appears well-developed.   HENT:   Head: Atraumatic.   Eyes: EOM are normal.   Pulmonary/Chest: Effort normal.   Neurological: He is alert and oriented to person, place, and time.   Psychiatric: Judgment normal.         Ambulating without assistive devices with slight limp  Pelvis is level  Bilateral hips passive motion without pain in the groin.  Hip flexors, abductors, adductors, quads, hamstrings, ankle extensors and flexors are all 5/5   Right knee with moderately severe swelling.  Range of motion 0-105.  Severe pain medially and crepitus to compression on the patella as well as medially.  There is no defect in the patella or quadriceps tendon.  Slightly loose medial collateral ligament but stable.  Negative anterior drawer.  Left knee with severe swelling.  Range of motion 5-105.  Medial joint severe pain and crepitus to compression on the patella.  There is no defect in the patella or quadriceps tendon.  There is stable  collaterals and cruciates.  No anterior draw.    Calves are soft nontender  There is some pitting edema around the distal 3rd of tibia  Ankle motion intact  Skin slight brawny induration around the ankle    Relevant imaging results reviewed and interpreted by me, discussed with the patient and / or family today     X-ray 01/13/2023 showing complete loss of medial joint space bilaterally with marginal large osteophytic changes tricompartmental with varus deformity consistent with bilateral knee severe arthritis  Assessment:     Encounter Diagnoses   Name Primary?    Arthritis of knee, right     Acquired varus deformity knee, right     Arthritis of knee, left Yes    Acquired varus deformity knee, left         Plan:   Arthritis of knee, left  -     gabapentin (NEURONTIN) 300 MG capsule; Take 1 capsule (300 mg total) by mouth every evening.  Dispense: 30 capsule; Refill: 11    Arthritis of knee, right  -     gabapentin (NEURONTIN) 300 MG capsule; Take 1 capsule (300 mg total) by mouth every evening.  Dispense: 30 capsule; Refill: 11    Acquired varus deformity knee, right    Acquired varus deformity knee, left         Patient Instructions   Your x-ray shows today that you completely lost the joint space on the inside of her knee and you have large bone spurs   Your exam show that you can not bend the knee too much and you have large amount of bone spurs and swelling in the knee   You tried multiple injections in the knee last time you had it in January they help but not long enough   You do take Tylenol as needed  You try to stay active by cutting grass but you can not last more than 8-11 o'clock then the knees will swell up on you and hurt   You come to a point that you want have you knees replaced     Total knee replacement I showed her the x-ray showing how it looks like.  It is considered by the government as outpatient surgery that means you will have you surgery go home the same day.  That is why you need your  wife and family around you at the beginning to help you out.  Surgery itself is roughly 2 hours and you will be asleep for it or you can get a spinal anesthetic.  It will take roughly 3 months to heal from knee replacement.  Recent study says that you see improvement up to 18 months.  It takes time it is like building a house to get better.  We need to have you see the cardiologist to make sure you heart is okay for the surgery because you recently stop smoking.    Once we do the surgery we get you up and walking in recovery room and then you go home.  The therapist will show up to her house for the next couple weeks.  Also you will have a visiting nurse that comes in and check on you and change the dressing for your leg    Procedure, common risks and benefits,alternatives discussed in details.All questions answered.Patient expressed understanding.Patient instructed to call for any questions that could arise in the future.    Most common Risks:  Infection/2% chance  Leg-length discrepancy    Neuro-vascular injury ( resulting in loss of motor and sensory functions)/almost all total knee replacements are slightly numb on the outside of the knee.  Occasionally get somebody that develop a footdrop that means unable to bring the foot up and usually we put you in a brace on you foot until it recuperate.  It takes roughly 6 months to a year for it to start working again your foot and 80% of people who developed a footdrop get better.  You already had a footdrop before after your hip surgery on the left side but it recuperate  Pain  Blood clot  Fat clot  Loss of motion/we heal with scar tissue that is why you need to work hard with the therapist to get the motion back otherwise you form scar tissue you will not be able to move the knee as you wish.  In might be painful after surgery but you take your medication you do ice it knee and work hard with the therapist so you can avoid getting severe scar tissue and get good  results  Fracture of bone  Failure of procedure to achieve its intended purpose/total knee replacement is 80% successful in decreasing pain and increasing function  Failure of hardware/there is plastic component that will last roughly 15 years  Non-union or mal-union of bone  Malalignment  Death/you need to see the cardiologist/heart doctor to make sure you heart could tolerate the surgery and we do not have surprises during surgery      Patient instructions for joint replacement    Before surgery  1.  Shower with Hibiclens soap/antibacterial for 3 days prior to surgery to decrease risk of infection  2..  Stop all blood thinners/aspirin, Coumadin, warfarin, Plavix, Eliquis, Xarelto etc 7 days prior to surgery  3.  No eating or drinking after midnight the night before surgery.  If you want to drink something you can drink 1 bottle of Gatorade before midnight  4.  Take Tylenol 650 mg the night prior to surgery    Ask physicians for prescription of Celebrex or Mobic if needed    After surgery at home  1.  Take Tylenol 650 mg 3 times a day for 14 days then as needed for mild pain  2.  Take gabapentin 300 mg nightly for 6 weeks/gabapentin helps with nerve pain and will help you after surgery.  You can also start right now taking it and get you body used to it  3.  T4.  Must take aspirin 81 mg twice a day for 6 weeks unless you are on other blood thinners/Plavix, Eliquis, Xarelto, Coumadin etc  5.  Must wear compressive stockings for 6 weeks minimum to decrease the risk of blood clot and swelling  6.  Hydrocodone/Norco or oxycodone/Percocet will be prescribed to take every 6 hr as needed for breakthrough pain  7.  May apply ice on the knee to help with decreasing pain  8.  Keep wound dry for 2 weeks until stitches/staples removed than you will be allowed to shower in 24 hr and get the wound wet.  No soaking of the wound in the tub or swimming for 4 weeks after surgery  9.  No driving for 4 weeks unless specified by  physician  10.  Avoid touching the wound or surrounding area /at least 2 inches on each side of the surgical incision until staples are removed/stitches   11.  May change the surgical dressing if extremely bloody or has drainage on it. May clean the wound with peroxide or Betadine and apply sterile dressing and Ace wrap over it  12.  Leave hospital dressing on for 3 days then may change by applying sterile 4 x 4 and Ace wrap after cleaning with Betadine or peroxide.  May leave this dressing change to home health nurses    Proceed with left total knee replacement.  Long discussion about the pros and cons.  I did tell him he can have the right knee done 3 months after the 1st if everything goes well.  I did tell him it is 80% successful.  He does have good support at home that could help him.  We will start him on some gabapentin to help out.  All questions asked and answered.  We discussed all the preop and postop instructions as well as the risks involved in having surgery.  Patient quit smoking in November last year.  He will be seeing cardiology for clearance      Disclaimer: This note was prepared using a voice recognition system and is likely to have sound alike errors within the text.

## 2023-03-27 NOTE — PATIENT INSTRUCTIONS
Your x-ray shows today that you completely lost the joint space on the inside of her knee and you have large bone spurs   Your exam show that you can not bend the knee too much and you have large amount of bone spurs and swelling in the knee   You tried multiple injections in the knee last time you had it in January they help but not long enough   You do take Tylenol as needed  You try to stay active by cutting grass but you can not last more than 8-11 o'clock then the knees will swell up on you and hurt   You come to a point that you want have you knees replaced     Total knee replacement I showed her the x-ray showing how it looks like.  It is considered by the government as outpatient surgery that means you will have you surgery go home the same day.  That is why you need your wife and family around you at the beginning to help you out.  Surgery itself is roughly 2 hours and you will be asleep for it or you can get a spinal anesthetic.  It will take roughly 3 months to heal from knee replacement.  Recent study says that you see improvement up to 18 months.  It takes time it is like building a house to get better.  We need to have you see the cardiologist to make sure you heart is okay for the surgery because you recently stop smoking.    Once we do the surgery we get you up and walking in recovery room and then you go home.  The therapist will show up to her house for the next couple weeks.  Also you will have a visiting nurse that comes in and check on you and change the dressing for your leg    Procedure, common risks and benefits,alternatives discussed in details.All questions answered.Patient expressed understanding.Patient instructed to call for any questions that could arise in the future.    Most common Risks:  Infection/2% chance  Leg-length discrepancy    Neuro-vascular injury ( resulting in loss of motor and sensory functions)/almost all total knee replacements are slightly numb on the outside of the  knee.  Occasionally get somebody that develop a footdrop that means unable to bring the foot up and usually we put you in a brace on you foot until it recuperate.  It takes roughly 6 months to a year for it to start working again your foot and 80% of people who developed a footdrop get better.  You already had a footdrop before after your hip surgery on the left side but it recuperate  Pain  Blood clot  Fat clot  Loss of motion/we heal with scar tissue that is why you need to work hard with the therapist to get the motion back otherwise you form scar tissue you will not be able to move the knee as you wish.  In might be painful after surgery but you take your medication you do ice it knee and work hard with the therapist so you can avoid getting severe scar tissue and get good results  Fracture of bone  Failure of procedure to achieve its intended purpose/total knee replacement is 80% successful in decreasing pain and increasing function  Failure of hardware/there is plastic component that will last roughly 15 years  Non-union or mal-union of bone  Malalignment  Death/you need to see the cardiologist/heart doctor to make sure you heart could tolerate the surgery and we do not have surprises during surgery      Patient instructions for joint replacement    Before surgery  1.  Shower with Hibiclens soap/antibacterial for 3 days prior to surgery to decrease risk of infection  2..  Stop all blood thinners/aspirin, Coumadin, warfarin, Plavix, Eliquis, Xarelto etc 7 days prior to surgery  3.  No eating or drinking after midnight the night before surgery.  If you want to drink something you can drink 1 bottle of Gatorade before midnight  4.  Take Tylenol 650 mg the night prior to surgery    Ask physicians for prescription of Celebrex or Mobic if needed    After surgery at home  1.  Take Tylenol 650 mg 3 times a day for 14 days then as needed for mild pain  2.  Take gabapentin 300 mg nightly for 6 weeks/gabapentin helps  with nerve pain and will help you after surgery.  You can also start right now taking it and get you body used to it  3.  T4.  Must take aspirin 81 mg twice a day for 6 weeks unless you are on other blood thinners/Plavix, Eliquis, Xarelto, Coumadin etc  5.  Must wear compressive stockings for 6 weeks minimum to decrease the risk of blood clot and swelling  6.  Hydrocodone/Norco or oxycodone/Percocet will be prescribed to take every 6 hr as needed for breakthrough pain  7.  May apply ice on the knee to help with decreasing pain  8.  Keep wound dry for 2 weeks until stitches/staples removed than you will be allowed to shower in 24 hr and get the wound wet.  No soaking of the wound in the tub or swimming for 4 weeks after surgery  9.  No driving for 4 weeks unless specified by physician  10.  Avoid touching the wound or surrounding area /at least 2 inches on each side of the surgical incision until staples are removed/stitches   11.  May change the surgical dressing if extremely bloody or has drainage on it. May clean the wound with peroxide or Betadine and apply sterile dressing and Ace wrap over it  12.  Leave hospital dressing on for 3 days then may change by applying sterile 4 x 4 and Ace wrap after cleaning with Betadine or peroxide.  May leave this dressing change to home health nurses

## 2023-04-18 ENCOUNTER — TELEPHONE (OUTPATIENT)
Dept: SMOKING CESSATION | Facility: CLINIC | Age: 68
End: 2023-04-18
Payer: MEDICARE

## 2023-04-18 ENCOUNTER — CLINICAL SUPPORT (OUTPATIENT)
Dept: SMOKING CESSATION | Facility: CLINIC | Age: 68
End: 2023-04-18

## 2023-04-18 DIAGNOSIS — F17.200 NICOTINE DEPENDENCE: Primary | ICD-10-CM

## 2023-04-18 PROCEDURE — 99407 PR TOBACCO USE CESSATION INTENSIVE >10 MINUTES: ICD-10-PCS | Mod: S$GLB,,,

## 2023-04-18 PROCEDURE — 99999 PR PBB SHADOW E&M-EST. PATIENT-LVL I: ICD-10-PCS | Mod: PBBFAC,,,

## 2023-04-18 PROCEDURE — 99999 PR PBB SHADOW E&M-EST. PATIENT-LVL I: CPT | Mod: PBBFAC,,,

## 2023-04-18 PROCEDURE — 99407 BEHAV CHNG SMOKING > 10 MIN: CPT | Mod: S$GLB,,,

## 2023-04-18 NOTE — TELEPHONE ENCOUNTER
1st attempt, patient called in regards to 6 month f/u for quit 1 with Smoking Cessation.  Voicemail with contact information given.    
no

## 2023-04-19 DIAGNOSIS — I10 ESSENTIAL HYPERTENSION: Primary | ICD-10-CM

## 2023-04-25 ENCOUNTER — OFFICE VISIT (OUTPATIENT)
Dept: CARDIOLOGY | Facility: CLINIC | Age: 68
End: 2023-04-25
Payer: MEDICARE

## 2023-04-25 ENCOUNTER — HOSPITAL ENCOUNTER (OUTPATIENT)
Dept: CARDIOLOGY | Facility: HOSPITAL | Age: 68
Discharge: HOME | End: 2023-04-25
Attending: INTERNAL MEDICINE
Payer: MEDICARE

## 2023-04-25 VITALS
BODY MASS INDEX: 31.68 KG/M2 | HEIGHT: 76 IN | WEIGHT: 260.13 LBS | SYSTOLIC BLOOD PRESSURE: 120 MMHG | OXYGEN SATURATION: 99 % | HEART RATE: 71 BPM | DIASTOLIC BLOOD PRESSURE: 78 MMHG

## 2023-04-25 DIAGNOSIS — M19.90 ARTHRITIS: ICD-10-CM

## 2023-04-25 DIAGNOSIS — Z01.810 PREOP CARDIOVASCULAR EXAM: ICD-10-CM

## 2023-04-25 DIAGNOSIS — E78.5 HYPERLIPIDEMIA, UNSPECIFIED HYPERLIPIDEMIA TYPE: ICD-10-CM

## 2023-04-25 DIAGNOSIS — I10 ESSENTIAL HYPERTENSION: ICD-10-CM

## 2023-04-25 DIAGNOSIS — I10 ESSENTIAL HYPERTENSION: Primary | ICD-10-CM

## 2023-04-25 DIAGNOSIS — E66.9 OBESITY (BMI 30.0-34.9): ICD-10-CM

## 2023-04-25 DIAGNOSIS — Z87.891 HISTORY OF TOBACCO USE: ICD-10-CM

## 2023-04-25 PROCEDURE — 1126F AMNT PAIN NOTED NONE PRSNT: CPT | Mod: HCNC,CPTII,S$GLB, | Performed by: INTERNAL MEDICINE

## 2023-04-25 PROCEDURE — 3288F PR FALLS RISK ASSESSMENT DOCUMENTED: ICD-10-PCS | Mod: HCNC,CPTII,S$GLB, | Performed by: INTERNAL MEDICINE

## 2023-04-25 PROCEDURE — 1101F PR PT FALLS ASSESS DOC 0-1 FALLS W/OUT INJ PAST YR: ICD-10-PCS | Mod: HCNC,CPTII,S$GLB, | Performed by: INTERNAL MEDICINE

## 2023-04-25 PROCEDURE — 99204 PR OFFICE/OUTPT VISIT, NEW, LEVL IV, 45-59 MIN: ICD-10-PCS | Mod: HCNC,S$GLB,, | Performed by: INTERNAL MEDICINE

## 2023-04-25 PROCEDURE — 1159F PR MEDICATION LIST DOCUMENTED IN MEDICAL RECORD: ICD-10-PCS | Mod: HCNC,CPTII,S$GLB, | Performed by: INTERNAL MEDICINE

## 2023-04-25 PROCEDURE — 4010F PR ACE/ARB THEARPY RXD/TAKEN: ICD-10-PCS | Mod: HCNC,CPTII,S$GLB, | Performed by: INTERNAL MEDICINE

## 2023-04-25 PROCEDURE — 99999 PR PBB SHADOW E&M-EST. PATIENT-LVL III: ICD-10-PCS | Mod: PBBFAC,HCNC,, | Performed by: INTERNAL MEDICINE

## 2023-04-25 PROCEDURE — 93010 ELECTROCARDIOGRAM REPORT: CPT | Mod: HCNC,,, | Performed by: INTERNAL MEDICINE

## 2023-04-25 PROCEDURE — 99999 PR PBB SHADOW E&M-EST. PATIENT-LVL III: CPT | Mod: PBBFAC,HCNC,, | Performed by: INTERNAL MEDICINE

## 2023-04-25 PROCEDURE — 3078F PR MOST RECENT DIASTOLIC BLOOD PRESSURE < 80 MM HG: ICD-10-PCS | Mod: HCNC,CPTII,S$GLB, | Performed by: INTERNAL MEDICINE

## 2023-04-25 PROCEDURE — 1126F PR PAIN SEVERITY QUANTIFIED, NO PAIN PRESENT: ICD-10-PCS | Mod: HCNC,CPTII,S$GLB, | Performed by: INTERNAL MEDICINE

## 2023-04-25 PROCEDURE — 3008F PR BODY MASS INDEX (BMI) DOCUMENTED: ICD-10-PCS | Mod: HCNC,CPTII,S$GLB, | Performed by: INTERNAL MEDICINE

## 2023-04-25 PROCEDURE — 3074F SYST BP LT 130 MM HG: CPT | Mod: HCNC,CPTII,S$GLB, | Performed by: INTERNAL MEDICINE

## 2023-04-25 PROCEDURE — 93010 EKG 12-LEAD: ICD-10-PCS | Mod: HCNC,,, | Performed by: INTERNAL MEDICINE

## 2023-04-25 PROCEDURE — 3288F FALL RISK ASSESSMENT DOCD: CPT | Mod: HCNC,CPTII,S$GLB, | Performed by: INTERNAL MEDICINE

## 2023-04-25 PROCEDURE — 3008F BODY MASS INDEX DOCD: CPT | Mod: HCNC,CPTII,S$GLB, | Performed by: INTERNAL MEDICINE

## 2023-04-25 PROCEDURE — 99204 OFFICE O/P NEW MOD 45 MIN: CPT | Mod: HCNC,S$GLB,, | Performed by: INTERNAL MEDICINE

## 2023-04-25 PROCEDURE — 1101F PT FALLS ASSESS-DOCD LE1/YR: CPT | Mod: HCNC,CPTII,S$GLB, | Performed by: INTERNAL MEDICINE

## 2023-04-25 PROCEDURE — 1159F MED LIST DOCD IN RCRD: CPT | Mod: HCNC,CPTII,S$GLB, | Performed by: INTERNAL MEDICINE

## 2023-04-25 PROCEDURE — 3074F PR MOST RECENT SYSTOLIC BLOOD PRESSURE < 130 MM HG: ICD-10-PCS | Mod: HCNC,CPTII,S$GLB, | Performed by: INTERNAL MEDICINE

## 2023-04-25 PROCEDURE — 3078F DIAST BP <80 MM HG: CPT | Mod: HCNC,CPTII,S$GLB, | Performed by: INTERNAL MEDICINE

## 2023-04-25 PROCEDURE — 4010F ACE/ARB THERAPY RXD/TAKEN: CPT | Mod: HCNC,CPTII,S$GLB, | Performed by: INTERNAL MEDICINE

## 2023-04-25 PROCEDURE — 93005 ELECTROCARDIOGRAM TRACING: CPT | Mod: HCNC

## 2023-04-25 NOTE — PROGRESS NOTES
Subjective:   Patient ID:  Mitchell Mcmillan is a 67 y.o. male who presents for evaluation of Establish Care      HPI  68 yo male, ex smoker, htn, hld   Good functional status, used stairs recently at his nephew no chest pain   Works in his yard, not limited except for his knee   Denies cardiac symptoms, see ros   Ekg normal   Missed his statin recently     Past Medical History:   Diagnosis Date    Arthritis     knees    Asymmetric SNHL (sensorineural hearing loss) 2017    Chronic left hip pain     Dislocated left hip, job injury    Chronic prostatitis     per pt, last episode     Elevated PSA, less than 10 ng/ml     per pt last elevation     Gastroesophageal reflux disease 8/3/2022    Hyperlipidemia 2010    Hypertension     Joint effusion of knee     per pt rt knee, seen by Dr. Duke     Midline low back pain without sciatica 2016    Obesity     Special screening for malignant neoplasms, colon 10/5/2016    Tobacco dependence     Trouble in sleeping        Past Surgical History:   Procedure Laterality Date    arthrocentesis Right     Per pt , knee by Dr. BONNIE Duke    COLONOSCOPY N/A 10/5/2016    Normal exam repeat 10 yrs. Procedure: COLONOSCOPY;  Surgeon: Alexander Goetz MD;  Location: [facility];  Service: Endoscopy;  Laterality: N/A;    HIP ARTHROPLASTY Left 2006    Per pt, dislocated left hip/ hardware placed  by Dr. Lisa    left orchiectomy      PROSTATE BIOPSY      procedure performed at [facility] due to elevated PSA - benign       Social History     Tobacco Use    Smoking status: Former     Packs/day: 0.10     Years: 46.00     Pack years: 4.60     Types: Cigarettes     Start date: 6/3/2022     Quit date: 10/19/2022     Years since quittin.5    Smokeless tobacco: Never   Substance Use Topics    Alcohol use: Not Currently     Alcohol/week: 2.0 standard drinks     Types: 2 Glasses of wine per week    Drug use: No       Family History   Problem  Relation Age of Onset    Depression Mother     Early death Mother     Hypertension Mother     Heart disease Mother 53         from MI    Arthritis Father     Stroke Father     Diabetes Sister     No Known Problems Brother     Hypertension Sister     Hypertension Sister     No Known Problems Brother     No Known Problems Brother     No Known Problems Daughter     Cancer Neg Hx     COPD Neg Hx     Kidney disease Neg Hx        Review of Systems   Constitutional: Negative for fever and malaise/fatigue.   HENT:  Negative for sore throat.    Eyes:  Negative for blurred vision.   Cardiovascular:  Negative for chest pain, claudication, cyanosis, dyspnea on exertion, irregular heartbeat, leg swelling, near-syncope, orthopnea, palpitations, paroxysmal nocturnal dyspnea and syncope.   Respiratory:  Negative for cough and hemoptysis.    Hematologic/Lymphatic: Negative for bleeding problem.   Skin:  Negative for rash.   Musculoskeletal:  Positive for arthritis. Negative for falls.   Gastrointestinal:  Negative for abdominal pain.   Genitourinary: Negative.    Neurological: Negative.    Psychiatric/Behavioral:  Negative for altered mental status and substance abuse.      Current Outpatient Medications on File Prior to Visit   Medication Sig    atorvastatin (LIPITOR) 40 MG tablet TAKE 1 TABLET EVERY DAY    diclofenac sodium (VOLTAREN) 1 % Gel Apply 2 g topically 3 (three) times daily as needed.    finasteride (PROSCAR) 5 mg tablet TAKE 1 TABLET EVERY DAY    gabapentin (NEURONTIN) 300 MG capsule Take 1 capsule (300 mg total) by mouth every evening.    lisinopriL-hydrochlorothiazide (PRINZIDE,ZESTORETIC) 20-25 mg Tab TAKE 1 TABLET EVERY DAY    nicotine polacrilex 2 MG Lozg Take 1 lozenge (2 mg total) by mouth as needed (Use 5-6 lozenges per day in the place of cigarettes).    omega-3 fatty acids/fish oil (FISH OIL-OMEGA-3 FATTY ACIDS) 300-1,000 mg capsule Take 1 capsule by mouth once daily.    pantoprazole (PROTONIX) 40 MG  tablet Take 1 tablet (40 mg total) by mouth once daily.    sildenafiL (VIAGRA) 100 MG tablet Take 1 tablet (100 mg total) by mouth daily as needed for Erectile Dysfunction.    tamsulosin (FLOMAX) 0.4 mg Cap TAKE 1 CAPSULE EVERY DAY     No current facility-administered medications on file prior to visit.       Objective:   Objective:  Wt Readings from Last 3 Encounters:   04/25/23 118 kg (260 lb 2.3 oz)   03/27/23 118 kg (260 lb 2.3 oz)   03/15/23 119.2 kg (262 lb 10.9 oz)     Temp Readings from Last 3 Encounters:   03/15/23 97.7 °F (36.5 °C) (Oral)   02/15/23 97.9 °F (36.6 °C)   09/30/22 98.4 °F (36.9 °C) (Temporal)     BP Readings from Last 3 Encounters:   04/25/23 120/78   03/15/23 131/74   02/15/23 132/78     Pulse Readings from Last 3 Encounters:   04/25/23 71   03/15/23 91   02/15/23 78       Physical Exam  Vitals reviewed.   Constitutional:       Appearance: He is well-developed.   HENT:      Head: Normocephalic and atraumatic.   Eyes:      General: No scleral icterus.     Conjunctiva/sclera: Conjunctivae normal.   Cardiovascular:      Rate and Rhythm: Normal rate and regular rhythm.      Pulses: Intact distal pulses.      Heart sounds: Normal heart sounds. No murmur heard.  Pulmonary:      Effort: No respiratory distress.      Breath sounds: No wheezing or rales.   Chest:      Chest wall: No tenderness.   Abdominal:      General: Bowel sounds are normal. There is no distension.      Palpations: Abdomen is soft.      Tenderness: There is no guarding.   Musculoskeletal:         General: Normal range of motion.      Cervical back: Normal range of motion and neck supple.   Skin:     General: Skin is warm.   Neurological:      Mental Status: He is alert and oriented to person, place, and time.       Lab Results   Component Value Date    CHOL 277 (H) 09/23/2022    CHOL 197 08/28/2020    CHOL 216 (H) 10/23/2017     Lab Results   Component Value Date    HDL 58 09/23/2022    HDL 58 08/28/2020    HDL 73 10/23/2017      Lab Results   Component Value Date    LDLCALC 187.0 (H) 09/23/2022    LDLCALC 94.6 08/28/2020    LDLCALC 110.6 10/23/2017     Lab Results   Component Value Date    TRIG 160 (H) 09/23/2022    TRIG 222 (H) 08/28/2020    TRIG 162 (H) 10/23/2017     Lab Results   Component Value Date    CHOLHDL 20.9 09/23/2022    CHOLHDL 29.4 08/28/2020    CHOLHDL 33.8 10/23/2017       Chemistry        Component Value Date/Time     09/23/2022 0831    K 4.3 09/23/2022 0831     09/23/2022 0831    CO2 26 09/23/2022 0831    BUN 15 09/23/2022 0831    CREATININE 1.1 09/23/2022 0831     09/23/2022 0831        Component Value Date/Time    CALCIUM 9.6 09/23/2022 0831    ALKPHOS 61 09/23/2022 0831    AST 16 09/23/2022 0831    ALT 19 09/23/2022 0831    BILITOT 0.4 09/23/2022 0831    ESTGFRAFRICA >60.0 08/28/2020 1405    EGFRNONAA >60.0 08/28/2020 1405          Lab Results   Component Value Date    TSH 1.583 09/23/2022     No results found for: INR, PROTIME  Lab Results   Component Value Date    WBC 6.88 09/23/2022    HGB 12.6 (L) 09/23/2022    HCT 41.1 09/23/2022    MCV 89 09/23/2022     09/23/2022     BNP  @LABRCNTIP(BNP,BNPTRIAGEBLO)@  CrCl cannot be calculated (Patient's most recent lab result is older than the maximum 7 days allowed.).     Imaging:  ======  No results found for this or any previous visit.    No results found for this or any previous visit.    No results found for this or any previous visit.    No results found for this or any previous visit.    No results found for this or any previous visit.    No valid procedures specified.    Diagnostic Results:  ECG: Reviewed    The 10-year ASCVD risk score (Michelle DK, et al., 2019) is: 17.3%    Values used to calculate the score:      Age: 67 years      Sex: Male      Is Non- : Yes      Diabetic: No      Tobacco smoker: No      Systolic Blood Pressure: 120 mmHg      Is BP treated: Yes      HDL Cholesterol: 58 mg/dL      Total  Cholesterol: 277 mg/dL    Assessment and Plan:   Essential hypertension    Hyperlipidemia, unspecified hyperlipidemia type  Comments:  STATIN COMPLIANCE    Arthritis    Obesity (BMI 30.0-34.9)    History of tobacco use    Preop cardiovascular exam    Reviewed all tests and above medical conditions with patient in detail and formulated treatment plan.  Risk factor modification discussed.   Cardiac low salt diet discussed.  Maintaining healthy weight and weight loss goals were discussed in clinic.  Good functional status, METS>4   No active cardiac issues, no chest pain   Normal cardiac exam   No dynamic changes on ekg   Ok and stable to proceed with non cardiac intermediate risk surgery       Follow up in 12 months

## 2023-04-26 ENCOUNTER — TELEPHONE (OUTPATIENT)
Dept: ORTHOPEDICS | Facility: CLINIC | Age: 68
End: 2023-04-26
Payer: MEDICARE

## 2023-04-27 ENCOUNTER — TELEPHONE (OUTPATIENT)
Dept: CARDIOLOGY | Facility: CLINIC | Age: 68
End: 2023-04-27
Payer: MEDICARE

## 2023-04-27 ENCOUNTER — TELEPHONE (OUTPATIENT)
Dept: ORTHOPEDICS | Facility: CLINIC | Age: 68
End: 2023-04-27
Payer: MEDICARE

## 2023-04-27 NOTE — TELEPHONE ENCOUNTER
----- Message from Ismael Guzman MA sent at 4/27/2023 11:00 AM CDT -----  Regarding: pre-op clearance  Hello,    Pt is cleared for surgery. This is Dr. Smyth's plan on 04/25/2023.    Reviewed all tests and above medical conditions with patient in detail and formulated treatment plan.  Risk factor modification discussed.   Cardiac low salt diet discussed.  Maintaining healthy weight and weight loss goals were discussed in clinic.  Good functional status, METS>4   No active cardiac issues, no chest pain   Normal cardiac exam   No dynamic changes on ekg   Ok and stable to proceed with non cardiac intermediate risk surgery

## 2023-04-27 NOTE — TELEPHONE ENCOUNTER
Contacted pt. Advised him he's clear for surgery. Sent staff message to ortho on pre-op clearance.

## 2023-04-28 ENCOUNTER — TELEPHONE (OUTPATIENT)
Dept: ORTHOPEDICS | Facility: CLINIC | Age: 68
End: 2023-04-28
Payer: MEDICARE

## 2023-04-28 DIAGNOSIS — Z01.818 PREOP TESTING: ICD-10-CM

## 2023-04-28 DIAGNOSIS — Z01.818 PREOPERATIVE EXAMINATION: ICD-10-CM

## 2023-04-28 DIAGNOSIS — M17.12 ARTHRITIS OF KNEE, LEFT: Primary | ICD-10-CM

## 2023-04-28 DIAGNOSIS — Z01.818 PRE-OP TESTING: Primary | ICD-10-CM

## 2023-05-08 ENCOUNTER — HOSPITAL ENCOUNTER (OUTPATIENT)
Dept: RADIOLOGY | Facility: HOSPITAL | Age: 68
Discharge: HOME | End: 2023-05-08
Attending: FAMILY MEDICINE
Payer: MEDICARE

## 2023-05-08 ENCOUNTER — OFFICE VISIT (OUTPATIENT)
Dept: FAMILY MEDICINE | Facility: CLINIC | Age: 68
End: 2023-05-08
Payer: MEDICARE

## 2023-05-08 VITALS
HEIGHT: 76 IN | WEIGHT: 261.94 LBS | TEMPERATURE: 98 F | SYSTOLIC BLOOD PRESSURE: 124 MMHG | OXYGEN SATURATION: 96 % | BODY MASS INDEX: 31.9 KG/M2 | DIASTOLIC BLOOD PRESSURE: 68 MMHG | HEART RATE: 86 BPM

## 2023-05-08 DIAGNOSIS — J32.9 SINOBRONCHITIS: Primary | ICD-10-CM

## 2023-05-08 DIAGNOSIS — R05.9 COUGH, UNSPECIFIED TYPE: ICD-10-CM

## 2023-05-08 DIAGNOSIS — J40 SINOBRONCHITIS: Primary | ICD-10-CM

## 2023-05-08 PROCEDURE — 71046 X-RAY EXAM CHEST 2 VIEWS: CPT | Mod: TC,HCNC,FY,PO

## 2023-05-08 PROCEDURE — 4010F ACE/ARB THERAPY RXD/TAKEN: CPT | Mod: HCNC,CPTII,S$GLB, | Performed by: FAMILY MEDICINE

## 2023-05-08 PROCEDURE — 99213 PR OFFICE/OUTPT VISIT, EST, LEVL III, 20-29 MIN: ICD-10-PCS | Mod: HCNC,S$GLB,, | Performed by: FAMILY MEDICINE

## 2023-05-08 PROCEDURE — 1159F MED LIST DOCD IN RCRD: CPT | Mod: HCNC,CPTII,S$GLB, | Performed by: FAMILY MEDICINE

## 2023-05-08 PROCEDURE — 3288F PR FALLS RISK ASSESSMENT DOCUMENTED: ICD-10-PCS | Mod: HCNC,CPTII,S$GLB, | Performed by: FAMILY MEDICINE

## 2023-05-08 PROCEDURE — 71046 XR CHEST PA AND LATERAL: ICD-10-PCS | Mod: 26,HCNC,, | Performed by: STUDENT IN AN ORGANIZED HEALTH CARE EDUCATION/TRAINING PROGRAM

## 2023-05-08 PROCEDURE — 3008F PR BODY MASS INDEX (BMI) DOCUMENTED: ICD-10-PCS | Mod: HCNC,CPTII,S$GLB, | Performed by: FAMILY MEDICINE

## 2023-05-08 PROCEDURE — 1159F PR MEDICATION LIST DOCUMENTED IN MEDICAL RECORD: ICD-10-PCS | Mod: HCNC,CPTII,S$GLB, | Performed by: FAMILY MEDICINE

## 2023-05-08 PROCEDURE — 1101F PT FALLS ASSESS-DOCD LE1/YR: CPT | Mod: HCNC,CPTII,S$GLB, | Performed by: FAMILY MEDICINE

## 2023-05-08 PROCEDURE — 1160F RVW MEDS BY RX/DR IN RCRD: CPT | Mod: HCNC,CPTII,S$GLB, | Performed by: FAMILY MEDICINE

## 2023-05-08 PROCEDURE — 4010F PR ACE/ARB THEARPY RXD/TAKEN: ICD-10-PCS | Mod: HCNC,CPTII,S$GLB, | Performed by: FAMILY MEDICINE

## 2023-05-08 PROCEDURE — 3288F FALL RISK ASSESSMENT DOCD: CPT | Mod: HCNC,CPTII,S$GLB, | Performed by: FAMILY MEDICINE

## 2023-05-08 PROCEDURE — 99999 PR PBB SHADOW E&M-EST. PATIENT-LVL V: ICD-10-PCS | Mod: PBBFAC,HCNC,, | Performed by: FAMILY MEDICINE

## 2023-05-08 PROCEDURE — 99213 OFFICE O/P EST LOW 20 MIN: CPT | Mod: HCNC,S$GLB,, | Performed by: FAMILY MEDICINE

## 2023-05-08 PROCEDURE — 1126F PR PAIN SEVERITY QUANTIFIED, NO PAIN PRESENT: ICD-10-PCS | Mod: HCNC,CPTII,S$GLB, | Performed by: FAMILY MEDICINE

## 2023-05-08 PROCEDURE — 99999 PR PBB SHADOW E&M-EST. PATIENT-LVL V: CPT | Mod: PBBFAC,HCNC,, | Performed by: FAMILY MEDICINE

## 2023-05-08 PROCEDURE — 3008F BODY MASS INDEX DOCD: CPT | Mod: HCNC,CPTII,S$GLB, | Performed by: FAMILY MEDICINE

## 2023-05-08 PROCEDURE — 3078F PR MOST RECENT DIASTOLIC BLOOD PRESSURE < 80 MM HG: ICD-10-PCS | Mod: HCNC,CPTII,S$GLB, | Performed by: FAMILY MEDICINE

## 2023-05-08 PROCEDURE — 3078F DIAST BP <80 MM HG: CPT | Mod: HCNC,CPTII,S$GLB, | Performed by: FAMILY MEDICINE

## 2023-05-08 PROCEDURE — 3074F PR MOST RECENT SYSTOLIC BLOOD PRESSURE < 130 MM HG: ICD-10-PCS | Mod: HCNC,CPTII,S$GLB, | Performed by: FAMILY MEDICINE

## 2023-05-08 PROCEDURE — 1126F AMNT PAIN NOTED NONE PRSNT: CPT | Mod: HCNC,CPTII,S$GLB, | Performed by: FAMILY MEDICINE

## 2023-05-08 PROCEDURE — 1101F PR PT FALLS ASSESS DOC 0-1 FALLS W/OUT INJ PAST YR: ICD-10-PCS | Mod: HCNC,CPTII,S$GLB, | Performed by: FAMILY MEDICINE

## 2023-05-08 PROCEDURE — 3074F SYST BP LT 130 MM HG: CPT | Mod: HCNC,CPTII,S$GLB, | Performed by: FAMILY MEDICINE

## 2023-05-08 PROCEDURE — 1160F PR REVIEW ALL MEDS BY PRESCRIBER/CLIN PHARMACIST DOCUMENTED: ICD-10-PCS | Mod: HCNC,CPTII,S$GLB, | Performed by: FAMILY MEDICINE

## 2023-05-08 PROCEDURE — 71046 X-RAY EXAM CHEST 2 VIEWS: CPT | Mod: 26,HCNC,, | Performed by: STUDENT IN AN ORGANIZED HEALTH CARE EDUCATION/TRAINING PROGRAM

## 2023-05-08 RX ORDER — FLUTICASONE PROPIONATE 50 MCG
2 SPRAY, SUSPENSION (ML) NASAL DAILY
Qty: 16 G | Refills: 1 | Status: SHIPPED | OUTPATIENT
Start: 2023-05-08

## 2023-05-08 RX ORDER — DOXYCYCLINE HYCLATE 100 MG
100 TABLET ORAL EVERY 12 HOURS
Qty: 20 TABLET | Refills: 0 | Status: SHIPPED | OUTPATIENT
Start: 2023-05-08 | End: 2023-05-18

## 2023-05-08 NOTE — PROGRESS NOTES
Mitchell Mcmillan    Chief Complaint   Patient presents with    Cough    Nasal Congestion    Headache       History of Present Illness:   Mr. Mcmillan, currently a nonsmoker, comes in today with complaint of having cold symptoms for 1-1/2 weeks.  He states he has nighttime chest congestion, slightly productive cough, wheezing, shortness of breath, post-tussive chest pain, slight headache, right upper quadrant side pain.  He states he had slight chills yesterday evening and has had slight decreased intake despite having good appetite.  He states he performed home COVID test on yesterday with negative result.  He states he has been taking Coricidin Day and Night, Mucinex-DM and applying Vicks on his chest and nose at night.  He states Mucinex-DM helps the most for his symptoms.  Otherwise, he denies having fever, fatigue, appetite changes; palpitations, leg swelling; nausea, vomiting, diarrhea, constipation; unusual urinary symptoms; polydipsia, polyphagia, polyuria, hot or cold intolerance; back pain; anxiety, depression, homicidal or suicidal thoughts.      He states he has received COVID vaccine series plus booster and received seasonal flu shot this fall.  He states he is scheduled to have total knee replacement on May 31, 2023.            Current Outpatient Medications   Medication Sig    atorvastatin (LIPITOR) 40 MG tablet TAKE 1 TABLET EVERY DAY    diclofenac sodium (VOLTAREN) 1 % Gel Apply 2 g topically 3 (three) times daily as needed.    finasteride (PROSCAR) 5 mg tablet TAKE 1 TABLET EVERY DAY    gabapentin (NEURONTIN) 300 MG capsule Take 1 capsule (300 mg total) by mouth every evening.    lisinopriL-hydrochlorothiazide (PRINZIDE,ZESTORETIC) 20-25 mg Tab TAKE 1 TABLET EVERY DAY    nicotine polacrilex 2 MG Lozg Take 1 lozenge (2 mg total) by mouth as needed (Use 5-6 lozenges per day in the place of cigarettes).    omega-3 fatty acids/fish oil (FISH OIL-OMEGA-3 FATTY ACIDS) 300-1,000 mg capsule Take 1 capsule by  mouth once daily.    pantoprazole (PROTONIX) 40 MG tablet Take 1 tablet (40 mg total) by mouth once daily.    sildenafiL (VIAGRA) 100 MG tablet Take 1 tablet (100 mg total) by mouth daily as needed for Erectile Dysfunction.    tamsulosin (FLOMAX) 0.4 mg Cap TAKE 1 CAPSULE EVERY DAY       Review of Systems   Constitutional:  Positive for chills. Negative for activity change, appetite change, fatigue and fever.   HENT:  Negative for congestion, postnasal drip, rhinorrhea, sinus pressure, sinus pain, sneezing and sore throat.    Respiratory:  Positive for cough, shortness of breath and wheezing.    Cardiovascular:  Positive for chest pain. Negative for palpitations and leg swelling.   Gastrointestinal:  Positive for abdominal pain. Negative for constipation, diarrhea, nausea and vomiting.   Endocrine: Negative for cold intolerance, heat intolerance, polydipsia, polyphagia and polyuria.   Genitourinary:  Negative for difficulty urinating.   Musculoskeletal:  Negative for back pain.   Neurological:  Positive for headaches. Negative for numbness.   Psychiatric/Behavioral:  Negative for dysphoric mood and suicidal ideas. The patient is not nervous/anxious.         Negative for homicidal ideas.     Objective:  Physical Exam  Vitals reviewed.   Constitutional:       General: He is not in acute distress.     Appearance: Normal appearance. He is well-developed. He is obese. He is not ill-appearing, toxic-appearing or diaphoretic.      Comments: Pleasant.   HENT:      Head: Normocephalic and atraumatic.      Comments: Non tender sinuses.     Right Ear: Tympanic membrane, ear canal and external ear normal. There is no impacted cerumen.      Left Ear: Tympanic membrane, ear canal and external ear normal. There is no impacted cerumen.      Nose: Congestion present. No rhinorrhea.      Comments: (Right > Left).     Mouth/Throat:      Mouth: Mucous membranes are moist.      Pharynx: No oropharyngeal exudate or posterior  oropharyngeal erythema.   Eyes:      General:         Right eye: No discharge.         Left eye: No discharge.      Extraocular Movements: Extraocular movements intact.      Conjunctiva/sclera: Conjunctivae normal.      Pupils: Pupils are equal, round, and reactive to light.      Comments: He wears glasses.   Neck:      Thyroid: No thyromegaly.   Cardiovascular:      Rate and Rhythm: Normal rate and regular rhythm.      Pulses:           Dorsalis pedis pulses are 3+ on the right side and 3+ on the left side.      Heart sounds: Normal heart sounds. No murmur heard.  Pulmonary:      Effort: Pulmonary effort is normal. No respiratory distress.      Breath sounds: Normal breath sounds. No wheezing.   Chest:      Chest wall: No tenderness.   Abdominal:      General: Bowel sounds are normal. There is no distension.      Palpations: Abdomen is soft. There is no mass.      Tenderness: There is no abdominal tenderness. There is no guarding or rebound.   Musculoskeletal:         General: No swelling or tenderness. Normal range of motion.      Cervical back: Normal range of motion and neck supple. No tenderness.      Comments: He is ambulatory without problems.    Feet:      Left foot:      Protective Sensation: 5 sites tested.  5 sites sensed.      Skin integrity: No ulcer or skin breakdown.   Lymphadenopathy:      Cervical: No cervical adenopathy.   Neurological:      General: No focal deficit present.      Mental Status: He is alert and oriented to person, place, and time.   Psychiatric:         Mood and Affect: Mood normal.         Behavior: Behavior normal.         Thought Content: Thought content normal.         Judgment: Judgment normal.       ASSESSMENT:  1. Sinobronchitis    2. Cough, unspecified type        PLAN:  Mitchell was seen today for cough, nasal congestion and headache.    Diagnoses and all orders for this visit:    Sinobronchitis  -     doxycycline (VIBRA-TABS) 100 MG tablet; Take 1 tablet (100 mg total) by  mouth every 12 (twelve) hours. for 10 days  -     fluticasone propionate (FLONASE) 50 mcg/actuation nasal spray; 2 sprays (100 mcg total) by Each Nostril route once daily.    Cough, unspecified type  -     X-Ray Chest PA And Lateral; Future      Patient advised to call for results.  Continue current medications, follow low sodium, low cholesterol, low carb diet, daily walks.  Doxycycline 100 mg twice daily x 10 days; Medication precautions discussed with patient.  Flonase nasal spray as directed; Medication precautions discussed with patient.  Stop Coricidin. Continue Mucinex-DM.  Keep follow up with specialists.  Flu shot this fall.  Follow up if symptoms worsen or fail to improve.

## 2023-05-16 ENCOUNTER — LAB VISIT (OUTPATIENT)
Dept: LAB | Facility: HOSPITAL | Age: 68
End: 2023-05-16
Attending: NURSE PRACTITIONER
Payer: MEDICARE

## 2023-05-16 ENCOUNTER — OFFICE VISIT (OUTPATIENT)
Dept: INTERNAL MEDICINE | Facility: CLINIC | Age: 68
End: 2023-05-16
Payer: MEDICARE

## 2023-05-16 VITALS
DIASTOLIC BLOOD PRESSURE: 80 MMHG | OXYGEN SATURATION: 95 % | TEMPERATURE: 98 F | HEART RATE: 82 BPM | SYSTOLIC BLOOD PRESSURE: 128 MMHG

## 2023-05-16 DIAGNOSIS — G89.29 CHRONIC PAIN OF LEFT KNEE: ICD-10-CM

## 2023-05-16 DIAGNOSIS — I10 ESSENTIAL HYPERTENSION: ICD-10-CM

## 2023-05-16 DIAGNOSIS — Z01.818 PRE-OP TESTING: ICD-10-CM

## 2023-05-16 DIAGNOSIS — M25.562 CHRONIC PAIN OF LEFT KNEE: ICD-10-CM

## 2023-05-16 DIAGNOSIS — E78.5 HYPERLIPIDEMIA, UNSPECIFIED HYPERLIPIDEMIA TYPE: ICD-10-CM

## 2023-05-16 DIAGNOSIS — K21.9 GASTROESOPHAGEAL REFLUX DISEASE, UNSPECIFIED WHETHER ESOPHAGITIS PRESENT: ICD-10-CM

## 2023-05-16 DIAGNOSIS — Z01.818 PREOPERATIVE EXAMINATION: ICD-10-CM

## 2023-05-16 LAB
ANION GAP SERPL CALC-SCNC: 11 MMOL/L (ref 8–16)
BASOPHILS # BLD AUTO: 0.03 K/UL (ref 0–0.2)
BASOPHILS NFR BLD: 0.4 % (ref 0–1.9)
BUN SERPL-MCNC: 14 MG/DL (ref 8–23)
CALCIUM SERPL-MCNC: 9.8 MG/DL (ref 8.7–10.5)
CHLORIDE SERPL-SCNC: 104 MMOL/L (ref 95–110)
CO2 SERPL-SCNC: 25 MMOL/L (ref 23–29)
CREAT SERPL-MCNC: 0.9 MG/DL (ref 0.5–1.4)
DIFFERENTIAL METHOD: ABNORMAL
EOSINOPHIL # BLD AUTO: 0.1 K/UL (ref 0–0.5)
EOSINOPHIL NFR BLD: 0.8 % (ref 0–8)
ERYTHROCYTE [DISTWIDTH] IN BLOOD BY AUTOMATED COUNT: 13.4 % (ref 11.5–14.5)
EST. GFR  (NO RACE VARIABLE): >60 ML/MIN/1.73 M^2
GLUCOSE SERPL-MCNC: 103 MG/DL (ref 70–110)
HCT VFR BLD AUTO: 38.7 % (ref 40–54)
HGB BLD-MCNC: 12.1 G/DL (ref 14–18)
IMM GRANULOCYTES # BLD AUTO: 0.02 K/UL (ref 0–0.04)
IMM GRANULOCYTES NFR BLD AUTO: 0.3 % (ref 0–0.5)
LYMPHOCYTES # BLD AUTO: 3.5 K/UL (ref 1–4.8)
LYMPHOCYTES NFR BLD: 45.7 % (ref 18–48)
MCH RBC QN AUTO: 26.8 PG (ref 27–31)
MCHC RBC AUTO-ENTMCNC: 31.3 G/DL (ref 32–36)
MCV RBC AUTO: 86 FL (ref 82–98)
MONOCYTES # BLD AUTO: 0.6 K/UL (ref 0.3–1)
MONOCYTES NFR BLD: 7.5 % (ref 4–15)
NEUTROPHILS # BLD AUTO: 3.4 K/UL (ref 1.8–7.7)
NEUTROPHILS NFR BLD: 45.3 % (ref 38–73)
NRBC BLD-RTO: 0 /100 WBC
PLATELET # BLD AUTO: 337 K/UL (ref 150–450)
PMV BLD AUTO: 9.9 FL (ref 9.2–12.9)
POTASSIUM SERPL-SCNC: 4.2 MMOL/L (ref 3.5–5.1)
RBC # BLD AUTO: 4.51 M/UL (ref 4.6–6.2)
SODIUM SERPL-SCNC: 140 MMOL/L (ref 136–145)
WBC # BLD AUTO: 7.57 K/UL (ref 3.9–12.7)

## 2023-05-16 PROCEDURE — 85025 COMPLETE CBC W/AUTO DIFF WBC: CPT | Mod: HCNC | Performed by: NURSE PRACTITIONER

## 2023-05-16 PROCEDURE — 1159F MED LIST DOCD IN RCRD: CPT | Mod: HCNC,CPTII,, | Performed by: SPECIALIST

## 2023-05-16 PROCEDURE — 1159F PR MEDICATION LIST DOCUMENTED IN MEDICAL RECORD: ICD-10-PCS | Mod: HCNC,CPTII,, | Performed by: SPECIALIST

## 2023-05-16 PROCEDURE — 3074F SYST BP LT 130 MM HG: CPT | Mod: HCNC,CPTII,, | Performed by: SPECIALIST

## 2023-05-16 PROCEDURE — 1160F PR REVIEW ALL MEDS BY PRESCRIBER/CLIN PHARMACIST DOCUMENTED: ICD-10-PCS | Mod: HCNC,CPTII,, | Performed by: SPECIALIST

## 2023-05-16 PROCEDURE — 99999 PR PBB SHADOW E&M-EST. PATIENT-LVL III: CPT | Mod: PBBFAC,HCNC,,

## 2023-05-16 PROCEDURE — 80048 BASIC METABOLIC PNL TOTAL CA: CPT | Mod: HCNC | Performed by: NURSE PRACTITIONER

## 2023-05-16 PROCEDURE — 3079F DIAST BP 80-89 MM HG: CPT | Mod: HCNC,CPTII,, | Performed by: SPECIALIST

## 2023-05-16 PROCEDURE — 4010F PR ACE/ARB THEARPY RXD/TAKEN: ICD-10-PCS | Mod: HCNC,CPTII,, | Performed by: SPECIALIST

## 2023-05-16 PROCEDURE — 3079F PR MOST RECENT DIASTOLIC BLOOD PRESSURE 80-89 MM HG: ICD-10-PCS | Mod: HCNC,CPTII,, | Performed by: SPECIALIST

## 2023-05-16 PROCEDURE — 1160F RVW MEDS BY RX/DR IN RCRD: CPT | Mod: HCNC,CPTII,, | Performed by: SPECIALIST

## 2023-05-16 PROCEDURE — 4010F ACE/ARB THERAPY RXD/TAKEN: CPT | Mod: HCNC,CPTII,, | Performed by: SPECIALIST

## 2023-05-16 PROCEDURE — 99999 PR PBB SHADOW E&M-EST. PATIENT-LVL III: ICD-10-PCS | Mod: PBBFAC,HCNC,,

## 2023-05-16 PROCEDURE — 36415 COLL VENOUS BLD VENIPUNCTURE: CPT | Mod: HCNC | Performed by: NURSE PRACTITIONER

## 2023-05-16 PROCEDURE — 3074F PR MOST RECENT SYSTOLIC BLOOD PRESSURE < 130 MM HG: ICD-10-PCS | Mod: HCNC,CPTII,, | Performed by: SPECIALIST

## 2023-05-16 NOTE — ASSESSMENT & PLAN NOTE
Known risk factors for perioperative complications: None    Difficulty with intubation is not anticipated.    Cardiac Risk Estimation: Based on the Revised Cardiac Risk index, patient is a Class risk with a % risk of a major cardiac event in a low risk procedure.    1.) Preoperative workup as follows: chest x-ray, ECG, hemoglobin, hematocrit, electrolytes, creatinine, glucose, liver function studies, urinalysis  2.) Change in medication regimen before surgery: discontinue ASA, NDAIDs 7 days before surgery  3.) Prophylaxis for cardiac events with perioperative beta-blockers: not indicated.  4.) Invasive hemodynamic monitoring perioperatively: at the discretion of anesthesiologist.  5.) Deep vein thrombosis prophylaxis postoperatively: intermittent pneumatic compression boots and regimen to be chosen by surgical team.  6.) Surveillance for postoperative MI with ECG immediately postoperatively and on postoperati ve days 1 and 2 AND troponin levels 24 hours postoperatively and on day 4 or hospital discharge (whichever comes first): not indicated.  7.) Current medications which may produce withdrawal symptoms if withheld perioperatively: None  8.) Other measures: None

## 2023-05-16 NOTE — DISCHARGE INSTRUCTIONS
Pre op instructions reviewed with Pt: verbalized understanding.    To confirm, Boot Camp Class is scheduled on 5/23/23 @ 9 am @ [facility] (check in at the main lobby when arrived)    Surgery is scheduled on 5/31/23. We will call you late afternoon the business day prior to surgery with your arrival time.    *Please report to the [facility] Lobby (1st Floor) located off of [facility] (2nd Entrance/Building on the left, in front of the flag pole).  Address: [address] [place] [address] [place]. [id number]    Your Type & Screen appointment is scheduled on 5/30/23 @ [facility] ([facility]). Please DO NOT remove the red arm band applied.      INSTRUCTIONS IMPORTANT!!!  Do Not Eat, Drink, or Smoke after 12 midnight unless instructed otherwise by your Surgeon. OK to brush teeth, no gum, candy or mints!      *Take Only these medications with a small sip of water Morning of Surgery:  Protonix      ____  HOLD all vitamins, herbal supplements, aspirin products & NSAIDS 7 days prior to surgery, as these can thin the blood.  ____  Avoid Alcoholic beverages 3 days prior to surgery, as it can thin the blood.  ____  NO Acrylic/fake nails or nail polish worn day of surgery (specifically hand/arm & foot surgeries).  ____  NO powder, lotions, deodorants, oils or cream on body.  ____  Remove all jewelry & piercings prior to surgery.  ____  Remove Dentures, Hearing Aids & Contact Lens prior to surgery.  ____  Bring photo ID and insurance information to hospital (Leave Valuables at Home).  ____  If going home the same day, arrange for a ride home. You will not be able to drive for 24 hrs if Anesthesia was used.   ____  Females (ages 11-60): may need to give a urine sample the morning of surgery; please see Pre op Nurse prior to using the restroom.  ____  Males: Stop ED medications (Viagra, Cialis) 24 hrs prior to surgery.  ____  Wear clean, loose fitting clothing to allow for dressings/ bandages (elastic waistbands,  sweat pants, pajamas pants, dresses, etc.)            Diabetic Patients: If you take diabetic medication, do NOT take morning of surgery unless instructed by Doctor. Metformin to be stopped 24 hrs prior to surgery time. DO NOT take long-acting insulin the evening before surgery. Blood sugars will be checked in pre-op by Nurse.    Bathing Instructions:    -Shower with anti-bacterial soap 3 days prior to Surgery (Dial, Lever 2000, etc.)   - Use Hibiclens on surgical site the night before & morning of surgery (3-5 minutes, then rinse).   -Do not use Hibiclens on your face or genitals.   -Apply clean clothes after shower.  -Do not shave your face or body 2 days prior to surgery unless instructed otherwise by your Surgeon.    Physical Therapy: You will meet with a Physical Therapist day of surgery. You will need to bring your walker with you to the hospital if you already have one at home. You will be given a walker in Recovery if you do not receive one prior to surgery day. Please wear comfortable shoes, as you will be wearing them to walk post-surgery with the Therapist. Outpatient surgery patients will have a Physical Therapist following up with them at home after surgery (they will call you to set up schedule).    [facility] Visitor/Ride Policy:  Only 2 adults allowed (over the age of 18) to accompany you to the Hospital. You Must have a ride home from a responsible adult that you know and trust. Medical Transport, Uber or Lyft can only be used if patient has a responsible adult to accompany them during ride home.    Discharge Instructions: You will receive Post-op/Discharge instructions by your Discharge Nurse prior to going home. Please call your Surgeon's office with any post-surgery questions/concerns @ [phone].    *If you are running late or have questions the morning of surgery, please call the Surgery Dept @ [phone]  *Insurance/ Financial Questions, please call [phone]    Thank you,  -[facility]  Surgery Pre Admit Dept.  [phone] or [phone]  M-F 7:30 am - 4:30 pm (Closed Major Holidays)

## 2023-05-16 NOTE — ASSESSMENT & PLAN NOTE
Planned for ARTHROPLASTY, KNEE, TOTAL, Left with Dr. Ellis Mora on 5/31/23    --Morning of Procedure: Protonix  --Hold: all other medications, resume all medications post-operatively  --Hold ASA, NSAIDs and all vitamins/supplements 7 days prior to procedure  --UA reviewed: No indication of infection.

## 2023-05-16 NOTE — PRE-PROCEDURE INSTRUCTIONS
To confirm, Boot Camp Class is scheduled on 5/23/23 @ 9 am @ [facility] (check in at the main lobby when arrived)    Surgery is scheduled on 5/31/23. We will call you late afternoon the business day prior to surgery with your arrival time.    *Please report to the [facility] Lobby (1st Floor) located off of [facility] (2nd Entrance/Building on the left, in front of the flag pole).  Address: [address] [place] [address] [place] [address]    Your Type & Screen appointment is scheduled on 5/30/23 @ [facility] ([facility]). Please DO NOT remove the red arm band applied.      INSTRUCTIONS IMPORTANT!!!  Do Not Eat, Drink, or Smoke after 12 midnight unless instructed otherwise by your Surgeon. OK to brush teeth, no gum, candy or mints!      *Take Only these medications with a small sip of water Morning of Surgery:  Protonix      ____  HOLD all vitamins, herbal supplements, aspirin products & NSAIDS 7 days prior to surgery, as these can thin the blood.  ____  Avoid Alcoholic beverages 3 days prior to surgery, as it can thin the blood.  ____  NO Acrylic/fake nails or nail polish worn day of surgery (specifically hand/arm & foot surgeries).  ____  NO powder, lotions, deodorants, oils or cream on body.  ____  Remove all jewelry & piercings prior to surgery.  ____  Remove Dentures, Hearing Aids & Contact Lens prior to surgery.  ____  Bring photo ID and insurance information to hospital (Leave Valuables at Home).  ____  If going home the same day, arrange for a ride home. You will not be able to drive for 24 hrs if Anesthesia was used.   ____  Females (ages 11-60): may need to give a urine sample the morning of surgery; please see Pre op Nurse prior to using the restroom.  ____  Males: Stop ED medications (Viagra, Cialis) 24 hrs prior to surgery.  ____  Wear clean, loose fitting clothing to allow for dressings/ bandages (elastic waistbands, sweat pants, pajamas pants, dresses, etc.)            Diabetic  Patients: If you take diabetic medication, do NOT take morning of surgery unless instructed by Doctor. Metformin to be stopped 24 hrs prior to surgery time. DO NOT take long-acting insulin the evening before surgery. Blood sugars will be checked in pre-op by Nurse.    Bathing Instructions:    -Shower with anti-bacterial soap 3 days prior to Surgery (Dial, Lever 2000, etc.)   - Use Hibiclens on surgical site the night before & morning of surgery (3-5 minutes, then rinse).   -Do not use Hibiclens on your face or genitals.   -Apply clean clothes after shower.  -Do not shave your face or body 2 days prior to surgery unless instructed otherwise by your Surgeon.    Physical Therapy: You will meet with a Physical Therapist day of surgery. You will need to bring your walker with you to the hospital if you already have one at home. You will be given a walker in Recovery if you do not receive one prior to surgery day. Please wear comfortable shoes, as you will be wearing them to walk post-surgery with the Therapist. Outpatient surgery patients will have a Physical Therapist following up with them at home after surgery (they will call you to set up schedule).    [facility]/Ride Policy:  Only 2 adults allowed (over the age of 18) to accompany you to the Hospital. You Must have a ride home from a responsible adult that you know and trust. Medical Transport, Uber or Lyft can only be used if patient has a responsible adult to accompany them during ride home.    Discharge Instructions: You will receive Post-op/Discharge instructions by your Discharge Nurse prior to going home. Please call your Surgeon's office with any post-surgery questions/concerns @ [phone].    *If you are running late or have questions the morning of surgery, please call the Surgery Dept @ [phone]  *Insurance/ Financial Questions, please call [phone]    Thank you,  -[facility] Pre Admit Dept.  [phone] or [phone]  M-F 7:30  am - 4:30 pm (Closed Major Holidays)

## 2023-05-16 NOTE — PROGRESS NOTES
Preoperative History and Physical                                                              Hospital Medicine                                                                      Chief Complaint: Preoperative evaluation     History of Present Illness:      Mitchell Mcmillan is a 67 y.o. male who presents to the office today for a preoperative consultation at the request of Dr. Ellis Mora who plans on performing ARTHROPLASTY, KNEE, TOTAL, LEFT on May 31.     Functional Status:      The patient is able to climb a flight of stairs. The patient is able to ambulate without difficulty. The patient's functional status is affected by the surgical problem. The patient's functional status is not affected by shortness of breath, chest pain, dyspnea on exertion and fatigue.    MET score greater than 4    Past Medical History:      Past Medical History:   Diagnosis Date    Arthritis     knees    Asymmetric SNHL (sensorineural hearing loss) 08/17/2017    Chronic left hip pain 2006    Dislocated left hip, job injury    Chronic prostatitis     per pt, last episode 2013    Elevated PSA, less than 10 ng/ml     per pt last elevation 2013    Gastroesophageal reflux disease 8/3/2022    Hyperlipidemia 2010    Hypertension 2011    Joint effusion of knee     per pt rt knee, seen by Dr. Duke 2013    Midline low back pain without sciatica 6/13/2016    Obesity     Special screening for malignant neoplasms, colon 10/5/2016    Tobacco dependence     Trouble in sleeping         Past Surgical History:      Past Surgical History:   Procedure Laterality Date    arthrocentesis Right 2013    Per pt , knee by Dr. BONNIE Duke    COLONOSCOPY N/A 10/05/2016    Normal exam repeat 10 yrs. Procedure: COLONOSCOPY;  Surgeon: Alexander Goetz MD;  Location: [facility];  Service: Endoscopy;  Laterality: N/A;    HIP ARTHROPLASTY Left 04/16/2006    Per pt, dislocated left hip/ hardware placed  by Dr. Lisa     left orchiectomy      PROSTATE BIOPSY  2012    procedure performed at [facility] due to elevated PSA - benign        Social History:      Social History     Socioeconomic History    Marital status:      Spouse name: Nida    Number of children: 0   Occupational History    Occupation: Disabled since      Comment: Dislocated left hip   Tobacco Use    Smoking status: Former     Packs/day: 0.10     Years: 46.00     Pack years: 4.60     Types: Cigarettes     Start date: 6/3/2022     Quit date: 10/19/2022     Years since quittin.5    Smokeless tobacco: Never   Substance and Sexual Activity    Alcohol use: Not Currently     Alcohol/week: 2.0 standard drinks     Types: 2 Glasses of wine per week    Drug use: No    Sexual activity: Yes     Partners: Female     Birth control/protection: None   Social History Narrative    Disabled since .  Job injury.  Dislocated left hip.  Has 4 brothers and 4 sisters. Wears seatbelt.     Social Determinants of Health     Financial Resource Strain: Low Risk     Difficulty of Paying Living Expenses: Not hard at all   Food Insecurity: No Food Insecurity    Worried About Running Out of Food in the Last Year: Never true    Ran Out of Food in the Last Year: Never true   Transportation Needs: No Transportation Needs    Lack of Transportation (Medical): No    Lack of Transportation (Non-Medical): No   Physical Activity: Insufficiently Active    Days of Exercise per Week: 2 days    Minutes of Exercise per Session: 20 min   Stress: No Stress Concern Present    Feeling of Stress : Only a little   Social Connections: Socially Integrated    Frequency of Communication with Friends and Family: Once a week    Frequency of Social Gatherings with Friends and Family: Three times a week    Attends Jew Services: More than 4 times per year    Active Member of Clubs or Organizations: Yes    Attends Club or Organization Meetings: More than 4 times per year    Marital Status:     Housing Stability: Low Risk     Unable to Pay for Housing in the Last Year: No    Number of Places Lived in the Last Year: 1    Unstable Housing in the Last Year: No        Family History:      Family History   Problem Relation Age of Onset    Depression Mother     Early death Mother     Hypertension Mother     Heart disease Mother 53         from MI    Arthritis Father     Stroke Father     Diabetes Sister     No Known Problems Brother     Hypertension Sister     Hypertension Sister     No Known Problems Brother     No Known Problems Brother     No Known Problems Daughter     Cancer Neg Hx     COPD Neg Hx     Kidney disease Neg Hx        Allergies:      Review of patient's allergies indicates:  No Known Allergies    Medications:      Current Outpatient Medications   Medication Sig    atorvastatin (LIPITOR) 40 MG tablet TAKE 1 TABLET EVERY DAY    diclofenac sodium (VOLTAREN) 1 % Gel Apply 2 g topically 3 (three) times daily as needed.    doxycycline (VIBRA-TABS) 100 MG tablet Take 1 tablet (100 mg total) by mouth every 12 (twelve) hours. for 10 days    finasteride (PROSCAR) 5 mg tablet TAKE 1 TABLET EVERY DAY    fluticasone propionate (FLONASE) 50 mcg/actuation nasal spray 2 sprays (100 mcg total) by Each Nostril route once daily.    gabapentin (NEURONTIN) 300 MG capsule Take 1 capsule (300 mg total) by mouth every evening.    lisinopriL-hydrochlorothiazide (PRINZIDE,ZESTORETIC) 20-25 mg Tab TAKE 1 TABLET EVERY DAY    omega-3 fatty acids/fish oil (FISH OIL-OMEGA-3 FATTY ACIDS) 300-1,000 mg capsule Take 1 capsule by mouth once daily.    pantoprazole (PROTONIX) 40 MG tablet Take 1 tablet (40 mg total) by mouth once daily.    sildenafiL (VIAGRA) 100 MG tablet Take 1 tablet (100 mg total) by mouth daily as needed for Erectile Dysfunction.    tamsulosin (FLOMAX) 0.4 mg Cap TAKE 1 CAPSULE EVERY DAY     No current facility-administered medications for this visit.       Vitals:      Vitals:    23 1038    BP: 128/80   Pulse: 82   Temp: 98.1 °F (36.7 °C)       Review of Systems:        Constitutional: Negative for fever, chills, weight loss, malaise/fatigue and diaphoresis.   HENT: Negative for hearing loss, ear pain, nosebleeds, congestion, sore throat, neck pain, tinnitus and ear discharge.    Eyes: Negative for blurred vision, double vision, photophobia, pain, discharge and redness.   Respiratory: Negative for cough, hemoptysis, sputum production, shortness of breath, wheezing and stridor.    Cardiovascular: Negative for chest pain, palpitations, orthopnea, claudication, leg swelling and PND.   Gastrointestinal: Negative for heartburn, nausea, vomiting, abdominal pain, diarrhea, constipation, blood in stool and melena.   Genitourinary: Negative for dysuria, urgency, frequency, hematuria and flank pain.   Musculoskeletal: Negative for myalgias, back pain, joint pain and falls.   Skin: Negative for itching and rash.   Neurological: Negative for dizziness, tingling, tremors, sensory change, speech change, focal weakness, seizures, loss of consciousness, weakness and headaches.   Endo/Heme/Allergies: Negative for environmental allergies and polydipsia. Does not bruise/bleed easily.   Psychiatric/Behavioral: Negative for depression, suicidal ideas, hallucinations, memory loss and substance abuse. The patient is not nervous/anxious and does not have insomnia.    All 14 systems reviewed and negative except as noted above.    Physical Exam:      Constitutional: Appears well-developed, well-nourished and in no acute distress.  Patient is oriented to person, place, and time.   Head: Normocephalic and atraumatic. Mucous membranes moist.  Neck: Neck supple no mass.   Cardiovascular: Normal rate and regular rhythm.  S1 S2 appreciated by ascultation.  Pulmonary/Chest: Effort normal and clear to auscultation bilaterally. No respiratory distress.   Abdomen: Soft. Non-tender and non-distended. Bowel sounds are normal.    Neurological: Patient is alert and oriented to person, place and time. Moves all extremities.  Skin: Warm and dry. No lesions.  Extremities: No clubbing, cyanosis or edema.    Laboratory data:      Reviewed and noted in plan where applicable. Please see chart for full laboratory data.    @CVKMSGCDV28(cpk,cpkmb,troponini,mb)@ @CHODDXMJZ73(poctglucose)@     No results found for: INR, PROTIME    Lab Results   Component Value Date    WBC 6.88 09/23/2022    HGB 12.6 (L) 09/23/2022    HCT 41.1 09/23/2022    MCV 89 09/23/2022     09/23/2022       @VKQZPDTXV98(GLU,NA,K,Cl,CO2,BUN,Creatinine,Calcium,MG)@    Predictors of intubation difficulty:       Morbid obesity? no   Anatomically abnormal facies? no   Prominent incisors? no   Receding mandible? no   Short, thick neck? no   Neck range of motion: normal   Dentition:  Has a partial plate on top, will remove prior to surgery  Based on the Modified Mallampati, patient is a mallampati score: II (hard and soft palate, upper portion of tonsils anduvula visible)    Cardiographics:      ECG: normal sinus rhythm, no blocks or conduction defects, no ischemic changes  Echocardiogram: not indicated    Imaging:      Chest x-ray: normal and reviewed by myself    Assessment and Plan:      Preoperative examination  Known risk factors for perioperative complications: None    Difficulty with intubation is not anticipated.    Cardiac Risk Estimation: Based on the Revised Cardiac Risk index, patient is a Class risk with a % risk of a major cardiac event in a low risk procedure.    1.) Preoperative workup as follows: chest x-ray, ECG, hemoglobin, hematocrit, electrolytes, creatinine, glucose, liver function studies, urinalysis  2.) Change in medication regimen before surgery: discontinue ASA, NDAIDs 7 days before surgery  3.) Prophylaxis for cardiac events with perioperative beta-blockers: not indicated.  4.) Invasive hemodynamic monitoring perioperatively: at the discretion of  anesthesiologist.  5.) Deep vein thrombosis prophylaxis postoperatively: intermittent pneumatic compression boots and regimen to be chosen by surgical team.  6.) Surveillance for postoperative MI with ECG immediately postoperatively and on postoperati ve days 1 and 2 AND troponin levels 24 hours postoperatively and on day 4 or hospital discharge (whichever comes first): not indicated.  7.) Current medications which may produce withdrawal symptoms if withheld perioperatively: None  8.) Other measures: None    Chronic pain of left knee  Planned for ARTHROPLASTY, KNEE, TOTAL, Left with Dr. Ellis Mora on 5/31/23    --Morning of Procedure: Protonix  --Hold: all other medications, resume all medications post-operatively  --Hold ASA, NSAIDs and all vitamins/supplements 7 days prior to procedure    Essential hypertension  Chronic, Well controlled  OP regimen: Prinzide    --Hold morning of surgery    Hyperlipidemia  Chronic, well controlled    --Continue statin    Gastroesophageal reflux disease  Chronic, well controlled    --Continue PPI          Electronically signed by Lilliam Hall NP on 5/16/2023 at 10:26 AM.

## 2023-05-23 ENCOUNTER — TELEPHONE (OUTPATIENT)
Dept: PREADMISSION TESTING | Facility: HOSPITAL | Age: 68
End: 2023-05-23
Payer: MEDICARE

## 2023-05-23 NOTE — TELEPHONE ENCOUNTER
Patient attended Ortho Boot Camp class today at [facility]. Reviewed Joint Replacement education in a face to face class setting. All patient questions were answered, patient verbalized understanding. Patient given surgery instruction handouts at end of class. Physical Therapy in attendance at beginning of class to address PT questions.

## 2023-05-30 ENCOUNTER — LAB VISIT (OUTPATIENT)
Dept: LAB | Facility: HOSPITAL | Age: 68
End: 2023-05-30
Attending: ORTHOPAEDIC SURGERY
Payer: MEDICARE

## 2023-05-30 ENCOUNTER — TELEPHONE (OUTPATIENT)
Dept: PREADMISSION TESTING | Facility: HOSPITAL | Age: 68
End: 2023-05-30
Payer: MEDICARE

## 2023-05-30 DIAGNOSIS — Z01.818 PREOP TESTING: ICD-10-CM

## 2023-05-30 LAB
ABO + RH BLD: NORMAL
BLD GP AB SCN CELLS X3 SERPL QL: NORMAL
SPECIMEN OUTDATE: NORMAL

## 2023-05-30 PROCEDURE — 36415 COLL VENOUS BLD VENIPUNCTURE: CPT | Mod: HCNC | Performed by: ORTHOPAEDIC SURGERY

## 2023-05-30 PROCEDURE — 86900 BLOOD TYPING SEROLOGIC ABO: CPT | Mod: HCNC | Performed by: ORTHOPAEDIC SURGERY

## 2023-05-30 NOTE — TELEPHONE ENCOUNTER
Called and spoke with pt about the following:     Please arrive to [facility] ([facility] at 0530 am on 5/31/23 for your scheduled procedure.  Address: [address] [place] [address] [place]. [address] (2nd Building on left, 1st Floor Lobby)  >>>NO eating or drinking after midnight unless instructed otherwise by your Surgeon<<<    Thank you,  -[facility] Pre Admit Testing Dept.  Mon-Fri 8 am - 4 pm [phone]

## 2023-05-31 ENCOUNTER — ANESTHESIA (OUTPATIENT)
Dept: SURGERY | Facility: HOSPITAL | Age: 68
End: 2023-05-31
Payer: MEDICARE

## 2023-05-31 ENCOUNTER — ANESTHESIA EVENT (OUTPATIENT)
Dept: SURGERY | Facility: HOSPITAL | Age: 68
End: 2023-05-31
Payer: MEDICARE

## 2023-05-31 ENCOUNTER — HOSPITAL ENCOUNTER (OUTPATIENT)
Facility: HOSPITAL | Age: 68
Discharge: HOME | End: 2023-05-31
Attending: ORTHOPAEDIC SURGERY | Admitting: ORTHOPAEDIC SURGERY
Payer: MEDICARE

## 2023-05-31 DIAGNOSIS — M17.12 ARTHRITIS OF LEFT KNEE: Primary | ICD-10-CM

## 2023-05-31 LAB
HCT VFR BLD AUTO: 37.1 % (ref 40–54)
HGB BLD-MCNC: 11.8 G/DL (ref 14–18)

## 2023-05-31 PROCEDURE — 63600175 PHARM REV CODE 636 W HCPCS: Mod: HCNC | Performed by: ANESTHESIOLOGY

## 2023-05-31 PROCEDURE — 37000009 HC ANESTHESIA EA ADD 15 MINS: Mod: HCNC | Performed by: ORTHOPAEDIC SURGERY

## 2023-05-31 PROCEDURE — 63600175 PHARM REV CODE 636 W HCPCS: Mod: HCNC | Performed by: ORTHOPAEDIC SURGERY

## 2023-05-31 PROCEDURE — 85018 HEMOGLOBIN: CPT | Mod: HCNC | Performed by: ORTHOPAEDIC SURGERY

## 2023-05-31 PROCEDURE — 97110 THERAPEUTIC EXERCISES: CPT | Mod: HCNC

## 2023-05-31 PROCEDURE — 63600175 PHARM REV CODE 636 W HCPCS: Mod: HCNC | Performed by: NURSE ANESTHETIST, CERTIFIED REGISTERED

## 2023-05-31 PROCEDURE — 27447 PR TOTAL KNEE ARTHROPLASTY: ICD-10-PCS | Mod: AS,HCNC,LT, | Performed by: PHYSICIAN ASSISTANT

## 2023-05-31 PROCEDURE — 25000003 PHARM REV CODE 250: Mod: HCNC | Performed by: ORTHOPAEDIC SURGERY

## 2023-05-31 PROCEDURE — 36000711: Mod: HCNC | Performed by: ORTHOPAEDIC SURGERY

## 2023-05-31 PROCEDURE — 97161 PT EVAL LOW COMPLEX 20 MIN: CPT | Mod: HCNC

## 2023-05-31 PROCEDURE — 37000008 HC ANESTHESIA 1ST 15 MINUTES: Mod: HCNC | Performed by: ORTHOPAEDIC SURGERY

## 2023-05-31 PROCEDURE — 71000033 HC RECOVERY, INTIAL HOUR: Mod: HCNC | Performed by: ORTHOPAEDIC SURGERY

## 2023-05-31 PROCEDURE — 25000003 PHARM REV CODE 250: Mod: HCNC | Performed by: NURSE ANESTHETIST, CERTIFIED REGISTERED

## 2023-05-31 PROCEDURE — 71000015 HC POSTOP RECOV 1ST HR: Mod: HCNC | Performed by: ORTHOPAEDIC SURGERY

## 2023-05-31 PROCEDURE — C1776 JOINT DEVICE (IMPLANTABLE): HCPCS | Mod: HCNC | Performed by: ORTHOPAEDIC SURGERY

## 2023-05-31 PROCEDURE — 27447 TOTAL KNEE ARTHROPLASTY: CPT | Mod: AS,HCNC,LT, | Performed by: PHYSICIAN ASSISTANT

## 2023-05-31 PROCEDURE — 36000710: Mod: HCNC | Performed by: ORTHOPAEDIC SURGERY

## 2023-05-31 PROCEDURE — 27447 PR TOTAL KNEE ARTHROPLASTY: ICD-10-PCS | Mod: HCNC,LT,, | Performed by: ORTHOPAEDIC SURGERY

## 2023-05-31 PROCEDURE — 25000003 PHARM REV CODE 250: Mod: HCNC | Performed by: ANESTHESIOLOGY

## 2023-05-31 PROCEDURE — 85014 HEMATOCRIT: CPT | Mod: HCNC | Performed by: ORTHOPAEDIC SURGERY

## 2023-05-31 PROCEDURE — 27447 TOTAL KNEE ARTHROPLASTY: CPT | Mod: HCNC,LT,, | Performed by: ORTHOPAEDIC SURGERY

## 2023-05-31 PROCEDURE — C1713 ANCHOR/SCREW BN/BN,TIS/BN: HCPCS | Mod: HCNC | Performed by: ORTHOPAEDIC SURGERY

## 2023-05-31 PROCEDURE — 71000039 HC RECOVERY, EACH ADD'L HOUR: Mod: HCNC | Performed by: ORTHOPAEDIC SURGERY

## 2023-05-31 PROCEDURE — 27201423 OPTIME MED/SURG SUP & DEVICES STERILE SUPPLY: Mod: HCNC | Performed by: ORTHOPAEDIC SURGERY

## 2023-05-31 DEVICE — CEMENT BONE W/GENT SIMPLEX HV: Type: IMPLANTABLE DEVICE | Site: KNEE | Status: FUNCTIONAL

## 2023-05-31 DEVICE — XPE PATELLAR, ON SET, 3 PEGS, MEDIUM,Ïˆ 32 MM
Type: IMPLANTABLE DEVICE | Site: KNEE | Status: FUNCTIONAL
Brand: XPE PATELLAR, ON SET, 3 PEGS

## 2023-05-31 DEVICE — U2 FEMORAL COMPONENT, PS, #5, LEFT
Type: IMPLANTABLE DEVICE | Site: KNEE | Status: FUNCTIONAL
Brand: U2 FEMORAL COMPONENT, PS

## 2023-05-31 DEVICE — XPE TIBIAL INSERT, PS, #5, 13MM
Type: IMPLANTABLE DEVICE | Site: KNEE | Status: FUNCTIONAL
Brand: XPE TIBIAL INSERT, PS

## 2023-05-31 DEVICE — U2 TIBIAL BASEPLATE, CMA, #5
Type: IMPLANTABLE DEVICE | Site: KNEE | Status: FUNCTIONAL
Brand: U2 TIBIAL BASEPLATE, CMA

## 2023-05-31 RX ORDER — SODIUM CHLORIDE 9 MG/ML
INJECTION, SOLUTION INTRAVENOUS CONTINUOUS
Status: CANCELLED | OUTPATIENT
Start: 2023-05-31

## 2023-05-31 RX ORDER — OXYCODONE HYDROCHLORIDE 5 MG/1
10 TABLET ORAL
Status: CANCELLED | OUTPATIENT
Start: 2023-05-31

## 2023-05-31 RX ORDER — FENTANYL CITRATE 50 UG/ML
INJECTION, SOLUTION INTRAMUSCULAR; INTRAVENOUS
Status: DISCONTINUED | OUTPATIENT
Start: 2023-05-31 | End: 2023-05-31

## 2023-05-31 RX ORDER — OXYCODONE HYDROCHLORIDE 5 MG/1
5 TABLET ORAL
Status: CANCELLED | OUTPATIENT
Start: 2023-05-31

## 2023-05-31 RX ORDER — ONDANSETRON 8 MG/1
8 TABLET, ORALLY DISINTEGRATING ORAL EVERY 8 HOURS PRN
Status: CANCELLED | OUTPATIENT
Start: 2023-05-31

## 2023-05-31 RX ORDER — LIDOCAINE HYDROCHLORIDE 10 MG/ML
INJECTION, SOLUTION EPIDURAL; INFILTRATION; INTRACAUDAL; PERINEURAL
Status: DISCONTINUED | OUTPATIENT
Start: 2023-05-31 | End: 2023-05-31

## 2023-05-31 RX ORDER — MELOXICAM 15 MG/1
15 TABLET ORAL DAILY
Qty: 30 TABLET | Refills: 0 | Status: SHIPPED | OUTPATIENT
Start: 2023-05-31 | End: 2023-08-31 | Stop reason: ALTCHOICE

## 2023-05-31 RX ORDER — ONDANSETRON 4 MG/1
8 TABLET, ORALLY DISINTEGRATING ORAL EVERY 8 HOURS PRN
Qty: 20 TABLET | Refills: 0 | Status: SHIPPED | OUTPATIENT
Start: 2023-05-31 | End: 2024-02-13

## 2023-05-31 RX ORDER — CEFAZOLIN SODIUM 2 G/50ML
2 SOLUTION INTRAVENOUS
Status: CANCELLED | OUTPATIENT
Start: 2023-05-31 | End: 2023-06-01

## 2023-05-31 RX ORDER — SUCCINYLCHOLINE CHLORIDE 20 MG/ML
INJECTION INTRAMUSCULAR; INTRAVENOUS
Status: DISCONTINUED | OUTPATIENT
Start: 2023-05-31 | End: 2023-05-31

## 2023-05-31 RX ORDER — CELECOXIB 100 MG/1
200 CAPSULE ORAL 2 TIMES DAILY
Status: CANCELLED | OUTPATIENT
Start: 2023-06-01

## 2023-05-31 RX ORDER — SODIUM CHLORIDE 9 MG/ML
INJECTION, SOLUTION INTRAVENOUS CONTINUOUS
Status: DISCONTINUED | OUTPATIENT
Start: 2023-05-31 | End: 2023-05-31 | Stop reason: HOSPADM

## 2023-05-31 RX ORDER — OXYCODONE AND ACETAMINOPHEN 10; 325 MG/1; MG/1
1 TABLET ORAL EVERY 6 HOURS PRN
Qty: 30 TABLET | Refills: 0 | Status: SHIPPED | OUTPATIENT
Start: 2023-05-31 | End: 2023-06-12

## 2023-05-31 RX ORDER — OXYCODONE AND ACETAMINOPHEN 5; 325 MG/1; MG/1
1 TABLET ORAL
Status: DISCONTINUED | OUTPATIENT
Start: 2023-05-31 | End: 2023-05-31 | Stop reason: HOSPADM

## 2023-05-31 RX ORDER — CHLORHEXIDINE GLUCONATE ORAL RINSE 1.2 MG/ML
10 SOLUTION DENTAL
Status: DISCONTINUED | OUTPATIENT
Start: 2023-05-31 | End: 2023-05-31 | Stop reason: HOSPADM

## 2023-05-31 RX ORDER — PROPOFOL 10 MG/ML
VIAL (ML) INTRAVENOUS
Status: DISCONTINUED | OUTPATIENT
Start: 2023-05-31 | End: 2023-05-31

## 2023-05-31 RX ORDER — HYDROMORPHONE HYDROCHLORIDE 2 MG/ML
0.2 INJECTION, SOLUTION INTRAMUSCULAR; INTRAVENOUS; SUBCUTANEOUS EVERY 5 MIN PRN
Status: DISCONTINUED | OUTPATIENT
Start: 2023-05-31 | End: 2023-05-31 | Stop reason: HOSPADM

## 2023-05-31 RX ORDER — KETOROLAC TROMETHAMINE 30 MG/ML
15 INJECTION, SOLUTION INTRAMUSCULAR; INTRAVENOUS EVERY 8 HOURS PRN
Status: DISCONTINUED | OUTPATIENT
Start: 2023-05-31 | End: 2023-05-31 | Stop reason: HOSPADM

## 2023-05-31 RX ORDER — DOCUSATE SODIUM 100 MG/1
100 CAPSULE, LIQUID FILLED ORAL 2 TIMES DAILY
Status: CANCELLED | OUTPATIENT
Start: 2023-05-31

## 2023-05-31 RX ORDER — NEOSTIGMINE METHYLSULFATE 1 MG/ML
INJECTION, SOLUTION INTRAVENOUS
Status: DISCONTINUED | OUTPATIENT
Start: 2023-05-31 | End: 2023-05-31

## 2023-05-31 RX ORDER — ROCURONIUM BROMIDE 10 MG/ML
INJECTION, SOLUTION INTRAVENOUS
Status: DISCONTINUED | OUTPATIENT
Start: 2023-05-31 | End: 2023-05-31

## 2023-05-31 RX ORDER — DEXAMETHASONE SODIUM PHOSPHATE 4 MG/ML
8 INJECTION, SOLUTION INTRA-ARTICULAR; INTRALESIONAL; INTRAMUSCULAR; INTRAVENOUS; SOFT TISSUE
Status: DISCONTINUED | OUTPATIENT
Start: 2023-05-31 | End: 2023-05-31 | Stop reason: HOSPADM

## 2023-05-31 RX ORDER — ONDANSETRON 2 MG/ML
INJECTION INTRAMUSCULAR; INTRAVENOUS
Status: DISCONTINUED | OUTPATIENT
Start: 2023-05-31 | End: 2023-05-31

## 2023-05-31 RX ORDER — ACETAMINOPHEN 325 MG/1
650 TABLET ORAL EVERY 8 HOURS PRN
Status: CANCELLED | OUTPATIENT
Start: 2023-05-31

## 2023-05-31 RX ORDER — ROPIVACAINE/EPI/CLONIDINE/KET 2.46-0.005
SYRINGE (ML) INJECTION
Status: DISCONTINUED | OUTPATIENT
Start: 2023-05-31 | End: 2023-05-31 | Stop reason: HOSPADM

## 2023-05-31 RX ORDER — PHENYLEPHRINE HYDROCHLORIDE 10 MG/ML
INJECTION INTRAVENOUS
Status: DISCONTINUED | OUTPATIENT
Start: 2023-05-31 | End: 2023-05-31

## 2023-05-31 RX ORDER — TRANEXAMIC ACID 10 MG/ML
1000 INJECTION, SOLUTION INTRAVENOUS
Status: COMPLETED | OUTPATIENT
Start: 2023-05-31 | End: 2023-05-31

## 2023-05-31 RX ORDER — GABAPENTIN 300 MG/1
300 CAPSULE ORAL DAILY
Status: DISCONTINUED | OUTPATIENT
Start: 2023-05-31 | End: 2023-05-31 | Stop reason: HOSPADM

## 2023-05-31 RX ORDER — ACETAMINOPHEN 325 MG/1
650 TABLET ORAL EVERY 8 HOURS PRN
Status: DISCONTINUED | OUTPATIENT
Start: 2023-05-31 | End: 2023-05-31 | Stop reason: HOSPADM

## 2023-05-31 RX ORDER — CHLORHEXIDINE GLUCONATE ORAL RINSE 1.2 MG/ML
10 SOLUTION DENTAL 2 TIMES DAILY
Status: CANCELLED | OUTPATIENT
Start: 2023-05-31 | End: 2023-06-05

## 2023-05-31 RX ORDER — MIDAZOLAM HYDROCHLORIDE 1 MG/ML
INJECTION INTRAMUSCULAR; INTRAVENOUS
Status: DISCONTINUED | OUTPATIENT
Start: 2023-05-31 | End: 2023-05-31

## 2023-05-31 RX ORDER — CELECOXIB 100 MG/1
400 CAPSULE ORAL ONCE
Status: CANCELLED | OUTPATIENT
Start: 2023-05-31 | End: 2023-05-31

## 2023-05-31 RX ORDER — GABAPENTIN 300 MG/1
300 CAPSULE ORAL DAILY
Status: DISCONTINUED | OUTPATIENT
Start: 2023-05-31 | End: 2023-05-31

## 2023-05-31 RX ORDER — BISACODYL 10 MG
10 SUPPOSITORY, RECTAL RECTAL DAILY PRN
Status: CANCELLED | OUTPATIENT
Start: 2023-05-31

## 2023-05-31 RX ORDER — MORPHINE SULFATE 4 MG/ML
2 INJECTION, SOLUTION INTRAMUSCULAR; INTRAVENOUS EVERY 4 HOURS PRN
Status: CANCELLED | OUTPATIENT
Start: 2023-05-31

## 2023-05-31 RX ORDER — CEFAZOLIN SODIUM 2 G/50ML
2 SOLUTION INTRAVENOUS
Status: COMPLETED | OUTPATIENT
Start: 2023-05-31 | End: 2023-05-31

## 2023-05-31 RX ORDER — ONDANSETRON 2 MG/ML
4 INJECTION INTRAMUSCULAR; INTRAVENOUS DAILY PRN
Status: DISCONTINUED | OUTPATIENT
Start: 2023-05-31 | End: 2023-05-31 | Stop reason: HOSPADM

## 2023-05-31 RX ORDER — ONDANSETRON 2 MG/ML
4 INJECTION INTRAMUSCULAR; INTRAVENOUS EVERY 12 HOURS PRN
Status: CANCELLED | OUTPATIENT
Start: 2023-05-31

## 2023-05-31 RX ADMIN — ACETAMINOPHEN 650 MG: 325 TABLET ORAL at 06:05

## 2023-05-31 RX ADMIN — ROCURONIUM BROMIDE 5 MG: 10 SOLUTION INTRAVENOUS at 07:05

## 2023-05-31 RX ADMIN — GLYCOPYRROLATE 0.8 MG: 0.2 INJECTION, SOLUTION INTRAMUSCULAR; INTRAVENOUS at 08:05

## 2023-05-31 RX ADMIN — ROCURONIUM BROMIDE 35 MG: 10 SOLUTION INTRAVENOUS at 07:05

## 2023-05-31 RX ADMIN — CEFAZOLIN SODIUM 2 G: 2 SOLUTION INTRAVENOUS at 07:05

## 2023-05-31 RX ADMIN — NEOSTIGMINE METHYLSULFATE 5 MG: 1 INJECTION INTRAVENOUS at 08:05

## 2023-05-31 RX ADMIN — CHLORHEXIDINE GLUCONATE 0.12% ORAL RINSE 10 ML: 1.2 LIQUID ORAL at 06:05

## 2023-05-31 RX ADMIN — SUCCINYLCHOLINE CHLORIDE 140 MG: 20 INJECTION, SOLUTION INTRAMUSCULAR; INTRAVENOUS; PARENTERAL at 07:05

## 2023-05-31 RX ADMIN — DEXAMETHASONE SODIUM PHOSPHATE 8 MG: 4 INJECTION, SOLUTION INTRA-ARTICULAR; INTRALESIONAL; INTRAMUSCULAR; INTRAVENOUS; SOFT TISSUE at 07:05

## 2023-05-31 RX ADMIN — FENTANYL CITRATE 50 MCG: 50 INJECTION, SOLUTION INTRAMUSCULAR; INTRAVENOUS at 08:05

## 2023-05-31 RX ADMIN — ONDANSETRON 4 MG: 2 INJECTION INTRAMUSCULAR; INTRAVENOUS at 08:05

## 2023-05-31 RX ADMIN — PROPOFOL 150 MG: 10 INJECTION, EMULSION INTRAVENOUS at 07:05

## 2023-05-31 RX ADMIN — OXYCODONE HYDROCHLORIDE AND ACETAMINOPHEN 1 TABLET: 5; 325 TABLET ORAL at 10:05

## 2023-05-31 RX ADMIN — PHENYLEPHRINE HYDROCHLORIDE 200 MCG: 10 INJECTION INTRAVENOUS at 08:05

## 2023-05-31 RX ADMIN — SODIUM CHLORIDE, SODIUM LACTATE, POTASSIUM CHLORIDE, AND CALCIUM CHLORIDE: .6; .31; .03; .02 INJECTION, SOLUTION INTRAVENOUS at 06:05

## 2023-05-31 RX ADMIN — FENTANYL CITRATE 100 MCG: 50 INJECTION, SOLUTION INTRAMUSCULAR; INTRAVENOUS at 07:05

## 2023-05-31 RX ADMIN — MIDAZOLAM HYDROCHLORIDE 2 MG: 1 INJECTION, SOLUTION INTRAMUSCULAR; INTRAVENOUS at 06:05

## 2023-05-31 RX ADMIN — GABAPENTIN 300 MG: 300 CAPSULE ORAL at 06:05

## 2023-05-31 RX ADMIN — HYDROMORPHONE HYDROCHLORIDE 0.2 MG: 2 INJECTION INTRAMUSCULAR; INTRAVENOUS; SUBCUTANEOUS at 09:05

## 2023-05-31 RX ADMIN — TRANEXAMIC ACID 1000 MG: 10 INJECTION, SOLUTION INTRAVENOUS at 07:05

## 2023-05-31 RX ADMIN — LIDOCAINE HYDROCHLORIDE 50 MG: 10 SOLUTION INTRAVENOUS at 07:05

## 2023-05-31 RX ADMIN — TRANEXAMIC ACID 1000 MG: 10 INJECTION, SOLUTION INTRAVENOUS at 08:05

## 2023-05-31 NOTE — ANESTHESIA POSTPROCEDURE EVALUATION
Anesthesia Post Evaluation    Patient: Mitchell Mcmillan    Procedure(s) Performed: Procedure(s) (LRB):  ARTHROPLASTY, KNEE, TOTAL (Left)    Final Anesthesia Type: general      Patient location during evaluation: PACU  Patient participation: Yes- Able to Participate  Level of consciousness: awake and alert  Post-procedure vital signs: reviewed and stable  Pain management: adequate  Airway patency: patent  YANETH mitigation strategies: Verification of full reversal of neuromuscular block  PONV status at discharge: No PONV  Anesthetic complications: no      Cardiovascular status: hemodynamically stable  Respiratory status: spontaneous ventilation  Hydration status: euvolemic  Follow-up not needed.          Vitals Value Taken Time   /81 05/31/23 1015   Temp 36.1 °C (97 °F) 05/31/23 0902   Pulse 71 05/31/23 1017   Resp 8 05/31/23 1017   SpO2 98 % 05/31/23 1017   Vitals shown include unvalidated device data.      Event Time   Out of Recovery 10:16:44         Pain/Il Score: Pain Rating Prior to Med Admin: 4 (5/31/2023 10:13 AM)  Li Score: 10 (5/31/2023 11:01 AM)

## 2023-05-31 NOTE — PATIENT INSTRUCTIONS
Patient instructions for joint replacement      After surgery at home  1.  Take Tylenol 650 mg 3 times a day for 14 days then as needed for mild pain  2.  Take gabapentin 300 mg nightly for 6 weeks  3.  Take Celebrex 200 mg or meloxicam 15 mg daily for 6 weeks unless having cardiac issues to take for 2 weeks only  4.  Must take aspirin 81 mg twice a day for 6 weeks unless you are on other blood thinners/Plavix, Eliquis, Xarelto, Coumadin etc  5.  Must wear compressive stockings for 6 weeks minimum to decrease the risk of blood clot and swelling  6.  Hydrocodone/Norco or oxycodone/Percocet will be prescribed to take every 6 hr as needed for breakthrough pain  7.  May apply ice on the knee to help with decreasing pain  8.  Keep wound dry for 2 weeks until stitches/staples removed than you will be allowed to shower in 24 hr and get the wound wet.  No soaking of the wound in the tub or swimming for 4 weeks after surgery  9.  No driving for 4 weeks unless specified by physician  10.  Avoid touching the wound or surrounding area /at least 2 inches on each side of the surgical incision until staples are removed/stitches   11.  May change the surgical dressing if extremely bloody or has drainage on it. May clean the wound with peroxide or Betadine and apply sterile dressing and Ace wrap over it  12.  Leave hospital dressing on for 3 days then may change by applying sterile 4 x 4 and Ace wrap after cleaning with Betadine or peroxide.  May leave this dressing change to home health nurses

## 2023-05-31 NOTE — TRANSFER OF CARE
"Anesthesia Transfer of Care Note    Patient: Mitchell Mcmillan    Procedure(s) Performed: Procedure(s) (LRB):  ARTHROPLASTY, KNEE, TOTAL (Left)    Patient location: PACU    Anesthesia Type: general    Transport from OR: Transported from OR on room air with adequate spontaneous ventilation    Post pain: adequate analgesia    Post assessment: no apparent anesthetic complications and tolerated procedure well    Post vital signs: stable    Level of consciousness: awake    Nausea/Vomiting: no nausea/vomiting    Complications: none    Transfer of care protocol was followed      Last vitals:   Visit Vitals  BP (!) 140/75 (BP Location: Right arm, Patient Position: Sitting)   Pulse 69   Temp 36.5 °C (97.7 °F) (Temporal)   Resp 20   Ht 6' 4" (1.93 m)   Wt 117.2 kg (258 lb 6.1 oz)   SpO2 98%   BMI 31.45 kg/m²     "

## 2023-05-31 NOTE — PT/OT/SLP EVAL
Physical Therapy Evaluation    Patient Name:  Mitchell Mcmillan   MRN:  [id number]    Recommendations:     Discharge Recommendations: home health PT   Discharge Equipment Recommendations: walker, rolling   Barriers to discharge: None    Assessment:     Mitchell Mcmillan is a 67 y.o. male admitted with a medical diagnosis of <principal problem not specified>.  He presents with the following impairments/functional limitations: weakness, impaired endurance, impaired balance, gait instability, decreased lower extremity function, decreased ROM, pain, impaired functional mobility, impaired self care skills, orthopedic precautions .    Rehab Prognosis: Good; patient would benefit from acute skilled PT services to address these deficits and reach maximum level of function.    Recent Surgery: Procedure(s) (LRB):  ARTHROPLASTY, KNEE, TOTAL (Left) Day of Surgery    Plan:     During this hospitalization, patient to be seen 3 x/week to address the identified rehab impairments via gait training, therapeutic activities, therapeutic exercises and progress toward the following goals:    Plan of Care Expires:  06/14/23    Subjective     Chief Complaint: PAIN   Patient/Family Comments/goals: WALK  Pain/Comfort:  Pain Rating 1: 8/10  Location - Side 1: Left  Location 1: knee  Pain Addressed 1: Pre-medicate for activity  Pain Rating Post-Intervention 1: 8/10    Patients cultural, spiritual, Jew conflicts given the current situation:      Living Environment:   PT LIVES AT HOME WITH WIFE IN A ONE STORY HOME WITH NO STEPS TO ENTER HOME   Prior to admission, patients level of function was IND, DRIVES AND IS RETIRED.  Equipment used at home: none.  DME owned (not currently used): none.  Upon discharge, patient will have assistance from WIFE .    Objective:     Communicated with NURSE AND Epic CHART REVIEW  prior to session.  Patient found supine with peripheral IV, wound vac  upon PT entry to room.    General Precautions: Standard,  fall  Orthopedic Precautions:LLE weight bearing as tolerated   Braces: N/A  Respiratory Status: Room air    Exams:  Cognitive Exam:  Patient is oriented to Person, Place, Time, and Situation  RLE ROM: WNL  RLE Strength: WNL  LLE ROM: LIMITED  LLE Strength: NA D/T PAIN    Functional Mobility:  Bed Mobility:     Rolling Left:  stand by assistance  Supine to Sit: stand by assistance  Transfers:     Sit to Stand:  contact guard assistance with rolling walker  Bed to Chair: contact guard assistance with  rolling walker  using  Step Transfer  Gait: PT GT TRAINED X 20' WITH CGA WITH FLEXED POSTURE WITH RW       AM-PAC 6 CLICK MOBILITY  Total Score:18       Treatment & Education:  GT PROGRESSING X 60' WITH RW AND CUES FOR TRUNK EXTENSION, INC SWING OF GT ON L LE AND HEEL STRIKE AS PT NOTED TO HAVE INC LEFT HIP IR AND LACK KNEE EXT. PT RETURNED TO  WC AND T/F WITH CGA. PT EDUCATED ON USE OF ICE TO DEC PAIN AND SWELLING. PT ALSO EDUCATED TO AMBULATE HOURLY DURING THE DAY. PT COMPLETED B LE TE X 10 REPS OF AP, TKE, AND MIP.   PT EDUCATED TO KEEP KNEE IN EXT WHEN IN BED / IE. NO PILLOWS UNDER THE KNEE FOR COMFORT. PT AND WIFE REPORTED UNDERSTANDING.     Patient left up in chair with  WIFE present.    GOALS:   Multidisciplinary Problems       Physical Therapy Goals          Problem: Physical Therapy    Goal Priority Disciplines Outcome Goal Variances Interventions   Physical Therapy Goal     PT, PT/OT      Description: LT23  1. PT WILL GT TRAIN X 500' WITH RW MOD I                       History:     Past Medical History:   Diagnosis Date    Arthritis     knees    Asymmetric SNHL (sensorineural hearing loss) 2017    Chronic left hip pain 2006    Dislocated left hip, job injury    Chronic prostatitis     per pt, last episode 2013    Elevated PSA, less than 10 ng/ml     per pt last elevation 2013    Gastroesophageal reflux disease 8/3/2022    Hyperlipidemia 2010    Hypertension 2011    Joint effusion of knee     per  pt rt knee, seen by Dr. Duke 2013    Midline low back pain without sciatica 6/13/2016    Obesity     Special screening for malignant neoplasms, colon 10/5/2016    Tobacco dependence     Trouble in sleeping        Past Surgical History:   Procedure Laterality Date    arthrocentesis Right 2013    Per pt , knee by Dr. BONNIE Duke    COLONOSCOPY N/A 10/05/2016    Normal exam repeat 10 yrs. Procedure: COLONOSCOPY;  Surgeon: Alexander Goetz MD;  Location: [facility];  Service: Endoscopy;  Laterality: N/A;    HIP ARTHROPLASTY Left 04/16/2006    Per pt, dislocated left hip/ hardware placed  by Dr. Lisa    left orchiectomy      PROSTATE BIOPSY  2012    procedure performed at [facility] due to elevated PSA - benign       Time Tracking:     PT Received On: 05/31/23  PT Start Time: 1045     PT Stop Time: 1109  PT Total Time (min): 24 min     Billable Minutes: Evaluation 14 and Therapeutic Exercise 10      05/31/2023

## 2023-05-31 NOTE — PLAN OF CARE
[facility] - [facility] (Hospital)  Discharge Assessment    Primary Care Provider: Jessica Chinchilla MD     Discharge Assessment (most recent)       BRIEF DISCHARGE ASSESSMENT - 05/31/23 0862          Discharge Planning    Assessment Type Discharge Planning Brief Assessment     Resource/Environmental Concerns none     Support Systems Spouse/significant other     Assistance Needed Independent     Equipment Currently Used at Home none     Current Living Arrangements home     Patient/Family Anticipates Transition to home with help/services;home with family     Patient/Family Anticipated Services at Transition home health care     DME Needed Upon Discharge  walker, rolling;bedside commode     Discharge Plan A Home Health                   Per pt's wife, Aimee, pt has no HME at home.     Sw spoke with [facility] Rep, Shaun, who confirmed this information and will delivery HME to pt's wife today prior to pt's d/c.     [facility] arranged.

## 2023-05-31 NOTE — H&P
Subjective:     Patient ID: Mitchell Mcmillan is a 67 y.o. male.    Chief Complaint: Pain of the Left Knee and Pain of the Right Knee    HPI:  03/27/2023   Bilateral knee pain left worse than the right.  Patient states he received numerous injections over couple years now from steroid injections to gel injections.  Received Depo-Medrol on 07/28/2021 in both of his knees followed by Monovisc on 09/13/2021 and most recently Kenalog in both of his knees 01/13/2023.  He said they do help but not lasting long enough.  His pain is 9/10.  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  He put some lotion on his knee and then he puts the cup her neoprene sleeves.  He does use topicals get better.  Patient stated in 2006 fracture dislocated the left hip requiring ORIF of posterior acetabulum by Dr. Tanya Arredondo.  He developed after that footdrop on the left side which eventually got better with time.  He does take Tylenol.  History of GERD and numerous other medical issues that he avoids NSAIDs   He lives with his wife.  He does take Tylenol.  He does not have any kidney or ulcers  No fever no chills no shortness of breath difficulty with chewing swallowing loss of bowel bladder control blurry vision double vision loss sense smell or taste    Past Medical History:   Diagnosis Date    Arthritis     knees    Asymmetric SNHL (sensorineural hearing loss) 08/17/2017    Chronic left hip pain 2006    Dislocated left hip, job injury    Chronic prostatitis     per pt, last episode 2013    Elevated PSA, less than 10 ng/ml     per pt last elevation 2013    Gastroesophageal reflux disease 8/3/2022    Hyperlipidemia 2010    Hypertension 2011    Joint effusion of knee     per pt rt knee, seen by Dr. Duke 2013    Midline low back pain without sciatica 6/13/2016    Obesity     Special screening for malignant neoplasms, colon 10/5/2016    Tobacco dependence     Trouble in sleeping      Past  Surgical History:   Procedure Laterality Date    arthrocentesis Right     Per pt , knee by Dr. BONNIE Duke    COLONOSCOPY N/A 10/5/2016    Normal exam repeat 10 yrs. Procedure: COLONOSCOPY;  Surgeon: Alexander Goetz MD;  Location: [facility];  Service: Endoscopy;  Laterality: N/A;    HIP ARTHROPLASTY Left 2006    Per pt, dislocated left hip/ hardware placed  by Dr. Lisa    left orchiectomy      PROSTATE BIOPSY      procedure performed at [facility] due to elevated PSA - benign     Family History   Problem Relation Age of Onset    Depression Mother     Early death Mother     Hypertension Mother     Heart disease Mother 53         from MI    Arthritis Father     Stroke Father     Diabetes Sister     No Known Problems Brother     Hypertension Sister     Hypertension Sister     No Known Problems Brother     No Known Problems Brother     No Known Problems Daughter     Cancer Neg Hx     COPD Neg Hx     Kidney disease Neg Hx      Social History     Socioeconomic History    Marital status:      Spouse name: Nida    Number of children: 0   Occupational History    Occupation: Disabled since      Comment: Dislocated left hip   Tobacco Use    Smoking status: Former     Packs/day: 0.10     Years: 46.00     Pack years: 4.60     Types: Cigarettes     Start date: 6/3/2022     Quit date: 10/19/2022     Years since quittin.4    Smokeless tobacco: Never   Substance and Sexual Activity    Alcohol use: Not Currently     Alcohol/week: 2.0 standard drinks     Types: 2 Glasses of wine per week    Drug use: No    Sexual activity: Yes     Partners: Female     Birth control/protection: None   Social History Narrative    Disabled since .  Job injury.  Dislocated left hip.  Has 4 brothers and 4 sisters. Wears seatbelt.     Social Determinants of Health     Financial Resource Strain: Low Risk     Difficulty of Paying Living Expenses: Not hard at all   Food Insecurity: No Food Insecurity    Worried  About Running Out of Food in the Last Year: Never true    Ran Out of Food in the Last Year: Never true   Transportation Needs: No Transportation Needs    Lack of Transportation (Medical): No    Lack of Transportation (Non-Medical): No   Physical Activity: Insufficiently Active    Days of Exercise per Week: 2 days    Minutes of Exercise per Session: 20 min   Stress: No Stress Concern Present    Feeling of Stress : Only a little   Social Connections: Socially Integrated    Frequency of Communication with Friends and Family: Once a week    Frequency of Social Gatherings with Friends and Family: Three times a week    Attends Voodoo Services: More than 4 times per year    Active Member of Clubs or Organizations: Yes    Attends Club or Organization Meetings: More than 4 times per year    Marital Status:    Housing Stability: Low Risk     Unable to Pay for Housing in the Last Year: No    Number of Places Lived in the Last Year: 1    Unstable Housing in the Last Year: No     Medication List with Changes/Refills   New Medications    GABAPENTIN (NEURONTIN) 300 MG CAPSULE    Take 1 capsule (300 mg total) by mouth every evening.   Current Medications    ATORVASTATIN (LIPITOR) 40 MG TABLET    TAKE 1 TABLET EVERY DAY    DICLOFENAC SODIUM (VOLTAREN) 1 % GEL    Apply 2 g topically 3 (three) times daily as needed.    FINASTERIDE (PROSCAR) 5 MG TABLET    TAKE 1 TABLET EVERY DAY    LISINOPRIL-HYDROCHLOROTHIAZIDE (PRINZIDE,ZESTORETIC) 20-25 MG TAB    TAKE 1 TABLET EVERY DAY    NICOTINE POLACRILEX 2 MG LOZG    Take 1 lozenge (2 mg total) by mouth as needed (Use 5-6 lozenges per day in the place of cigarettes).    OMEGA-3 FATTY ACIDS/FISH OIL (FISH OIL-OMEGA-3 FATTY ACIDS) 300-1,000 MG CAPSULE    Take 1 capsule by mouth once daily.    PANTOPRAZOLE (PROTONIX) 40 MG TABLET    Take 1 tablet (40 mg total) by mouth once daily.    SILDENAFIL (VIAGRA) 100 MG TABLET    Take 1 tablet (100 mg total) by mouth daily as needed for Erectile  Dysfunction.    TAMSULOSIN (FLOMAX) 0.4 MG CAP    TAKE 1 CAPSULE EVERY DAY     Review of patient's allergies indicates:  No Known Allergies  Review of Systems   Constitutional: Negative for decreased appetite.   HENT:  Negative for tinnitus.    Eyes:  Negative for double vision.   Cardiovascular:  Negative for chest pain.   Respiratory:  Negative for wheezing.    Hematologic/Lymphatic: Negative for bleeding problem.   Skin:  Negative for dry skin.   Musculoskeletal:  Positive for arthritis, joint pain, joint swelling and stiffness. Negative for back pain, gout, muscle weakness and neck pain.   Gastrointestinal:  Negative for abdominal pain.   Genitourinary:  Negative for bladder incontinence.   Neurological:  Negative for numbness, paresthesias and sensory change.   Psychiatric/Behavioral:  Negative for altered mental status.      Objective:   Body mass index is 31.67 kg/m².  There were no vitals filed for this visit.       General    Constitutional: He is oriented to person, place, and time. He appears well-developed.   HENT:   Head: Atraumatic.   Eyes: EOM are normal.   Pulmonary/Chest: Effort normal.   Neurological: He is alert and oriented to person, place, and time.   Psychiatric: Judgment normal.         Ambulating without assistive devices with slight limp  Pelvis is level  Bilateral hips passive motion without pain in the groin.  Hip flexors, abductors, adductors, quads, hamstrings, ankle extensors and flexors are all 5/5   Right knee with moderately severe swelling.  Range of motion 0-105.  Severe pain medially and crepitus to compression on the patella as well as medially.  There is no defect in the patella or quadriceps tendon.  Slightly loose medial collateral ligament but stable.  Negative anterior drawer.  Left knee with severe swelling.  Range of motion 5-105.  Medial joint severe pain and crepitus to compression on the patella.  There is no defect in the patella or quadriceps tendon.  There is stable  collaterals and cruciates.  No anterior draw.    Calves are soft nontender  There is some pitting edema around the distal 3rd of tibia  Ankle motion intact  Skin slight brawny induration around the ankle    Relevant imaging results reviewed and interpreted by me, discussed with the patient and / or family today     X-ray 01/13/2023 showing complete loss of medial joint space bilaterally with marginal large osteophytic changes tricompartmental with varus deformity consistent with bilateral knee severe arthritis  Assessment:     Encounter Diagnoses   Name Primary?    Arthritis of knee, right     Acquired varus deformity knee, right     Arthritis of knee, left Yes    Acquired varus deformity knee, left         Plan:   Arthritis of knee, left  -     gabapentin (NEURONTIN) 300 MG capsule; Take 1 capsule (300 mg total) by mouth every evening.  Dispense: 30 capsule; Refill: 11    Arthritis of knee, right  -     gabapentin (NEURONTIN) 300 MG capsule; Take 1 capsule (300 mg total) by mouth every evening.  Dispense: 30 capsule; Refill: 11    Acquired varus deformity knee, right    Acquired varus deformity knee, left         Patient Instructions   Your x-ray shows today that you completely lost the joint space on the inside of her knee and you have large bone spurs   Your exam show that you can not bend the knee too much and you have large amount of bone spurs and swelling in the knee   You tried multiple injections in the knee last time you had it in January they help but not long enough   You do take Tylenol as needed  You try to stay active by cutting grass but you can not last more than 8-11 o'clock then the knees will swell up on you and hurt   You come to a point that you want have you knees replaced     Total knee replacement I showed her the x-ray showing how it looks like.  It is considered by the government as outpatient surgery that means you will have you surgery go home the same day.  That is why you need your  wife and family around you at the beginning to help you out.  Surgery itself is roughly 2 hours and you will be asleep for it or you can get a spinal anesthetic.  It will take roughly 3 months to heal from knee replacement.  Recent study says that you see improvement up to 18 months.  It takes time it is like building a house to get better.  We need to have you see the cardiologist to make sure you heart is okay for the surgery because you recently stop smoking.    Once we do the surgery we get you up and walking in recovery room and then you go home.  The therapist will show up to her house for the next couple weeks.  Also you will have a visiting nurse that comes in and check on you and change the dressing for your leg    Procedure, common risks and benefits,alternatives discussed in details.All questions answered.Patient expressed understanding.Patient instructed to call for any questions that could arise in the future.    Most common Risks:  Infection/2% chance  Leg-length discrepancy    Neuro-vascular injury ( resulting in loss of motor and sensory functions)/almost all total knee replacements are slightly numb on the outside of the knee.  Occasionally get somebody that develop a footdrop that means unable to bring the foot up and usually we put you in a brace on you foot until it recuperate.  It takes roughly 6 months to a year for it to start working again your foot and 80% of people who developed a footdrop get better.  You already had a footdrop before after your hip surgery on the left side but it recuperate  Pain  Blood clot  Fat clot  Loss of motion/we heal with scar tissue that is why you need to work hard with the therapist to get the motion back otherwise you form scar tissue you will not be able to move the knee as you wish.  In might be painful after surgery but you take your medication you do ice it knee and work hard with the therapist so you can avoid getting severe scar tissue and get good  results  Fracture of bone  Failure of procedure to achieve its intended purpose/total knee replacement is 80% successful in decreasing pain and increasing function  Failure of hardware/there is plastic component that will last roughly 15 years  Non-union or mal-union of bone  Malalignment  Death/you need to see the cardiologist/heart doctor to make sure you heart could tolerate the surgery and we do not have surprises during surgery      Patient instructions for joint replacement    Before surgery  1.  Shower with Hibiclens soap/antibacterial for 3 days prior to surgery to decrease risk of infection  2..  Stop all blood thinners/aspirin, Coumadin, warfarin, Plavix, Eliquis, Xarelto etc 7 days prior to surgery  3.  No eating or drinking after midnight the night before surgery.  If you want to drink something you can drink 1 bottle of Gatorade before midnight  4.  Take Tylenol 650 mg the night prior to surgery    Ask physicians for prescription of Celebrex or Mobic if needed    After surgery at home  1.  Take Tylenol 650 mg 3 times a day for 14 days then as needed for mild pain  2.  Take gabapentin 300 mg nightly for 6 weeks/gabapentin helps with nerve pain and will help you after surgery.  You can also start right now taking it and get you body used to it  3.  T4.  Must take aspirin 81 mg twice a day for 6 weeks unless you are on other blood thinners/Plavix, Eliquis, Xarelto, Coumadin etc  5.  Must wear compressive stockings for 6 weeks minimum to decrease the risk of blood clot and swelling  6.  Hydrocodone/Norco or oxycodone/Percocet will be prescribed to take every 6 hr as needed for breakthrough pain  7.  May apply ice on the knee to help with decreasing pain  8.  Keep wound dry for 2 weeks until stitches/staples removed than you will be allowed to shower in 24 hr and get the wound wet.  No soaking of the wound in the tub or swimming for 4 weeks after surgery  9.  No driving for 4 weeks unless specified by  physician  10.  Avoid touching the wound or surrounding area /at least 2 inches on each side of the surgical incision until staples are removed/stitches   11.  May change the surgical dressing if extremely bloody or has drainage on it. May clean the wound with peroxide or Betadine and apply sterile dressing and Ace wrap over it  12.  Leave hospital dressing on for 3 days then may change by applying sterile 4 x 4 and Ace wrap after cleaning with Betadine or peroxide.  May leave this dressing change to home health nurses    Proceed with left total knee replacement.  Long discussion about the pros and cons.  I did tell him he can have the right knee done 3 months after the 1st if everything goes well.  I did tell him it is 80% successful.  He does have good support at home that could help him.  We will start him on some gabapentin to help out.  All questions asked and answered.  We discussed all the preop and postop instructions as well as the risks involved in having surgery.  Patient quit smoking in November last year.  He will be seeing cardiology for clearance      Disclaimer: This note was prepared using a voice recognition system and is likely to have sound alike errors within the text.

## 2023-05-31 NOTE — ANESTHESIA PREPROCEDURE EVALUATION
05/31/2023  Mitchell Mcmillan is a 67 y.o., male     Patient Active Problem List   Diagnosis    Essential hypertension    Hyperlipidemia    Obesity (BMI 30.0-34.9)    Chronic hip pain    Midline low back pain without sciatica    Genu varum of both lower extremities    Primary osteoarthritis of both knees    Elevated PSA    Arthritis    Sensorineural hearing loss (SNHL) of both ears    Benign prostatic hyperplasia    Erectile dysfunction    Gastroesophageal reflux disease    History of tobacco use    Preoperative examination    Chronic pain of left knee     Past Medical History:   Diagnosis Date    Arthritis     knees    Asymmetric SNHL (sensorineural hearing loss) 08/17/2017    Chronic left hip pain 2006    Dislocated left hip, job injury    Chronic prostatitis     per pt, last episode 2013    Elevated PSA, less than 10 ng/ml     per pt last elevation 2013    Gastroesophageal reflux disease 8/3/2022    Hyperlipidemia 2010    Hypertension 2011    Joint effusion of knee     per pt rt knee, seen by Dr. Duke 2013    Midline low back pain without sciatica 6/13/2016    Obesity     Special screening for malignant neoplasms, colon 10/5/2016    Tobacco dependence     Trouble in sleeping      Past Surgical History:   Procedure Laterality Date    arthrocentesis Right 2013    Per pt , knee by Dr. BONNIE Duke    COLONOSCOPY N/A 10/05/2016    Normal exam repeat 10 yrs. Procedure: COLONOSCOPY;  Surgeon: Alexander Goetz MD;  [facility];  Service: Endoscopy;  Laterality: N/A;    HIP ARTHROPLASTY Left 04/16/2006    Per pt, dislocated left hip/ hardware placed  by Dr. Lisa    left orchiectomy      PROSTATE BIOPSY  2012    procedure performed at [facility] due to elevated PSA - benign       Chemistry        Component Value Date/Time     05/16/2023 1000    K 4.2  05/16/2023 1000     05/16/2023 1000    CO2 25 05/16/2023 1000    BUN 14 05/16/2023 1000    CREATININE 0.9 05/16/2023 1000     05/16/2023 1000        Component Value Date/Time    CALCIUM 9.8 05/16/2023 1000    ALKPHOS 61 09/23/2022 0831    AST 16 09/23/2022 0831    ALT 19 09/23/2022 0831    BILITOT 0.4 09/23/2022 0831    ESTGFRAFRICA >60.0 08/28/2020 1405    EGFRNONAA >60.0 08/28/2020 1405        Lab Results   Component Value Date    WBC 7.57 05/16/2023    HGB 12.1 (L) 05/16/2023    HCT 38.7 (L) 05/16/2023    MCV 86 05/16/2023     05/16/2023             Pre-op Assessment    I have reviewed the Patient Summary Reports.    I have reviewed the NPO Status.   I have reviewed the Medications.     Review of Systems  Anesthesia Hx:  No problems with previous Anesthesia    Social:  Non-Smoker    Hematology/Oncology:  Hematology Normal        Cardiovascular:   Hypertension hyperlipidemia ECG has been reviewed.    Pulmonary:  Pulmonary Normal    Renal/:  Renal/ Normal     Hepatic/GI:   GERD    Neurological:  Neurology Normal    Endocrine:  Endocrine Normal        Physical Exam  General: Well nourished    Airway:  Mallampati: I   Mouth Opening: Normal  TM Distance: Normal  Neck ROM: Normal ROM    Dental:  Intact        Anesthesia Plan  Type of Anesthesia, risks & benefits discussed:    Anesthesia Type: Gen ETT  Intra-op Monitoring Plan: Standard ASA Monitors  Post Op Pain Control Plan: multimodal analgesia  Induction:  IV  Airway Plan: , Post-Induction  Informed Consent: Informed consent signed with the Patient and all parties understand the risks and agree with anesthesia plan.  All questions answered.   ASA Score: 2    Ready For Surgery From Anesthesia Perspective.     .

## 2023-05-31 NOTE — DISCHARGE SUMMARY
[facility] - Surgery (Hospital)  Discharge Note  Short Stay    Procedure(s) (LRB):  ARTHROPLASTY, KNEE, TOTAL (Left)      OUTCOME: Patient tolerated treatment/procedure well without complication and is now ready for discharge.    DISPOSITION: Home-Health Care [facility] [place] [facility]    FINAL DIAGNOSIS:  <principal problem not specified>  Arthritis of left knee  FOLLOWUP: In clinic    DISCHARGE INSTRUCTIONS:  No discharge procedures on file.     TIME SPENT ON DISCHARGE:  25 minutes

## 2023-05-31 NOTE — OP NOTE
[facility] - Surgery ([facility])  Orthopedic Surgery  Operative Note    SUMMARY     Date of Procedure: 5/31/2023     Procedure: Procedure(s) (LRB):  ARTHROPLASTY, KNEE, TOTAL (Left)       Surgeon(s) and Role:     * Ellis Mora MD - Primary    Assisting Surgeon:  Sahri MEHTA    Pre-Operative Diagnosis: Arthritis of knee, left [M17.12]    Post-Operative Diagnosis: Post-Op Diagnosis Codes:     * Arthritis of knee, left [M17.12]    Anesthesia: General    Injections/Meds: DEON with 40cc NS    Tourniquet time:  No tourniquet    Significant Surgical Tasks Conducted by the Assistant(s), if Applicable:    To hold multiple retractors to protect ligaments and neurovascular structures in order to perform surgery safely and efficiently.    Complications: none     Estimated Blood Loss (EBL):  200 mL           Implants:  [facility] primary posterior stabilize total knee system femur size 5. PS, tibial tray size 5., tibial insert 13 millimeters PS, 32 dome patella, gentamicin cement by [employer]     Specimens: None           Condition: Good    Disposition: PACU - hemodynamically stable.    Attestation: I performed the procedure.    Description:  Medial parapatellar approach used to perform left TKA.  After patient received antibiotics and preop meds the knee was prepped and draped in usual sterile fashion. Midline incision was made 2 fingers above the superior pole of the patella to 2 fingers below.  Full-thickness skin flap raised medially and partially laterally.  Medial parapatellar incision was made to medial tibial tubercle  Medial release off the medial tibial flare perform subperiosteal elevating the tissues to the posterior medial corner of the tibia.  Patellar fat pad resected partially.  Superior capsulectomy performed.  Osteophytes removed mediolateral meniscus removed. Patella was resurfaced after measuring and free hand technique used.  Partial lateral  facetectomy performed.  Patella button trial was applied and the measured and reconstituted thickness. The patella was moved laterally the knee hyperflexed.  Quarter-inch drill bit used to open the canal into the femur and the canal was suctioned.  Irrigated and suctioned again.  Canal finer inserted an intramedullary alignment avani inserted into the femoral canal and pinned our cutting block at 5° of valgus cut. The avani was removed and distal femur was cut through the cutting block. We measured the size of the femur and marked our rotation and then 1 cutting block applied and pinned in place and proceeded cutting the anterior condyle posterior condyle and chamfer cuts followed by box cut. The femoral canal was bone grafted.  Trial was applied on the femur and posterior osteophytes removed. Directed attention to the tibia quarter-inch drill bit used to open the canal into the tibia.  Canal Finders inserted. A gated the canal and suctioned it. Fluted intramedullary alignment avani applied and using the depth gauge and the cutting block on the tibia.  Proceeded with multiple bent Homans protecting the collateral ligaments and posterior neurovascular structures and using the oscillating saw cut the tibia.  Excess osteophytes removed. Measured the tibia, marked the rotation on the base plate , pinned in place and punched our Chauncey.  Trials placed into the knee and put the knee through range of motion checking gap in extension , flexion at 45° of flexion.  Come from the sizes.    The knee was pulse lavaged then was injected in the surrounding soft tissues for postop pain control.  Prosthesis was cemented in place and excess cement was removed. Once cement has hardened the knee was placed through range of motion confirming stability. Once prosthesis was checked the Closure of the knee performed with 1.  Vicryl and figure-of-eight and running fashion. Subcutaneous tissues were irrigated and then closed using 1.  Vicryl  inverted stitch and skin using staple gun.  Sterile dressing applied.

## 2023-05-31 NOTE — PLAN OF CARE
[facility] - Surgery (Hospital)  Discharge Final Note    Primary Care Provider: Jessica Chinchilla MD    Expected Discharge Date: 5/31/2023    Final Discharge Note (most recent)       Final Note - 05/31/23 0945          Final Note    Assessment Type Final Discharge Note     Anticipated Discharge Disposition Home-Health Care Svc        Post-Acute Status    Post-Acute Authorization HME;Home Health     HME Status Pending Delivery     Home Health Status Set-up Complete/Auth obtained     Discharge Delays None known at this time                     Contact Info       Ellis Mora MD   Specialty: Orthopedic Surgery    [address] [place] [address] [address] [id number]   Phone: [phone]       Next Steps: Follow up in 2 week(s)          [facility] arranged.     Pending HME delivery for pt to d/c.     No needs at this time.     [id number]  notified by Shaun with [facility] that HME was delivered to pt's bedside.

## 2023-06-01 VITALS
RESPIRATION RATE: 9 BRPM | BODY MASS INDEX: 31.46 KG/M2 | OXYGEN SATURATION: 97 % | DIASTOLIC BLOOD PRESSURE: 81 MMHG | HEIGHT: 76 IN | WEIGHT: 258.38 LBS | TEMPERATURE: 97 F | SYSTOLIC BLOOD PRESSURE: 142 MMHG | HEART RATE: 75 BPM

## 2023-06-01 PROCEDURE — G0180 PR HOME HEALTH MD CERTIFICATION: ICD-10-PCS | Mod: ,,, | Performed by: ORTHOPAEDIC SURGERY

## 2023-06-01 PROCEDURE — G0180 MD CERTIFICATION HHA PATIENT: HCPCS | Mod: ,,, | Performed by: ORTHOPAEDIC SURGERY

## 2023-06-12 ENCOUNTER — HOSPITAL ENCOUNTER (OUTPATIENT)
Dept: RADIOLOGY | Facility: HOSPITAL | Age: 68
Discharge: HOME | End: 2023-06-12
Attending: ORTHOPAEDIC SURGERY
Payer: MEDICARE

## 2023-06-12 ENCOUNTER — OFFICE VISIT (OUTPATIENT)
Dept: ORTHOPEDICS | Facility: CLINIC | Age: 68
End: 2023-06-12
Payer: MEDICARE

## 2023-06-12 VITALS — WEIGHT: 258 LBS | BODY MASS INDEX: 31.42 KG/M2 | HEIGHT: 76 IN

## 2023-06-12 DIAGNOSIS — M17.12 ARTHRITIS OF KNEE, LEFT: ICD-10-CM

## 2023-06-12 DIAGNOSIS — Z96.652 STATUS POST TOTAL LEFT KNEE REPLACEMENT USING CEMENT: Primary | ICD-10-CM

## 2023-06-12 PROCEDURE — 99024 POSTOP FOLLOW-UP VISIT: CPT | Mod: HCNC,S$GLB,, | Performed by: PHYSICIAN ASSISTANT

## 2023-06-12 PROCEDURE — 1125F AMNT PAIN NOTED PAIN PRSNT: CPT | Mod: HCNC,CPTII,S$GLB, | Performed by: PHYSICIAN ASSISTANT

## 2023-06-12 PROCEDURE — 99999 PR PBB SHADOW E&M-EST. PATIENT-LVL III: ICD-10-PCS | Mod: PBBFAC,HCNC,, | Performed by: PHYSICIAN ASSISTANT

## 2023-06-12 PROCEDURE — 3288F PR FALLS RISK ASSESSMENT DOCUMENTED: ICD-10-PCS | Mod: HCNC,CPTII,S$GLB, | Performed by: PHYSICIAN ASSISTANT

## 2023-06-12 PROCEDURE — 1101F PR PT FALLS ASSESS DOC 0-1 FALLS W/OUT INJ PAST YR: ICD-10-PCS | Mod: HCNC,CPTII,S$GLB, | Performed by: PHYSICIAN ASSISTANT

## 2023-06-12 PROCEDURE — 73564 X-RAY EXAM KNEE 4 OR MORE: CPT | Mod: TC,HCNC,LT

## 2023-06-12 PROCEDURE — 3008F BODY MASS INDEX DOCD: CPT | Mod: HCNC,CPTII,S$GLB, | Performed by: PHYSICIAN ASSISTANT

## 2023-06-12 PROCEDURE — 4010F ACE/ARB THERAPY RXD/TAKEN: CPT | Mod: HCNC,CPTII,S$GLB, | Performed by: PHYSICIAN ASSISTANT

## 2023-06-12 PROCEDURE — 99024 PR POST-OP FOLLOW-UP VISIT: ICD-10-PCS | Mod: HCNC,S$GLB,, | Performed by: PHYSICIAN ASSISTANT

## 2023-06-12 PROCEDURE — 1125F PR PAIN SEVERITY QUANTIFIED, PAIN PRESENT: ICD-10-PCS | Mod: HCNC,CPTII,S$GLB, | Performed by: PHYSICIAN ASSISTANT

## 2023-06-12 PROCEDURE — 1101F PT FALLS ASSESS-DOCD LE1/YR: CPT | Mod: HCNC,CPTII,S$GLB, | Performed by: PHYSICIAN ASSISTANT

## 2023-06-12 PROCEDURE — 73564 XR KNEE ORTHO LEFT WITH FLEXION: ICD-10-PCS | Mod: 26,HCNC,LT, | Performed by: RADIOLOGY

## 2023-06-12 PROCEDURE — 73562 XR KNEE ORTHO LEFT WITH FLEXION: ICD-10-PCS | Mod: 26,HCNC,RT, | Performed by: RADIOLOGY

## 2023-06-12 PROCEDURE — 99999 PR PBB SHADOW E&M-EST. PATIENT-LVL III: CPT | Mod: PBBFAC,HCNC,, | Performed by: PHYSICIAN ASSISTANT

## 2023-06-12 PROCEDURE — 73564 X-RAY EXAM KNEE 4 OR MORE: CPT | Mod: 26,HCNC,LT, | Performed by: RADIOLOGY

## 2023-06-12 PROCEDURE — 4010F PR ACE/ARB THEARPY RXD/TAKEN: ICD-10-PCS | Mod: HCNC,CPTII,S$GLB, | Performed by: PHYSICIAN ASSISTANT

## 2023-06-12 PROCEDURE — 73562 X-RAY EXAM OF KNEE 3: CPT | Mod: 26,HCNC,RT, | Performed by: RADIOLOGY

## 2023-06-12 PROCEDURE — 1159F MED LIST DOCD IN RCRD: CPT | Mod: HCNC,CPTII,S$GLB, | Performed by: PHYSICIAN ASSISTANT

## 2023-06-12 PROCEDURE — 3008F PR BODY MASS INDEX (BMI) DOCUMENTED: ICD-10-PCS | Mod: HCNC,CPTII,S$GLB, | Performed by: PHYSICIAN ASSISTANT

## 2023-06-12 PROCEDURE — 1159F PR MEDICATION LIST DOCUMENTED IN MEDICAL RECORD: ICD-10-PCS | Mod: HCNC,CPTII,S$GLB, | Performed by: PHYSICIAN ASSISTANT

## 2023-06-12 PROCEDURE — 3288F FALL RISK ASSESSMENT DOCD: CPT | Mod: HCNC,CPTII,S$GLB, | Performed by: PHYSICIAN ASSISTANT

## 2023-06-12 RX ORDER — HYDROCODONE BITARTRATE AND ACETAMINOPHEN 10; 325 MG/1; MG/1
1 TABLET ORAL EVERY 8 HOURS PRN
Qty: 28 TABLET | Refills: 0 | Status: SHIPPED | OUTPATIENT
Start: 2023-06-12 | End: 2023-06-16 | Stop reason: ALTCHOICE

## 2023-06-12 RX ORDER — SILDENAFIL 100 MG/1
100 TABLET, FILM COATED ORAL DAILY PRN
Qty: 30 TABLET | Refills: 10 | Status: SHIPPED | OUTPATIENT
Start: 2023-06-12

## 2023-06-12 NOTE — PROGRESS NOTES
Patient ID: Mitchell Mcmillan is a 67 y.o. male.    Chief Complaint: No chief complaint on file.      HPI: Mitchell Mcmillan  is a 67 y.o. male who c/o No chief complaint on file.     Post op visit 1   Patient notes pain is 7/10   The patient is doing well since surgery and is pleased with his results thus far   Pain is increased with use and activity over the course the day as well as with therapy   We discussed relief with medication as well as elevation and ice  Using a cane to assist with ambulation  He is taking the narcotic pain medication but asked for something not as strong as he feels his pain is improving even with breaking it in half   He is compliant with DVT prophylaxis as well as PT     When we discussed outpatient PT the patient noted transportation issues.  I would like to extend his home health services secondary to this.  He may drive in 2 weeks.    Patient is presently denying any shortness of breath, chest pain, fever/chills, nausea/vomiting, loss of taste or smell, numbness/tingling or sensation changes, loss of bladder or bowel function, loss of taste/smell.     Surgery: Left Total Knee    Surgery Date:  05/31/2023    Past Medical History:   Diagnosis Date    Arthritis     knees    Asymmetric SNHL (sensorineural hearing loss) 08/17/2017    Chronic left hip pain 2006    Dislocated left hip, job injury    Chronic prostatitis     per pt, last episode 2013    Elevated PSA, less than 10 ng/ml     per pt last elevation 2013    Gastroesophageal reflux disease 8/3/2022    Hyperlipidemia 2010    Hypertension 2011    Joint effusion of knee     per pt rt knee, seen by Dr. Duke 2013    Midline low back pain without sciatica 6/13/2016    Obesity     Special screening for malignant neoplasms, colon 10/5/2016    Tobacco dependence     Trouble in sleeping      Past Surgical History:   Procedure Laterality Date    arthrocentesis Right 2013    Per pt , knee by Dr. BONNIE Duke    COLONOSCOPY N/A 10/05/2016     Normal exam repeat 10 yrs. Procedure: COLONOSCOPY;  Surgeon: Alexander Goetz MD;  Location: [facility] ENDO;  Service: Endoscopy;  Laterality: N/A;    HIP ARTHROPLASTY Left 2006    Per pt, dislocated left hip/ hardware placed  by Dr. Lisa    left orchiectomy      PROSTATE BIOPSY  2012    procedure performed at [facility] due to elevated PSA - benign    TOTAL KNEE ARTHROPLASTY Left 2023    Procedure: ARTHROPLASTY, KNEE, TOTAL;  Surgeon: Ellis Mora MD;  Location: [facility] OR;  Service: Orthopedics;  Laterality: Left;     Family History   Problem Relation Age of Onset    Depression Mother     Early death Mother     Hypertension Mother     Heart disease Mother 53         from MI    Arthritis Father     Stroke Father     Diabetes Sister     No Known Problems Brother     Hypertension Sister     Hypertension Sister     No Known Problems Brother     No Known Problems Brother     No Known Problems Daughter     Cancer Neg Hx     COPD Neg Hx     Kidney disease Neg Hx      Social History     Socioeconomic History    Marital status:      Spouse name: Nida    Number of children: 0   Occupational History    Occupation: Disabled since      Comment: Dislocated left hip   Tobacco Use    Smoking status: Former     Packs/day: 0.10     Years: 46.00     Pack years: 4.60     Types: Cigarettes     Start date: 6/3/2022     Quit date: 10/19/2022     Years since quittin.6    Smokeless tobacco: Never   Substance and Sexual Activity    Alcohol use: Not Currently     Alcohol/week: 2.0 standard drinks     Types: 2 Glasses of wine per week    Drug use: No    Sexual activity: Yes     Partners: Female     Birth control/protection: None   Social History Narrative    Disabled since .  Job injury.  Dislocated left hip.  Has 4 brothers and 4 sisters. Wears seatbelt.     Social Determinants of Health     Financial Resource Strain: Low Risk     Difficulty of Paying Living Expenses: Not hard at all   Food  Insecurity: No Food Insecurity    Worried About Running Out of Food in the Last Year: Never true    Ran Out of Food in the Last Year: Never true   Transportation Needs: No Transportation Needs    Lack of Transportation (Medical): No    Lack of Transportation (Non-Medical): No   Physical Activity: Insufficiently Active    Days of Exercise per Week: 2 days    Minutes of Exercise per Session: 20 min   Stress: No Stress Concern Present    Feeling of Stress : Only a little   Social Connections: Socially Integrated    Frequency of Communication with Friends and Family: Once a week    Frequency of Social Gatherings with Friends and Family: Three times a week    Attends Religion Services: More than 4 times per year    Active Member of Clubs or Organizations: Yes    Attends Club or Organization Meetings: More than 4 times per year    Marital Status:    Housing Stability: Low Risk     Unable to Pay for Housing in the Last Year: No    Number of Places Lived in the Last Year: 1    Unstable Housing in the Last Year: No     Medication List with Changes/Refills   Current Medications    ATORVASTATIN (LIPITOR) 40 MG TABLET    TAKE 1 TABLET EVERY DAY    DICLOFENAC SODIUM (VOLTAREN) 1 % GEL    Apply 2 g topically 3 (three) times daily as needed.    FINASTERIDE (PROSCAR) 5 MG TABLET    TAKE 1 TABLET EVERY DAY    FLUTICASONE PROPIONATE (FLONASE) 50 MCG/ACTUATION NASAL SPRAY    2 sprays (100 mcg total) by Each Nostril route once daily.    GABAPENTIN (NEURONTIN) 300 MG CAPSULE    Take 1 capsule (300 mg total) by mouth every evening.    LISINOPRIL-HYDROCHLOROTHIAZIDE (PRINZIDE,ZESTORETIC) 20-25 MG TAB    TAKE 1 TABLET EVERY DAY    MELOXICAM (MOBIC) 15 MG TABLET    Take 1 tablet (15 mg total) by mouth once daily.    OMEGA-3 FATTY ACIDS/FISH OIL (FISH OIL-OMEGA-3 FATTY ACIDS) 300-1,000 MG CAPSULE    Take 1 capsule by mouth once daily.    ONDANSETRON (ZOFRAN-ODT) 4 MG TBDL    Dissolve 2 tablets (8 mg total) by mouth every 8 (eight)  hours as needed.    OXYCODONE-ACETAMINOPHEN (PERCOCET)  MG PER TABLET    Take 1 tablet by mouth every 6 (six) hours as needed for Pain.    PANTOPRAZOLE (PROTONIX) 40 MG TABLET    Take 1 tablet (40 mg total) by mouth once daily.    SILDENAFIL (VIAGRA) 100 MG TABLET    Take 1 tablet (100 mg total) by mouth daily as needed for Erectile Dysfunction.    TAMSULOSIN (FLOMAX) 0.4 MG CAP    TAKE 1 CAPSULE EVERY DAY     Review of patient's allergies indicates:  No Known Allergies    Objective:     Left Lower Extremity  NVI  WWP foot  Comp soft  Cap refill < 2 sec  Calf NT, soft  (-) Simi sign  RENATO  ROM : Patient is able to easily exhibit full flexion and extension on passive range of motion.   Wiggles toes  DF/PF intact  Sensation intact  Inc C/D/I  No SOI    IMAGING  FINDINGS: Evidence of recent left knee replacement.  Prosthetic components are in good position alignment with skin staples anteriorly.  Advanced arthritic changes noted involving the right knee.        Impression:    Postoperative changes left knee with advanced osteoarthritis right knee.    Assessment:       Encounter Diagnosis   Name Primary?    Status post total left knee replacement using cement Yes          Plan:       Diagnoses and all orders for this visit:    Status post total left knee replacement using cement        Mitchell Mcmillan is an established pt here for postop follow-up after left total knee replacement by Dr. Mora.  Patient will be given a script of Lortab today.  I will DC the oxycodone.  The incision was cleaned with hydrogen peroxide.  All staples were removed, and suture strips were applied across the incision.  They should remain in place until they fall off in approximately 1-2 weeks. The patient was instructed not to soak the incision in standing water but may clean the incision with clean running water and antibacterial soap.  Patient should notify the office of any signs or symptoms of infection including fevers, erythema,  purulent drainage, increasing pain.  Patient will continue with DVT prophylaxis.  Additionally I will extend home health for 2 more weeks secondary to transportation issues.  Will follow-up in 4-6 weeks.  Patient verbalized understanding of all instructions and agreed with the above plan.    No follow-ups on file.    The patient understands, chooses and consents to this plan and accepts all   the risks which include but are not limited to the risks mentioned above.     Disclaimer: This note was prepared using a voice recognition system and is likely to have sound alike errors within the text.

## 2023-06-16 ENCOUNTER — TELEPHONE (OUTPATIENT)
Dept: ORTHOPEDICS | Facility: CLINIC | Age: 68
End: 2023-06-16
Payer: MEDICARE

## 2023-06-16 RX ORDER — OXYCODONE AND ACETAMINOPHEN 10; 325 MG/1; MG/1
1 TABLET ORAL EVERY 6 HOURS PRN
Qty: 30 TABLET | Refills: 0 | Status: SHIPPED | OUTPATIENT
Start: 2023-06-16 | End: 2023-07-17 | Stop reason: SDUPTHER

## 2023-06-16 NOTE — TELEPHONE ENCOUNTER
Returned the patient's phone call and informed them that Dr. Bruno did send in that refill of percocet to their pharmacy. Informed the patient again that their insurance may or may not approve the percocet due to the fact that they just picked up norco on 06/12/23. Patient verbalized understanding.

## 2023-06-16 NOTE — TELEPHONE ENCOUNTER
Returned the patient's phone call in regards to their message informed the patient that I can send a refill request to Dr. Mora but Im not sure if their insurance will approve or pay for it. Informed the patient that they may not approve or pay for it due to the fact that they just got Norco on 06/12/23. Patient verbilized understanding.               ----- Message from Kaitlin Banuelos sent at 6/16/2023 10:34 AM CDT -----  Contact: hermila  Patient is calling to speak with a nurse regarding medication . Reports medication Loratab is not helping the pain and patient isnt sleeping . States wanting to go back to percocet if possible . Please send to .  [facility] [address] [place], [place] [address] - [address] [facility]  [address] [facility] [address]  Phone: [phone] Fax: [phone]    Or give patient a call back at [phone]

## 2023-06-19 ENCOUNTER — EXTERNAL HOME HEALTH (OUTPATIENT)
Dept: HOME HEALTH SERVICES | Facility: HOSPITAL | Age: 68
End: 2023-06-19
Payer: MEDICARE

## 2023-06-21 ENCOUNTER — TELEPHONE (OUTPATIENT)
Dept: ORTHOPEDICS | Facility: CLINIC | Age: 68
End: 2023-06-21
Payer: MEDICARE

## 2023-06-21 DIAGNOSIS — Z96.652 STATUS POST TOTAL LEFT KNEE REPLACEMENT USING CEMENT: Primary | ICD-10-CM

## 2023-06-26 DIAGNOSIS — Z96.652 STATUS POST TOTAL LEFT KNEE REPLACEMENT USING CEMENT: Primary | ICD-10-CM

## 2023-06-29 ENCOUNTER — TELEPHONE (OUTPATIENT)
Dept: ORTHOPEDICS | Facility: CLINIC | Age: 68
End: 2023-06-29
Payer: MEDICARE

## 2023-06-29 NOTE — TELEPHONE ENCOUNTER
Returned the patient's phone call in regards to their message. Patient states that [facility] not take their insurance. Informed the patient that I will have Shari put in a referral to [facility] [address]. Informed the patient that they will give them a call to get schedule. Patient verbalized understanding.             ----- Message from Apple Sheffield sent at 6/29/2023  1:11 PM CDT -----  Pt request a call back in regards to therapy,Please call back at [phone].Thanks

## 2023-06-30 DIAGNOSIS — Z96.652 STATUS POST TOTAL LEFT KNEE REPLACEMENT USING CEMENT: Primary | ICD-10-CM

## 2023-07-06 ENCOUNTER — CLINICAL SUPPORT (OUTPATIENT)
Dept: REHABILITATION | Facility: HOSPITAL | Age: 68
End: 2023-07-06
Payer: MEDICARE

## 2023-07-06 DIAGNOSIS — Z96.652 STATUS POST TOTAL LEFT KNEE REPLACEMENT USING CEMENT: ICD-10-CM

## 2023-07-06 DIAGNOSIS — M25.662 DECREASED ROM OF LEFT KNEE: ICD-10-CM

## 2023-07-06 PROCEDURE — 97110 THERAPEUTIC EXERCISES: CPT | Mod: HCNC,PN

## 2023-07-06 PROCEDURE — 97161 PT EVAL LOW COMPLEX 20 MIN: CPT | Mod: HCNC,PN

## 2023-07-06 PROCEDURE — 97112 NEUROMUSCULAR REEDUCATION: CPT | Mod: HCNC,PN

## 2023-07-06 NOTE — PLAN OF CARE
[facility]   Physical Therapy Initial Evaluation      Name: Mitchell Mcmillan  Clinic Number: [id number]    Therapy Diagnosis:   Encounter Diagnosis   Name Primary?    Status post total left knee replacement using cement         Physician: Shari Florez PA    Physician Orders: PT Eval and Treat   Medical Diagnosis from Referral: Z96.652 (ICD-10-CM) - Status post total left knee replacement using cement   Evaluation Date: 7/6/2023  Authorization Period Expiration: 12/31/2023  Plan of Care Expiration: 08/18/2023  Progress Note Due: 30 days  Visit # / Visits authorized: 1/ 1   FOTO: 1/3    Precautions: Standard     Time In: 10:15am  Time Out: 11:00am  Total Billable Time: 45 minutes    Subjective     Date of onset: 5/31/20233 surgery for L TKR    History of current condition - Mitchell reports: her had his L TKR on 5/31/2023. He had home health therapy after his surgery for a few weeks.     Falls: 0    Imaging: Xrays showed normal TKR:    Prior Therapy: yes  Social History:  lives with their spouse  Occupation: Not working   Prior Level of Function: WNL  Current Level of Function: Difficulty with walking     Pain:  Current 6/10, worst 10/10,   Location: left knee    Description: Variable  Aggravating Factors: Walking  Easing Factors: pain medication, ice, and rest    Patients goals: Pt would like to not have pain in his Left knee and have improved ability to walk.      Medical History:   Past Medical History:   Diagnosis Date    Arthritis     knees    Asymmetric SNHL (sensorineural hearing loss) 08/17/2017    Chronic left hip pain 2006    Dislocated left hip, job injury    Chronic prostatitis     per pt, last episode 2013    Elevated PSA, less than 10 ng/ml     per pt last elevation 2013    Gastroesophageal reflux disease 8/3/2022    Hyperlipidemia 2010    Hypertension 2011    Joint effusion of knee     per pt rt knee, seen by Dr. Duke 2013    Midline low back pain without sciatica  6/13/2016    Obesity     Special screening for malignant neoplasms, colon 10/5/2016    Tobacco dependence     Trouble in sleeping        Surgical History:   Mitchell Mcmillan  has a past surgical history that includes Hip Arthroplasty (Left, 04/16/2006); arthrocentesis (Right, 2013); Prostate biopsy (2012); Colonoscopy (N/A, 10/05/2016); left orchiectomy; and Total knee arthroplasty (Left, 5/31/2023).    Medications:   Mitchell has a current medication list which includes the following prescription(s): atorvastatin, diclofenac sodium, finasteride, fluticasone propionate, gabapentin, lisinopril-hydrochlorothiazide, meloxicam, fish oil-omega-3 fatty acids, ondansetron, oxycodone-acetaminophen, pantoprazole, sildenafil, and tamsulosin.    Allergies:   Review of patient's allergies indicates:  No Known Allergies     Objective    Gait Observation:  walking with walking stick, antalgic gait pattern    Sensation: Intact to light touch B LE's, all dermatomes        Strength:       L/E MMT Right Left Pain/Dysfunction with Movement   Hip Flexion 5/5 5/5    Hip Extension 4+/5 4/5    Hip Abduction 5/5 4/5    Hip Adduction 5/5 5/5    Hip IR 5/5 5/5    Hip ER 5/5 5/5    Knee Flexion 5/5 4-/5    Knee Extension 5/5 4-/5    Ankle DF 5/5 4+/5    Ankle PF 5/5 4+/5    Ankle Inversion 5/5 5/5    Ankle Eversion 5/5 5/5          Range of Motion:     Knee Right Left Pain/Dysfunction with Movement   AROM/PROM      flexion  110  103    extension  5  5 Lacks 5 degrees from TKE            Intake Outcome Measure for FOTO Knee Survey    Therapist reviewed FOTO scores for Mitchell Mcmillan on 7/6/2023.   FOTO documents entered into EPIC - see Media section.    Functional Score 25/100         Treatment     Total Treatment time (time-based codes) separate from Evaluation: 40 minutes     Mitchell received the treatments listed below:      therapeutic exercises to develop strength, endurance, ROM, flexibility, posture, and core stabilization for 15 minutes  "including:  Nustep 8' for Range of motion and stretching  Knee flexion on step 10" x 10  Knee extension on step 10" x 10  Calf stretch on stairs 20" x 4        neuromuscular re-education activities to improve: Balance, Coordination, Kinesthetic, Sense, Proprioception, and Posture for 30 minutes. The following activities were included:  Standing heel raises 2 x 10  Prone TKE  LAQ  Straight Leg Raise  Quad sets  Bridging        Patient Education and Home Exercises     Education provided:   - HEP and plan of care    Written Home Exercises Provided: yes. Exercises were reviewed and Mitchell was able to demonstrate them prior to the end of the session.  Mitchell demonstrated good  understanding of the education provided. See EMR under Patient Instructions for exercises provided during therapy sessions.    Assessment     Mitchell is a 67 y.o. male referred to outpatient Physical Therapy with a medical diagnosis of L TKR. Patient presents with decreased L knee AROM, decreased L knee strength, difficulty with gait, pain in the left lower leg, and difficulty with daily activities.     Patient prognosis is Good.   Patient will benefit from skilled outpatient Physical Therapy to address the deficits stated above and in the chart below, provide patient /family education, and to maximize patientt's level of independence.     Plan of care discussed with patient: Yes  Patient's spiritual, cultural and educational needs considered and patient is agreeable to the plan of care and goals as stated below:     Anticipated Barriers for therapy: none    Medical Necessity is demonstrated by the following  History  Co-morbidities and personal factors that may impact the plan of care [] LOW: no personal factors / co-morbidities  [x] MODERATE: 1-2 personal factors / co-morbidities  [] HIGH: 3+ personal factors / co-morbidities    Moderate / High Support Documentation:   Co-morbidities affecting plan of care: See PMHx    Personal Factors:   no " deficits     Examination  Body Structures and Functions, activity limitations and participation restrictions that may impact the plan of care [] LOW: addressing 1-2 elements  [x] MODERATE: 3+ elements  [] HIGH: 4+ elements (please support below)    Moderate / High Support Documentation: See FOTO documentation     Clinical Presentation [x] LOW: stable  [] MODERATE: Evolving  [] HIGH: Unstable     Decision Making/ Complexity Score: low       Goals:  Short Term Goals: 2 weeks   Pt will be 50% independent with HEP.    Long Term Goals: 6 weeks   Pt will be 100% independent with HEP.  Pt will have at least 115 degrees of knee flexion and 0 degrees of knee extension in the L Knee .  Pt will improve his knee strength to at least 4+/5 in all directions.  Pt will improve his FOTO Score by 20 points.   Plan     Plan of care Certification: 7/6/2023 to 08/18/2023.    Outpatient Physical Therapy 2 times weekly for 6 weeks to include the following interventions: Electrical Stimulation PRN, Gait Training, Manual Therapy, Moist Heat/ Ice, Neuromuscular Re-ed, Patient Education, Self Care, Therapeutic Activities, Therapeutic Exercise, and FDN .     Mena Goncalves, PT

## 2023-07-10 ENCOUNTER — CLINICAL SUPPORT (OUTPATIENT)
Dept: REHABILITATION | Facility: HOSPITAL | Age: 68
End: 2023-07-10
Payer: MEDICARE

## 2023-07-10 DIAGNOSIS — M25.662 DECREASED ROM OF LEFT KNEE: Primary | ICD-10-CM

## 2023-07-10 PROCEDURE — 97110 THERAPEUTIC EXERCISES: CPT | Mod: HCNC,PN

## 2023-07-10 PROCEDURE — 97112 NEUROMUSCULAR REEDUCATION: CPT | Mod: HCNC,PN

## 2023-07-10 NOTE — PROGRESS NOTES
"[facility]   Physical Therapy Treatment Note      Name: Mitchell Mcmillan  Clinic Number: [id number]    Therapy Diagnosis:   Encounter Diagnosis   Name Primary?    Decreased ROM of left knee Yes     Physician: Shari Florez PA    Visit Date: 7/10/2023    Physician Orders: PT Eval and Treat   Medical Diagnosis from Referral: Z96.652 (ICD-10-CM) - Status post total left knee replacement using cement   Evaluation Date: 7/6/2023  Authorization Period Expiration: 12/31/2023  Plan of Care Expiration: 08/18/2023  Progress Note Due: 30 days  Visit # / Visits authorized: 1/20 +Eval   FOTO: 1/3     Precautions: Standard      Time In: 7:06m  Time Out: 8:05am  Total Billable Time: 54 minutes    PTA Visit #: --/5       Subjective     Pt reports: that his knee is doing ok today, he is not in excruciating pain.  He was compliant with home exercise program.  Response to previous treatment: knee is doing ok  Functional change: nothing significant noted    Pain: 6/10  Location: left knee      Objective      Objective Measures updated at progress report unless specified.     Treatment     Mitchell received the treatments listed below:      therapeutic exercises to develop strength, endurance, ROM, flexibility, posture, and core stabilization for 18 minutes including:  Nustep 6' for Range of motion and stretching  Knee flexion on step 10" x 10  Wedge Calf stretch 20" x 5  Seated calf stretch 20" x 5  Knee flexion on SB 10" x 10      manual therapy techniques: Joint mobilizations, Manual traction, Soft tissue Mobilization, and FDN were applied to the: L knee for -- minutes, including:  --       neuromuscular re-education activities to improve: Balance, Coordination, Kinesthetic, Sense, Proprioception, and Posture for 31 minutes. The following activities were included:    Standing heel raises 2 x 10  Prone TKE 20x  LAQ 20x  Straight Leg Raise 3 x 10  Quad sets 3" x 20  Sidelying hip abd 20x  Clamshells " "20x  Heel prop 2'    Deferred:   Bridging      therapeutic activities to improve functional performance for 5  minutes, including:  Sit to stand 24" 2 x 10    Pt will ice at home.    Patient Education and Home Exercises       Education provided:   - HEP and plan of care    Written Home Exercises Provided: Patient instructed to cont prior HEP. Exercises were reviewed and Mitchell was able to demonstrate them prior to the end of the session.  Mitchell demonstrated good  understanding of the education provided. See EMR under Patient Instructions for exercises provided during therapy sessions    Assessment     Mitchell presents with pain in the left knee.  His knee AROM has improved but is lacking terminal knee extension by 2 degrees.  He tolerates his treatment well with minimal increased pain.  Continue with progressing AROM and stretching exercises as well as strengthening for the left lower extremity.     Mitchell Is progressing well towards his goals.   Pt prognosis is Good.     Pt will continue to benefit from skilled outpatient physical therapy to address the deficits listed in the problem list box on initial evaluation, provide pt/family education and to maximize pt's level of independence in the home and community environment.     Pt's spiritual, cultural and educational needs considered and pt agreeable to plan of care and goals.     Anticipated barriers to physical therapy: none    Goals:   Short Term Goals: 2 weeks   Pt will be 50% independent with HEP. Progressing     Long Term Goals: 6 weeks   Pt will be 100% independent with HEP. Progressing  Pt will have at least 115 degrees of knee flexion and 0 degrees of knee extension in the L Knee. Progressing  Pt will improve his knee strength to at least 4+/5 in all directions. Progressing  Pt will improve his FOTO Score by 20 points. Progressing  Plan      Plan of care Certification: 7/6/2023 to 08/18/2023.     Outpatient Physical Therapy 2 times weekly for 6 weeks to " include the following interventions: Electrical Stimulation PRN, Gait Training, Manual Therapy, Moist Heat/ Ice, Neuromuscular Re-ed, Patient Education, Self Care, Therapeutic Activities, Therapeutic Exercise, and FDN .     Mena Goncalves, PT

## 2023-07-13 ENCOUNTER — CLINICAL SUPPORT (OUTPATIENT)
Dept: REHABILITATION | Facility: HOSPITAL | Age: 68
End: 2023-07-13
Payer: MEDICARE

## 2023-07-13 DIAGNOSIS — M25.662 DECREASED ROM OF LEFT KNEE: Primary | ICD-10-CM

## 2023-07-13 PROCEDURE — 97530 THERAPEUTIC ACTIVITIES: CPT | Mod: HCNC,PN

## 2023-07-13 PROCEDURE — 97110 THERAPEUTIC EXERCISES: CPT | Mod: HCNC,PN

## 2023-07-13 PROCEDURE — 97112 NEUROMUSCULAR REEDUCATION: CPT | Mod: HCNC,PN

## 2023-07-13 NOTE — PROGRESS NOTES
"[facility]   Physical Therapy Treatment Note      Name: Mitchell Mcmillan  Clinic Number: [id number]    Therapy Diagnosis:   Encounter Diagnosis   Name Primary?    Decreased ROM of left knee Yes     Physician: Shari Florez PA    Visit Date: 7/13/2023    Physician Orders: PT Eval and Treat   Medical Diagnosis from Referral: Z96.652 (ICD-10-CM) - Status post total left knee replacement using cement   Evaluation Date: 7/6/2023  Authorization Period Expiration: 12/31/2023  Plan of Care Expiration: 08/18/2023  Progress Note Due: 30 days  Visit # / Visits authorized: 2/20 +Eval   FOTO: 1/3     Precautions: Standard      Time In: 12:30pm  Time Out: 1:23pm  Total Billable Time: 543minutes    PTA Visit #: --/5       Subjective     Pt reports: that his knee is doing ok today.  He still has days that are good and days that are bad.  He also has times when he has more pain than other.   He was compliant with home exercise program.  Response to previous treatment: knee is doing ok  Functional change: nothing significant noted    Pain: 6/10  Location: left knee      Objective      Objective Measures updated at progress report unless specified.     Treatment     Mitchell received the treatments listed below:      therapeutic exercises to develop strength, endurance, ROM, flexibility, posture, and core stabilization for 18 minutes including:  Nustep 6' for Range of motion and stretching  Knee flexion on step 10" x 10  Knee extension on step 10" x 10  Stair calf stretch 20" x 5  Seated calf stretch 20" x 5  Knee flexion on SB 10" x 10      manual therapy techniques: Joint mobilizations, Manual traction, Soft tissue Mobilization, and FDN were applied to the: L knee for -- minutes, including:  --       neuromuscular re-education activities to improve: Balance, Coordination, Kinesthetic, Sense, Proprioception, and Posture for 30 minutes. The following activities were included:    Shuttle DL 6B 3', SL 3B 2' " "right lower extremity only  Standing heel raises 2 x 10  Prone TKE 20x  Prone ham curls 20x  Straight Leg Raise 3 x 10  Quad sets 3" x 20  Sidelying hip abd 20x  Clamshells 20x  Tailgators 7.5# 3'    Deferred:   Bridging  Heel prop 2'  LAQ 20x    therapeutic activities to improve functional performance for 5  minutes, including:  Sit to stand 22" 2 x 10  SLED push 2x    Pt will ice at home.    Patient Education and Home Exercises       Education provided:   - HEP and plan of care    Written Home Exercises Provided: Patient instructed to cont prior HEP. Exercises were reviewed and Mitchell was able to demonstrate them prior to the end of the session.  Mitchell demonstrated good  understanding of the education provided. See EMR under Patient Instructions for exercises provided during therapy sessions    Assessment     Mitchell presents with pain in the left knee.  He continues to demonstrate improvement with his Knee AROM.  He does continue to have swelling in the knee joint. Continue with progressing AROM and stretching exercises as well as strengthening for the left lower extremity.     Mitchell Is progressing well towards his goals.   Pt prognosis is Good.     Pt will continue to benefit from skilled outpatient physical therapy to address the deficits listed in the problem list box on initial evaluation, provide pt/family education and to maximize pt's level of independence in the home and community environment.     Pt's spiritual, cultural and educational needs considered and pt agreeable to plan of care and goals.     Anticipated barriers to physical therapy: none    Goals:   Short Term Goals: 2 weeks   Pt will be 50% independent with HEP. Progressing     Long Term Goals: 6 weeks   Pt will be 100% independent with HEP. Progressing  Pt will have at least 115 degrees of knee flexion and 0 degrees of knee extension in the L Knee. Progressing  Pt will improve his knee strength to at least 4+/5 in all directions. " Progressing  Pt will improve his FOTO Score by 20 points. Progressing  Plan      Plan of care Certification: 7/6/2023 to 08/18/2023.     Outpatient Physical Therapy 2 times weekly for 6 weeks to include the following interventions: Electrical Stimulation PRN, Gait Training, Manual Therapy, Moist Heat/ Ice, Neuromuscular Re-ed, Patient Education, Self Care, Therapeutic Activities, Therapeutic Exercise, and FDN .     Mena Goncalves, PT

## 2023-07-17 RX ORDER — OXYCODONE AND ACETAMINOPHEN 10; 325 MG/1; MG/1
1 TABLET ORAL EVERY 8 HOURS PRN
Qty: 15 TABLET | Refills: 0 | Status: SHIPPED | OUTPATIENT
Start: 2023-07-17 | End: 2023-08-16

## 2023-07-17 NOTE — TELEPHONE ENCOUNTER
----- Message from Carlita Atkinson sent at 7/17/2023 10:54 AM CDT -----  Patient is requesting a refill for oxycodone. Call back at [phone]    [facility] [address]  [address]  [address] [place] [address]  Phone: [phone] Fax: [phone]

## 2023-07-19 ENCOUNTER — CLINICAL SUPPORT (OUTPATIENT)
Dept: REHABILITATION | Facility: HOSPITAL | Age: 68
End: 2023-07-19
Payer: MEDICARE

## 2023-07-19 DIAGNOSIS — M25.662 DECREASED ROM OF LEFT KNEE: Primary | ICD-10-CM

## 2023-07-19 PROCEDURE — 97112 NEUROMUSCULAR REEDUCATION: CPT | Mod: HCNC,PN

## 2023-07-19 PROCEDURE — 97110 THERAPEUTIC EXERCISES: CPT | Mod: HCNC,PN

## 2023-07-19 NOTE — PROGRESS NOTES
"[facility]   Physical Therapy Treatment Note      Name: Mitchell Mcmillan  Clinic Number: [id number]    Therapy Diagnosis:   Encounter Diagnosis   Name Primary?    Decreased ROM of left knee Yes     Physician: Shari Florez PA    Visit Date: 7/19/2023    Physician Orders: PT Eval and Treat   Medical Diagnosis from Referral: Z96.652 (ICD-10-CM) - Status post total left knee replacement using cement   Evaluation Date: 7/6/2023  Authorization Period Expiration: 12/31/2023  Plan of Care Expiration: 08/18/2023  Progress Note Due: 30 days  Visit # / Visits authorized: 3/20 +Eval   FOTO: 1/3     Precautions: Standard      Time In: 7:10am  Time Out: 8:11am  Total Billable Time: 61 minutes    PTA Visit #: --/5       Subjective     Pt reports: he was sore after his last treatment session.  He had increased pain after his last treatment for 2 days.  He had to take a pain pill yesterday.     He was compliant with home exercise program.  Response to previous treatment: knee is doing ok  Functional change: nothing significant noted    Pain: 6/10  Location: left knee      Objective      Objective Measures updated at progress report unless specified.     Treatment     Mitchell received the treatments listed below:      therapeutic exercises to develop strength, endurance, ROM, flexibility, posture, and core stabilization for 30 minutes including:  Bike  6' for Range of motion and stretching  Knee flexion on step 10" x 10  Knee extension on step 10" x 10  Stair calf stretch 20" x 5  Seated calf stretch 20" x 5  Knee flexion on SB 10" x 10      manual therapy techniques: Joint mobilizations, Manual traction, Soft tissue Mobilization, and FDN were applied to the: L knee for -- minutes, including:  --       neuromuscular re-education activities to improve: Balance, Coordination, Kinesthetic, Sense, Proprioception, and Posture for 31 minutes. The following activities were included:    Shuttle DL 6B 3', SL 3B " "2' right lower extremity only  Standing heel raises 2 x 10  Prone TKE 20x  Prone ham curls 20x  Straight Leg Raise 3 x 10  Quad sets 3" x 20  Sidelying hip abd 20x  Clamshells 20x  Tailgators 7.5# 3'  Matrix Leg Ext 10# 3 x 10  Matrix Ham curl     Deferred:   Bridging  Heel prop 2'  LAQ 20x    therapeutic activities to improve functional performance for -- minutes, including:  Sit to stand 22" 2 x 10  SLED push 2x    Pt will ice at home.    Patient Education and Home Exercises       Education provided:   - HEP and plan of care    Written Home Exercises Provided: Patient instructed to cont prior HEP. Exercises were reviewed and Mitchell was able to demonstrate them prior to the end of the session.  Mitchell demonstrated good  understanding of the education provided. See EMR under Patient Instructions for exercises provided during therapy sessions    Assessment     Mitchell presents with pain in the left knee.  He is progressing well and demonstrating improved knee AROM with both flexion and extension.  He does still have weakness in his quad and will demonstrate an extensor lag in the left knee during Straight Leg Raise.  Continue with progressing AROM and stretching exercises as well as strengthening for the left lower extremity.     Mitchell Is progressing well towards his goals.   Pt prognosis is Good.     Pt will continue to benefit from skilled outpatient physical therapy to address the deficits listed in the problem list box on initial evaluation, provide pt/family education and to maximize pt's level of independence in the home and community environment.     Pt's spiritual, cultural and educational needs considered and pt agreeable to plan of care and goals.     Anticipated barriers to physical therapy: none    Goals:   Short Term Goals: 2 weeks   Pt will be 50% independent with HEP. Progressing     Long Term Goals: 6 weeks   Pt will be 100% independent with HEP. Progressing  Pt will have at least 115 degrees of knee " flexion and 0 degrees of knee extension in the L Knee. Progressing  Pt will improve his knee strength to at least 4+/5 in all directions. Progressing  Pt will improve his FOTO Score by 20 points. Progressing  Plan      Plan of care Certification: 7/6/2023 to 08/18/2023.     Outpatient Physical Therapy 2 times weekly for 6 weeks to include the following interventions: Electrical Stimulation PRN, Gait Training, Manual Therapy, Moist Heat/ Ice, Neuromuscular Re-ed, Patient Education, Self Care, Therapeutic Activities, Therapeutic Exercise, and FDN .     Mena Goncalves, PT

## 2023-07-21 ENCOUNTER — CLINICAL SUPPORT (OUTPATIENT)
Dept: REHABILITATION | Facility: HOSPITAL | Age: 68
End: 2023-07-21
Payer: MEDICARE

## 2023-07-21 ENCOUNTER — DOCUMENTATION ONLY (OUTPATIENT)
Dept: REHABILITATION | Facility: HOSPITAL | Age: 68
End: 2023-07-21

## 2023-07-21 DIAGNOSIS — M25.662 DECREASED ROM OF LEFT KNEE: Primary | ICD-10-CM

## 2023-07-21 PROCEDURE — 97530 THERAPEUTIC ACTIVITIES: CPT | Mod: HCNC,PN,CQ

## 2023-07-21 PROCEDURE — 97110 THERAPEUTIC EXERCISES: CPT | Mod: HCNC,PN,CQ

## 2023-07-21 PROCEDURE — 97112 NEUROMUSCULAR REEDUCATION: CPT | Mod: HCNC,PN,CQ

## 2023-07-21 NOTE — PROGRESS NOTES
PT/PTA met face to face to discuss pt's treatment plan and progress towards established goals. Pt will be seen by a physical therapist minimally every 6th visit or every 30 days.      Piero Lobo PTA

## 2023-07-21 NOTE — PROGRESS NOTES
"[facility]   Physical Therapy Treatment Note      Name: Mitchell Mcmillan  Clinic Number: [id number]    Therapy Diagnosis:   Encounter Diagnosis   Name Primary?    Decreased ROM of left knee Yes     Physician: Shari Florez PA    Visit Date: 7/21/2023    Physician Orders: PT Eval and Treat   Medical Diagnosis from Referral: Z96.652 (ICD-10-CM) - Status post total left knee replacement using cement   Evaluation Date: 7/6/2023  Authorization Period Expiration: 12/31/2023  Plan of Care Expiration: 08/18/2023  Progress Note Due: 30 days  Visit # / Visits authorized: 4/20 +Eval   FOTO: 1/3     Precautions: Standard      Time In: 7:32am  Time Out: 8:16am  Total Billable Time: 44 minutes    PTA Visit #: 1/5       Subjective     Pt reports: doing pretty well today with decreased knee pain. Patient reports continuing to have an increase in pain and soreness for about 2 days after therapy but states not as bad after last visit.    He was compliant with home exercise program.  Response to previous treatment: knee is doing ok  Functional change: nothing significant noted    Pain: 5/10  Location: left knee      Objective      Objective Measures updated at progress report unless specified.     Treatment     Mitchell received the treatments listed below:      therapeutic exercises to develop strength, endurance, ROM, flexibility, posture, and core stabilization for 15 minutes including:  Bike  6' for Range of motion and stretching  Stair calf stretch 20" x 5  Seated calf stretch 20" x 5  Seated Heel Slides; 5" x15  Supine Hangs with 3# on knee 2 minutes      manual therapy techniques: Joint mobilizations, Manual traction, Soft tissue Mobilization, and FDN were applied to the: L knee for -- minutes, including:  --       neuromuscular re-education activities to improve: Balance, Coordination, Kinesthetic, Sense, Proprioception, and Posture for 19 minutes. The following activities were included:    Shuttle " "DL 6B 3', SL 3B 2' right lower extremity only  Prone TKE 20x  Prone ham curls 20x  Straight Leg Raise 3 x 10  Clamshells 20x  Tailgators 7.5# 3'  Matrix Leg Ext 10# 3 x 10  Matrix Ham curl   Tandem Stance; 3 x20"  Standing TKE with ball to wall      therapeutic activities to improve functional performance for 10 minutes, including:  Sit to stand 22" 2 x 10  SLED push 2x  Mini Squats; x15 holding parallel bars    Pt will ice at home.    Patient Education and Home Exercises       Education provided:   - proper self scar tissue mobilizations  - consistency with extension and flexion activities    Written Home Exercises Provided: Patient instructed to cont prior HEP. Exercises were reviewed and Mitchell was able to demonstrate them prior to the end of the session.  Mitchell demonstrated good  understanding of the education provided. See EMR under Patient Instructions for exercises provided during therapy sessions    Assessment     Continued work of previous visit in attempt to continue improving knee Range of Motion, strength, and tolerance to activity. Introduced patient to balancing activities today at which patient required minimal UE A for static tandem stance but constant light UE A for marches. Discussed with patient about continuing to be consistent with efforts outside of therapy to improve knee extension and flexion Range of Motion. Discussed with patient about importance of working within tolerance to minimize inflammation but appropriate challenge.     Mitchell Is progressing well towards his goals.   Pt prognosis is Good.     Pt will continue to benefit from skilled outpatient physical therapy to address the deficits listed in the problem list box on initial evaluation, provide pt/family education and to maximize pt's level of independence in the home and community environment.     Pt's spiritual, cultural and educational needs considered and pt agreeable to plan of care and goals.     Anticipated barriers to " physical therapy: none    Goals:   Short Term Goals: 2 weeks   Pt will be 50% independent with HEP. Progressing     Long Term Goals: 6 weeks   Pt will be 100% independent with HEP. Progressing  Pt will have at least 115 degrees of knee flexion and 0 degrees of knee extension in the L Knee. Progressing  Pt will improve his knee strength to at least 4+/5 in all directions. Progressing  Pt will improve his FOTO Score by 20 points. Progressing  Plan      Plan of care Certification: 7/6/2023 to 08/18/2023.     Outpatient Physical Therapy 2 times weekly for 6 weeks to include the following interventions: Electrical Stimulation PRN, Gait Training, Manual Therapy, Moist Heat/ Ice, Neuromuscular Re-ed, Patient Education, Self Care, Therapeutic Activities, Therapeutic Exercise, and FDN .     Piero Lobo, PTA

## 2023-07-26 ENCOUNTER — CLINICAL SUPPORT (OUTPATIENT)
Dept: REHABILITATION | Facility: HOSPITAL | Age: 68
End: 2023-07-26
Payer: MEDICARE

## 2023-07-26 DIAGNOSIS — M25.662 DECREASED ROM OF LEFT KNEE: Primary | ICD-10-CM

## 2023-07-26 PROCEDURE — 97110 THERAPEUTIC EXERCISES: CPT | Mod: HCNC,PN

## 2023-07-26 PROCEDURE — 97530 THERAPEUTIC ACTIVITIES: CPT | Mod: HCNC,PN

## 2023-07-26 PROCEDURE — 97112 NEUROMUSCULAR REEDUCATION: CPT | Mod: HCNC,PN

## 2023-07-26 NOTE — PROGRESS NOTES
"[facility]   Physical Therapy Treatment Note      Name: Mitchell Mcmillan  Clinic Number: [id number]    Therapy Diagnosis:   Encounter Diagnosis   Name Primary?    Decreased ROM of left knee Yes     Physician: Shari Florez PA    Visit Date: 7/26/2023    Physician Orders: PT Eval and Treat   Medical Diagnosis from Referral: Z96.652 (ICD-10-CM) - Status post total left knee replacement using cement   Evaluation Date: 7/6/2023  Authorization Period Expiration: 12/31/2023  Plan of Care Expiration: 08/18/2023  Progress Note Due: 30 days  Visit # / Visits authorized: 5/20 +Eval   FOTO: 2/3     Precautions: Standard      Time In: 7:15am  Time Out: 8:30am  Total Billable 75 minutes    PTA Visit #: 1/5       Subjective     Pt reports: he will get sharp pains at times in his knee that comes and goes pretty quickly but happens randomly during the day.   He was compliant with home exercise program.  Response to previous treatment: knee is doing ok  Functional change: nothing significant noted    Pain: 5/10  Location: left knee      Objective      Objective Measures updated at progress report unless specified.     Treatment     Mitchell received the treatments listed below:      therapeutic exercises to develop strength, endurance, ROM, flexibility, posture, and core stabilization for 15 minutes including:  Bike  6' for Range of motion and stretching  Stair calf stretch 20" x 5  Seated calf stretch 20" x 5  Seated Heel Slides; 5" x15  Supine Hangs with 3# on knee 2 minutes      manual therapy techniques: Joint mobilizations, Manual traction, Soft tissue Mobilization, and FDN were applied to the: L knee for -- minutes, including:  --       neuromuscular re-education activities to improve: Balance, Coordination, Kinesthetic, Sense, Proprioception, and Posture for 45 minutes. The following activities were included:    Shuttle DL 6B 3', SL 3B 2' right lower extremity only  Prone TKE 20x  Prone ham curls " "20x  Straight Leg Raise 3 x 10  Clamshells 20x  Tailgators 7.5# 3'  Matrix Leg Ext 15# 3 x 10  Matrix Ham curl 35# 3 x 10  Tandem Stance; 3 x20"  Standing TKE with ball to wall      therapeutic activities to improve functional performance for 15 minutes, including:  Sit to stand 22" 2 x 10  SLED push/pull 2x  Mini Squats; x15 holding parallel bars    Pt will ice at home.    Patient Education and Home Exercises       Education provided:   - proper self scar tissue mobilizations  - consistency with extension and flexion activities    Written Home Exercises Provided: Patient instructed to cont prior HEP. Exercises were reviewed and Mitchell was able to demonstrate them prior to the end of the session.  Mitchell demonstrated good  understanding of the education provided. See EMR under Patient Instructions for exercises provided during therapy sessions    Assessment       Mitchell presents with good AROM of the L knee and decreased pain overall.  He is still having sharp pains at times, educated that his could be scar tissue/healing process of having a TKR.  His AROM continued to improve.  Continue with progressing strengthening exercises and continue with end range stretching.      Mitchell Is progressing well towards his goals.   Pt prognosis is Good.     Pt will continue to benefit from skilled outpatient physical therapy to address the deficits listed in the problem list box on initial evaluation, provide pt/family education and to maximize pt's level of independence in the home and community environment.     Pt's spiritual, cultural and educational needs considered and pt agreeable to plan of care and goals.     Anticipated barriers to physical therapy: none    Goals:   Short Term Goals: 2 weeks   Pt will be 50% independent with HEP. Progressing     Long Term Goals: 6 weeks   Pt will be 100% independent with HEP. Progressing  Pt will have at least 115 degrees of knee flexion and 0 degrees of knee extension in the L Knee. " Progressing  Pt will improve his knee strength to at least 4+/5 in all directions. Progressing  Pt will improve his FOTO Score by 20 points. Progressing  Plan      Plan of care Certification: 7/6/2023 to 08/18/2023.     Outpatient Physical Therapy 2 times weekly for 6 weeks to include the following interventions: Electrical Stimulation PRN, Gait Training, Manual Therapy, Moist Heat/ Ice, Neuromuscular Re-ed, Patient Education, Self Care, Therapeutic Activities, Therapeutic Exercise, and FDN .     Mena Goncalves, PT

## 2023-07-28 ENCOUNTER — CLINICAL SUPPORT (OUTPATIENT)
Dept: REHABILITATION | Facility: HOSPITAL | Age: 68
End: 2023-07-28
Payer: MEDICARE

## 2023-07-28 DIAGNOSIS — M25.662 DECREASED ROM OF LEFT KNEE: Primary | ICD-10-CM

## 2023-07-28 PROCEDURE — 97110 THERAPEUTIC EXERCISES: CPT | Mod: HCNC,PN,CQ

## 2023-07-28 PROCEDURE — 97112 NEUROMUSCULAR REEDUCATION: CPT | Mod: HCNC,PN,CQ

## 2023-07-28 PROCEDURE — 97530 THERAPEUTIC ACTIVITIES: CPT | Mod: HCNC,PN,CQ

## 2023-07-28 NOTE — PROGRESS NOTES
"[facility]   Physical Therapy Treatment Note      Name: Mitchell Mcmillan  Clinic Number: [id number]    Therapy Diagnosis:   Encounter Diagnosis   Name Primary?    Decreased ROM of left knee Yes     Physician: Shari Florez PA    Visit Date: 7/28/2023    Physician Orders: PT Eval and Treat   Medical Diagnosis from Referral: Z96.652 (ICD-10-CM) - Status post total left knee replacement using cement   Evaluation Date: 7/6/2023  Authorization Period Expiration: 12/31/2023  Plan of Care Expiration: 08/18/2023  Progress Note Due: 30 days  Visit # / Visits authorized: 6/20 +Eval   FOTO: 3/3     Precautions: Standard      Time In: 7:31am  Time Out: 8:40 am  Total Billable: 68 minutes    PTA Visit #: 1/5       Subjective     Pt reports: noticing progression in knee from therapy. Patient states manually working on scar has been helpful  He was compliant with home exercise program.  Response to previous treatment: knee is doing ok  Functional change: nothing significant noted    Pain: 5/10  Location: left knee      Objective      Objective Measures updated at progress report unless specified.     Treatment     Mitchell received the treatments listed below:      therapeutic exercises to develop strength, endurance, ROM, flexibility, posture, and core stabilization for 14 minutes including:  Bike  6' for Range of motion and stretching  Stair calf stretch 20" x 5  Seated calf and hamstring stretch 20" x 5  Seated knee flexion AAROM with Right Lower Extremity to manually assist into further range; 5" x15  Supine Hangs with 4# on knee 2 minutes      manual therapy techniques: Joint mobilizations, Manual traction, Soft tissue Mobilization, and FDN were applied to the: L knee for -- minutes, including:  --       neuromuscular re-education activities to improve: Balance, Coordination, Kinesthetic, Sense, Proprioception, and Posture for 30 minutes. The following activities were included:    Shuttle DL 6B " "3', SL 3B 2' right lower extremity only  Prone TKE 20x  Prone ham curls 20x  Straight Leg Raise 3 x 10  Tailgators 7.5# 3'  Matrix Leg Ext 20# 3 x 10  Matrix Ham curl 40# 3 x 10  Tandem Stance; 3 x20"  Standing TKE with ball to wall      therapeutic activities to improve functional performance for 24 minutes, including:    TRX modified single leg Sit to stand 24" 2 x 10  SLED push/pull 2x  Mini Squats; x20 holding parallel bars  Stair climbing 2 sets 4 steps  Step Downs on 4" step; 2 x10      Patient Education and Home Exercises       Education provided:     Written Home Exercises Provided: Patient instructed to cont prior HEP. Exercises were reviewed and Mitchell was able to demonstrate them prior to the end of the session.  Mitchell demonstrated good  understanding of the education provided. See EMR under Patient Instructions for exercises provided during therapy sessions    Assessment     Had patient complete a FOTO again this week following subjective reports of seeing progress. Patient states still having to take his time with things such as turning or picking something off of the floor but has less pain with such movements. Patient continues to report limitations with standing still without support stating only can go for about 4-5 minutes before needing to move around. Patient reported mild discomfort with step downs and stair climbing stating an overall tightness feeling but was able to complete all prescribed activities today without exacerbations.     Mitchell Is progressing well towards his goals.   Pt prognosis is Good.     Pt will continue to benefit from skilled outpatient physical therapy to address the deficits listed in the problem list box on initial evaluation, provide pt/family education and to maximize pt's level of independence in the home and community environment.     Pt's spiritual, cultural and educational needs considered and pt agreeable to plan of care and goals.     Anticipated barriers to " physical therapy: none    Goals:   Short Term Goals: 2 weeks   Pt will be 50% independent with HEP. Progressing     Long Term Goals: 6 weeks   Pt will be 100% independent with HEP. Progressing  Pt will have at least 115 degrees of knee flexion and 0 degrees of knee extension in the L Knee. Progressing  Pt will improve his knee strength to at least 4+/5 in all directions. Progressing  Pt will improve his FOTO Score by 20 points. Progressing  Plan      Plan of care Certification: 7/6/2023 to 08/18/2023.     Outpatient Physical Therapy 2 times weekly for 6 weeks to include the following interventions: Electrical Stimulation PRN, Gait Training, Manual Therapy, Moist Heat/ Ice, Neuromuscular Re-ed, Patient Education, Self Care, Therapeutic Activities, Therapeutic Exercise, and FDN .     Piero Lobo, PTA

## 2023-07-31 ENCOUNTER — OFFICE VISIT (OUTPATIENT)
Dept: ORTHOPEDICS | Facility: CLINIC | Age: 68
End: 2023-07-31
Payer: MEDICARE

## 2023-07-31 VITALS — WEIGHT: 258 LBS | BODY MASS INDEX: 31.42 KG/M2 | HEIGHT: 76 IN

## 2023-07-31 DIAGNOSIS — Z96.652 STATUS POST TOTAL LEFT KNEE REPLACEMENT USING CEMENT: Primary | ICD-10-CM

## 2023-07-31 PROCEDURE — 3288F PR FALLS RISK ASSESSMENT DOCUMENTED: ICD-10-PCS | Mod: HCNC,CPTII,S$GLB, | Performed by: PHYSICIAN ASSISTANT

## 2023-07-31 PROCEDURE — 99024 PR POST-OP FOLLOW-UP VISIT: ICD-10-PCS | Mod: HCNC,S$GLB,, | Performed by: PHYSICIAN ASSISTANT

## 2023-07-31 PROCEDURE — 3008F BODY MASS INDEX DOCD: CPT | Mod: HCNC,CPTII,S$GLB, | Performed by: PHYSICIAN ASSISTANT

## 2023-07-31 PROCEDURE — 3288F FALL RISK ASSESSMENT DOCD: CPT | Mod: HCNC,CPTII,S$GLB, | Performed by: PHYSICIAN ASSISTANT

## 2023-07-31 PROCEDURE — 1101F PT FALLS ASSESS-DOCD LE1/YR: CPT | Mod: HCNC,CPTII,S$GLB, | Performed by: PHYSICIAN ASSISTANT

## 2023-07-31 PROCEDURE — 4010F ACE/ARB THERAPY RXD/TAKEN: CPT | Mod: HCNC,CPTII,S$GLB, | Performed by: PHYSICIAN ASSISTANT

## 2023-07-31 PROCEDURE — 3008F PR BODY MASS INDEX (BMI) DOCUMENTED: ICD-10-PCS | Mod: HCNC,CPTII,S$GLB, | Performed by: PHYSICIAN ASSISTANT

## 2023-07-31 PROCEDURE — 1160F RVW MEDS BY RX/DR IN RCRD: CPT | Mod: HCNC,CPTII,S$GLB, | Performed by: PHYSICIAN ASSISTANT

## 2023-07-31 PROCEDURE — 99024 POSTOP FOLLOW-UP VISIT: CPT | Mod: HCNC,S$GLB,, | Performed by: PHYSICIAN ASSISTANT

## 2023-07-31 PROCEDURE — 1159F PR MEDICATION LIST DOCUMENTED IN MEDICAL RECORD: ICD-10-PCS | Mod: HCNC,CPTII,S$GLB, | Performed by: PHYSICIAN ASSISTANT

## 2023-07-31 PROCEDURE — 1125F AMNT PAIN NOTED PAIN PRSNT: CPT | Mod: HCNC,CPTII,S$GLB, | Performed by: PHYSICIAN ASSISTANT

## 2023-07-31 PROCEDURE — 1160F PR REVIEW ALL MEDS BY PRESCRIBER/CLIN PHARMACIST DOCUMENTED: ICD-10-PCS | Mod: HCNC,CPTII,S$GLB, | Performed by: PHYSICIAN ASSISTANT

## 2023-07-31 PROCEDURE — 99999 PR PBB SHADOW E&M-EST. PATIENT-LVL III: CPT | Mod: PBBFAC,HCNC,, | Performed by: PHYSICIAN ASSISTANT

## 2023-07-31 PROCEDURE — 4010F PR ACE/ARB THEARPY RXD/TAKEN: ICD-10-PCS | Mod: HCNC,CPTII,S$GLB, | Performed by: PHYSICIAN ASSISTANT

## 2023-07-31 PROCEDURE — 1125F PR PAIN SEVERITY QUANTIFIED, PAIN PRESENT: ICD-10-PCS | Mod: HCNC,CPTII,S$GLB, | Performed by: PHYSICIAN ASSISTANT

## 2023-07-31 PROCEDURE — 1159F MED LIST DOCD IN RCRD: CPT | Mod: HCNC,CPTII,S$GLB, | Performed by: PHYSICIAN ASSISTANT

## 2023-07-31 PROCEDURE — 99999 PR PBB SHADOW E&M-EST. PATIENT-LVL III: ICD-10-PCS | Mod: PBBFAC,HCNC,, | Performed by: PHYSICIAN ASSISTANT

## 2023-07-31 PROCEDURE — 1101F PR PT FALLS ASSESS DOC 0-1 FALLS W/OUT INJ PAST YR: ICD-10-PCS | Mod: HCNC,CPTII,S$GLB, | Performed by: PHYSICIAN ASSISTANT

## 2023-07-31 NOTE — PROGRESS NOTES
Patient ID: Mitchell Mcmillan is a 67 y.o. male.    Chief Complaint: Pain and Post-op Evaluation of the Left Knee      HPI: Mitchell Mcimllan  is a 67 y.o. male who c/o Pain and Post-op Evaluation of the Left Knee     Post op visit 2  Patient notes pain is 5/10   The patient is doing well since surgery and is pleased with his results   He has been able to trust the extremity more and make further advancement of activity of daily living and thus is quality of life   He notes intermittent pain with use and activity over the course of the day more so at night in which he notes electrical shocks   Upon further discussion patient states he stopped taking gabapentin roughly 1 week ago.  I advised him to restart this medicine to help alleviate the symptoms   He is still taking half a Percocet as needed intermittently but nothing daily just on days that it gets really bad with therapy   He notes his balance is improving on a daily basis   He is using a cane to assist with ambulation if needed states it is in the car as he does not have it with him today   Fully compliant with DVT prophylaxis and therapy    Patient is presently denying any shortness of breath, chest pain, fever/chills, nausea/vomiting, loss of taste or smell, numbness/tingling or sensation changes, loss of bladder or bowel function, loss of taste/smell.     Surgery: Left Total Knee     Surgery Date:  05/31/2023    Past Medical History:   Diagnosis Date    Arthritis     knees    Asymmetric SNHL (sensorineural hearing loss) 08/17/2017    Chronic left hip pain 2006    Dislocated left hip, job injury    Chronic prostatitis     per pt, last episode 2013    Elevated PSA, less than 10 ng/ml     per pt last elevation 2013    Gastroesophageal reflux disease 8/3/2022    Hyperlipidemia 2010    Hypertension 2011    Joint effusion of knee     per pt rt knee, seen by Dr. Duke 2013    Midline low back pain without sciatica 6/13/2016    Obesity     Special screening for  malignant neoplasms, colon 10/5/2016    Tobacco dependence     Trouble in sleeping      Past Surgical History:   Procedure Laterality Date    arthrocentesis Right     Per pt , knee by Dr. BONNIE Duke    COLONOSCOPY N/A 10/05/2016    Normal exam repeat 10 yrs. Procedure: COLONOSCOPY;  Surgeon: Alexander Goetz MD;  Location: [facility] ENDO;  Service: Endoscopy;  Laterality: N/A;    HIP ARTHROPLASTY Left 2006    Per pt, dislocated left hip/ hardware placed  by Dr. Lisa    left orchiectomy      PROSTATE BIOPSY      procedure performed at [facility] [place] due to elevated PSA - benign    TOTAL KNEE ARTHROPLASTY Left 2023    Procedure: ARTHROPLASTY, KNEE, TOTAL;  Surgeon: Ellis Mora MD;  Location: [facility] OR;  Service: Orthopedics;  Laterality: Left;     Family History   Problem Relation Age of Onset    Depression Mother     Early death Mother     Hypertension Mother     Heart disease Mother 53         from MI    Arthritis Father     Stroke Father     Diabetes Sister     No Known Problems Brother     Hypertension Sister     Hypertension Sister     No Known Problems Brother     No Known Problems Brother     No Known Problems Daughter     Cancer Neg Hx     COPD Neg Hx     Kidney disease Neg Hx      Social History     Socioeconomic History    Marital status:      Spouse name: Nida    Number of children: 0   Occupational History    Occupation: Disabled since      Comment: Dislocated left hip   Tobacco Use    Smoking status: Former     Current packs/day: 0.00     Average packs/day: 0.1 packs/day for 46.0 years (4.6 ttl pk-yrs)     Types: Cigarettes     Start date: 6/3/2022     Quit date: 10/19/2022     Years since quittin.7    Smokeless tobacco: Never   Substance and Sexual Activity    Alcohol use: Not Currently     Alcohol/week: 2.0 standard drinks of alcohol     Types: 2 Glasses of wine per week    Drug use: No    Sexual activity: Yes     Partners: Female     Birth  control/protection: None   Social History Narrative    Disabled since 2006.  Job injury.  Dislocated left hip.  Has 4 brothers and 4 sisters. Wears seatbelt.     Social Determinants of Health     Financial Resource Strain: Low Risk  (9/30/2022)    Overall Financial Resource Strain (CARDIA)     Difficulty of Paying Living Expenses: Not hard at all   Food Insecurity: No Food Insecurity (9/30/2022)    Hunger Vital Sign     Worried About Running Out of Food in the Last Year: Never true     Ran Out of Food in the Last Year: Never true   Transportation Needs: No Transportation Needs (9/30/2022)    PRAPARE - Transportation     Lack of Transportation (Medical): No     Lack of Transportation (Non-Medical): No   Physical Activity: Insufficiently Active (9/30/2022)    Exercise Vital Sign     Days of Exercise per Week: 2 days     Minutes of Exercise per Session: 20 min   Stress: No Stress Concern Present (9/30/2022)    [employer] - Occupational Stress Questionnaire     Feeling of Stress : Only a little   Social Connections: Socially Integrated (9/30/2022)    Social Connection and Isolation Panel [NHANES]     Frequency of Communication with Friends and Family: Once a week     Frequency of Social Gatherings with Friends and Family: Three times a week     Attends [employer] Services: More than 4 times per year     Active Member of Clubs or Organizations: Yes     Attends Club or Organization Meetings: More than 4 times per year     Marital Status:    Housing Stability: Low Risk  (9/30/2022)    Housing Stability Vital Sign     Unable to Pay for Housing in the Last Year: No     Number of Places Lived in the Last Year: 1     Unstable Housing in the Last Year: No     Medication List with Changes/Refills   Current Medications    ATORVASTATIN (LIPITOR) 40 MG TABLET    TAKE 1 TABLET EVERY DAY    DICLOFENAC SODIUM (VOLTAREN) 1 % GEL    Apply 2 g topically 3 (three) times daily as needed.    FINASTERIDE  (PROSCAR) 5 MG TABLET    TAKE 1 TABLET EVERY DAY    FLUTICASONE PROPIONATE (FLONASE) 50 MCG/ACTUATION NASAL SPRAY    2 sprays (100 mcg total) by Each Nostril route once daily.    GABAPENTIN (NEURONTIN) 300 MG CAPSULE    Take 1 capsule (300 mg total) by mouth every evening.    LISINOPRIL-HYDROCHLOROTHIAZIDE (PRINZIDE,ZESTORETIC) 20-25 MG TAB    TAKE 1 TABLET EVERY DAY    MELOXICAM (MOBIC) 15 MG TABLET    Take 1 tablet (15 mg total) by mouth once daily.    OMEGA-3 FATTY ACIDS/FISH OIL (FISH OIL-OMEGA-3 FATTY ACIDS) 300-1,000 MG CAPSULE    Take 1 capsule by mouth once daily.    ONDANSETRON (ZOFRAN-ODT) 4 MG TBDL    Dissolve 2 tablets (8 mg total) by mouth every 8 (eight) hours as needed.    OXYCODONE-ACETAMINOPHEN (PERCOCET)  MG PER TABLET    Take 1 tablet by mouth every 8 (eight) hours as needed for Pain.    PANTOPRAZOLE (PROTONIX) 40 MG TABLET    Take 1 tablet (40 mg total) by mouth once daily.    SILDENAFIL (VIAGRA) 100 MG TABLET    Take 1 tablet (100 mg total) by mouth daily as needed for Erectile Dysfunction.    TAMSULOSIN (FLOMAX) 0.4 MG CAP    TAKE 1 CAPSULE EVERY DAY     Review of patient's allergies indicates:  No Known Allergies    Objective:     Left Lower Extremity  NVI  WWP foot  Comp soft  Cap refill < 2 sec  Calf NT, soft  (-) Simi sign  RENATO  ROM : Patient is able to easily exhibit full flexion and extension on passive range of motion.   Wiggles toes  DF/PF intact  Sensation intact  Inc C/D/I  No SOI    Imaging:    No imaging obtained today    Assessment:       No diagnosis found.       Plan:       There are no diagnoses linked to this encounter.    Mitchell Mcmillan is an established pt here for postop follow-up after left total knee replacement by Dr. Mora. The patient will continue with the current medication regimen and treatment plan.  Patient should notify the office of any signs or symptoms of infection including fevers, erythema, purulent drainage, increasing pain.  Patient will continue  with DVT prophylaxis until at least 6 weeks postop.  Patient will continue outpatient physical therapy.  Will follow-up as scheduled. Patient verbalized understanding of all instructions and agreed with the above plan.    No follow-ups on file.    The patient understands, chooses and consents to this plan and accepts all   the risks which include but are not limited to the risks mentioned above.     Disclaimer: This note was prepared using a voice recognition system and is likely to have sound alike errors within the text.

## 2023-08-02 ENCOUNTER — CLINICAL SUPPORT (OUTPATIENT)
Dept: REHABILITATION | Facility: HOSPITAL | Age: 68
End: 2023-08-02
Payer: MEDICARE

## 2023-08-02 DIAGNOSIS — M25.662 DECREASED ROM OF LEFT KNEE: Primary | ICD-10-CM

## 2023-08-02 PROCEDURE — 97530 THERAPEUTIC ACTIVITIES: CPT | Mod: HCNC,PN

## 2023-08-02 PROCEDURE — 97110 THERAPEUTIC EXERCISES: CPT | Mod: HCNC,PN

## 2023-08-02 PROCEDURE — 97112 NEUROMUSCULAR REEDUCATION: CPT | Mod: HCNC,PN

## 2023-08-02 NOTE — PROGRESS NOTES
"[facility]   Physical Therapy Treatment Note      Name: Mitchell Mcmillan  Clinic Number: [id number]    Therapy Diagnosis:   Encounter Diagnosis   Name Primary?    Decreased ROM of left knee Yes     Physician: Shari Florez PA    Visit Date: 8/2/2023    Physician Orders: PT Eval and Treat   Medical Diagnosis from Referral: Z96.652 (ICD-10-CM) - Status post total left knee replacement using cement   Evaluation Date: 7/6/2023  Authorization Period Expiration: 12/31/2023  Plan of Care Expiration: 08/18/2023  Progress Note Due: 30 days  Visit # / Visits authorized: 7/20 +Eval   FOTO: 3/3     Precautions: Standard      Time In: 7:15am  Time Out: 8:23am  Total Billable: 68 minutes    PTA Visit #: 1/5       Subjective     Pt reports: he feels like he is doing pretty good, wondering if today could be his last day.   He was compliant with home exercise program.  Response to previous treatment: knee is doing ok  Functional change: nothing significant noted    Pain: 5/10  Location: left knee      Objective      Objective Measures updated at progress report unless specified.     Treatment     Mitchell received the treatments listed below:      therapeutic exercises to develop strength, endurance, ROM, flexibility, posture, and core stabilization for 14 minutes including:  Bike  6' for Range of motion and stretching  Stair calf stretch 20" x 5  Supine knee flexion with SB 10" x 10    Deferred:   Seated calf and hamstring stretch 20" x 5  Seated knee flexion AAROM with Right Lower Extremity to manually assist into further range; 5" x15  Supine Hangs with 4# on knee 2 minutes      manual therapy techniques: Joint mobilizations, Manual traction, Soft tissue Mobilization, and FDN were applied to the: L knee for -- minutes, including:  --       neuromuscular re-education activities to improve: Balance, Coordination, Kinesthetic, Sense, Proprioception, and Posture for 24 minutes. The following activities " "were included:    Shuttle DL 6B 3', SL 3B 2' right lower extremity only  Matrix Leg Ext 25# 3 x 10  Matrix Ham curl 45# 3 x 10    Deferred:   Prone TKE 20x  Prone ham curls 20x  Straight Leg Raise 3 x 10  Tailgators 7.5# 3'  Tandem Stance; 3 x20"  Standing TKE with ball to wall      therapeutic activities to improve functional performance for 30 minutes, including:    TRX modified single leg Sit to stand 24" 2 x 10  SLED push/pull 2x  Sit to stand 20" 2 x 10 w/10# plate  Step Downs on 6" step; 2 x10  Stair training    Deferred:   Mini Squats; x20 holding parallel bars  Stair climbing 2 sets 4 steps        Patient Education and Home Exercises       Education provided:   HEP and plan of care    Written Home Exercises Provided: Patient instructed to cont prior HEP. Exercises were reviewed and Mitchell was able to demonstrate them prior to the end of the session.  Mitchell demonstrated good  understanding of the education provided. See EMR under Patient Instructions for exercises provided during therapy sessions    Assessment     Mitchell is progressing well and improving his AROM and knee strength.  He has 0 of knee extension and has 112 degrees of knee flexion. He is progressing well and anticipate possible discharge next week.     Mitchell Is progressing well towards his goals.   Pt prognosis is Good.     Pt will continue to benefit from skilled outpatient physical therapy to address the deficits listed in the problem list box on initial evaluation, provide pt/family education and to maximize pt's level of independence in the home and community environment.     Pt's spiritual, cultural and educational needs considered and pt agreeable to plan of care and goals.     Anticipated barriers to physical therapy: none    Goals:   Short Term Goals: 2 weeks   Pt will be 50% independent with HEP. Progressing     Long Term Goals: 6 weeks   Pt will be 100% independent with HEP. Progressing  Pt will have at least 115 degrees of knee " flexion and 0 degrees of knee extension in the L Knee. Progressing  Pt will improve his knee strength to at least 4+/5 in all directions. Progressing  Pt will improve his FOTO Score by 20 points. Progressing  Plan      Plan of care Certification: 7/6/2023 to 08/18/2023.     Outpatient Physical Therapy 2 times weekly for 6 weeks to include the following interventions: Electrical Stimulation PRN, Gait Training, Manual Therapy, Moist Heat/ Ice, Neuromuscular Re-ed, Patient Education, Self Care, Therapeutic Activities, Therapeutic Exercise, and FDN .     Mena Goncalves, PT

## 2023-08-09 ENCOUNTER — CLINICAL SUPPORT (OUTPATIENT)
Dept: REHABILITATION | Facility: HOSPITAL | Age: 68
End: 2023-08-09
Payer: MEDICARE

## 2023-08-09 DIAGNOSIS — M25.662 DECREASED ROM OF LEFT KNEE: Primary | ICD-10-CM

## 2023-08-09 PROCEDURE — 97530 THERAPEUTIC ACTIVITIES: CPT | Mod: HCNC,PN

## 2023-08-09 PROCEDURE — 97110 THERAPEUTIC EXERCISES: CPT | Mod: HCNC,PN

## 2023-08-09 PROCEDURE — 97112 NEUROMUSCULAR REEDUCATION: CPT | Mod: HCNC,PN

## 2023-08-09 NOTE — PROGRESS NOTES
"[facility]   Physical Therapy Treatment Note      Name: Mitchell Mcmillan  Clinic Number: [id number]    Therapy Diagnosis:   Encounter Diagnosis   Name Primary?    Decreased ROM of left knee Yes     Physician: Shari Florez PA    Visit Date: 8/9/2023    Physician Orders: PT Eval and Treat   Medical Diagnosis from Referral: Z96.652 (ICD-10-CM) - Status post total left knee replacement using cement   Evaluation Date: 7/6/2023  Authorization Period Expiration: 12/31/2023  Plan of Care Expiration: 08/18/2023  Progress Note Due: 30 days  Visit # / Visits authorized: 8/20 +Eval   FOTO: 3/3     Precautions: Standard      Time In: 7:10am  Time Out: 8:05am  Total Billable: 53 minutes    PTA Visit #: 1/5       Subjective     Pt reports: he is doing pretty good today.   He was compliant with home exercise program.  Response to previous treatment: knee is doing ok  Functional change: nothing significant noted    Pain: 5/10  Location: left knee      Objective      Objective Measures updated at progress report unless specified.     Treatment     Mitchell received the treatments listed below:      therapeutic exercises to develop strength, endurance, ROM, flexibility, posture, and core stabilization for 8 minutes including:  Bike  6' for Range of motion and stretching  Stair calf stretch 20" x 5      Deferred:   Supine knee flexion with SB 10" x 10  Seated calf and hamstring stretch 20" x 5  Seated knee flexion AAROM with Right Lower Extremity to manually assist into further range; 5" x15  Supine Hangs with 4# on knee 2 minutes      manual therapy techniques: Joint mobilizations, Manual traction, Soft tissue Mobilization, and FDN were applied to the: L knee for -- minutes, including:  --       neuromuscular re-education activities to improve: Balance, Coordination, Kinesthetic, Sense, Proprioception, and Posture for 23 minutes. The following activities were included:    Shuttle DL 7B 3', SL 5B 1' right " "lower extremity only  Standing heel raises 2 x 20  Matrix Leg Ext 25# 3 x 10  Matrix Ham curl 45# 3 x 10    Deferred:   Prone TKE 20x  Prone ham curls 20x  Straight Leg Raise 3 x 10  Tailgators 7.5# 3'  Tandem Stance; 3 x20"  Standing TKE with ball to wall      therapeutic activities to improve functional performance for 24 minutes, including:      SLED push/pull 3x  Sit to stand 20" 3 x 10 w/10# plate  Step Downs on 6" step; 2 x10      Deferred:   TRX modified single leg Sit to stand 24" 2 x 10  Mini Squats; x20 holding parallel bars  Stair climbing 2 sets 4 steps        Patient Education and Home Exercises       Education provided:   HEP and plan of care    Written Home Exercises Provided: Patient instructed to cont prior HEP. Exercises were reviewed and Mitchell was able to demonstrate them prior to the end of the session.  Mitchell demonstrated good  understanding of the education provided. See EMR under Patient Instructions for exercises provided during therapy sessions    Assessment     Mitchell has progressed well and improved his knee AROM and strength.  He is having less pain overall.  He will be discharged at his next visit.     Mitchell Is progressing well towards his goals.   Pt prognosis is Good.     Pt will continue to benefit from skilled outpatient physical therapy to address the deficits listed in the problem list box on initial evaluation, provide pt/family education and to maximize pt's level of independence in the home and community environment.     Pt's spiritual, cultural and educational needs considered and pt agreeable to plan of care and goals.     Anticipated barriers to physical therapy: none    Goals:   Short Term Goals: 2 weeks   Pt will be 50% independent with HEP. Progressing     Long Term Goals: 6 weeks   Pt will be 100% independent with HEP. Progressing  Pt will have at least 115 degrees of knee flexion and 0 degrees of knee extension in the L Knee. Progressing  Pt will improve his knee " strength to at least 4+/5 in all directions. Progressing  Pt will improve his FOTO Score by 20 points. Progressing  Plan      Plan of care Certification: 7/6/2023 to 08/18/2023.     Outpatient Physical Therapy 2 times weekly for 6 weeks to include the following interventions: Electrical Stimulation PRN, Gait Training, Manual Therapy, Moist Heat/ Ice, Neuromuscular Re-ed, Patient Education, Self Care, Therapeutic Activities, Therapeutic Exercise, and FDN .     Mena Goncalves, PT

## 2023-08-11 ENCOUNTER — CLINICAL SUPPORT (OUTPATIENT)
Dept: REHABILITATION | Facility: HOSPITAL | Age: 68
End: 2023-08-11
Payer: MEDICARE

## 2023-08-11 DIAGNOSIS — M25.662 DECREASED ROM OF LEFT KNEE: Primary | ICD-10-CM

## 2023-08-11 PROCEDURE — 97530 THERAPEUTIC ACTIVITIES: CPT | Mod: HCNC,PN,CQ

## 2023-08-11 PROCEDURE — 97112 NEUROMUSCULAR REEDUCATION: CPT | Mod: HCNC,PN,CQ

## 2023-08-11 PROCEDURE — 97110 THERAPEUTIC EXERCISES: CPT | Mod: HCNC,PN,CQ

## 2023-08-11 NOTE — PROGRESS NOTES
"[facility]   Physical Therapy Treatment Note      Name: Mitchell Mcmillan  Clinic Number: [id number]    Therapy Diagnosis:   Encounter Diagnosis   Name Primary?    Decreased ROM of left knee Yes     Physician: Shari Florez PA    Visit Date: 8/11/2023    Physician Orders: PT Eval and Treat   Medical Diagnosis from Referral: Z96.652 (ICD-10-CM) - Status post total left knee replacement using cement   Evaluation Date: 7/6/2023  Authorization Period Expiration: 12/31/2023  Plan of Care Expiration: 08/18/2023  Progress Note Due: 30 days  Visit # / Visits authorized: 9/20 +Eval   FOTO: 3/3     Precautions: Standard      Time In: 7:28am  Time Out: 8:16am  Total Billable: 48 minutes    PTA Visit #: 1/5       Subjective     Pt reports: continuing to do well overall with complaints of tightness and stiffness present in the morning but minimal pain present.  He was compliant with home exercise program.  Response to previous treatment: knee is doing ok  Functional change: nothing significant noted    Pain: 3/10  Location: left knee      Objective      Objective Measures updated at progress report unless specified.     Treatment     Mitchell received the treatments listed below:      therapeutic exercises to develop strength, endurance, ROM, flexibility, posture, and core stabilization for 10 minutes including:  Tall Bike  6' for Range of motion and stretching  Stair calf stretch 20" x 5  Standing TKE Green Theraband 2 x10  Mini Squats; 2 x10    Deferred:   Supine knee flexion with SB 10" x 10  Seated calf and hamstring stretch 20" x 5  Seated knee flexion AAROM with Right Lower Extremity to manually assist into further range; 5" x15  Supine Hangs with 4# on knee 2 minutes      manual therapy techniques: Joint mobilizations, Manual traction, Soft tissue Mobilization, and FDN were applied to the: L knee for -- minutes, including:  --       neuromuscular re-education activities to improve: Balance, " "Coordination, Kinesthetic, Sense, Proprioception, and Posture for 24 minutes. The following activities were included:    Shuttle DL 7B 3', SL 5B 1' each  Standing heel raises 2 x 20  Matrix Leg Ext 25# 3 x 10  Matrix Ham curl 45# 3 x 10  SLS 3 x20"  Tandem Gait 3 laps      Deferred:   Prone TKE 20x  Prone ham curls 20x  Straight Leg Raise 3 x 10  Tailgators 7.5# 3'  Tandem Stance; 3 x20"  Standing TKE with ball to wall      therapeutic activities to improve functional performance for 15 minutes, including:    Stair climbing 6 sets  SLED push/pull 3x  Sit to stand 20" 3 x 10 w/10# plate  Step Downs on 6" step; 2 x10  HEP Review      Deferred:   TRX modified single leg Sit to stand 24" 2 x 10  Mini Squats; x20 holding parallel bars  Stair climbing 2 sets 4 steps        Patient Education and Home Exercises       Education provided:       Written Home Exercises Provided: Patient instructed to cont prior HEP. Exercises were reviewed and Mitchell was able to demonstrate them prior to the end of the session.  Mitchell demonstrated good  understanding of the education provided. See EMR under Patient Instructions for exercises provided during therapy sessions    Assessment     Today is patient's discharge visit as he had successfully demonstrated greater knee mobility, consistency of improving tolerance to weightbearing and strengthening activities, and overall decreased pain. Patient reports every couple days getting a stinging pain in knee regardless of activity but states will go away after about 30 seconds. Patient demonstrates improvements in stability and balance with standing activities with decreased UE A. Provided patient with an advanced HEP for continued independence and preventing a regression. Had patient complete one last FOTO at which his reports of difficulty and pain show improvements in left knee.    Mitchell Is progressing well towards his goals.   Pt prognosis is Good.     Pt will continue to benefit from " skilled outpatient physical therapy to address the deficits listed in the problem list box on initial evaluation, provide pt/family education and to maximize pt's level of independence in the home and community environment.     Pt's spiritual, cultural and educational needs considered and pt agreeable to plan of care and goals.     Anticipated barriers to physical therapy: none    Goals:   Short Term Goals: 2 weeks   Pt will be 50% independent with HEP. Progressing     Long Term Goals: 6 weeks   Pt will be 100% independent with HEP. Progressing  Pt will have at least 115 degrees of knee flexion and 0 degrees of knee extension in the L Knee. Progressing  Pt will improve his knee strength to at least 4+/5 in all directions. Progressing  Pt will improve his FOTO Score by 20 points. Progressing  Plan      Plan of care Certification: 7/6/2023 to 08/18/2023.     Outpatient Physical Therapy 2 times weekly for 6 weeks to include the following interventions: Electrical Stimulation PRN, Gait Training, Manual Therapy, Moist Heat/ Ice, Neuromuscular Re-ed, Patient Education, Self Care, Therapeutic Activities, Therapeutic Exercise, and FDN .     Piero Lobo, PTA

## 2023-08-15 DIAGNOSIS — M17.12 ARTHRITIS OF KNEE, LEFT: ICD-10-CM

## 2023-08-15 DIAGNOSIS — M17.11 ARTHRITIS OF KNEE, RIGHT: ICD-10-CM

## 2023-08-16 ENCOUNTER — TELEPHONE (OUTPATIENT)
Dept: HEMATOLOGY/ONCOLOGY | Facility: CLINIC | Age: 68
End: 2023-08-16
Payer: MEDICARE

## 2023-08-16 RX ORDER — GABAPENTIN 300 MG/1
300 CAPSULE ORAL NIGHTLY
Qty: 30 CAPSULE | Refills: 11 | Status: SHIPPED | OUTPATIENT
Start: 2023-08-16 | End: 2024-08-15

## 2023-08-16 NOTE — TELEPHONE ENCOUNTER
----- Message from Omar Cocnepcion MD sent at 8/16/2023  3:49 PM CDT -----  Contact: [phone]  Need sunny to see  ----- Message -----  From: Latanya Palomino LPN  Sent: 8/16/2023   2:29 PM CDT  To: Omar Concepcion MD    Patient requesting medication change  ----- Message -----  From: Marbella Dunn  Sent: 8/16/2023   1:06 PM CDT  To: Jamey MANNING Staff    Pt is calling requesting pain medication , pt states he is needing something milder than the oxycodone . Please call back at [phone] . [facility] [address] [place] [address] [address] [facility]  [address] [facility] [address]  Phone: [phone] Fax: [phone]

## 2023-08-17 ENCOUNTER — TELEPHONE (OUTPATIENT)
Dept: ORTHOPEDICS | Facility: CLINIC | Age: 68
End: 2023-08-17
Payer: MEDICARE

## 2023-08-17 NOTE — TELEPHONE ENCOUNTER
Returned the patient's phone call in regards to their message. Left a message for the patient to give the office a call back.             ----- Message from TERENCE Park sent at 8/17/2023 12:51 PM CDT -----  Contact: [phone]  Is he needing pain meds or more a muscle relaxer?    We typically dont do pain meds this far out but if need I can give him 1 more refill of a pain medications   ----- Message -----  From: Jeanna Sales MA  Sent: 8/16/2023   1:20 PM CDT  To: TERENCE Park    He would like to know if he can have something not as strong as his percocet we filled today.  ----- Message -----  From: Marbella Dunn  Sent: 8/16/2023   1:14 PM CDT  To: Abby Corral Staff; Gautam Mccarthy Staff    Pt is calling requesting pain medication , pt states he would like something milder than oxycodone . Please call back at [phone] . Thankskimber          [facility] [address] [place] [address] [address] [facility]  [address] [facility] [address]  Phone: [phone] Fax: [phone]

## 2023-08-17 NOTE — TELEPHONE ENCOUNTER
Returned the patient's phone call in regards to their message. Left a message for the patient to give the office a call back.         ----- Message from Yaima Palomino sent at 8/17/2023  2:46 PM CDT -----  Contact: self [phone]  Patient called back after missing a call from your office. Please call back [phone].

## 2023-08-31 ENCOUNTER — OFFICE VISIT (OUTPATIENT)
Dept: ORTHOPEDICS | Facility: CLINIC | Age: 68
End: 2023-08-31
Payer: MEDICARE

## 2023-08-31 ENCOUNTER — HOSPITAL ENCOUNTER (OUTPATIENT)
Dept: RADIOLOGY | Facility: HOSPITAL | Age: 68
Discharge: HOME | End: 2023-08-31
Attending: ORTHOPAEDIC SURGERY
Payer: MEDICARE

## 2023-08-31 VITALS — WEIGHT: 257.94 LBS | BODY MASS INDEX: 31.41 KG/M2 | HEIGHT: 76 IN

## 2023-08-31 DIAGNOSIS — M21.161 ACQUIRED VARUS DEFORMITY KNEE, RIGHT: ICD-10-CM

## 2023-08-31 DIAGNOSIS — Z96.652 STATUS POST TOTAL LEFT KNEE REPLACEMENT USING CEMENT: Primary | ICD-10-CM

## 2023-08-31 DIAGNOSIS — M17.11 ARTHRITIS OF KNEE, RIGHT: ICD-10-CM

## 2023-08-31 DIAGNOSIS — M17.12 ARTHRITIS OF KNEE, LEFT: ICD-10-CM

## 2023-08-31 PROCEDURE — 4010F ACE/ARB THERAPY RXD/TAKEN: CPT | Mod: HCNC,CPTII,S$GLB, | Performed by: ORTHOPAEDIC SURGERY

## 2023-08-31 PROCEDURE — 99024 POSTOP FOLLOW-UP VISIT: CPT | Mod: HCNC,S$GLB,, | Performed by: ORTHOPAEDIC SURGERY

## 2023-08-31 PROCEDURE — 73564 X-RAY EXAM KNEE 4 OR MORE: CPT | Mod: 26,HCNC,LT, | Performed by: RADIOLOGY

## 2023-08-31 PROCEDURE — 73564 XR KNEE ORTHO LEFT WITH FLEXION: ICD-10-PCS | Mod: 26,HCNC,LT, | Performed by: RADIOLOGY

## 2023-08-31 PROCEDURE — 3288F FALL RISK ASSESSMENT DOCD: CPT | Mod: HCNC,CPTII,S$GLB, | Performed by: ORTHOPAEDIC SURGERY

## 2023-08-31 PROCEDURE — 1159F MED LIST DOCD IN RCRD: CPT | Mod: HCNC,CPTII,S$GLB, | Performed by: ORTHOPAEDIC SURGERY

## 2023-08-31 PROCEDURE — 1101F PR PT FALLS ASSESS DOC 0-1 FALLS W/OUT INJ PAST YR: ICD-10-PCS | Mod: HCNC,CPTII,S$GLB, | Performed by: ORTHOPAEDIC SURGERY

## 2023-08-31 PROCEDURE — 73562 XR KNEE ORTHO LEFT WITH FLEXION: ICD-10-PCS | Mod: 26,HCNC,RT, | Performed by: RADIOLOGY

## 2023-08-31 PROCEDURE — 1125F PR PAIN SEVERITY QUANTIFIED, PAIN PRESENT: ICD-10-PCS | Mod: HCNC,CPTII,S$GLB, | Performed by: ORTHOPAEDIC SURGERY

## 2023-08-31 PROCEDURE — 3288F PR FALLS RISK ASSESSMENT DOCUMENTED: ICD-10-PCS | Mod: HCNC,CPTII,S$GLB, | Performed by: ORTHOPAEDIC SURGERY

## 2023-08-31 PROCEDURE — 99999 PR PBB SHADOW E&M-EST. PATIENT-LVL III: ICD-10-PCS | Mod: PBBFAC,HCNC,, | Performed by: ORTHOPAEDIC SURGERY

## 2023-08-31 PROCEDURE — 73562 X-RAY EXAM OF KNEE 3: CPT | Mod: 26,HCNC,RT, | Performed by: RADIOLOGY

## 2023-08-31 PROCEDURE — 99024 PR POST-OP FOLLOW-UP VISIT: ICD-10-PCS | Mod: HCNC,S$GLB,, | Performed by: ORTHOPAEDIC SURGERY

## 2023-08-31 PROCEDURE — 1125F AMNT PAIN NOTED PAIN PRSNT: CPT | Mod: HCNC,CPTII,S$GLB, | Performed by: ORTHOPAEDIC SURGERY

## 2023-08-31 PROCEDURE — 1159F PR MEDICATION LIST DOCUMENTED IN MEDICAL RECORD: ICD-10-PCS | Mod: HCNC,CPTII,S$GLB, | Performed by: ORTHOPAEDIC SURGERY

## 2023-08-31 PROCEDURE — 1101F PT FALLS ASSESS-DOCD LE1/YR: CPT | Mod: HCNC,CPTII,S$GLB, | Performed by: ORTHOPAEDIC SURGERY

## 2023-08-31 PROCEDURE — 4010F PR ACE/ARB THEARPY RXD/TAKEN: ICD-10-PCS | Mod: HCNC,CPTII,S$GLB, | Performed by: ORTHOPAEDIC SURGERY

## 2023-08-31 PROCEDURE — 3008F PR BODY MASS INDEX (BMI) DOCUMENTED: ICD-10-PCS | Mod: HCNC,CPTII,S$GLB, | Performed by: ORTHOPAEDIC SURGERY

## 2023-08-31 PROCEDURE — 3008F BODY MASS INDEX DOCD: CPT | Mod: HCNC,CPTII,S$GLB, | Performed by: ORTHOPAEDIC SURGERY

## 2023-08-31 PROCEDURE — 73564 X-RAY EXAM KNEE 4 OR MORE: CPT | Mod: TC,HCNC,LT

## 2023-08-31 PROCEDURE — 99999 PR PBB SHADOW E&M-EST. PATIENT-LVL III: CPT | Mod: PBBFAC,HCNC,, | Performed by: ORTHOPAEDIC SURGERY

## 2023-08-31 RX ORDER — CELECOXIB 200 MG/1
200 CAPSULE ORAL DAILY
Qty: 30 CAPSULE | Refills: 6 | Status: SHIPPED | OUTPATIENT
Start: 2023-08-31

## 2023-08-31 NOTE — PROGRESS NOTES
Subjective:     Patient ID: Mitchell Mcmillan is a 68 y.o. male.    Chief Complaint: Post-op Evaluation of the Left Knee    HPI:  03/27/2023   Bilateral knee pain left worse than the right.  Patient states he received numerous injections over couple years now from steroid injections to gel injections.  Received Depo-Medrol on 07/28/2021 in both of his knees followed by Monovisc on 09/13/2021 and most recently Kenalog in both of his knees 01/13/2023.  He said they do help but not lasting long enough.  His pain is 9/10.  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  He put some lotion on his knee and then he puts the cup her neoprene sleeves.  He does use topicals get better.  Patient stated in 2006 fracture dislocated the left hip requiring ORIF of posterior acetabulum by Dr. Tanya Arredonod.  He developed after that footdrop on the left side which eventually got better with time.  He does take Tylenol.  History of GERD and numerous other medical issues that he avoids NSAIDs   He lives with his wife.  He does take Tylenol.  He does not have any kidney or ulcers  No fever no chills no shortness of breath difficulty with chewing swallowing loss of bowel bladder control blurry vision double vision loss sense smell or taste      08/31/2023   Left TKA says it feels very strong it is strong side because he has severe pain on the right knee.  His pain is around 8/10 mostly in the right side but on the left he said he is much much better.  He feels pain in the right knee wakes him up at night he does not think that meloxicam is doing anything.  He is requesting some Norco as I did tell him it make him get hooked up on the medication in the drugs we should stop it completely at this time.  He is candidate to have the right knee replaced since he is bone-on-bone but this is up to him when he would decide to proceed that way.  He can think me about going to physical therapy at  [facility] on [address].  Despite having pain he is pleased with his left knee results because it is much stronger and depends on it to walk.  He is wearing his neoprene sleeves on.  He said he is getting better he is happy he had the surgery so far.    Past Medical History:   Diagnosis Date    Arthritis     knees    Asymmetric SNHL (sensorineural hearing loss) 2017    Chronic left hip pain     Dislocated left hip, job injury    Chronic prostatitis     per pt, last episode     Elevated PSA, less than 10 ng/ml     per pt last elevation     Gastroesophageal reflux disease 8/3/2022    Hyperlipidemia 2010    Hypertension     Joint effusion of knee     per pt rt knee, seen by Dr. Duke     Midline low back pain without sciatica 2016    Obesity     Special screening for malignant neoplasms, colon 10/5/2016    Tobacco dependence     Trouble in sleeping      Past Surgical History:   Procedure Laterality Date    arthrocentesis Right     Per pt , knee by Dr. BONNIE Duke    COLONOSCOPY N/A 10/05/2016    Normal exam repeat 10 yrs. Procedure: COLONOSCOPY;  Surgeon: Alexander Goetz MD;  Location: [facility];  Service: Endoscopy;  Laterality: N/A;    HIP ARTHROPLASTY Left 2006    Per pt, dislocated left hip/ hardware placed  by Dr. Lisa    left orchiectomy      PROSTATE BIOPSY  2012    procedure performed at [facility] due to elevated PSA - benign    TOTAL KNEE ARTHROPLASTY Left 2023    Procedure: ARTHROPLASTY, KNEE, TOTAL;  Surgeon: Ellis Mora MD;  Location: [facility] OR;  Service: Orthopedics;  Laterality: Left;     Family History   Problem Relation Age of Onset    Depression Mother     Early death Mother     Hypertension Mother     Heart disease Mother 53         from MI    Arthritis Father     Stroke Father     Diabetes Sister     No Known Problems Brother     Hypertension Sister     Hypertension Sister     No Known Problems Brother     No Known Problems Brother      No Known Problems Daughter     Cancer Neg Hx     COPD Neg Hx     Kidney disease Neg Hx      Social History     Socioeconomic History    Marital status:      Spouse name: Nida    Number of children: 0   Occupational History    Occupation: Disabled since      Comment: Dislocated left hip   Tobacco Use    Smoking status: Former     Current packs/day: 0.00     Average packs/day: 0.1 packs/day for 46.0 years (4.6 ttl pk-yrs)     Types: Cigarettes     Start date: 6/3/2022     Quit date: 10/19/2022     Years since quittin.8    Smokeless tobacco: Never   Substance and Sexual Activity    Alcohol use: Not Currently     Alcohol/week: 2.0 standard drinks of alcohol     Types: 2 Glasses of wine per week    Drug use: No    Sexual activity: Yes     Partners: Female     Birth control/protection: None   Social History Narrative    Disabled since .  Job injury.  Dislocated left hip.  Has 4 brothers and 4 sisters. Wears seatbelt.     Social Determinants of Health     Financial Resource Strain: Low Risk  (2022)    Overall Financial Resource Strain (CARDIA)     Difficulty of Paying Living Expenses: Not hard at all   Food Insecurity: No Food Insecurity (2022)    Hunger Vital Sign     Worried About Running Out of Food in the Last Year: Never true     Ran Out of Food in the Last Year: Never true   Transportation Needs: No Transportation Needs (2022)    PRAPARE - Transportation     Lack of Transportation (Medical): No     Lack of Transportation (Non-Medical): No   Physical Activity: Insufficiently Active (2022)    Exercise Vital Sign     Days of Exercise per Week: 2 days     Minutes of Exercise per Session: 20 min   Stress: No Stress Concern Present (2022)    [employer] - Occupational Stress Questionnaire     Feeling of Stress : Only a little   Social Connections: Socially Integrated (2022)    Social Connection and Isolation Panel [NHANES]     Frequency of  Communication with Friends and Family: Once a week     Frequency of Social Gatherings with Friends and Family: Three times a week     Attends Evangelical Services: More than 4 times per year     Active Member of Clubs or Organizations: Yes     Attends Club or Organization Meetings: More than 4 times per year     Marital Status:    Housing Stability: Low Risk  (9/30/2022)    Housing Stability Vital Sign     Unable to Pay for Housing in the Last Year: No     Number of Places Lived in the Last Year: 1     Unstable Housing in the Last Year: No     Medication List with Changes/Refills   New Medications    CELECOXIB (CELEBREX) 200 MG CAPSULE    Take 1 capsule (200 mg total) by mouth once daily. Take with food   Current Medications    ATORVASTATIN (LIPITOR) 40 MG TABLET    TAKE 1 TABLET EVERY DAY    DICLOFENAC SODIUM (VOLTAREN) 1 % GEL    Apply 2 g topically 3 (three) times daily as needed.    FINASTERIDE (PROSCAR) 5 MG TABLET    TAKE 1 TABLET EVERY DAY    FLUTICASONE PROPIONATE (FLONASE) 50 MCG/ACTUATION NASAL SPRAY    2 sprays (100 mcg total) by Each Nostril route once daily.    GABAPENTIN (NEURONTIN) 300 MG CAPSULE    Take 1 capsule (300 mg total) by mouth every evening.    LISINOPRIL-HYDROCHLOROTHIAZIDE (PRINZIDE,ZESTORETIC) 20-25 MG TAB    TAKE 1 TABLET EVERY DAY    OMEGA-3 FATTY ACIDS/FISH OIL (FISH OIL-OMEGA-3 FATTY ACIDS) 300-1,000 MG CAPSULE    Take 1 capsule by mouth once daily.    ONDANSETRON (ZOFRAN-ODT) 4 MG TBDL    Dissolve 2 tablets (8 mg total) by mouth every 8 (eight) hours as needed.    PANTOPRAZOLE (PROTONIX) 40 MG TABLET    Take 1 tablet (40 mg total) by mouth once daily.    SILDENAFIL (VIAGRA) 100 MG TABLET    Take 1 tablet (100 mg total) by mouth daily as needed for Erectile Dysfunction.    TAMSULOSIN (FLOMAX) 0.4 MG CAP    TAKE 1 CAPSULE EVERY DAY   Discontinued Medications    MELOXICAM (MOBIC) 15 MG TABLET    Take 1 tablet (15 mg total) by mouth once daily.     Review of patient's allergies  indicates:  No Known Allergies  Review of Systems   Constitutional: Negative for decreased appetite.   HENT:  Negative for tinnitus.    Eyes:  Negative for double vision.   Cardiovascular:  Negative for chest pain.   Respiratory:  Negative for wheezing.    Hematologic/Lymphatic: Negative for bleeding problem.   Skin:  Negative for dry skin.   Musculoskeletal:  Positive for arthritis, joint pain, joint swelling and stiffness. Negative for back pain, gout, muscle weakness and neck pain.   Gastrointestinal:  Negative for abdominal pain.   Genitourinary:  Negative for bladder incontinence.   Neurological:  Negative for numbness, paresthesias and sensory change.   Psychiatric/Behavioral:  Negative for altered mental status.        Objective:   Body mass index is 31.4 kg/m².  There were no vitals filed for this visit.       General    Constitutional: He is oriented to person, place, and time. He appears well-developed.   HENT:   Head: Atraumatic.   Eyes: EOM are normal.   Pulmonary/Chest: Effort normal.   Neurological: He is alert and oriented to person, place, and time.   Psychiatric: Judgment normal.           Ambulating without assistive devices with slight limp  Pelvis is level  Bilateral hips passive motion without pain in the groin.  Hip flexors, abductors, adductors, quads, hamstrings, ankle extensors and flexors are all 5/5   Right knee with moderately severe swelling.  Range of motion 0-105.  Severe pain medially and crepitus to compression on the patella as well as medially.  There is no defect in the patella or quadriceps tendon.  Slightly loose medial collateral ligament but stable.  Negative anterior drawer.  Left knee preop exam with severe swelling.  Range of motion 5-105.  Medial joint severe pain and crepitus to compression on the patella.  There is no defect in the patella or quadriceps tendon.  There is stable collaterals and cruciates.  No anterior draw.  Left knee postop TKA surgical incision healed  well.  No evidence of erythema.  Very mild swelling.  0-130 degrees.  No defect in the patella or quadriceps tendon.  Stable in extension and in flexion at 90°  Calves are soft nontender  There is some pitting edema around the distal 3rd of tibia  Ankle motion intact  Skin slight brawny induration around the ankle    Relevant imaging results reviewed and interpreted by me, discussed with the patient and / or family today   X-ray 08/31/2023 left knee with TKA in excellent alignment.  Patella midline no evidence of failure.  Right knee with complete loss of medial joint space and marginal osteophytes consistent with severe arthritis and varus deformity  X-ray 01/13/2023 showing complete loss of medial joint space bilaterally with marginal large osteophytic changes tricompartmental with varus deformity consistent with bilateral knee severe arthritis  Assessment:     Encounter Diagnoses   Name Primary?    Status post total left knee replacement using cement Yes    Arthritis of knee, right     Acquired varus deformity knee, right         Plan:   Status post total left knee replacement using cement  -     celecoxib (CELEBREX) 200 MG capsule; Take 1 capsule (200 mg total) by mouth once daily. Take with food  Dispense: 30 capsule; Refill: 6    Arthritis of knee, right  -     celecoxib (CELEBREX) 200 MG capsule; Take 1 capsule (200 mg total) by mouth once daily. Take with food  Dispense: 30 capsule; Refill: 6    Acquired varus deformity knee, right         Patient Instructions   Your x-ra y today showing left total knee replacement in excellent alignment   Your right knee you are bone-on-bone and that will require you to have a knee replacement when you already   The meloxicam is not seem to work for you I want you to stop it     I will start you on Celebrex 200 mg once a day instead of the meloxicam to help with the arthritis pain   Make sure you take the Celebrex with food   I would like you to take your gabapentin at  night which will help with the nerve pain  I would like you to come back in 4 months to see me  I would like you to keep on being very active and exercise    Disclaimer: This note was prepared using a voice recognition system and is likely to have sound alike errors within the text.

## 2023-08-31 NOTE — PATIENT INSTRUCTIONS
Your x-ra y today showing left total knee replacement in excellent alignment   Your right knee you are bone-on-bone and that will require you to have a knee replacement when you already   The meloxicam is not seem to work for you I want you to stop it     I will start you on Celebrex 200 mg once a day instead of the meloxicam to help with the arthritis pain   Make sure you take the Celebrex with food   I would like you to take your gabapentin at night which will help with the nerve pain  I would like you to come back in 4 months to see me  I would like you to keep on being very active and exercise

## 2023-09-19 ENCOUNTER — OFFICE VISIT (OUTPATIENT)
Dept: FAMILY MEDICINE | Facility: CLINIC | Age: 68
End: 2023-09-19
Payer: MEDICARE

## 2023-09-19 ENCOUNTER — LAB VISIT (OUTPATIENT)
Dept: LAB | Facility: HOSPITAL | Age: 68
End: 2023-09-19
Attending: FAMILY MEDICINE
Payer: MEDICARE

## 2023-09-19 VITALS
OXYGEN SATURATION: 94 % | SYSTOLIC BLOOD PRESSURE: 132 MMHG | HEIGHT: 76 IN | DIASTOLIC BLOOD PRESSURE: 84 MMHG | TEMPERATURE: 97 F | WEIGHT: 260.38 LBS | BODY MASS INDEX: 31.71 KG/M2 | HEART RATE: 80 BPM

## 2023-09-19 DIAGNOSIS — Z00.00 ANNUAL PHYSICAL EXAM: Primary | ICD-10-CM

## 2023-09-19 DIAGNOSIS — E78.5 HYPERLIPIDEMIA, UNSPECIFIED HYPERLIPIDEMIA TYPE: ICD-10-CM

## 2023-09-19 DIAGNOSIS — I70.0 AORTIC ATHEROSCLEROSIS: ICD-10-CM

## 2023-09-19 DIAGNOSIS — M17.0 PRIMARY OSTEOARTHRITIS OF BOTH KNEES: ICD-10-CM

## 2023-09-19 DIAGNOSIS — E66.9 OBESITY (BMI 30.0-34.9): ICD-10-CM

## 2023-09-19 DIAGNOSIS — N40.0 BENIGN PROSTATIC HYPERPLASIA, UNSPECIFIED WHETHER LOWER URINARY TRACT SYMPTOMS PRESENT: ICD-10-CM

## 2023-09-19 DIAGNOSIS — R97.20 ELEVATED PSA: ICD-10-CM

## 2023-09-19 DIAGNOSIS — I10 ESSENTIAL HYPERTENSION: ICD-10-CM

## 2023-09-19 LAB
ALBUMIN SERPL BCP-MCNC: 4.1 G/DL (ref 3.5–5.2)
ALP SERPL-CCNC: 69 U/L (ref 55–135)
ALT SERPL W/O P-5'-P-CCNC: 8 U/L (ref 10–44)
ANION GAP SERPL CALC-SCNC: 9 MMOL/L (ref 8–16)
AST SERPL-CCNC: 16 U/L (ref 10–40)
BASOPHILS # BLD AUTO: 0.04 K/UL (ref 0–0.2)
BASOPHILS NFR BLD: 0.6 % (ref 0–1.9)
BILIRUB SERPL-MCNC: 0.4 MG/DL (ref 0.1–1)
BUN SERPL-MCNC: 15 MG/DL (ref 8–23)
CALCIUM SERPL-MCNC: 10.1 MG/DL (ref 8.7–10.5)
CHLORIDE SERPL-SCNC: 104 MMOL/L (ref 95–110)
CHOLEST SERPL-MCNC: 265 MG/DL (ref 120–199)
CHOLEST/HDLC SERPL: 4.8 {RATIO} (ref 2–5)
CO2 SERPL-SCNC: 25 MMOL/L (ref 23–29)
CREAT SERPL-MCNC: 1 MG/DL (ref 0.5–1.4)
DIFFERENTIAL METHOD: ABNORMAL
EOSINOPHIL # BLD AUTO: 0.1 K/UL (ref 0–0.5)
EOSINOPHIL NFR BLD: 1.3 % (ref 0–8)
ERYTHROCYTE [DISTWIDTH] IN BLOOD BY AUTOMATED COUNT: 14.8 % (ref 11.5–14.5)
EST. GFR  (NO RACE VARIABLE): >60 ML/MIN/1.73 M^2
ESTIMATED AVG GLUCOSE: 108 MG/DL (ref 68–131)
GLUCOSE SERPL-MCNC: 90 MG/DL (ref 70–110)
HBA1C MFR BLD: 5.4 % (ref 4–5.6)
HCT VFR BLD AUTO: 40.2 % (ref 40–54)
HDLC SERPL-MCNC: 55 MG/DL (ref 40–75)
HDLC SERPL: 20.8 % (ref 20–50)
HGB BLD-MCNC: 12.5 G/DL (ref 14–18)
IMM GRANULOCYTES # BLD AUTO: 0.02 K/UL (ref 0–0.04)
IMM GRANULOCYTES NFR BLD AUTO: 0.3 % (ref 0–0.5)
LDLC SERPL CALC-MCNC: 177.2 MG/DL (ref 63–159)
LYMPHOCYTES # BLD AUTO: 3.4 K/UL (ref 1–4.8)
LYMPHOCYTES NFR BLD: 47.6 % (ref 18–48)
MCH RBC QN AUTO: 26 PG (ref 27–31)
MCHC RBC AUTO-ENTMCNC: 31.1 G/DL (ref 32–36)
MCV RBC AUTO: 84 FL (ref 82–98)
MONOCYTES # BLD AUTO: 0.6 K/UL (ref 0.3–1)
MONOCYTES NFR BLD: 9 % (ref 4–15)
NEUTROPHILS # BLD AUTO: 3 K/UL (ref 1.8–7.7)
NEUTROPHILS NFR BLD: 41.2 % (ref 38–73)
NONHDLC SERPL-MCNC: 210 MG/DL
NRBC BLD-RTO: 0 /100 WBC
PLATELET # BLD AUTO: 323 K/UL (ref 150–450)
PMV BLD AUTO: 11.3 FL (ref 9.2–12.9)
POTASSIUM SERPL-SCNC: 4.2 MMOL/L (ref 3.5–5.1)
PROT SERPL-MCNC: 8.1 G/DL (ref 6–8.4)
RBC # BLD AUTO: 4.81 M/UL (ref 4.6–6.2)
SODIUM SERPL-SCNC: 138 MMOL/L (ref 136–145)
TRIGL SERPL-MCNC: 164 MG/DL (ref 30–150)
TSH SERPL DL<=0.005 MIU/L-ACNC: 1.52 UIU/ML (ref 0.4–4)
WBC # BLD AUTO: 7.14 K/UL (ref 3.9–12.7)

## 2023-09-19 PROCEDURE — 99999 PR PBB SHADOW E&M-EST. PATIENT-LVL V: CPT | Mod: PBBFAC,HCNC,, | Performed by: FAMILY MEDICINE

## 2023-09-19 PROCEDURE — 1160F PR REVIEW ALL MEDS BY PRESCRIBER/CLIN PHARMACIST DOCUMENTED: ICD-10-PCS | Mod: HCNC,CPTII,S$GLB, | Performed by: FAMILY MEDICINE

## 2023-09-19 PROCEDURE — 80061 LIPID PANEL: CPT | Mod: HCNC | Performed by: FAMILY MEDICINE

## 2023-09-19 PROCEDURE — 4010F PR ACE/ARB THEARPY RXD/TAKEN: ICD-10-PCS | Mod: HCNC,CPTII,S$GLB, | Performed by: FAMILY MEDICINE

## 2023-09-19 PROCEDURE — 99397 PR PREVENTIVE VISIT,EST,65 & OVER: ICD-10-PCS | Mod: HCNC,S$GLB,, | Performed by: FAMILY MEDICINE

## 2023-09-19 PROCEDURE — 83036 HEMOGLOBIN GLYCOSYLATED A1C: CPT | Mod: GA,HCNC | Performed by: FAMILY MEDICINE

## 2023-09-19 PROCEDURE — 80053 COMPREHEN METABOLIC PANEL: CPT | Mod: HCNC | Performed by: FAMILY MEDICINE

## 2023-09-19 PROCEDURE — 1160F RVW MEDS BY RX/DR IN RCRD: CPT | Mod: HCNC,CPTII,S$GLB, | Performed by: FAMILY MEDICINE

## 2023-09-19 PROCEDURE — 1101F PT FALLS ASSESS-DOCD LE1/YR: CPT | Mod: HCNC,CPTII,S$GLB, | Performed by: FAMILY MEDICINE

## 2023-09-19 PROCEDURE — 1159F PR MEDICATION LIST DOCUMENTED IN MEDICAL RECORD: ICD-10-PCS | Mod: HCNC,CPTII,S$GLB, | Performed by: FAMILY MEDICINE

## 2023-09-19 PROCEDURE — 3008F PR BODY MASS INDEX (BMI) DOCUMENTED: ICD-10-PCS | Mod: HCNC,CPTII,S$GLB, | Performed by: FAMILY MEDICINE

## 2023-09-19 PROCEDURE — 3288F PR FALLS RISK ASSESSMENT DOCUMENTED: ICD-10-PCS | Mod: HCNC,CPTII,S$GLB, | Performed by: FAMILY MEDICINE

## 2023-09-19 PROCEDURE — 99397 PER PM REEVAL EST PAT 65+ YR: CPT | Mod: HCNC,S$GLB,, | Performed by: FAMILY MEDICINE

## 2023-09-19 PROCEDURE — 1126F PR PAIN SEVERITY QUANTIFIED, NO PAIN PRESENT: ICD-10-PCS | Mod: HCNC,CPTII,S$GLB, | Performed by: FAMILY MEDICINE

## 2023-09-19 PROCEDURE — 84443 ASSAY THYROID STIM HORMONE: CPT | Mod: HCNC | Performed by: FAMILY MEDICINE

## 2023-09-19 PROCEDURE — 3075F PR MOST RECENT SYSTOLIC BLOOD PRESS GE 130-139MM HG: ICD-10-PCS | Mod: HCNC,CPTII,S$GLB, | Performed by: FAMILY MEDICINE

## 2023-09-19 PROCEDURE — 1101F PR PT FALLS ASSESS DOC 0-1 FALLS W/OUT INJ PAST YR: ICD-10-PCS | Mod: HCNC,CPTII,S$GLB, | Performed by: FAMILY MEDICINE

## 2023-09-19 PROCEDURE — 3079F DIAST BP 80-89 MM HG: CPT | Mod: HCNC,CPTII,S$GLB, | Performed by: FAMILY MEDICINE

## 2023-09-19 PROCEDURE — 85025 COMPLETE CBC W/AUTO DIFF WBC: CPT | Mod: HCNC | Performed by: FAMILY MEDICINE

## 2023-09-19 PROCEDURE — 3288F FALL RISK ASSESSMENT DOCD: CPT | Mod: HCNC,CPTII,S$GLB, | Performed by: FAMILY MEDICINE

## 2023-09-19 PROCEDURE — 3079F PR MOST RECENT DIASTOLIC BLOOD PRESSURE 80-89 MM HG: ICD-10-PCS | Mod: HCNC,CPTII,S$GLB, | Performed by: FAMILY MEDICINE

## 2023-09-19 PROCEDURE — 36415 COLL VENOUS BLD VENIPUNCTURE: CPT | Mod: HCNC,PO | Performed by: FAMILY MEDICINE

## 2023-09-19 PROCEDURE — 99999 PR PBB SHADOW E&M-EST. PATIENT-LVL V: ICD-10-PCS | Mod: PBBFAC,HCNC,, | Performed by: FAMILY MEDICINE

## 2023-09-19 PROCEDURE — 3008F BODY MASS INDEX DOCD: CPT | Mod: HCNC,CPTII,S$GLB, | Performed by: FAMILY MEDICINE

## 2023-09-19 PROCEDURE — 3075F SYST BP GE 130 - 139MM HG: CPT | Mod: HCNC,CPTII,S$GLB, | Performed by: FAMILY MEDICINE

## 2023-09-19 PROCEDURE — 1159F MED LIST DOCD IN RCRD: CPT | Mod: HCNC,CPTII,S$GLB, | Performed by: FAMILY MEDICINE

## 2023-09-19 PROCEDURE — 4010F ACE/ARB THERAPY RXD/TAKEN: CPT | Mod: HCNC,CPTII,S$GLB, | Performed by: FAMILY MEDICINE

## 2023-09-19 PROCEDURE — 1126F AMNT PAIN NOTED NONE PRSNT: CPT | Mod: HCNC,CPTII,S$GLB, | Performed by: FAMILY MEDICINE

## 2023-09-19 NOTE — PROGRESS NOTES
HISTORY OF PRESENT ILLNESS:  Mr. Mcmillan comes in today for annual wellness examination. He is not fasting but has taken medications today.     END OF LIFE DECISION:  He does not have a living will but desires life support.    Current Outpatient Medications   Medication Sig    atorvastatin (LIPITOR) 40 MG tablet TAKE 1 TABLET EVERY DAY    celecoxib (CELEBREX) 200 MG capsule Take 1 capsule (200 mg total) by mouth once daily. Take with food    diclofenac sodium (VOLTAREN) 1 % Gel Apply 2 g topically 3 (three) times daily as needed.    finasteride (PROSCAR) 5 mg tablet TAKE 1 TABLET EVERY DAY    fluticasone propionate (FLONASE) 50 mcg/actuation nasal spray 2 sprays (100 mcg total) by Each Nostril route once daily.    gabapentin (NEURONTIN) 300 MG capsule Take 1 capsule (300 mg total) by mouth every evening.    lisinopriL-hydrochlorothiazide (PRINZIDE,ZESTORETIC) 20-25 mg Tab TAKE 1 TABLET EVERY DAY    omega-3 fatty acids/fish oil (FISH OIL-OMEGA-3 FATTY ACIDS) 300-1,000 mg capsule Take 1 capsule by mouth once daily.    ondansetron (ZOFRAN-ODT) 4 MG TbDL Dissolve 2 tablets (8 mg total) by mouth every 8 (eight) hours as needed.    pantoprazole (PROTONIX) 40 MG tablet Take 1 tablet (40 mg total) by mouth once daily.    sildenafiL (VIAGRA) 100 MG tablet Take 1 tablet (100 mg total) by mouth daily as needed for Erectile Dysfunction.    tamsulosin (FLOMAX) 0.4 mg Cap TAKE 1 CAPSULE EVERY DAY            SCREENINGS:       Cholesterol: September 23, 2022.     FFS/Colonoscopy: October 5, 2016 - okay; repeat in 10 years.     Prostate/PSA: March 15, 2023 with urologist Dr. Mcbride. March 10, 2023 - 5.5.      Dexa Scan: Never.     Eye Exam: August 24, 2017 with Dr. Piper but reports [facility] since then.  He wears glasses.     PPD: Negative in the past.     Immunizations:  Td/Tdap - Unsure.  Advised insurance-covered benefit only if injury.                              Zostavax - March 2, 2016.                               Shingrix - Never. Advised patient insurance-covered benefit only at local pharmacy.                              Pneumovax - February 17, 2016. September 19, 2022.                              Prevnar-13 shot - March 1, 2019.                              Seasonal Flu - September 19, 2022.                                  Covid-19 vaccine series - February 18, 2021, March 18, 2021, October 29, 2021, May 13, 2022, November 14, 2022.                             ROS:  GENERAL: No fever, chills, fatigue or unusual weight change. Appetite normal. Exercises 2 times per week - 10 minutes each time.  Monitors diet. Weight 118.8 kg (261 lb 14.5 oz)at May 8, 2023 visit.  SKIN: No rashes, itching, changes in mole, color or texture of skin or easy bruising.  HEAD: No headaches or recent head trauma.  EYES: No change in vision,   no pain, diplopia, redness or discharge. Wears glasses.  EARS: Denies ear pain, discharge, vertigo but reports decreased hearing.  NOSE: No epistaxis or rhinitis. Nontender external nose.   MOUTH & THROAT: No hoarseness or change in voice. No excessive gum bleeding or mouth sores.  No sore throat.  NODES: Denies swollen glands.  CHEST: Denies BUTCHER, wheezing, cough, hemoptysis or sputum production.   CARDIOVASCULAR: Denies chest pain, No palpitations.  Reports occasionally performs home blood pressure checks with levels ranging 130/80.  Saw Dr. Smyth, cardiologist, on April 25, 2023 for surveillance of essential hypertension, hyperlipidemia, unspecified hyperlipidemia type, arthritis, obesity (BMI 30.0-34.9)with 1-year follow-up advised.  ABDOMEN: Denies diarrhea, constipation, vomiting, abdominal pain, or blood in stool.  GENITOURINARY: No flank pain, dysuria or hematuria.No nocturia or frequency. No lesions, pain or swelling in genital area. Performs monthly self testicular exam. Saw urologist Dr. Mcbride on March 15, 2023 for surveillance of elevated PSA, BPH with 1-year follow up.   ENDOCRINE:  "Denies diabetes or thyroid problems.  HEME/LYMPH: Denies bleeding problems.  PERIPHERAL VASCULAR: No claudication or cyanosis  MUSCULOSKELETAL: No joint stiffness, pain or swelling except report chronic arthritis hip and knee pains. No edema.  Saw Dr. Mora, orthopedist, on August 31, 2023 status post left total knee replacement with 4-month follow-up advised.  NEUROLOGIC: No history of seizures, tremors, alteration of gait or coordination.  PSYCHIATRIC: Denies mood swings, depression, anxiety, homicidal or suicidal thoughts. Denies sleep problems.    PE:   VS:  Blood Pressure 132/84   Pulse 80   Temperature 96.6 °F (35.9 °C) (Tympanic)   Height 6' 4" (1.93 m)   Weight 118.1 kg (260 lb 5.8 oz)   Oxygen Saturation (Abnormal) 94%   Body Mass Index 31.69 kg/m²   APPEARANCE:  Well nourished, well developed male, obese and pleasant, alert and oriented in no acute distress.    HEAD: Non tender . Full range of motion.  EYES: PERRL, conjunctiva pink, lids no edema. He wears glasses.  EARS: External canal patent, no swelling or redness. TM's shiny and clear.  NOSE: Mucosa and turbinates pink, not swollen. No discharge  THROAT: No pharyngeal erythema or exudate. No stridor.    NECK: Supple, no mass, thyroid not enlarged, no mass. No carotid bruit.  NODES: No cervical, axillary lymph node enlargement.  CHEST: Normal respiratory effort. Lungs clear to auscultation.  CARDIOVASCULAR: Normal S1, S2. No rubs, murmurs or gallops.No edema.Pedal pulses palpable bilaterally.  ABDOMEN: Bowel sounds present. Not distended. Soft. No tenderness, masses or organomegaly.  BREAST: Nontender, no asymmetry, nipple discharge, abnormal masses, nodules, lumps.  GENITALIA: Not examined per patient request.  RECTAL: Not examined per patient request.  MUSCULOSKELETAL: No joint deformities or stiffness. He is ambulatory without problems.  SKIN: No rashes or suspicious lesions, normal color and turgor.  NEUROLOGIC:   Cranial Nerves: II-XII " grossly intact.  DTR's: Knees, Ankles 2+ and equal bilaterally Gait & Posture: Normal gait and fine motion.  PSYCHIATRIC: Patient alert, oriented x 3. Mood/Affect normal without acute anxiety and depression noted.Judgement and insight-good as he makes appropriate decisions on today's examination.    DIAGNOSIS:    ICD-10-CM ICD-9-CM    1. Annual physical exam  Z00.00 V70.0       2. Essential hypertension  I10 401.9 CBC Auto Differential      TSH      Comprehensive Metabolic Panel      Lipid Panel      Hemoglobin A1C      3. Hyperlipidemia, unspecified hyperlipidemia type  E78.5 272.4 Comprehensive Metabolic Panel      Lipid Panel      4. Aortic atherosclerosis  I70.0 440.0       5. Elevated PSA  R97.20 790.93       6. Benign prostatic hyperplasia, unspecified whether lower urinary tract symptoms present  N40.0 600.00       7. Primary osteoarthritis of both knees  M17.0 715.16       8. Obesity (BMI 30.0-34.9)  E66.9 278.00           PLAN:   1. Age-appropriate counseling-appropriate low-sodium, low-cholesterol, low carbohydrate diet and exercise daily, monthly self testicular examination, annual wellness examination.  2. Patient advised to call for results.  3. Continue current medications.  4. Keep follow up with specialists.  5. Flu shot this fall.   6. Follow up in about 6 months (around 3/18/2024) for hypertension follow up.    40 minutes of total time spent on the encounter, which includes face to face time and non-face to face time preparing to see the patient (eg, review of tests), Obtaining and/or reviewing separately obtained history, Documenting clinical information in the electronic or other health record, Independently interpreting results (not separately reported) and communicating results to the patient/family/caregiver, or Care coordination (not separately reported).      This note is  generated with speech recognition software and is subject to transcription error and sound alike phrases that may be  missed by proofreading.

## 2023-09-27 DIAGNOSIS — I10 ESSENTIAL HYPERTENSION: ICD-10-CM

## 2023-09-27 RX ORDER — LISINOPRIL AND HYDROCHLOROTHIAZIDE 20; 25 MG/1; MG/1
TABLET ORAL
Qty: 90 TABLET | Refills: 3 | Status: SHIPPED | OUTPATIENT
Start: 2023-09-27

## 2023-09-27 NOTE — TELEPHONE ENCOUNTER
No care due was identified.  [facility] Embedded Care Due Messages. Reference number: [id number].   9/27/2023 10:11:36 AM CDT

## 2023-09-27 NOTE — TELEPHONE ENCOUNTER
Refill Decision Note   Mitchell Mcmillan  is requesting a refill authorization.  Brief Assessment and Rationale for Refill:  Approve     Medication Therapy Plan:         Comments:     Note composed:11:18 AM 09/27/2023

## 2023-10-04 DIAGNOSIS — E78.5 HYPERLIPIDEMIA, UNSPECIFIED HYPERLIPIDEMIA TYPE: ICD-10-CM

## 2023-10-04 RX ORDER — ATORVASTATIN CALCIUM 40 MG/1
TABLET, FILM COATED ORAL
Qty: 90 TABLET | Refills: 3 | Status: SHIPPED | OUTPATIENT
Start: 2023-10-04

## 2023-10-04 NOTE — TELEPHONE ENCOUNTER
No care due was identified.  [facility] Embedded Care Due Messages. Reference number: [id number].   10/04/2023 10:25:54 AM CDT

## 2023-10-04 NOTE — TELEPHONE ENCOUNTER
Refill Decision Note   Mitchell Mcmillan  is requesting a refill authorization.  Brief Assessment and Rationale for Refill:  Approve     Medication Therapy Plan:         Comments:     Note composed:12:11 PM 10/04/2023

## 2023-11-02 DIAGNOSIS — M17.12 ARTHRITIS OF KNEE, LEFT: ICD-10-CM

## 2023-11-02 DIAGNOSIS — M17.11 ARTHRITIS OF KNEE, RIGHT: ICD-10-CM

## 2023-11-02 DIAGNOSIS — Z96.652 STATUS POST TOTAL LEFT KNEE REPLACEMENT USING CEMENT: ICD-10-CM

## 2023-11-02 RX ORDER — CELECOXIB 200 MG/1
CAPSULE ORAL
Qty: 90 CAPSULE | Refills: 0 | OUTPATIENT
Start: 2023-11-02

## 2023-11-02 RX ORDER — GABAPENTIN 300 MG/1
CAPSULE ORAL
Qty: 90 CAPSULE | Refills: 0 | OUTPATIENT
Start: 2023-11-02

## 2023-11-02 RX ORDER — FINASTERIDE 5 MG/1
TABLET, FILM COATED ORAL
Qty: 90 TABLET | Refills: 0 | OUTPATIENT
Start: 2023-11-02

## 2023-11-02 RX ORDER — MELOXICAM 15 MG/1
TABLET ORAL
Qty: 90 TABLET | Refills: 0 | OUTPATIENT
Start: 2023-11-02

## 2023-11-02 RX ORDER — TAMSULOSIN HYDROCHLORIDE 0.4 MG/1
CAPSULE ORAL
Qty: 90 CAPSULE | Refills: 0 | OUTPATIENT
Start: 2023-11-02

## 2023-11-02 NOTE — TELEPHONE ENCOUNTER
Refill Decision Note   Mitchell Mcmillan  is requesting a refill authorization.  Brief Assessment and Rationale for Refill:  Quick Discontinue     Medication Therapy Plan:    Pharmacy is requesting new scripts for the following medications without required information, (sig/ frequency/qty/etc)      Medication Reconciliation Completed: No     Comments: Pharmacies have been requesting medications for patients without required information, (sig, frequency, qty, etc.). In addition, requests are sent for medication(s) pt. are currently not taking, and medications patients have never taken.    We have spoken to the pharmacies about these request types and advised their teams previously that we are unable to assess these New Script requests and require all details for these requests. This is a known issue and has been reported.     Note composed:1:17 PM 11/02/2023

## 2023-11-02 NOTE — TELEPHONE ENCOUNTER
No care due was identified.  [facility] Embedded Care Due Messages. Reference number: [id number].   11/02/2023 11:50:55 AM CDT

## 2023-11-13 ENCOUNTER — CLINICAL SUPPORT (OUTPATIENT)
Dept: SMOKING CESSATION | Facility: CLINIC | Age: 68
End: 2023-11-13

## 2023-11-13 DIAGNOSIS — F17.200 NICOTINE DEPENDENCE: Primary | ICD-10-CM

## 2023-11-13 PROCEDURE — 99999 PR PBB SHADOW E&M-EST. PATIENT-LVL I: ICD-10-PCS | Mod: PBBFAC,,, | Performed by: DIETITIAN, REGISTERED

## 2023-11-13 PROCEDURE — 99406 PR TOBACCO USE CESSATION INTERMEDIATE 3-10 MINUTES: ICD-10-PCS | Mod: S$GLB,,, | Performed by: DIETITIAN, REGISTERED

## 2023-11-13 PROCEDURE — 99999 PR PBB SHADOW E&M-EST. PATIENT-LVL I: CPT | Mod: PBBFAC,,, | Performed by: DIETITIAN, REGISTERED

## 2023-11-13 PROCEDURE — 99406 BEHAV CHNG SMOKING 3-10 MIN: CPT | Mod: S$GLB,,, | Performed by: DIETITIAN, REGISTERED

## 2023-11-13 NOTE — PROGRESS NOTES
Spoke with patient today in regard to smoking cessation progress for 12 month telephone follow up, he states he has been tobacco free since November of 2022, making one year anniversary of being tobacco-free this month. Commended patient on the accomplishment thus far. Informed patient of benefit period and contact information if any further help or support is needed. Will resolve episode and complete smart form for Quit attempt #1.

## 2024-01-04 ENCOUNTER — OFFICE VISIT (OUTPATIENT)
Dept: ORTHOPEDICS | Facility: CLINIC | Age: 69
End: 2024-01-04
Payer: MEDICARE

## 2024-01-04 VITALS — HEIGHT: 76 IN | BODY MASS INDEX: 31.66 KG/M2 | WEIGHT: 260 LBS

## 2024-01-04 DIAGNOSIS — Z96.652 HISTORY OF TOTAL LEFT KNEE REPLACEMENT: Primary | ICD-10-CM

## 2024-01-04 DIAGNOSIS — M17.11 ARTHRITIS OF KNEE, RIGHT: ICD-10-CM

## 2024-01-04 DIAGNOSIS — M21.161 ACQUIRED VARUS DEFORMITY KNEE, RIGHT: ICD-10-CM

## 2024-01-04 PROCEDURE — 99999 PR PBB SHADOW E&M-EST. PATIENT-LVL III: CPT | Mod: PBBFAC,HCNC,, | Performed by: ORTHOPAEDIC SURGERY

## 2024-01-04 PROCEDURE — 3288F FALL RISK ASSESSMENT DOCD: CPT | Mod: HCNC,CPTII,S$GLB, | Performed by: ORTHOPAEDIC SURGERY

## 2024-01-04 PROCEDURE — 99214 OFFICE O/P EST MOD 30 MIN: CPT | Mod: HCNC,S$GLB,, | Performed by: ORTHOPAEDIC SURGERY

## 2024-01-04 PROCEDURE — 3008F BODY MASS INDEX DOCD: CPT | Mod: HCNC,CPTII,S$GLB, | Performed by: ORTHOPAEDIC SURGERY

## 2024-01-04 PROCEDURE — 1125F AMNT PAIN NOTED PAIN PRSNT: CPT | Mod: HCNC,CPTII,S$GLB, | Performed by: ORTHOPAEDIC SURGERY

## 2024-01-04 PROCEDURE — 1101F PT FALLS ASSESS-DOCD LE1/YR: CPT | Mod: HCNC,CPTII,S$GLB, | Performed by: ORTHOPAEDIC SURGERY

## 2024-01-04 PROCEDURE — 1159F MED LIST DOCD IN RCRD: CPT | Mod: HCNC,CPTII,S$GLB, | Performed by: ORTHOPAEDIC SURGERY

## 2024-01-04 NOTE — PATIENT INSTRUCTIONS
Your x-ray show complete loss of joint space on the inside of your right knee with bone-on-bone consistent with severe arthritis   The left total knee replacement still in excellent alignment  Your right knee is the 1 that bothers her the most and you would like to proceed with knee replacement on the right side your pain is 7/10   You are taking Celebrex 200 mg you are using diclofenac gel and you are taking the gabapentin at night   I would like you for the surgery to stop taking the omega-3 and the Celebrex 1 week prior to surgery    Procedure, common risks and benefits,alternatives discussed in details.All questions answered.Patient expressed understanding.Patient instructed to call for any questions that could arise in the future.    Most common Risks:  Infection/less than 2%  Leg-length discrepancy    Neuro-vascular injury ( resulting in loss of motor and sensory functions)  Pain  Blood clot  Fat clot  Loss of motion  Fracture of bone  Failure of procedure to achieve its intended purpose  Failure of hardware  Non-union or mal-union of bone  Malalignment  Death/we will have you go to the preop clinic for evaluation if we find out that you need to be seen by the cardiologist and we will send you to the heart doctor    Patient instructions for joint replacement    Before surgery  1.  Shower with Hibiclens soap/antibacterial for 3 days prior to surgery to decrease risk of infection  2..  Stop all blood thinners/aspirin, Coumadin, warfarin, Plavix, Eliquis, Xarelto etc 5 days prior to surgery  3.  Take Celebrex 400 mg the night prior to surgery after dinner or meloxicam 15 mg  4.  Take Tylenol 650 mg the night prior to surgery    Ask physicians for prescription of Celebrex or Mobic if needed    After surgery at home  1.  Take Tylenol 650 mg 3 times a day for 14 days then as needed for mild pain  2.  Take gabapentin 300 mg nightly for 6 weeks  3.  Take Celebrex 200 mg or meloxicam 15 mg daily for 6 weeks unless  having cardiac issues to take for 2 weeks only  4.  Must take aspirin 81 mg twice a day for 6 weeks unless you are on other blood thinners/Plavix, Eliquis, Xarelto, Coumadin etc  5.  Must wear compressive stockings for 6 weeks minimum to decrease the risk of blood clot and swelling  6.  Hydrocodone/Norco or oxycodone/Percocet will be prescribed to take every 6 hr as needed for breakthrough pain  7.  May apply ice on the knee to help with decreasing pain  8.  Keep wound dry for 2 weeks until stitches/staples removed than you will be allowed to shower in 24 hr and get the wound wet.  No soaking of the wound in the tub or swimming for 4 weeks after surgery  9.  No driving for 4 weeks unless specified by physician  10.  Avoid touching the wound or surrounding area /at least 2 inches on each side of the surgical incision until staples are removed/stitches   11.  May change the surgical dressing if extremely bloody or has drainage on it. May clean the wound with peroxide or Betadine and apply sterile dressing and Ace wrap over it  12.  Leave hospital dressing on for 3 days then may change by applying sterile 4 x 4 and Ace wrap after cleaning with Betadine or peroxide.  May leave this dressing change to home health nurses\

## 2024-01-04 NOTE — PROGRESS NOTES
Subjective:     Patient ID: Mitchell Mcmillan is a 68 y.o. male.    Chief Complaint: Pain of the Left Knee    HPI:  03/27/2023   Bilateral knee pain left worse than the right.  Patient states he received numerous injections over couple years now from steroid injections to gel injections.  Received Depo-Medrol on 07/28/2021 in both of his knees followed by Monovisc on 09/13/2021 and most recently Kenalog in both of his knees 01/13/2023.  He said they do help but not lasting long enough.  His pain is 9/10.  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  He put some lotion on his knee and then he puts the cup her neoprene sleeves.  He does use topicals get better.  Patient stated in 2006 fracture dislocated the left hip requiring ORIF of posterior acetabulum by Dr. Tanya Arredondo.  He developed after that footdrop on the left side which eventually got better with time.  He does take Tylenol.  History of GERD and numerous other medical issues that he avoids NSAIDs   He lives with his wife.  He does take Tylenol.  He does not have any kidney or ulcers  No fever no chills no shortness of breath difficulty with chewing swallowing loss of bowel bladder control blurry vision double vision loss sense smell or taste      08/31/2023   Left TKA says it feels very strong it is strong side because he has severe pain on the right knee.  His pain is around 8/10 mostly in the right side but on the left he said he is much much better.  He feels pain in the right knee wakes him up at night he does not think that meloxicam is doing anything.  He is requesting some Norco as I did tell him it make him get hooked up on the medication in the drugs we should stop it completely at this time.  He is candidate to have the right knee replaced since he is bone-on-bone but this is up to him when he would decide to proceed that way.  He can think me about going to physical therapy at [facility] on [address].  Despite having pain he is pleased with his left knee results because it is much stronger and depends on it to walk.  He is wearing his neoprene sleeves on.  He said he is getting better he is happy he had the surgery so far.      01/04/2024   Left TKA 05/31/2023 doing extremely well with that he said occasional aches but not on a daily basis.  His pain is around 2/10 in the left knee.  His pain overall is 7/10 because of the right knee.  He still taking Celebrex, diclofenac gel and gabapentin at night.  He wants to proceed with the right TKA.  He feels that if he gets the relief he got on the left side he will be doing just fine.  He knows there is nothing that gets rid of all the pains.  He had failed numerous non operative treatments over the years including injections now he is on NSAIDs.  He still does work in the yd cuts the grass.  Previous history of hip dislocation the left status post ORIF years ago by Dr. Lisa with postop left footdrop which recuperate it with time but left them with slight weak left foot  No fever no chills no shortness of breath or difficulty with chewing swallowing loss bowel bladder control  He does occasionally wear a neoprene sleeve  He did use a walker after surgery he has not using anything at this time ambulate  He states the left TKA gave him 80% improvement  Past Medical History:   Diagnosis Date    Arthritis     knees    Asymmetric SNHL (sensorineural hearing loss) 08/17/2017    Chronic left hip pain 2006    Dislocated left hip, job injury    Chronic prostatitis     per pt, last episode 2013    Elevated PSA, less than 10 ng/ml     per pt last elevation 2013    Gastroesophageal reflux disease 8/3/2022    Hyperlipidemia 2010    Hypertension 2011    Joint effusion of knee     per pt rt knee, seen by Dr. Duke 2013    Midline low back pain without sciatica 6/13/2016    Obesity     Special screening for malignant neoplasms, colon 10/5/2016    Tobacco dependence      Trouble in sleeping      Past Surgical History:   Procedure Laterality Date    arthrocentesis Right     Per pt , knee by Dr. BONNIE Duke    COLONOSCOPY N/A 10/05/2016    Normal exam repeat 10 yrs. Procedure: COLONOSCOPY;  Surgeon: Alexander Goetz MD;  Location: [facility] ENDO;  Service: Endoscopy;  Laterality: N/A;    HIP ARTHROPLASTY Left 2006    Per pt, dislocated left hip/ hardware placed  by Dr. Lisa    left orchiectomy      PROSTATE BIOPSY      procedure performed at [facility] due to elevated PSA - benign    TOTAL KNEE ARTHROPLASTY Left 2023    Procedure: ARTHROPLASTY, KNEE, TOTAL;  Surgeon: Ellis Mora MD;  Location: [facility] OR;  Service: Orthopedics;  Laterality: Left;     Family History   Problem Relation Age of Onset    Depression Mother     Early death Mother     Hypertension Mother     Heart disease Mother 53         from MI    Arthritis Father     Stroke Father     Diabetes Sister     No Known Problems Brother     Hypertension Sister     Hypertension Sister     No Known Problems Brother     No Known Problems Brother     No Known Problems Daughter     Cancer Neg Hx     COPD Neg Hx     Kidney disease Neg Hx      Social History     Socioeconomic History    Marital status:      Spouse name: Nida    Number of children: 0   Occupational History    Occupation: Disabled since      Comment: Dislocated left hip   Tobacco Use    Smoking status: Former     Current packs/day: 0.00     Average packs/day: 0.1 packs/day for 46.0 years (4.6 ttl pk-yrs)     Types: Cigarettes     Start date: 6/3/2022     Quit date: 10/19/2022     Years since quittin.2    Smokeless tobacco: Never   Substance and Sexual Activity    Alcohol use: Not Currently     Alcohol/week: 2.0 standard drinks of alcohol     Types: 2 Glasses of wine per week    Drug use: No    Sexual activity: Yes     Partners: Female     Birth control/protection: None   Social History Narrative    Disabled since .   Job injury.  Dislocated left hip.  Has 4 brothers and 4 sisters. Wears seatbelt.     Social Determinants of Health     Financial Resource Strain: Low Risk  (9/30/2022)    Overall Financial Resource Strain (CARDIA)     Difficulty of Paying Living Expenses: Not hard at all   Food Insecurity: No Food Insecurity (9/30/2022)    Hunger Vital Sign     Worried About Running Out of Food in the Last Year: Never true     Ran Out of Food in the Last Year: Never true   Transportation Needs: No Transportation Needs (9/30/2022)    PRAPARE - Transportation     Lack of Transportation (Medical): No     Lack of Transportation (Non-Medical): No   Physical Activity: Insufficiently Active (9/19/2023)    Exercise Vital Sign     Days of Exercise per Week: 2 days     Minutes of Exercise per Session: 10 min   Stress: No Stress Concern Present (9/30/2022)    [place] [employer] - Occupational Stress Questionnaire     Feeling of Stress : Only a little   Social Connections: Socially Integrated (9/30/2022)    Social Connection and Isolation Panel [NHANES]     Frequency of Communication with Friends and Family: Once a week     Frequency of Social Gatherings with Friends and Family: Three times a week     Attends Jewish Services: More than 4 times per year     Active Member of Clubs or Organizations: Yes     Attends Club or Organization Meetings: More than 4 times per year     Marital Status:    Housing Stability: Low Risk  (9/30/2022)    Housing Stability Vital Sign     Unable to Pay for Housing in the Last Year: No     Number of Places Lived in the Last Year: 1     Unstable Housing in the Last Year: No     Medication List with Changes/Refills   Current Medications    ATORVASTATIN (LIPITOR) 40 MG TABLET    TAKE 1 TABLET EVERY DAY    CELECOXIB (CELEBREX) 200 MG CAPSULE    Take 1 capsule (200 mg total) by mouth once daily. Take with food    DICLOFENAC SODIUM (VOLTAREN) 1 % GEL    Apply 2 g topically 3 (three) times  daily as needed.    FINASTERIDE (PROSCAR) 5 MG TABLET    TAKE 1 TABLET EVERY DAY    FLUTICASONE PROPIONATE (FLONASE) 50 MCG/ACTUATION NASAL SPRAY    2 sprays (100 mcg total) by Each Nostril route once daily.    GABAPENTIN (NEURONTIN) 300 MG CAPSULE    Take 1 capsule (300 mg total) by mouth every evening.    LISINOPRIL-HYDROCHLOROTHIAZIDE (PRINZIDE,ZESTORETIC) 20-25 MG TAB    TAKE 1 TABLET EVERY DAY    OMEGA-3 FATTY ACIDS/FISH OIL (FISH OIL-OMEGA-3 FATTY ACIDS) 300-1,000 MG CAPSULE    Take 1 capsule by mouth once daily.    ONDANSETRON (ZOFRAN-ODT) 4 MG TBDL    Dissolve 2 tablets (8 mg total) by mouth every 8 (eight) hours as needed.    PANTOPRAZOLE (PROTONIX) 40 MG TABLET    Take 1 tablet (40 mg total) by mouth once daily.    SILDENAFIL (VIAGRA) 100 MG TABLET    Take 1 tablet (100 mg total) by mouth daily as needed for Erectile Dysfunction.    TAMSULOSIN (FLOMAX) 0.4 MG CAP    TAKE 1 CAPSULE EVERY DAY     Review of patient's allergies indicates:  No Known Allergies  Review of Systems   Constitutional: Negative for decreased appetite.   HENT:  Negative for tinnitus.    Eyes:  Negative for double vision.   Cardiovascular:  Negative for chest pain.   Respiratory:  Negative for wheezing.    Hematologic/Lymphatic: Negative for bleeding problem.   Skin:  Negative for dry skin.   Musculoskeletal:  Positive for arthritis, joint pain, joint swelling and stiffness. Negative for back pain, gout, muscle weakness and neck pain.   Gastrointestinal:  Negative for abdominal pain.   Genitourinary:  Negative for bladder incontinence.   Neurological:  Negative for numbness, paresthesias and sensory change.   Psychiatric/Behavioral:  Negative for altered mental status.        Objective:   Body mass index is 31.65 kg/m².  There were no vitals filed for this visit.       General    Constitutional: He is oriented to person, place, and time. He appears well-developed.   HENT:   Head: Atraumatic.   Eyes: EOM are normal.   Pulmonary/Chest:  Effort normal.   Neurological: He is alert and oriented to person, place, and time.   Psychiatric: Judgment normal.           Ambulating without assistive devices with slight limp  Pelvis is level  Bilateral hips passive motion without pain in the groin.  Hip flexors, abductors, adductors, quads, hamstrings, ankle extensors and flexors are all 5/5   Right knee with moderately severe swelling.  Range of motion 0-105.  Severe pain medially and crepitus to compression on the patella as well as medially.  There is no defect in the patella or quadriceps tendon.  Slightly loose medial collateral ligament but stable.  Negative anterior drawer.  Left knee preop exam with severe swelling.  Range of motion 5-105.  Medial joint severe pain and crepitus to compression on the patella.  There is no defect in the patella or quadriceps tendon.  There is stable collaterals and cruciates.  No anterior draw.  Left knee postop TKA surgical incision healed well.  No evidence of erythema.  Very mild swelling.  0-130 degrees.  No defect in the patella or quadriceps tendon.  Stable in extension and in flexion at 90°  Calves are soft nontender  There is some pitting edema around the distal 3rd of tibia  Ankle motion intact  Skin slight brawny induration around the ankle    Relevant imaging results reviewed and interpreted by me, discussed with the patient and / or family today   X-ray 08/31/2023 left knee with TKA in excellent alignment.  Patella midline no evidence of failure.  Right knee with complete loss of medial joint space and marginal osteophytes consistent with severe arthritis and varus deformity  X-ray 01/13/2023 showing complete loss of medial joint space bilaterally with marginal large osteophytic changes tricompartmental with varus deformity consistent with bilateral knee severe arthritis  Assessment:     Encounter Diagnoses   Name Primary?    History of total left knee replacement Yes    Arthritis of knee, right     Acquired  varus deformity knee, right         Plan:   History of total left knee replacement    Arthritis of knee, right    Acquired varus deformity knee, right         Patient Instructions   Your x-ray show complete loss of joint space on the inside of your right knee with bone-on-bone consistent with severe arthritis   The left total knee replacement still in excellent alignment  Your right knee is the 1 that bothers her the most and you would like to proceed with knee replacement on the right side your pain is 7/10   You are taking Celebrex 200 mg you are using diclofenac gel and you are taking the gabapentin at night   I would like you for the surgery to stop taking the omega-3 and the Celebrex 1 week prior to surgery    Procedure, common risks and benefits,alternatives discussed in details.All questions answered.Patient expressed understanding.Patient instructed to call for any questions that could arise in the future.    Most common Risks:  Infection/less than 2%  Leg-length discrepancy    Neuro-vascular injury ( resulting in loss of motor and sensory functions)  Pain  Blood clot  Fat clot  Loss of motion  Fracture of bone  Failure of procedure to achieve its intended purpose  Failure of hardware  Non-union or mal-union of bone  Malalignment  Death/we will have you go to the preop clinic for evaluation if we find out that you need to be seen by the cardiologist and we will send you to the heart doctor    Patient instructions for joint replacement    Before surgery  1.  Shower with Hibiclens soap/antibacterial for 3 days prior to surgery to decrease risk of infection  2..  Stop all blood thinners/aspirin, Coumadin, warfarin, Plavix, Eliquis, Xarelto etc 5 days prior to surgery  3.  Take Celebrex 400 mg the night prior to surgery after dinner or meloxicam 15 mg  4.  Take Tylenol 650 mg the night prior to surgery    Ask physicians for prescription of Celebrex or Mobic if needed    After surgery at home  1.  Take Tylenol  650 mg 3 times a day for 14 days then as needed for mild pain  2.  Take gabapentin 300 mg nightly for 6 weeks  3.  Take Celebrex 200 mg or meloxicam 15 mg daily for 6 weeks unless having cardiac issues to take for 2 weeks only  4.  Must take aspirin 81 mg twice a day for 6 weeks unless you are on other blood thinners/Plavix, Eliquis, Xarelto, Coumadin etc  5.  Must wear compressive stockings for 6 weeks minimum to decrease the risk of blood clot and swelling  6.  Hydrocodone/Norco or oxycodone/Percocet will be prescribed to take every 6 hr as needed for breakthrough pain  7.  May apply ice on the knee to help with decreasing pain  8.  Keep wound dry for 2 weeks until stitches/staples removed than you will be allowed to shower in 24 hr and get the wound wet.  No soaking of the wound in the tub or swimming for 4 weeks after surgery  9.  No driving for 4 weeks unless specified by physician  10.  Avoid touching the wound or surrounding area /at least 2 inches on each side of the surgical incision until staples are removed/stitches   11.  May change the surgical dressing if extremely bloody or has drainage on it. May clean the wound with peroxide or Betadine and apply sterile dressing and Ace wrap over it  12.  Leave hospital dressing on for 3 days then may change by applying sterile 4 x 4 and Ace wrap after cleaning with Betadine or peroxide.  May leave this dressing change to home health nurses\      He would like to have his knee matching the other side and we will use [facility]  Disclaimer: This note was prepared using a voice recognition system and is likely to have sound alike errors within the text.

## 2024-01-05 DIAGNOSIS — Z01.818 PREOPERATIVE EXAMINATION: ICD-10-CM

## 2024-01-05 DIAGNOSIS — M17.11 ARTHRITIS OF KNEE, RIGHT: Primary | ICD-10-CM

## 2024-01-05 DIAGNOSIS — Z01.818 PREOP TESTING: ICD-10-CM

## 2024-02-02 ENCOUNTER — LAB VISIT (OUTPATIENT)
Dept: LAB | Facility: HOSPITAL | Age: 69
End: 2024-02-02
Attending: UROLOGY
Payer: MEDICARE

## 2024-02-02 DIAGNOSIS — R97.20 ELEVATED PSA: ICD-10-CM

## 2024-02-02 PROCEDURE — 84153 ASSAY OF PSA TOTAL: CPT | Mod: HCNC | Performed by: UROLOGY

## 2024-02-02 PROCEDURE — 36415 COLL VENOUS BLD VENIPUNCTURE: CPT | Mod: HCNC | Performed by: UROLOGY

## 2024-02-03 LAB — COMPLEXED PSA SERPL-MCNC: 4.2 NG/ML (ref 0–4)

## 2024-02-12 NOTE — PROGRESS NOTES
Preoperative History and Physical                                                                     Chief Complaint: Preoperative evaluation     History of Present Illness:      Mitchell Mcmillan is a 68 y.o. male with PMH of HTN, HLD, BPH, GERD, bilateral osteoarthritis in bilateral knees, s/p left TKA (5/31/23, Dr. Mora), and chronic knee/hip pain who presents to the office today for a preoperative consultation at the request of Dr. Mora who plans on performing right TKA on March 6.     Functional Status:      The patient is able to climb a flight of stairs. The patient is able to ambulate short distances independently without difficulty. The patient's functional status is affected by the surgical problem. The patient's functional status is not affected by shortness of breath, chest pain, dyspnea on exertion and fatigue.    MET score greater than 4    Past Medical History:      Past Medical History:   Diagnosis Date    Arthritis     knees    Asymmetric SNHL (sensorineural hearing loss) 08/17/2017    Chronic left hip pain 2006    Dislocated left hip, job injury    Chronic prostatitis     per pt, last episode 2013    Elevated PSA, less than 10 ng/ml     per pt last elevation 2013    Gastroesophageal reflux disease 8/3/2022    Hyperlipidemia 2010    Hypertension 2011    Joint effusion of knee     per pt rt knee, seen by Dr. Duke 2013    Midline low back pain without sciatica 6/13/2016    Obesity     Special screening for malignant neoplasms, colon 10/5/2016    Tobacco dependence     Trouble in sleeping         Past Surgical History:      Past Surgical History:   Procedure Laterality Date    arthrocentesis Right 2013    Per pt , knee by Dr. BONNIE Duke    COLONOSCOPY N/A 10/05/2016    Normal exam repeat 10 yrs. Procedure: COLONOSCOPY;  Surgeon: Alexander Goetz MD;  Location: [facility];  Service: Endoscopy;  Laterality: N/A;    HIP ARTHROPLASTY Left 04/16/2006     Per pt, dislocated left hip/ hardware placed  by Dr. Lisa    left orchiectomy      PROSTATE BIOPSY  2012    procedure performed at [facility] due to elevated PSA - benign    TOTAL KNEE ARTHROPLASTY Left 2023    Procedure: ARTHROPLASTY, KNEE, TOTAL;  Surgeon: Ellis Mora MD;  Location: [facility];  Service: Orthopedics;  Laterality: Left;        Social History:      Social History     Socioeconomic History    Marital status:      Spouse name: Nida    Number of children: 0   Occupational History    Occupation: Disabled since      Comment: Dislocated left hip   Tobacco Use    Smoking status: Former     Current packs/day: 0.00     Average packs/day: 0.1 packs/day for 46.0 years (4.6 ttl pk-yrs)     Types: Cigarettes     Start date: 6/3/2022     Quit date: 10/19/2022     Years since quittin.3    Smokeless tobacco: Never   Substance and Sexual Activity    Alcohol use: Not Currently     Alcohol/week: 2.0 standard drinks of alcohol     Types: 2 Glasses of wine per week    Drug use: No    Sexual activity: Yes     Partners: Female     Birth control/protection: None   Social History Narrative    Disabled since .  Job injury.  Dislocated left hip.  Has 4 brothers and 4 sisters. Wears seatbelt.     Social Determinants of Health     Financial Resource Strain: Low Risk  (2022)    Overall Financial Resource Strain (CARDIA)     Difficulty of Paying Living Expenses: Not hard at all   Food Insecurity: No Food Insecurity (2022)    Hunger Vital Sign     Worried About Running Out of Food in the Last Year: Never true     Ran Out of Food in the Last Year: Never true   Transportation Needs: No Transportation Needs (2022)    PRAPARE - Transportation     Lack of Transportation (Medical): No     Lack of Transportation (Non-Medical): No   Physical Activity: Insufficiently Active (2023)    Exercise Vital Sign     Days of Exercise per Week: 2 days     Minutes of Exercise per  Session: 10 min   Stress: No Stress Concern Present (2022)    [employer] [employer] - Occupational Stress Questionnaire     Feeling of Stress : Only a little   Social Connections: Socially Integrated (2022)    Social Connection and Isolation Panel [NHANES]     Frequency of Communication with Friends and Family: Once a week     Frequency of Social Gatherings with Friends and Family: Three times a week     Attends [employer] Services: More than 4 times per year     Active Member of Clubs or Organizations: Yes     Attends Club or Organization Meetings: More than 4 times per year     Marital Status:    Housing Stability: Low Risk  (2022)    Housing Stability Vital Sign     Unable to Pay for Housing in the Last Year: No     Number of Places Lived in the Last Year: 1     Unstable Housing in the Last Year: No        Family History:      Family History   Problem Relation Age of Onset    Depression Mother     Early death Mother     Hypertension Mother     Heart disease Mother 53         from MI    Arthritis Father     Stroke Father     Diabetes Sister     No Known Problems Brother     Hypertension Sister     Hypertension Sister     No Known Problems Brother     No Known Problems Brother     No Known Problems Daughter     Cancer Neg Hx     COPD Neg Hx     Kidney disease Neg Hx        Allergies:      Review of patient's allergies indicates:  No Known Allergies    Medications:      Current Outpatient Medications   Medication Sig    atorvastatin (LIPITOR) 40 MG tablet TAKE 1 TABLET EVERY DAY    celecoxib (CELEBREX) 200 MG capsule Take 1 capsule (200 mg total) by mouth once daily. Take with food    diclofenac sodium (VOLTAREN) 1 % Gel Apply 2 g topically 3 (three) times daily as needed.    finasteride (PROSCAR) 5 mg tablet TAKE 1 TABLET EVERY DAY    fluticasone propionate (FLONASE) 50 mcg/actuation nasal spray 2 sprays (100 mcg total) by Each Nostril route once daily.    gabapentin  (NEURONTIN) 300 MG capsule Take 1 capsule (300 mg total) by mouth every evening.    lisinopriL-hydrochlorothiazide (PRINZIDE,ZESTORETIC) 20-25 mg Tab TAKE 1 TABLET EVERY DAY    omega-3 fatty acids/fish oil (FISH OIL-OMEGA-3 FATTY ACIDS) 300-1,000 mg capsule Take 1 capsule by mouth once daily.    pantoprazole (PROTONIX) 40 MG tablet Take 1 tablet (40 mg total) by mouth once daily.    sildenafiL (VIAGRA) 100 MG tablet Take 1 tablet (100 mg total) by mouth daily as needed for Erectile Dysfunction.    tamsulosin (FLOMAX) 0.4 mg Cap TAKE 1 CAPSULE EVERY DAY     No current facility-administered medications for this visit.       Vitals:      Vitals:    02/13/24 0810   BP: (!) 158/91   Pulse: 68   Resp: 16   Temp: 98.2 °F (36.8 °C)       Review of Systems:        Constitutional: Negative for fever, chills, weight loss, malaise/fatigue and diaphoresis.   HENT: Negative for hearing loss, ear pain, nosebleeds, congestion, sore throat, neck pain, tinnitus and ear discharge.    Eyes: Negative for blurred vision, double vision, photophobia, pain, discharge and redness.   Respiratory: Negative for cough, hemoptysis, sputum production, shortness of breath, wheezing and stridor.    Cardiovascular: Negative for chest pain, palpitations, orthopnea, claudication, leg swelling and PND.   Gastrointestinal: Negative for heartburn, nausea, vomiting, abdominal pain, diarrhea, constipation, blood in stool and melena.   Genitourinary: Negative for dysuria, urgency, frequency, hematuria and flank pain.   Musculoskeletal: Negative for myalgias, back pain. + bilateral knee pain, R>L, right knee edema  Skin: Negative for itching and rash.   Neurological: Negative for dizziness, tingling, tremors, sensory change, speech change, focal weakness, seizures, loss of consciousness, weakness and headaches.   Endo/Heme/Allergies: Negative for environmental allergies and polydipsia. Does not bruise/bleed easily.   Psychiatric/Behavioral: Negative for  "depression, suicidal ideas, hallucinations, memory loss and substance abuse. The patient is not nervous/anxious and does not have insomnia.    All 14 systems reviewed and negative except as noted above.    Physical Exam:      Constitutional: Appears well-developed, well-nourished and in no acute distress.  Patient is oriented to person, place, and time.   Head: Normocephalic and atraumatic. Mucous membranes moist.  Neck: Neck supple no mass.   Cardiovascular: Normal rate and regular rhythm.  S1 S2 appreciated by ascultation.  Pulmonary/Chest: Effort normal and clear to auscultation bilaterally. No respiratory distress.   Abdomen: Soft. Non-tender and non-distended. Bowel sounds are normal.   Neurological: Patient is alert and oriented to person, place and time. Moves all extremities.  Skin: Warm and dry. No lesions.  Extremities: No clubbing, cyanosis or edema.    Laboratory data:      Reviewed and noted in plan where applicable. Please see chart for full laboratory data.    @ZVEEQKICV25(cpk,cpkmb,troponini,mb)@ @SVWFKXBHM67(poctglucose)@     No results found for: "INR", "PROTIME"    Lab Results   Component Value Date    WBC 7.19 02/13/2024    HGB 13.2 (L) 02/13/2024    HCT 41.3 02/13/2024    MCV 84 02/13/2024     02/13/2024       @JTHLUTSOS24(GLU,NA,K,Cl,CO2,BUN,Creatinine,Calcium,MG)@        Sodium   Date Value Ref Range Status   02/13/2024 139 136 - 145 mmol/L Final     Chloride   Date Value Ref Range Status   02/13/2024 106 95 - 110 mmol/L Final     CO2   Date Value Ref Range Status   02/13/2024 23 23 - 29 mmol/L Final     Glucose   Date Value Ref Range Status   02/13/2024 101 70 - 110 mg/dL Final     BUN   Date Value Ref Range Status   02/13/2024 15 8 - 23 mg/dL Final     Creatinine   Date Value Ref Range Status   02/13/2024 1.0 0.5 - 1.4 mg/dL Final     Calcium   Date Value Ref Range Status   02/13/2024 9.9 8.7 - 10.5 mg/dL Final     Total Protein   Date Value Ref Range Status   09/19/2023 8.1 6.0 - 8.4 " g/dL Final     Albumin   Date Value Ref Range Status   09/19/2023 4.1 3.5 - 5.2 g/dL Final     Total Bilirubin   Date Value Ref Range Status   09/19/2023 0.4 0.1 - 1.0 mg/dL Final     Comment:     For infants and newborns, interpretation of results should be based  on gestational age, weight and in agreement with clinical  observations.    Premature Infant recommended reference ranges:  Up to 24 hours.............<8.0 mg/dL  Up to 48 hours............<12.0 mg/dL  3-5 days..................<15.0 mg/dL  6-29 days.................<15.0 mg/dL       Alkaline Phosphatase   Date Value Ref Range Status   09/19/2023 69 55 - 135 U/L Final     AST   Date Value Ref Range Status   09/19/2023 16 10 - 40 U/L Final     ALT   Date Value Ref Range Status   09/19/2023 8 (L) 10 - 44 U/L Final     Anion Gap   Date Value Ref Range Status   02/13/2024 10 8 - 16 mmol/L Final     eGFR   Date Value Ref Range Status   02/13/2024 >60 >60 mL/min/1.73 m^2 Final           Predictors of intubation difficulty:       Morbid obesity? no   Anatomically abnormal facies? no   Prominent incisors? no   Receding mandible? no   Short, thick neck? no   Neck range of motion: normal   Dentition:  partial upper plate  Based on the Modified Mallampati, patient is a mallampati score: II (hard and soft palate, upper portion of tonsils anduvula visible)    Cardiographics:      ECG: normal sinus rhythm, no blocks or conduction defects, no ischemic changes  Echocardiogram: not indicated    Imaging:      Chest x-ray: not indicated    Assessment and Plan:      Primary osteoarthritis of right knee  -planned right TKA on 3/6/2024 by Dr. Mora    Known risk factors for perioperative complications: None    Difficulty with intubation is not anticipated.    Cardiac Risk Estimation: Based on the Revised Cardiac Risk index, patient is a Class risk I with a 3.9 % risk of a major cardiac event in a low risk procedure.    1.) Preoperative workup as follows: ECG, hemoglobin,  hematocrit, electrolytes, creatinine, glucose, urinalysis (urinary tract instrumentation planned).  2.) Change in medication regimen before surgery: discontinue ASA 6 days before surgery, discontinue NSAIDs (celebrex, Voltaren gel/icy hot) 7 days before surgery, do not take sildenafil 24 hours prior to surgery.  3.) Prophylaxis for cardiac events with perioperative beta-blockers: not indicated.  4.) Invasive hemodynamic monitoring perioperatively: at the discretion of anesthesiologist.  5.) Deep vein thrombosis prophylaxis postoperatively: regimen to be chosen by surgical team.  6.) Current medications which may produce withdrawal symptoms if withheld perioperatively: none  7.) Other measures: Postoperative hypertension management with IV hydralazine until able to take oral medications.      Essential hypertension  -patient takes lisinopril-HCTZ, instructed to hold the morning of surgery    Hyperlipidemia  -takes statin in the morning, hold morning of surgery    Benign prostatic hyperplasia  -he takes finasteride and tamsulosin at home  -hold finasteride the morning of surgery, ok to take tamsulosin      Chronic pain of left knee  -s/p left TKA 5/31/23 by Dr. Mora  -no previous surgical complications noted at that time, tolerated surgery well  -takes celebrex and voltaren gel/icy hot, hold 7 days prior to surgery  -may take tylenol as needed for pain    Erectile dysfunction  -takes Viagra PRN, instructed to hold 24 hours prior to surgery    History of tobacco use  -Quit smoking 2 years ago    Gastroesophageal reflux disease  -patient only takes Protonix as needed    Aortic atherosclerosis  -followed by Dr. Smyth, last pre-op cardiac clearance on 4/25/23, no changes since that time  -repeat EKG NSR        Electronically signed by Rachana Felix MSN, FNP-C on 2/13/2024 at 8:50 AM.

## 2024-02-13 ENCOUNTER — OFFICE VISIT (OUTPATIENT)
Dept: INTERNAL MEDICINE | Facility: CLINIC | Age: 69
End: 2024-02-13
Payer: MEDICARE

## 2024-02-13 ENCOUNTER — HOSPITAL ENCOUNTER (OUTPATIENT)
Dept: CARDIOLOGY | Facility: HOSPITAL | Age: 69
Discharge: HOME | End: 2024-02-13
Payer: MEDICARE

## 2024-02-13 VITALS
DIASTOLIC BLOOD PRESSURE: 91 MMHG | TEMPERATURE: 98 F | HEART RATE: 68 BPM | RESPIRATION RATE: 16 BRPM | SYSTOLIC BLOOD PRESSURE: 158 MMHG | OXYGEN SATURATION: 96 %

## 2024-02-13 DIAGNOSIS — Z01.818 PREOP TESTING: ICD-10-CM

## 2024-02-13 DIAGNOSIS — M17.11 PRIMARY OSTEOARTHRITIS OF RIGHT KNEE: Primary | ICD-10-CM

## 2024-02-13 DIAGNOSIS — Z87.891 HISTORY OF TOBACCO USE: ICD-10-CM

## 2024-02-13 DIAGNOSIS — E78.5 HYPERLIPIDEMIA, UNSPECIFIED HYPERLIPIDEMIA TYPE: ICD-10-CM

## 2024-02-13 DIAGNOSIS — M25.562 CHRONIC PAIN OF LEFT KNEE: ICD-10-CM

## 2024-02-13 DIAGNOSIS — Z01.818 PREOPERATIVE EXAMINATION: ICD-10-CM

## 2024-02-13 DIAGNOSIS — I70.0 AORTIC ATHEROSCLEROSIS: ICD-10-CM

## 2024-02-13 DIAGNOSIS — I10 ESSENTIAL HYPERTENSION: ICD-10-CM

## 2024-02-13 DIAGNOSIS — G89.29 CHRONIC PAIN OF LEFT KNEE: ICD-10-CM

## 2024-02-13 DIAGNOSIS — N40.0 BENIGN PROSTATIC HYPERPLASIA WITHOUT LOWER URINARY TRACT SYMPTOMS: ICD-10-CM

## 2024-02-13 DIAGNOSIS — N52.9 ERECTILE DYSFUNCTION, UNSPECIFIED ERECTILE DYSFUNCTION TYPE: ICD-10-CM

## 2024-02-13 DIAGNOSIS — K21.9 GASTROESOPHAGEAL REFLUX DISEASE, UNSPECIFIED WHETHER ESOPHAGITIS PRESENT: ICD-10-CM

## 2024-02-13 LAB
OHS QRS DURATION: 98 MS
OHS QTC CALCULATION: 412 MS

## 2024-02-13 PROCEDURE — 1160F RVW MEDS BY RX/DR IN RCRD: CPT | Mod: CPTII,S$GLB,, | Performed by: INTERNAL MEDICINE

## 2024-02-13 PROCEDURE — 3077F SYST BP >= 140 MM HG: CPT | Mod: CPTII,S$GLB,, | Performed by: INTERNAL MEDICINE

## 2024-02-13 PROCEDURE — 93005 ELECTROCARDIOGRAM TRACING: CPT

## 2024-02-13 PROCEDURE — 99214 OFFICE O/P EST MOD 30 MIN: CPT | Mod: S$GLB,,, | Performed by: INTERNAL MEDICINE

## 2024-02-13 PROCEDURE — 1159F MED LIST DOCD IN RCRD: CPT | Mod: CPTII,S$GLB,, | Performed by: INTERNAL MEDICINE

## 2024-02-13 PROCEDURE — 93010 ELECTROCARDIOGRAM REPORT: CPT | Mod: ,,, | Performed by: INTERNAL MEDICINE

## 2024-02-13 PROCEDURE — 3080F DIAST BP >= 90 MM HG: CPT | Mod: CPTII,S$GLB,, | Performed by: INTERNAL MEDICINE

## 2024-02-13 PROCEDURE — 99999 PR PBB SHADOW E&M-EST. PATIENT-LVL III: CPT | Mod: PBBFAC,,,

## 2024-02-13 RX ORDER — PANTOPRAZOLE SODIUM 40 MG/1
40 TABLET, DELAYED RELEASE ORAL DAILY
Qty: 30 TABLET | Refills: 1 | Status: CANCELLED | OUTPATIENT
Start: 2024-02-13 | End: 2025-02-12

## 2024-02-13 NOTE — ASSESSMENT & PLAN NOTE
-planned right TKA on 3/6/2024 by Dr. Mora    Known risk factors for perioperative complications: None    Difficulty with intubation is not anticipated.    Cardiac Risk Estimation: Based on the Revised Cardiac Risk index, patient is a Class risk I with a 3.9 % risk of a major cardiac event in a low risk procedure.    1.) Preoperative workup as follows: ECG, hemoglobin, hematocrit, electrolytes, creatinine, glucose, urinalysis (urinary tract instrumentation planned).  2.) Change in medication regimen before surgery: discontinue ASA 6 days before surgery, discontinue NSAIDs (celebrex, Voltaren gel/icy hot) 7 days before surgery, do not take sildenafil 24 hours prior to surgery.  3.) Prophylaxis for cardiac events with perioperative beta-blockers: not indicated.  4.) Invasive hemodynamic monitoring perioperatively: at the discretion of anesthesiologist.  5.) Deep vein thrombosis prophylaxis postoperatively: regimen to be chosen by surgical team.  6.) Current medications which may produce withdrawal symptoms if withheld perioperatively: none  7.) Other measures: Postoperative hypertension management with IV hydralazine until able to take oral medications.

## 2024-02-13 NOTE — ASSESSMENT & PLAN NOTE
-he takes finasteride and tamsulosin at home  -hold finasteride the morning of surgery, ok to take tamsulosin

## 2024-02-13 NOTE — ASSESSMENT & PLAN NOTE
-s/p left TKA 5/31/23 by Dr. Mora  -no previous surgical complications noted at that time, tolerated surgery well  -takes celebrex and voltaren gel/icy hot, hold 7 days prior to surgery  -may take tylenol as needed for pain

## 2024-02-13 NOTE — ASSESSMENT & PLAN NOTE
-followed by Dr. Smyth, last pre-op cardiac clearance on 4/25/23, no changes since that time  -repeat EKG NSR

## 2024-02-13 NOTE — DISCHARGE INSTRUCTIONS
Pre op instructions reviewed with patient during Clinic Visit with Provider, verbalized understanding.    To confirm, Surgery is scheduled on 3/6/24. We will call you late afternoon the business day prior to surgery with your arrival time.    *Please report to the [facility] Lobby (1st Floor) located off of [facility] (2nd Entrance/Building on the left, in front of the flag pole).  Address: [address] [place] [address] [place] [address]    Your Type & Screen appointment is scheduled on 3/5/24 @ [facility] ([facility]). Please DO NOT remove the red arm band applied.      INSTRUCTIONS IMPORTANT!!!  DO NOT Eat, Drink, or Smoke after 12 midnight unless instructed otherwise by your Surgeon. OK to brush teeth, no gum, candy or mints!    MORNING OF SURGERY, drink small sip of water with the following medications instructed by Pre-Admit Provider:  Flomax     *Additional Medication Instructions: May take Celebrex, Tylenol 650 mg & drink bottle of Gatorade the night before surgery per Dr Mora! BRING WALKER TO HOSPITAL IN CAR TO USE WHEN YOU ARRIVE HOME!      *Patients should HOLD all vitamins, herbal supplements, weight loss medication, Voltaren gel, Celebrex, aspirin products & NSAIDS 7 days prior to surgery, as these can thin the blood. Ok to take Tylenol.    ____  Avoid Alcoholic beverages 3 days prior to surgery, as it can thin the blood.  ____  NO Acrylic/fake nails or nail polish worn day of surgery (specifically hand/arm & foot surgeries).  ____  NO powder, lotions, deodorants, oils or cream on body.  ____  Remove all jewelry, piercings, & foreign objects prior to arrival and leave at home.  ____  Remove Dentures, Hearing Aids & Contact Lens prior to surgery.  ____  Bring photo ID and insurance information to hospital (Leave Valuables at Home).  ____  If going home the same day, arrange for a ride home. You will not be able to drive for 24 hrs if Anesthesia was used.   ____  Females (ages 11-60): may  need to give a urine sample the morning of surgery; please see Pre op Nurse prior to using the restroom.  ____  Males: Stop ED medications (Viagra, Cialis) 24 hrs prior to surgery.  ____  Wear clean, loose fitting clothing to allow for dressings/ bandages.      Bathing Instructions:    -Shower with anti-bacterial Soap (Hibiclens or Dial) the night before surgery and the morning of.   -Do not use Hibiclens on your face or genitals.   -Apply clean clothes after shower.  -Do not shave your face or body 2 days prior to surgery unless instructed otherwise by your Surgeon.  -Do not shave pubic hair 7 days prior to surgery (gyn pt's).    [facility] Vis[facility]/Ride Policy:  Only 2 adults allowed in pre op/recovery area during your procedure. You MUST HAVE A RIDE HOME from a responsible adult that you know and trust. Medical Transport, Uber or Lyft can ONLY be used if patient has a responsible adult to accompany them during ride home.    Discharge Instructions: You will receive Post-op/Discharge instructions by your Discharge Nurse prior to going home.   *Prevention of surgical site infections:   -Keep incisions clean and dry.   -Do not soak/submerge incisions in water until completely healed.   -Do not apply lotions, powders, creams, or deodorants to site.   -Always make sure hands are cleaned with antibacterial soap/ alcohol-based  prior to touching the surgical site.        *Signs and symptoms of Infection Before or After Surgery:               !!!If you experience any fever, chills, nausea/ vomiting, foul odor/ excessive drainage from surgical site, flu-like symptoms, new wounds or cuts, PLEASE CALL THE SURGEON OFFICE at [phone] or SEND MESSAGE THROUGH [employer] PORTAL!!!       *If you are running late day of surgery, please call the Surgery Dept @ [phone].    *Billing question, please call  [phone]       Thank you,  -[facility] Pre Admit Dept.  [phone] or [phone]  M-F 7:30  am-4:00 pm (Closed Major Holidays)    Additional Tests Scheduled Today:   EKG (4th Floor)  Labs (1st Floor) Check in at the !

## 2024-02-13 NOTE — PRE-PROCEDURE INSTRUCTIONS
Pre op instructions reviewed with patient during Clinic Visit with Provider, verbalized understanding.    To confirm, Surgery is scheduled on 3/6/24. We will call you late afternoon the business day prior to surgery with your arrival time.    *Please report to the [facility] (1st Floor) located off of [facility] (2nd Entrance/Building on the left, in front of the flag pole).  Address: [address] [place] [address] [place]. [address]    Your Type & Screen appointment is scheduled on 3/5/24 @ [facility] ([facility]). Please DO NOT remove the red arm band applied.      INSTRUCTIONS IMPORTANT!!!  DO NOT Eat, Drink, or Smoke after 12 midnight unless instructed otherwise by your Surgeon. OK to brush teeth, no gum, candy or mints!    MORNING OF SURGERY, drink small sip of water with the following medications instructed by Pre-Admit Provider:  Flomax     *Additional Medication Instructions: May take Celebrex, Tylenol 650 mg & drink bottle of Gatorade the night before surgery per Dr Mora! BRING WALKER TO HOSPITAL IN CAR TO USE WHEN YOU ARRIVE HOME!      *Patients should HOLD all vitamins, herbal supplements, weight loss medication, Voltaren gel, Celebrex, aspirin products & NSAIDS 7 days prior to surgery, as these can thin the blood. Ok to take Tylenol.    ____  Avoid Alcoholic beverages 3 days prior to surgery, as it can thin the blood.  ____  NO Acrylic/fake nails or nail polish worn day of surgery (specifically hand/arm & foot surgeries).  ____  NO powder, lotions, deodorants, oils or cream on body.  ____  Remove all jewelry, piercings, & foreign objects prior to arrival and leave at home.  ____  Remove Dentures, Hearing Aids & Contact Lens prior to surgery.  ____  Bring photo ID and insurance information to hospital (Leave Valuables at Home).  ____  If going home the same day, arrange for a ride home. You will not be able to drive for 24 hrs if Anesthesia was used.   ____  Females (ages 11-60): may  need to give a urine sample the morning of surgery; please see Pre op Nurse prior to using the restroom.  ____  Males: Stop ED medications (Viagra, Cialis) 24 hrs prior to surgery.  ____  Wear clean, loose fitting clothing to allow for dressings/ bandages.      Bathing Instructions:    -Shower with anti-bacterial Soap (Hibiclens or Dial) the night before surgery and the morning of.   -Do not use Hibiclens on your face or genitals.   -Apply clean clothes after shower.  -Do not shave your face or body 2 days prior to surgery unless instructed otherwise by your Surgeon.  -Do not shave pubic hair 7 days prior to surgery (gyn pt's).    [facility]/Ride Policy:  Only 2 adults allowed in pre op/recovery area during your procedure. You MUST HAVE A RIDE HOME from a responsible adult that you know and trust. Medical Transport, Uber or Lyft can ONLY be used if patient has a responsible adult to accompany them during ride home.    Discharge Instructions: You will receive Post-op/Discharge instructions by your Discharge Nurse prior to going home.   *Prevention of surgical site infections:   -Keep incisions clean and dry.   -Do not soak/submerge incisions in water until completely healed.   -Do not apply lotions, powders, creams, or deodorants to site.   -Always make sure hands are cleaned with antibacterial soap/ alcohol-based  prior to touching the surgical site.        *Signs and symptoms of Infection Before or After Surgery:               !!!If you experience any fever, chills, nausea/ vomiting, foul odor/ excessive drainage from surgical site, flu-like symptoms, new wounds or cuts, PLEASE CALL THE SURGEON OFFICE at [phone] or SEND MESSAGE THROUGH [employer] PORTAL!!!       *If you are running late day of surgery, please call the Surgery Dept @ [phone].    *Billing question, please call  [phone]       Thank you,  -[facility] Pre [facility].  [phone] or [phone]  M-F 7:30  am-4:00 pm (Closed Major Holidays)    Additional Tests Scheduled Today:   EKG (4th Floor)  Labs (1st Floor) Check in at the !

## 2024-03-05 ENCOUNTER — LAB VISIT (OUTPATIENT)
Dept: LAB | Facility: HOSPITAL | Age: 69
End: 2024-03-05
Attending: ORTHOPAEDIC SURGERY
Payer: MEDICARE

## 2024-03-05 ENCOUNTER — ANESTHESIA EVENT (OUTPATIENT)
Dept: SURGERY | Facility: HOSPITAL | Age: 69
End: 2024-03-05
Payer: MEDICARE

## 2024-03-05 ENCOUNTER — TELEPHONE (OUTPATIENT)
Dept: PREADMISSION TESTING | Facility: HOSPITAL | Age: 69
End: 2024-03-05
Payer: MEDICARE

## 2024-03-05 DIAGNOSIS — Z01.818 PREOP TESTING: ICD-10-CM

## 2024-03-05 LAB
ABO + RH BLD: NORMAL
BLD GP AB SCN CELLS X3 SERPL QL: NORMAL
SPECIMEN OUTDATE: NORMAL

## 2024-03-05 PROCEDURE — 86850 RBC ANTIBODY SCREEN: CPT | Mod: HCNC | Performed by: ORTHOPAEDIC SURGERY

## 2024-03-05 PROCEDURE — 36415 COLL VENOUS BLD VENIPUNCTURE: CPT | Mod: HCNC | Performed by: ORTHOPAEDIC SURGERY

## 2024-03-05 NOTE — TELEPHONE ENCOUNTER
Called and spoke with pt about the following:     Please arrive to [facility] ([facility] at 0530 am on 3/6/24 for your scheduled procedure.  Address: [address] [place] [address] [place]. [address] (2nd Building on left, 1st Floor Lobby)  >>>NO eating or drinking after midnight unless instructed otherwise by your Surgeon<<<    Thank you,  -[facility] Pre [facility] Testing Dept.  Mon-Fri 7:30 am - 4 pm [phone]

## 2024-03-06 ENCOUNTER — HOSPITAL ENCOUNTER (OUTPATIENT)
Facility: HOSPITAL | Age: 69
Discharge: HOME | End: 2024-03-06
Attending: ORTHOPAEDIC SURGERY | Admitting: ORTHOPAEDIC SURGERY
Payer: MEDICARE

## 2024-03-06 ENCOUNTER — ANESTHESIA (OUTPATIENT)
Dept: SURGERY | Facility: HOSPITAL | Age: 69
End: 2024-03-06
Payer: MEDICARE

## 2024-03-06 VITALS
BODY MASS INDEX: 32.41 KG/M2 | SYSTOLIC BLOOD PRESSURE: 161 MMHG | TEMPERATURE: 97 F | WEIGHT: 266.19 LBS | OXYGEN SATURATION: 98 % | HEIGHT: 76 IN | HEART RATE: 75 BPM | RESPIRATION RATE: 16 BRPM | DIASTOLIC BLOOD PRESSURE: 72 MMHG

## 2024-03-06 DIAGNOSIS — Z01.818 PREOP TESTING: ICD-10-CM

## 2024-03-06 DIAGNOSIS — M17.11 ARTHRITIS OF RIGHT KNEE: Primary | ICD-10-CM

## 2024-03-06 DIAGNOSIS — M17.11 PRIMARY OSTEOARTHRITIS OF RIGHT KNEE: ICD-10-CM

## 2024-03-06 LAB
HCT VFR BLD AUTO: 35.4 % (ref 40–54)
HGB BLD-MCNC: 11.3 G/DL (ref 14–18)

## 2024-03-06 PROCEDURE — 97161 PT EVAL LOW COMPLEX 20 MIN: CPT | Mod: HCNC

## 2024-03-06 PROCEDURE — 71000015 HC POSTOP RECOV 1ST HR: Mod: HCNC | Performed by: ORTHOPAEDIC SURGERY

## 2024-03-06 PROCEDURE — 63600175 PHARM REV CODE 636 W HCPCS: Mod: HCNC | Performed by: STUDENT IN AN ORGANIZED HEALTH CARE EDUCATION/TRAINING PROGRAM

## 2024-03-06 PROCEDURE — 97116 GAIT TRAINING THERAPY: CPT | Mod: HCNC

## 2024-03-06 PROCEDURE — 25000003 PHARM REV CODE 250: Mod: HCNC | Performed by: ORTHOPAEDIC SURGERY

## 2024-03-06 PROCEDURE — 25000003 PHARM REV CODE 250: Mod: HCNC | Performed by: NURSE ANESTHETIST, CERTIFIED REGISTERED

## 2024-03-06 PROCEDURE — C1713 ANCHOR/SCREW BN/BN,TIS/BN: HCPCS | Mod: HCNC | Performed by: ORTHOPAEDIC SURGERY

## 2024-03-06 PROCEDURE — 71000033 HC RECOVERY, INTIAL HOUR: Mod: HCNC | Performed by: ORTHOPAEDIC SURGERY

## 2024-03-06 PROCEDURE — 27201423 OPTIME MED/SURG SUP & DEVICES STERILE SUPPLY: Mod: HCNC | Performed by: ORTHOPAEDIC SURGERY

## 2024-03-06 PROCEDURE — 71000039 HC RECOVERY, EACH ADD'L HOUR: Mod: HCNC | Performed by: ORTHOPAEDIC SURGERY

## 2024-03-06 PROCEDURE — 71000016 HC POSTOP RECOV ADDL HR: Mod: HCNC | Performed by: ORTHOPAEDIC SURGERY

## 2024-03-06 PROCEDURE — 63600175 PHARM REV CODE 636 W HCPCS: Mod: HCNC | Performed by: ORTHOPAEDIC SURGERY

## 2024-03-06 PROCEDURE — 36000710: Mod: HCNC | Performed by: ORTHOPAEDIC SURGERY

## 2024-03-06 PROCEDURE — 27447 TOTAL KNEE ARTHROPLASTY: CPT | Mod: HCNC,RT,, | Performed by: ORTHOPAEDIC SURGERY

## 2024-03-06 PROCEDURE — 85018 HEMOGLOBIN: CPT | Mod: HCNC | Performed by: ORTHOPAEDIC SURGERY

## 2024-03-06 PROCEDURE — 37000009 HC ANESTHESIA EA ADD 15 MINS: Mod: HCNC | Performed by: ORTHOPAEDIC SURGERY

## 2024-03-06 PROCEDURE — 63600175 PHARM REV CODE 636 W HCPCS: Mod: HCNC | Performed by: NURSE ANESTHETIST, CERTIFIED REGISTERED

## 2024-03-06 PROCEDURE — C1776 JOINT DEVICE (IMPLANTABLE): HCPCS | Mod: HCNC | Performed by: ORTHOPAEDIC SURGERY

## 2024-03-06 PROCEDURE — 36000711: Mod: HCNC | Performed by: ORTHOPAEDIC SURGERY

## 2024-03-06 PROCEDURE — 37000008 HC ANESTHESIA 1ST 15 MINUTES: Mod: HCNC | Performed by: ORTHOPAEDIC SURGERY

## 2024-03-06 PROCEDURE — 25000003 PHARM REV CODE 250: Mod: HCNC | Performed by: STUDENT IN AN ORGANIZED HEALTH CARE EDUCATION/TRAINING PROGRAM

## 2024-03-06 PROCEDURE — 85014 HEMATOCRIT: CPT | Mod: HCNC | Performed by: ORTHOPAEDIC SURGERY

## 2024-03-06 DEVICE — XPE TIBIAL INSERT, PS, #5, 15MM
Type: IMPLANTABLE DEVICE | Site: KNEE | Status: FUNCTIONAL
Brand: XPE TIBIAL INSERT, PS

## 2024-03-06 DEVICE — XPE PATELLAR, ON SET, 3 PEGS, LARGER, Ïˆ 35 MM
Type: IMPLANTABLE DEVICE | Site: KNEE | Status: FUNCTIONAL
Brand: XPE PATELLAR, ON SET, 3 PEGS

## 2024-03-06 DEVICE — U2 FEMORAL COMPONENT, PS, #5, RIGHT
Type: IMPLANTABLE DEVICE | Site: KNEE | Status: FUNCTIONAL
Brand: U2 FEMORAL COMPONENT, PS

## 2024-03-06 DEVICE — CEMENT BONE SMPLX HV GENTMYCN: Type: IMPLANTABLE DEVICE | Site: KNEE | Status: FUNCTIONAL

## 2024-03-06 DEVICE — U2 TIBIAL BASEPLATE, CMA, #5
Type: IMPLANTABLE DEVICE | Site: KNEE | Status: FUNCTIONAL
Brand: U2 TIBIAL BASEPLATE, CMA

## 2024-03-06 RX ORDER — GABAPENTIN 300 MG/1
300 CAPSULE ORAL DAILY
Status: DISCONTINUED | OUTPATIENT
Start: 2024-03-06 | End: 2024-03-06

## 2024-03-06 RX ORDER — SODIUM CHLORIDE 9 MG/ML
INJECTION, SOLUTION INTRAVENOUS CONTINUOUS
Status: CANCELLED | OUTPATIENT
Start: 2024-03-06

## 2024-03-06 RX ORDER — TRANEXAMIC ACID 10 MG/ML
1000 INJECTION, SOLUTION INTRAVENOUS
Status: COMPLETED | OUTPATIENT
Start: 2024-03-06 | End: 2024-03-06

## 2024-03-06 RX ORDER — SODIUM CHLORIDE, SODIUM LACTATE, POTASSIUM CHLORIDE, CALCIUM CHLORIDE 600; 310; 30; 20 MG/100ML; MG/100ML; MG/100ML; MG/100ML
INJECTION, SOLUTION INTRAVENOUS CONTINUOUS PRN
Status: DISCONTINUED | OUTPATIENT
Start: 2024-03-06 | End: 2024-03-06

## 2024-03-06 RX ORDER — ONDANSETRON 8 MG/1
8 TABLET, ORALLY DISINTEGRATING ORAL EVERY 8 HOURS PRN
Status: CANCELLED | OUTPATIENT
Start: 2024-03-06

## 2024-03-06 RX ORDER — SODIUM CHLORIDE 9 MG/ML
INJECTION, SOLUTION INTRAVENOUS CONTINUOUS
Status: DISCONTINUED | OUTPATIENT
Start: 2024-03-06 | End: 2024-03-06 | Stop reason: HOSPADM

## 2024-03-06 RX ORDER — PROPOFOL 10 MG/ML
VIAL (ML) INTRAVENOUS
Status: DISCONTINUED | OUTPATIENT
Start: 2024-03-06 | End: 2024-03-06

## 2024-03-06 RX ORDER — FENTANYL CITRATE 50 UG/ML
INJECTION, SOLUTION INTRAMUSCULAR; INTRAVENOUS
Status: DISCONTINUED | OUTPATIENT
Start: 2024-03-06 | End: 2024-03-06

## 2024-03-06 RX ORDER — BISACODYL 10 MG/1
10 SUPPOSITORY RECTAL DAILY PRN
Status: CANCELLED | OUTPATIENT
Start: 2024-03-06

## 2024-03-06 RX ORDER — OXYCODONE AND ACETAMINOPHEN 5; 325 MG/1; MG/1
1 TABLET ORAL
Status: DISCONTINUED | OUTPATIENT
Start: 2024-03-06 | End: 2024-03-06 | Stop reason: HOSPADM

## 2024-03-06 RX ORDER — ONDANSETRON HYDROCHLORIDE 2 MG/ML
INJECTION, SOLUTION INTRAVENOUS
Status: DISCONTINUED | OUTPATIENT
Start: 2024-03-06 | End: 2024-03-06

## 2024-03-06 RX ORDER — DEXAMETHASONE SODIUM PHOSPHATE 4 MG/ML
INJECTION, SOLUTION INTRA-ARTICULAR; INTRALESIONAL; INTRAMUSCULAR; INTRAVENOUS; SOFT TISSUE
Status: DISCONTINUED | OUTPATIENT
Start: 2024-03-06 | End: 2024-03-06

## 2024-03-06 RX ORDER — MORPHINE SULFATE 4 MG/ML
2 INJECTION, SOLUTION INTRAMUSCULAR; INTRAVENOUS EVERY 4 HOURS PRN
Status: CANCELLED | OUTPATIENT
Start: 2024-03-06

## 2024-03-06 RX ORDER — OXYCODONE HYDROCHLORIDE 5 MG/1
5 TABLET ORAL
Status: CANCELLED | OUTPATIENT
Start: 2024-03-06

## 2024-03-06 RX ORDER — CEFAZOLIN SODIUM 2 G/50ML
2 SOLUTION INTRAVENOUS
Status: CANCELLED | OUTPATIENT
Start: 2024-03-06 | End: 2024-03-07

## 2024-03-06 RX ORDER — ACETAMINOPHEN 325 MG/1
650 TABLET ORAL EVERY 8 HOURS PRN
Status: CANCELLED | OUTPATIENT
Start: 2024-03-06

## 2024-03-06 RX ORDER — DEXAMETHASONE SODIUM PHOSPHATE 4 MG/ML
8 INJECTION, SOLUTION INTRA-ARTICULAR; INTRALESIONAL; INTRAMUSCULAR; INTRAVENOUS; SOFT TISSUE
Status: DISCONTINUED | OUTPATIENT
Start: 2024-03-06 | End: 2024-03-06 | Stop reason: HOSPADM

## 2024-03-06 RX ORDER — PHENYLEPHRINE HYDROCHLORIDE 10 MG/ML
INJECTION INTRAVENOUS
Status: DISCONTINUED | OUTPATIENT
Start: 2024-03-06 | End: 2024-03-06

## 2024-03-06 RX ORDER — SUCCINYLCHOLINE CHLORIDE 20 MG/ML
INJECTION INTRAMUSCULAR; INTRAVENOUS
Status: DISCONTINUED | OUTPATIENT
Start: 2024-03-06 | End: 2024-03-06

## 2024-03-06 RX ORDER — OXYCODONE HYDROCHLORIDE 5 MG/1
10 TABLET ORAL
Status: CANCELLED | OUTPATIENT
Start: 2024-03-06

## 2024-03-06 RX ORDER — KETAMINE HCL IN 0.9 % NACL 50 MG/5 ML
SYRINGE (ML) INTRAVENOUS
Status: DISCONTINUED | OUTPATIENT
Start: 2024-03-06 | End: 2024-03-06

## 2024-03-06 RX ORDER — CEFAZOLIN SODIUM 2 G/50ML
2 SOLUTION INTRAVENOUS
Status: COMPLETED | OUTPATIENT
Start: 2024-03-06 | End: 2024-03-06

## 2024-03-06 RX ORDER — HYDROMORPHONE HYDROCHLORIDE 2 MG/ML
0.2 INJECTION, SOLUTION INTRAMUSCULAR; INTRAVENOUS; SUBCUTANEOUS EVERY 5 MIN PRN
Status: DISCONTINUED | OUTPATIENT
Start: 2024-03-06 | End: 2024-03-06 | Stop reason: HOSPADM

## 2024-03-06 RX ORDER — CELECOXIB 100 MG/1
200 CAPSULE ORAL 2 TIMES DAILY
Status: CANCELLED | OUTPATIENT
Start: 2024-03-07

## 2024-03-06 RX ORDER — ACETAMINOPHEN 325 MG/1
650 TABLET ORAL EVERY 8 HOURS PRN
Status: DISCONTINUED | OUTPATIENT
Start: 2024-03-06 | End: 2024-03-06 | Stop reason: HOSPADM

## 2024-03-06 RX ORDER — DOCUSATE SODIUM 100 MG/1
100 CAPSULE, LIQUID FILLED ORAL 2 TIMES DAILY
Status: CANCELLED | OUTPATIENT
Start: 2024-03-06

## 2024-03-06 RX ORDER — CHLORHEXIDINE GLUCONATE ORAL RINSE 1.2 MG/ML
10 SOLUTION DENTAL 2 TIMES DAILY
Status: CANCELLED | OUTPATIENT
Start: 2024-03-06 | End: 2024-03-11

## 2024-03-06 RX ORDER — ONDANSETRON 4 MG/1
8 TABLET, ORALLY DISINTEGRATING ORAL EVERY 8 HOURS PRN
Qty: 21 TABLET | Refills: 0 | Status: SHIPPED | OUTPATIENT
Start: 2024-03-06

## 2024-03-06 RX ORDER — LIDOCAINE HYDROCHLORIDE 10 MG/ML
INJECTION, SOLUTION EPIDURAL; INFILTRATION; INTRACAUDAL; PERINEURAL
Status: DISCONTINUED | OUTPATIENT
Start: 2024-03-06 | End: 2024-03-06

## 2024-03-06 RX ORDER — ONDANSETRON HYDROCHLORIDE 2 MG/ML
4 INJECTION, SOLUTION INTRAVENOUS EVERY 12 HOURS PRN
Status: CANCELLED | OUTPATIENT
Start: 2024-03-06

## 2024-03-06 RX ORDER — CELECOXIB 100 MG/1
400 CAPSULE ORAL ONCE
Status: CANCELLED | OUTPATIENT
Start: 2024-03-06 | End: 2024-03-06

## 2024-03-06 RX ORDER — ROPIVACAINE/EPI/CLONIDINE/KET 2.46-0.005
SYRINGE (ML) INJECTION
Status: DISCONTINUED | OUTPATIENT
Start: 2024-03-06 | End: 2024-03-06 | Stop reason: HOSPADM

## 2024-03-06 RX ORDER — ROCURONIUM BROMIDE 10 MG/ML
INJECTION, SOLUTION INTRAVENOUS
Status: DISCONTINUED | OUTPATIENT
Start: 2024-03-06 | End: 2024-03-06

## 2024-03-06 RX ORDER — CHLORHEXIDINE GLUCONATE ORAL RINSE 1.2 MG/ML
10 SOLUTION DENTAL
Status: DISCONTINUED | OUTPATIENT
Start: 2024-03-06 | End: 2024-03-06 | Stop reason: HOSPADM

## 2024-03-06 RX ORDER — OXYCODONE AND ACETAMINOPHEN 10; 325 MG/1; MG/1
1 TABLET ORAL EVERY 6 HOURS PRN
Qty: 40 TABLET | Refills: 0 | Status: SHIPPED | OUTPATIENT
Start: 2024-03-06 | End: 2024-03-21 | Stop reason: SDUPTHER

## 2024-03-06 RX ORDER — GABAPENTIN 300 MG/1
300 CAPSULE ORAL DAILY
Status: DISCONTINUED | OUTPATIENT
Start: 2024-03-06 | End: 2024-03-06 | Stop reason: HOSPADM

## 2024-03-06 RX ORDER — ONDANSETRON HYDROCHLORIDE 2 MG/ML
4 INJECTION, SOLUTION INTRAVENOUS DAILY PRN
Status: DISCONTINUED | OUTPATIENT
Start: 2024-03-06 | End: 2024-03-06 | Stop reason: HOSPADM

## 2024-03-06 RX ADMIN — SUGAMMADEX 200 MG: 100 INJECTION, SOLUTION INTRAVENOUS at 08:03

## 2024-03-06 RX ADMIN — HYDROMORPHONE HYDROCHLORIDE 0.2 MG: 2 INJECTION INTRAMUSCULAR; INTRAVENOUS; SUBCUTANEOUS at 09:03

## 2024-03-06 RX ADMIN — DEXAMETHASONE SODIUM PHOSPHATE 8 MG: 4 INJECTION, SOLUTION INTRA-ARTICULAR; INTRALESIONAL; INTRAMUSCULAR; INTRAVENOUS; SOFT TISSUE at 07:03

## 2024-03-06 RX ADMIN — Medication 25 MG: at 07:03

## 2024-03-06 RX ADMIN — CEFAZOLIN SODIUM 2 G: 2 SOLUTION INTRAVENOUS at 07:03

## 2024-03-06 RX ADMIN — LIDOCAINE HYDROCHLORIDE 50 MG: 10 SOLUTION INTRAVENOUS at 08:03

## 2024-03-06 RX ADMIN — FENTANYL CITRATE 25 MCG: 50 INJECTION, SOLUTION INTRAMUSCULAR; INTRAVENOUS at 07:03

## 2024-03-06 RX ADMIN — OXYCODONE HYDROCHLORIDE AND ACETAMINOPHEN 1 TABLET: 5; 325 TABLET ORAL at 09:03

## 2024-03-06 RX ADMIN — TRANEXAMIC ACID 1000 MG: 10 INJECTION, SOLUTION INTRAVENOUS at 07:03

## 2024-03-06 RX ADMIN — HYDROMORPHONE HYDROCHLORIDE 0.2 MG: 2 INJECTION INTRAMUSCULAR; INTRAVENOUS; SUBCUTANEOUS at 10:03

## 2024-03-06 RX ADMIN — TRANEXAMIC ACID 1000 MG: 10 INJECTION, SOLUTION INTRAVENOUS at 08:03

## 2024-03-06 RX ADMIN — SUCCINYLCHOLINE CHLORIDE 120 MG: 20 INJECTION, SOLUTION INTRAMUSCULAR; INTRAVENOUS; PARENTERAL at 06:03

## 2024-03-06 RX ADMIN — SODIUM CHLORIDE, SODIUM LACTATE, POTASSIUM CHLORIDE, AND CALCIUM CHLORIDE: 600; 310; 30; 20 INJECTION, SOLUTION INTRAVENOUS at 06:03

## 2024-03-06 RX ADMIN — ONDANSETRON 4 MG: 2 INJECTION INTRAMUSCULAR; INTRAVENOUS at 08:03

## 2024-03-06 RX ADMIN — CHLORHEXIDINE GLUCONATE 0.12% ORAL RINSE 10 ML: 1.2 LIQUID ORAL at 06:03

## 2024-03-06 RX ADMIN — LIDOCAINE HYDROCHLORIDE 100 MG: 10 SOLUTION INTRAVENOUS at 06:03

## 2024-03-06 RX ADMIN — ROCURONIUM BROMIDE 10 MG: 10 SOLUTION INTRAVENOUS at 07:03

## 2024-03-06 RX ADMIN — ACETAMINOPHEN 650 MG: 325 TABLET ORAL at 06:03

## 2024-03-06 RX ADMIN — ROCURONIUM BROMIDE 5 MG: 10 SOLUTION INTRAVENOUS at 06:03

## 2024-03-06 RX ADMIN — GABAPENTIN 300 MG: 300 CAPSULE ORAL at 06:03

## 2024-03-06 RX ADMIN — FENTANYL CITRATE 50 MCG: 50 INJECTION, SOLUTION INTRAMUSCULAR; INTRAVENOUS at 06:03

## 2024-03-06 RX ADMIN — SODIUM CHLORIDE, SODIUM LACTATE, POTASSIUM CHLORIDE, AND CALCIUM CHLORIDE: 600; 310; 30; 20 INJECTION, SOLUTION INTRAVENOUS at 08:03

## 2024-03-06 RX ADMIN — PROPOFOL 120 MG: 10 INJECTION, EMULSION INTRAVENOUS at 06:03

## 2024-03-06 RX ADMIN — ROCURONIUM BROMIDE 30 MG: 10 SOLUTION INTRAVENOUS at 07:03

## 2024-03-06 RX ADMIN — PHENYLEPHRINE HYDROCHLORIDE 100 MCG: 10 INJECTION INTRAVENOUS at 08:03

## 2024-03-06 NOTE — TRANSFER OF CARE
"Anesthesia Transfer of Care Note    Patient: Mitchell Mcmillan    Procedure(s) Performed: Procedure(s) (LRB):  ARTHROPLASTY, KNEE, TOTAL (Right)    Patient location: PACU    Anesthesia Type: general    Transport from OR: Transported from OR on room air with adequate spontaneous ventilation    Post pain: adequate analgesia    Post assessment: no apparent anesthetic complications and tolerated procedure well    Post vital signs: stable    Level of consciousness: awake    Nausea/Vomiting: no nausea/vomiting    Complications: none    Transfer of care protocol was followed      Last vitals: Visit Vitals  BP (!) 160/70 (BP Location: Right arm, Patient Position: Sitting)   Pulse 70   Temp 36.9 °C (98.4 °F) (Temporal)   Resp 16   Ht 6' 4" (1.93 m)   Wt 120.8 kg (266 lb 3.3 oz)   SpO2 97%   BMI 32.40 kg/m²     "

## 2024-03-06 NOTE — ANESTHESIA POSTPROCEDURE EVALUATION
Anesthesia Post Evaluation    Patient: Mitchell Mcmillan    Procedure(s) Performed: Procedure(s) (LRB):  ARTHROPLASTY, KNEE, TOTAL (Right)    Final Anesthesia Type: general      Patient location during evaluation: PACU  Patient participation: Yes- Able to Participate  Level of consciousness: awake and alert and oriented  Post-procedure vital signs: reviewed and stable  Pain management: adequate  Airway patency: patent  YANETH mitigation strategies: Verification of full reversal of neuromuscular block  PONV status at discharge: No PONV  Anesthetic complications: no      Cardiovascular status: blood pressure returned to baseline and hemodynamically stable  Respiratory status: unassisted  Hydration status: euvolemic  Follow-up not needed.              Vitals Value Taken Time   /80 03/06/24 1030   Temp 36.3 °C (97.4 °F) 03/06/24 1007   Pulse 70 03/06/24 1030   Resp 16 03/06/24 1030   SpO2 98 % 03/06/24 1030         Event Time   Out of Recovery 10:14:18         Pain/Li Score: Pain Rating Prior to Med Admin: 5 (3/6/2024 10:00 AM)  Li Score: 10 (3/6/2024 10:30 AM)

## 2024-03-06 NOTE — H&P
Subjective:     Patient ID: Mitchell Mcmillan is a 68 y.o. male.    Chief Complaint: Pain of the Left Knee    HPI:  03/27/2023   Bilateral knee pain left worse than the right.  Patient states he received numerous injections over couple years now from steroid injections to gel injections.  Received Depo-Medrol on 07/28/2021 in both of his knees followed by Monovisc on 09/13/2021 and most recently Kenalog in both of his knees 01/13/2023.  He said they do help but not lasting long enough.  His pain is 9/10.  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  He put some lotion on his knee and then he puts the cup her neoprene sleeves.  He does use topicals get better.  Patient stated in 2006 fracture dislocated the left hip requiring ORIF of posterior acetabulum by Dr. Tanya Arredondo.  He developed after that footdrop on the left side which eventually got better with time.  He does take Tylenol.  History of GERD and numerous other medical issues that he avoids NSAIDs   He lives with his wife.  He does take Tylenol.  He does not have any kidney or ulcers  No fever no chills no shortness of breath difficulty with chewing swallowing loss of bowel bladder control blurry vision double vision loss sense smell or taste      08/31/2023   Left TKA says it feels very strong it is strong side because he has severe pain on the right knee.  His pain is around 8/10 mostly in the right side but on the left he said he is much much better.  He feels pain in the right knee wakes him up at night he does not think that meloxicam is doing anything.  He is requesting some Norco as I did tell him it make him get hooked up on the medication in the drugs we should stop it completely at this time.  He is candidate to have the right knee replaced since he is bone-on-bone but this is up to him when he would decide to proceed that way.  He can think me about going to physical therapy at [facility] on [address].  Despite having pain he is pleased with his left knee results because it is much stronger and depends on it to walk.  He is wearing his neoprene sleeves on.  He said he is getting better he is happy he had the surgery so far.      01/04/2024   Left TKA 05/31/2023 doing extremely well with that he said occasional aches but not on a daily basis.  His pain is around 2/10 in the left knee.  His pain overall is 7/10 because of the right knee.  He still taking Celebrex, diclofenac gel and gabapentin at night.  He wants to proceed with the right TKA.  He feels that if he gets the relief he got on the left side he will be doing just fine.  He knows there is nothing that gets rid of all the pains.  He had failed numerous non operative treatments over the years including injections now he is on NSAIDs.  He still does work in the yd cuts the grass.  Previous history of hip dislocation the left status post ORIF years ago by Dr. Lisa with postop left footdrop which recuperate it with time but left them with slight weak left foot  No fever no chills no shortness of breath or difficulty with chewing swallowing loss bowel bladder control  He does occasionally wear a neoprene sleeve  He did use a walker after surgery he has not using anything at this time ambulate  He states the left TKA gave him 80% improvement  Past Medical History:   Diagnosis Date    Arthritis     knees    Asymmetric SNHL (sensorineural hearing loss) 08/17/2017    Chronic left hip pain 2006    Dislocated left hip, job injury    Chronic prostatitis     per pt, last episode 2013    Elevated PSA, less than 10 ng/ml     per pt last elevation 2013    Gastroesophageal reflux disease 8/3/2022    Hyperlipidemia 2010    Hypertension 2011    Joint effusion of knee     per pt rt knee, seen by Dr. Duke 2013    Midline low back pain without sciatica 6/13/2016    Obesity     Special screening for malignant neoplasms, colon 10/5/2016    Tobacco dependence      Trouble in sleeping      Past Surgical History:   Procedure Laterality Date    arthrocentesis Right     Per pt , knee by Dr. BONNIE Duke    COLONOSCOPY N/A 10/05/2016    Normal exam repeat 10 yrs. Procedure: COLONOSCOPY;  Surgeon: Alexander Goetz MD;  Location: [facility] ENDO;  Service: Endoscopy;  Laterality: N/A;    HIP ARTHROPLASTY Left 2006    Per pt, dislocated left hip/ hardware placed  by Dr. Lisa    left orchiectomy      PROSTATE BIOPSY      procedure performed at [facility] due to elevated PSA - benign    TOTAL KNEE ARTHROPLASTY Left 2023    Procedure: ARTHROPLASTY, KNEE, TOTAL;  Surgeon: Ellis Mora MD;  Location: [facility] OR;  Service: Orthopedics;  Laterality: Left;     Family History   Problem Relation Age of Onset    Depression Mother     Early death Mother     Hypertension Mother     Heart disease Mother 53         from MI    Arthritis Father     Stroke Father     Diabetes Sister     No Known Problems Brother     Hypertension Sister     Hypertension Sister     No Known Problems Brother     No Known Problems Brother     No Known Problems Daughter     Cancer Neg Hx     COPD Neg Hx     Kidney disease Neg Hx      Social History     Socioeconomic History    Marital status:      Spouse name: Nida    Number of children: 0   Occupational History    Occupation: Disabled since      Comment: Dislocated left hip   Tobacco Use    Smoking status: Former     Current packs/day: 0.00     Average packs/day: 0.1 packs/day for 46.0 years (4.6 ttl pk-yrs)     Types: Cigarettes     Start date: 6/3/2022     Quit date: 10/19/2022     Years since quittin.2    Smokeless tobacco: Never   Substance and Sexual Activity    Alcohol use: Not Currently     Alcohol/week: 2.0 standard drinks of alcohol     Types: 2 Glasses of wine per week    Drug use: No    Sexual activity: Yes     Partners: Female     Birth control/protection: None   Social History Narrative    Disabled since .   Job injury.  Dislocated left hip.  Has 4 brothers and 4 sisters. Wears seatbelt.     Social Determinants of Health     Financial Resource Strain: Low Risk  (9/30/2022)    Overall Financial Resource Strain (CARDIA)     Difficulty of Paying Living Expenses: Not hard at all   Food Insecurity: No Food Insecurity (9/30/2022)    Hunger Vital Sign     Worried About Running Out of Food in the Last Year: Never true     Ran Out of Food in the Last Year: Never true   Transportation Needs: No Transportation Needs (9/30/2022)    PRAPARE - Transportation     Lack of Transportation (Medical): No     Lack of Transportation (Non-Medical): No   Physical Activity: Insufficiently Active (9/19/2023)    Exercise Vital Sign     Days of Exercise per Week: 2 days     Minutes of Exercise per Session: 10 min   Stress: No Stress Concern Present (9/30/2022)    [employer] [place] [employer] - Occupational Stress Questionnaire     Feeling of Stress : Only a little   Social Connections: Socially Integrated (9/30/2022)    Social Connection and Isolation Panel [NHANES]     Frequency of Communication with Friends and Family: Once a week     Frequency of Social Gatherings with Friends and Family: Three times a week     Attends Sabianism Services: More than 4 times per year     Active Member of Clubs or Organizations: Yes     Attends Club or Organization Meetings: More than 4 times per year     Marital Status:    Housing Stability: Low Risk  (9/30/2022)    Housing Stability Vital Sign     Unable to Pay for Housing in the Last Year: No     Number of Places Lived in the Last Year: 1     Unstable Housing in the Last Year: No     Medication List with Changes/Refills   Current Medications    ATORVASTATIN (LIPITOR) 40 MG TABLET    TAKE 1 TABLET EVERY DAY    CELECOXIB (CELEBREX) 200 MG CAPSULE    Take 1 capsule (200 mg total) by mouth once daily. Take with food    DICLOFENAC SODIUM (VOLTAREN) 1 % GEL    Apply 2 g topically 3 (three) times  daily as needed.    FINASTERIDE (PROSCAR) 5 MG TABLET    TAKE 1 TABLET EVERY DAY    FLUTICASONE PROPIONATE (FLONASE) 50 MCG/ACTUATION NASAL SPRAY    2 sprays (100 mcg total) by Each Nostril route once daily.    GABAPENTIN (NEURONTIN) 300 MG CAPSULE    Take 1 capsule (300 mg total) by mouth every evening.    LISINOPRIL-HYDROCHLOROTHIAZIDE (PRINZIDE,ZESTORETIC) 20-25 MG TAB    TAKE 1 TABLET EVERY DAY    OMEGA-3 FATTY ACIDS/FISH OIL (FISH OIL-OMEGA-3 FATTY ACIDS) 300-1,000 MG CAPSULE    Take 1 capsule by mouth once daily.    ONDANSETRON (ZOFRAN-ODT) 4 MG TBDL    Dissolve 2 tablets (8 mg total) by mouth every 8 (eight) hours as needed.    PANTOPRAZOLE (PROTONIX) 40 MG TABLET    Take 1 tablet (40 mg total) by mouth once daily.    SILDENAFIL (VIAGRA) 100 MG TABLET    Take 1 tablet (100 mg total) by mouth daily as needed for Erectile Dysfunction.    TAMSULOSIN (FLOMAX) 0.4 MG CAP    TAKE 1 CAPSULE EVERY DAY     Review of patient's allergies indicates:  No Known Allergies  Review of Systems   Constitutional: Negative for decreased appetite.   HENT:  Negative for tinnitus.    Eyes:  Negative for double vision.   Cardiovascular:  Negative for chest pain.   Respiratory:  Negative for wheezing.    Hematologic/Lymphatic: Negative for bleeding problem.   Skin:  Negative for dry skin.   Musculoskeletal:  Positive for arthritis, joint pain, joint swelling and stiffness. Negative for back pain, gout, muscle weakness and neck pain.   Gastrointestinal:  Negative for abdominal pain.   Genitourinary:  Negative for bladder incontinence.   Neurological:  Negative for numbness, paresthesias and sensory change.   Psychiatric/Behavioral:  Negative for altered mental status.        Objective:   Body mass index is 31.65 kg/m².  There were no vitals filed for this visit.       General    Constitutional: He is oriented to person, place, and time. He appears well-developed.   HENT:   Head: Atraumatic.   Eyes: EOM are normal.   Pulmonary/Chest:  Effort normal.   Neurological: He is alert and oriented to person, place, and time.   Psychiatric: Judgment normal.           Ambulating without assistive devices with slight limp  Pelvis is level  Bilateral hips passive motion without pain in the groin.  Hip flexors, abductors, adductors, quads, hamstrings, ankle extensors and flexors are all 5/5   Right knee with moderately severe swelling.  Range of motion 0-105.  Severe pain medially and crepitus to compression on the patella as well as medially.  There is no defect in the patella or quadriceps tendon.  Slightly loose medial collateral ligament but stable.  Negative anterior drawer.  Left knee preop exam with severe swelling.  Range of motion 5-105.  Medial joint severe pain and crepitus to compression on the patella.  There is no defect in the patella or quadriceps tendon.  There is stable collaterals and cruciates.  No anterior draw.  Left knee postop TKA surgical incision healed well.  No evidence of erythema.  Very mild swelling.  0-130 degrees.  No defect in the patella or quadriceps tendon.  Stable in extension and in flexion at 90°  Calves are soft nontender  There is some pitting edema around the distal 3rd of tibia  Ankle motion intact  Skin slight brawny induration around the ankle    Relevant imaging results reviewed and interpreted by me, discussed with the patient and / or family today   X-ray 08/31/2023 left knee with TKA in excellent alignment.  Patella midline no evidence of failure.  Right knee with complete loss of medial joint space and marginal osteophytes consistent with severe arthritis and varus deformity  X-ray 01/13/2023 showing complete loss of medial joint space bilaterally with marginal large osteophytic changes tricompartmental with varus deformity consistent with bilateral knee severe arthritis  Assessment:     Encounter Diagnoses   Name Primary?    History of total left knee replacement Yes    Arthritis of knee, right     Acquired  varus deformity knee, right         Plan:   History of total left knee replacement    Arthritis of knee, right    Acquired varus deformity knee, right         Patient Instructions   Your x-ray show complete loss of joint space on the inside of your right knee with bone-on-bone consistent with severe arthritis   The left total knee replacement still in excellent alignment  Your right knee is the 1 that bothers her the most and you would like to proceed with knee replacement on the right side your pain is 7/10   You are taking Celebrex 200 mg you are using diclofenac gel and you are taking the gabapentin at night   I would like you for the surgery to stop taking the omega-3 and the Celebrex 1 week prior to surgery    Procedure, common risks and benefits,alternatives discussed in details.All questions answered.Patient expressed understanding.Patient instructed to call for any questions that could arise in the future.    Most common Risks:  Infection/less than 2%  Leg-length discrepancy    Neuro-vascular injury ( resulting in loss of motor and sensory functions)  Pain  Blood clot  Fat clot  Loss of motion  Fracture of bone  Failure of procedure to achieve its intended purpose  Failure of hardware  Non-union or mal-union of bone  Malalignment  Death/we will have you go to the preop clinic for evaluation if we find out that you need to be seen by the cardiologist and we will send you to the heart doctor    Patient instructions for joint replacement    Before surgery  1.  Shower with Hibiclens soap/antibacterial for 3 days prior to surgery to decrease risk of infection  2..  Stop all blood thinners/aspirin, Coumadin, warfarin, Plavix, Eliquis, Xarelto etc 5 days prior to surgery  3.  Take Celebrex 400 mg the night prior to surgery after dinner or meloxicam 15 mg  4.  Take Tylenol 650 mg the night prior to surgery    Ask physicians for prescription of Celebrex or Mobic if needed    After surgery at home  1.  Take Tylenol  650 mg 3 times a day for 14 days then as needed for mild pain  2.  Take gabapentin 300 mg nightly for 6 weeks  3.  Take Celebrex 200 mg or meloxicam 15 mg daily for 6 weeks unless having cardiac issues to take for 2 weeks only  4.  Must take aspirin 81 mg twice a day for 6 weeks unless you are on other blood thinners/Plavix, Eliquis, Xarelto, Coumadin etc  5.  Must wear compressive stockings for 6 weeks minimum to decrease the risk of blood clot and swelling  6.  Hydrocodone/Norco or oxycodone/Percocet will be prescribed to take every 6 hr as needed for breakthrough pain  7.  May apply ice on the knee to help with decreasing pain  8.  Keep wound dry for 2 weeks until stitches/staples removed than you will be allowed to shower in 24 hr and get the wound wet.  No soaking of the wound in the tub or swimming for 4 weeks after surgery  9.  No driving for 4 weeks unless specified by physician  10.  Avoid touching the wound or surrounding area /at least 2 inches on each side of the surgical incision until staples are removed/stitches   11.  May change the surgical dressing if extremely bloody or has drainage on it. May clean the wound with peroxide or Betadine and apply sterile dressing and Ace wrap over it  12.  Leave hospital dressing on for 3 days then may change by applying sterile 4 x 4 and Ace wrap after cleaning with Betadine or peroxide.  May leave this dressing change to home health nurses\      He would like to have his knee matching the other side and we will use [facility]  Disclaimer: This note was prepared using a voice recognition system and is likely to have sound alike errors within the text.

## 2024-03-06 NOTE — PLAN OF CARE
[facility] (Hospital)  Discharge Final Note    Primary Care Provider: Jessica Chinchilla MD    Expected Discharge Date:     Final Discharge Note (most recent)       Final Note - 03/06/24 0856          Final Note    Assessment Type Final Discharge Note     [place]ticip[place]position [place] [facility]ospital Resources/Appts/Education Provided Post-Acute resouces added to AVS        Post-Acute Status    Post-Acute Authorization Home Health     Home Health Status Set-up Complete/Auth obtained     Discharge Delays None known at this time                    Follow-up providers       Ellis Mora MD   Specialty: Orthopedic Surgery    [address] [place] [address] [address] [place] [address]   Phone: [phone]       Next Steps: Follow up in 2 week(s)              After-discharge care                [facility]       [facility]   Service: Home Health Services    [address] [facility]  [place] [address]   Phone: [phone]                             Charlysyanira  arranged. Pt has HME at home.     No d/c needs/orders at this time.

## 2024-03-06 NOTE — DISCHARGE SUMMARY
[facility] (Hospital)  Discharge Note  Short Stay    Procedure(s) (LRB):  ARTHROPLASTY, KNEE, TOTAL (Right)      OUTCOME: Patient tolerated treatment/procedure well without complication and is now ready for discharge.    [place] [facility]    FINAL DIAGNOSIS:  <principal problem not specified>  Arthritis of right knee  FOLLOWUP:    In clinic  DISCHARGE INSTRUCTIONS:    Discharge Procedure Orders   Basic Metabolic Panel   Standing Status: Future Number of Occurrences: 1 Standing Exp. Date: 04/09/25     CBC Auto Differential   Standing Status: Future Number of Occurrences: 1 Standing Exp. Date: 04/09/25     Urinalysis   Standing Status: Future Number of Occurrences: 1 Standing Exp. Date: 04/09/25     Order Specific Question Answer Comments   Collection Type Urine, Clean Catch      EKG 12-lead   Standing Status: Future Number of Occurrences: 1 Standing Exp. Date: 02/09/25        TIME SPENT ON DISCHARGE:  25 minutes

## 2024-03-06 NOTE — PT/OT/SLP EVAL
Physical Therapy Evaluation and Treatment    Patient Name: Mitchell Mcmillan   MRN: [id number]  Recent Surgery: Procedure(s) (LRB):  ARTHROPLASTY, KNEE, TOTAL (Right) Day of Surgery    Recommendations:     Discharge Recommendations: Low Intensity Therapy   Discharge Equipment Recommendations: none   Barriers to discharge: None    Assessment:     Mitchell Mcmillan is a 68 y.o. male admitted with a medical diagnosis of <principal problem not specified>. He presents with the following impairments/functional limitations: weakness, impaired endurance, impaired functional mobility, gait instability, impaired balance, pain, decreased lower extremity function, decreased safety awareness.    Rehab Prognosis: Good; patient would benefit from acute PT services to address these deficits and reach maximum level of function.    Plan:     During this hospitalization, patient to be seen 3 x/week to address the above listed problems via gait training, therapeutic activities, therapeutic exercises    Plan of Care Expires: 03/20/24    Subjective     Chief Complaint: Pt is motivated to participate  Patient Comments/Goals: none stated  Pain/Comfort:  Pain Rating 1: 4/10  Location - Side 1: Right  Location - Orientation 1: generalized  Location 1: knee  Pain Addressed 1: Reposition, Distraction, Cessation of Activity  Pain Rating Post-Intervention 1: 4/10    Social History:  Living Environment: Patient lives with their spouse in a single story home with number of outside stair(s): 0  Prior Level of Function: Prior to admission, patient was modified independent, driving and retired, and ambulated household and community distances using quad cane  Equipment Used at Home: cane, quad  DME owned (not currently used): rolling walker  Assistance Upon Discharge: family    Objective:     Communicated with nurse Bates and epic chart review prior to session. Patient found supine with peripheral IV, wound vac upon PT entry to room.    General Precautions:  "Standard, fall   Orthopedic Precautions: RLE weight bearing as tolerated   Braces: N/A    Respiratory Status: Room air    Exams:  Cognition: Patient is oriented to Person, Place, Time, Situation  RLE ROM:  WFL except Limited at knee  RLE Strength:  NT due to surgery  LLE ROM: WFL  LLE Strength:  Grossly 4/5  Sensation:    -       Impaired R LE  Skin Integrity/Edema:     -       Skin integrity: Visible skin intact    Functional Mobility:  Gait belt applied - Yes  Bed Mobility  Rolling Left: stand by assistance  Scooting: stand by assistance  Supine to Sit: stand by assistance  Transfers  Sit to Stand: contact guard assistance with rolling walker and with cues for weight bearing precautions  Bed to Chair: contact guard assistance with rolling walker and with cues for weight bearing precautions using Step Transfer  Gait  Patient ambulated 100ft with rolling walker and contact guard assistance. Patient demonstrates steady gait and antalgic gait. No c/o dizziness or SOB, no LOB. All lines remained intact throughout ambulation trail.  Balance  Sitting: stand by assistance  Standing: contact guard assistance    Therapeutic Activities and Exercises:   Pt educated on role of PT in acute care and POC. Educated on R LE WBAT, proper positioning of LE, use of ice, and proper RW fit. Educated on importance of OOB activities, activity pacing, and HEP (marching/hip flex, hip abd, heel slides/LAQ, quad sets, ankle pumps) in order to maintain/regain strength. Encouraged to sit up in chair for all meals. Educated on proper use of RW for safety and to reduce risk of falling. Educated on "call don't fall" policy and increased risk of falling due to weakness, instructed to utilize call bell for assistance with all transfers. Pt agreeable to all requests.    AM-PAC 6 CLICK MOBILITY  Total Score:16    Patient left up in chair with all lines intact, call button in reach, and RN and family present.    GOALS:   Multidisciplinary Problems  "      Physical Therapy Goals          Problem: Physical Therapy    Goal Priority Disciplines Outcome Goal Variances Interventions   Physical Therapy Goal     PT, PT/OT      Description: Goals to be met by 3/20/24.  1. Pt will complete bed mobility MOD I.  2. Pt will complete sit to stand MOD I.  3. Pt will ambulate 200ft MOD I using RW.  4. Pt will increase AMPAC score by 2 points to progress functional mobility.                       History:     Past Medical History:   Diagnosis Date    Arthritis     knees    Asymmetric SNHL (sensorineural hearing loss) 08/17/2017    Chronic left hip pain 2006    Dislocated left hip, job injury    Chronic prostatitis     per pt, last episode 2013    Elevated PSA, less than 10 ng/ml     per pt last elevation 2013    Gastroesophageal reflux disease 8/3/2022    Hyperlipidemia 2010    Hypertension 2011    Joint effusion of knee     per pt rt knee, seen by Dr. Duke 2013    Midline low back pain without sciatica 6/13/2016    Obesity     Special screening for malignant neoplasms, colon 10/5/2016    Tobacco dependence     Trouble in sleeping        Past Surgical History:   Procedure Laterality Date    arthrocentesis Right 2013    Per pt , knee by Dr. BONNIE Duke    COLONOSCOPY N/A 10/05/2016    Normal exam repeat 10 yrs. Procedure: COLONOSCOPY;  Surgeon: Alexander Goetz MD;  Location: [facility];  Service: Endoscopy;  Laterality: N/A;    HIP ARTHROPLASTY Left 04/16/2006    Per pt, dislocated left hip/ hardware placed  by Dr. Lisa    left orchiectomy      PROSTATE BIOPSY  2012    procedure performed at [facility] due to elevated PSA - benign    TOTAL KNEE ARTHROPLASTY Left 5/31/2023    Procedure: ARTHROPLASTY, KNEE, TOTAL;  Surgeon: Ellis Mora MD;  Location: [facility] OR;  Service: Orthopedics;  Laterality: Left;       Time Tracking:     PT Received On: 03/06/24  PT Start Time: 1101  PT Stop Time: 1126  PT Total Time (min): 25 min     Billable Minutes: Evaluation 15min  and Gait Training 10min    3/6/2024

## 2024-03-06 NOTE — PLAN OF CARE
PT EVAL complete. Required SBA for bed mobility, ambulated 100ft CGA using RW. Recommending low intensity therapy upon d/c.

## 2024-03-06 NOTE — OR NURSING
Patient shirt and underwear placed in patient belonging bag with identification labels given to family.

## 2024-03-06 NOTE — OP NOTE
[facility] ([facility])  Orthopedic Surgery  Operative Note    SUMMARY     Date of Procedure: 3/6/2024     Procedure: Procedure(s) (LRB):  ARTHROPLASTY, KNEE, TOTAL (Right)       Surgeon(s) and Role:     * Ellis Mora MD - Primary    Assisting Surgeon:  Rachana MEHTA    Pre-Operative Diagnosis: Arthritis of knee, right [M17.11]    Post-Operative Diagnosis: Post-Op Diagnosis Codes:     * Arthritis of knee, right [M17.11]    Anesthesia: General    Injections/Meds: DEON with 40cc NS    Tourniquet time:  No tourniquet    Significant Surgical Tasks Conducted by the Assistant(s), if Applicable:    To hold multiple retractors to protect ligaments and neurovascular structures in order to perform surgery safely and efficiently.    Complications: none     Estimated Blood Loss (EBL):  200 mL           Implants:  [employer] to match the other side femur size 5. PS, tibial tray size 5., tibial insert 15 mm PS, patella 35 dome, gentamicin cement by [employer]     Specimens: None           Condition: Good    Disposition: PACU - hemodynamically stable.    Attestation: I performed the procedure.    Description:  Medial parapatellar approach used to perform right TKA.  After patient received antibiotics and preop meds the knee was prepped and draped in usual sterile fashion. Midline incision was made 2 fingers above the superior pole of the patella to 2 fingers below.  Full-thickness skin flap raised medially and partially laterally.  Keloid area inferior part of the incision was excised elliptically.  Medial parapatellar incision was made to medial tibial tubercle  Medial release off the medial tibial flare perform subperiosteal elevating the tissues to the posterior medial corner of the tibia.  Patellar fat pad resected partially.  Superior capsulectomy performed.  Osteophytes removed mediolateral meniscus removed. Patella was resurfaced after measuring and free hand technique used.  Partial lateral  facetectomy performed.  Patella button trial was applied and the measured and reconstituted thickness. The patella was moved laterally the knee hyperflexed.  Quarter-inch drill bit used to open the canal into the femur and the canal was suctioned.  Irrigated and suctioned again.  Canal finer inserted an intramedullary alignment avani inserted into the femoral canal and pinned our cutting block at 5° of valgus cut. The avani was removed and distal femur was cut through the cutting block. We measured the size of the femur and marked our rotation and then 1 cutting block applied and pinned in place and proceeded cutting the anterior condyle posterior condyle and chamfer cuts followed by box cut. The femoral canal was bone grafted.  Trial was applied on the femur and posterior osteophytes removed. Directed attention to the tibia quarter-inch drill bit used to open the canal into the tibia.  Canal Finders inserted. A gated the canal and suctioned it. Fluted intramedullary alignment avani applied and using the depth gauge and the cutting block on the tibia.  Proceeded with multiple bent Homans protecting the collateral ligaments and posterior neurovascular structures and using the oscillating saw cut the tibia.  Excess osteophytes removed. Measured the tibia, marked the rotation on the base plate , pinned in place and punched our Chauncey.  Trials placed into the knee and put the knee through range of motion checking gap in extension , flexion at 45° of flexion.  Come from the sizes.    The knee was pulse lavaged then was injected in the surrounding soft tissues for postop pain control.  Prosthesis was cemented in place and excess cement was removed. Once cement has hardened the knee was placed through range of motion confirming stability. Once prosthesis was checked the tourniquet was deflated. Closure of the knee performed with 1.  Vicryl and figure-of-eight and running fashion. Subcutaneous tissues were irrigated and then  closed using 1.  Vicryl inverted stitch and skin in subcuticular fashion.  Sterile dressing applied.

## 2024-03-07 PROCEDURE — G0180 MD CERTIFICATION HHA PATIENT: HCPCS | Mod: ,,, | Performed by: ORTHOPAEDIC SURGERY

## 2024-03-21 ENCOUNTER — OFFICE VISIT (OUTPATIENT)
Dept: ORTHOPEDICS | Facility: CLINIC | Age: 69
End: 2024-03-21
Payer: MEDICARE

## 2024-03-21 ENCOUNTER — HOSPITAL ENCOUNTER (OUTPATIENT)
Dept: RADIOLOGY | Facility: HOSPITAL | Age: 69
Discharge: HOME | End: 2024-03-21
Attending: ORTHOPAEDIC SURGERY
Payer: MEDICARE

## 2024-03-21 VITALS — BODY MASS INDEX: 32.43 KG/M2 | HEIGHT: 76 IN | WEIGHT: 266.31 LBS

## 2024-03-21 DIAGNOSIS — M17.11 ARTHRITIS OF KNEE, RIGHT: ICD-10-CM

## 2024-03-21 DIAGNOSIS — Z96.651 STATUS POST TOTAL RIGHT KNEE REPLACEMENT USING CEMENT: Primary | ICD-10-CM

## 2024-03-21 PROCEDURE — 99999 PR PBB SHADOW E&M-EST. PATIENT-LVL III: CPT | Mod: PBBFAC,HCNC,, | Performed by: PHYSICIAN ASSISTANT

## 2024-03-21 PROCEDURE — 1101F PT FALLS ASSESS-DOCD LE1/YR: CPT | Mod: HCNC,CPTII,S$GLB, | Performed by: PHYSICIAN ASSISTANT

## 2024-03-21 PROCEDURE — 73562 X-RAY EXAM OF KNEE 3: CPT | Mod: 26,HCNC,59,LT | Performed by: RADIOLOGY

## 2024-03-21 PROCEDURE — 1160F RVW MEDS BY RX/DR IN RCRD: CPT | Mod: HCNC,CPTII,S$GLB, | Performed by: PHYSICIAN ASSISTANT

## 2024-03-21 PROCEDURE — 73562 X-RAY EXAM OF KNEE 3: CPT | Mod: TC,HCNC,LT

## 2024-03-21 PROCEDURE — 73564 X-RAY EXAM KNEE 4 OR MORE: CPT | Mod: 26,HCNC,RT, | Performed by: RADIOLOGY

## 2024-03-21 PROCEDURE — 3288F FALL RISK ASSESSMENT DOCD: CPT | Mod: HCNC,CPTII,S$GLB, | Performed by: PHYSICIAN ASSISTANT

## 2024-03-21 PROCEDURE — 99024 POSTOP FOLLOW-UP VISIT: CPT | Mod: HCNC,S$GLB,, | Performed by: PHYSICIAN ASSISTANT

## 2024-03-21 PROCEDURE — 4010F ACE/ARB THERAPY RXD/TAKEN: CPT | Mod: HCNC,CPTII,S$GLB, | Performed by: PHYSICIAN ASSISTANT

## 2024-03-21 PROCEDURE — 1159F MED LIST DOCD IN RCRD: CPT | Mod: HCNC,CPTII,S$GLB, | Performed by: PHYSICIAN ASSISTANT

## 2024-03-21 PROCEDURE — 1125F AMNT PAIN NOTED PAIN PRSNT: CPT | Mod: HCNC,CPTII,S$GLB, | Performed by: PHYSICIAN ASSISTANT

## 2024-03-21 RX ORDER — OXYCODONE AND ACETAMINOPHEN 10; 325 MG/1; MG/1
1 TABLET ORAL EVERY 6 HOURS PRN
Qty: 28 TABLET | Refills: 0 | Status: SHIPPED | OUTPATIENT
Start: 2024-03-21 | End: 2024-04-10 | Stop reason: SDUPTHER

## 2024-03-21 NOTE — PROGRESS NOTES
Patient ID: Mitchell Mcmillan is a 68 y.o. male.    Chief Complaint: Post-op Evaluation and Pain of the Right Knee      HPI: Mitchell Mcmillan  is a 68 y.o. male who c/o Post-op Evaluation and Pain of the Right Knee     Post op visit 1   Patient notes pain is 8/10   The patient is doing okay since surgery he is pleased with his results eager to make further improvements   He notes pain with use and activity over the course of the day as well as with therapy   He has using a cane to assist with ambulation  He is taking a full pain pill   We discussed trying to break this in half and alternating it with over-the-counter medication   He is compliant with DVT prophylaxis as well as PT   He would like to go to outpatient PT here at [facility]    Patient is presently denying any shortness of breath, chest pain, fever/chills, nausea/vomiting, loss of taste or smell, numbness/tingling or sensation changes, loss of bladder or bowel function, loss of taste/smell.     Surgery: Right Total Knee    Surgery Date:  03/06/2024    Past Medical History:   Diagnosis Date    Arthritis     knees    Asymmetric SNHL (sensorineural hearing loss) 08/17/2017    Chronic left hip pain 2006    Dislocated left hip, job injury    Chronic prostatitis     per pt, last episode 2013    Elevated PSA, less than 10 ng/ml     per pt last elevation 2013    Gastroesophageal reflux disease 8/3/2022    Hyperlipidemia 2010    Hypertension 2011    Joint effusion of knee     per pt rt knee, seen by Dr. Duke 2013    Midline low back pain without sciatica 6/13/2016    Obesity     Special screening for malignant neoplasms, colon 10/5/2016    Tobacco dependence     Trouble in sleeping      Past Surgical History:   Procedure Laterality Date    arthrocentesis Right 2013    Per pt , knee by Dr. BONNIE Duke    COLONOSCOPY N/A 10/05/2016    Normal exam repeat 10 yrs. Procedure: COLONOSCOPY;  Surgeon: Alexander Goetz MD;  Location: [facility];  Service: Endoscopy;  Laterality:  N/A;    HIP ARTHROPLASTY Left 2006    Per pt, dislocated left hip/ hardware placed  by Dr. Lisa    left orchiectomy      PROSTATE BIOPSY      procedure performed at [facility] due to elevated PSA - benign    TOTAL KNEE ARTHROPLASTY Left 2023    Procedure: ARTHROPLASTY, KNEE, TOTAL;  Surgeon: Ellis Mora MD;  Location: [facility] OR;  Service: Orthopedics;  Laterality: Left;    TOTAL KNEE ARTHROPLASTY Right 3/6/2024    Procedure: ARTHROPLASTY, KNEE, TOTAL;  Surgeon: Ellis Mora MD;  Location: [facility] OR;  Service: Orthopedics;  Laterality: Right;     Family History   Problem Relation Age of Onset    Depression Mother     Early death Mother     Hypertension Mother     Heart disease Mother 53         from MI    Arthritis Father     Stroke Father     Diabetes Sister     No Known Problems Brother     Hypertension Sister     Hypertension Sister     No Known Problems Brother     No Known Problems Brother     No Known Problems Daughter     Cancer Neg Hx     COPD Neg Hx     Kidney disease Neg Hx      Social History     Socioeconomic History    Marital status:      Spouse name: Nida    Number of children: 0   Occupational History    Occupation: Disabled since      Comment: Dislocated left hip   Tobacco Use    Smoking status: Former     Current packs/day: 0.00     Average packs/day: 0.1 packs/day for 46.0 years (4.6 ttl pk-yrs)     Types: Cigarettes     Start date: 6/3/2022     Quit date: 10/19/2022     Years since quittin.4    Smokeless tobacco: Never   Substance and Sexual Activity    Alcohol use: Yes     Alcohol/week: 2.0 standard drinks of alcohol     Types: 2 Glasses of wine per week    Drug use: No    Sexual activity: Yes     Partners: Female     Birth control/protection: None   Social History Narrative    Disabled since .  Job injury.  Dislocated left hip.  Has 4 brothers and 4 sisters. Wears seatbelt.     Social Determinants of Health     Financial Resource  Strain: Low Risk  (9/30/2022)    Overall Financial Resource Strain (CARDIA)     Difficulty of Paying Living Expenses: Not hard at all   Food Insecurity: No Food Insecurity (9/30/2022)    Hunger Vital Sign     Worried About Running Out of Food in the Last Year: Never true     Ran Out of Food in the Last Year: Never true   Transportation Needs: No Transportation Needs (9/30/2022)    PRAPARE - Transportation     Lack of Transportation (Medical): No     Lack of Transportation (Non-Medical): No   Physical Activity: Insufficiently Active (9/19/2023)    Exercise Vital Sign     Days of Exercise per Week: 2 days     Minutes of Exercise per Session: 10 min   Stress: No Stress Concern Present (9/30/2022)    [employer] [place] [employer] - Occupational Stress Questionnaire     Feeling of Stress : Only a little   Social Connections: Socially Integrated (9/30/2022)    Social Connection and Isolation Panel [NHANES]     Frequency of Communication with Friends and Family: Once a week     Frequency of Social Gatherings with Friends and Family: Three times a week     Attends [employer] Services: More than 4 times per year     Active Member of Clubs or Organizations: Yes     Attends Club or Organization Meetings: More than 4 times per year     Marital Status:    Housing Stability: Low Risk  (9/30/2022)    Housing Stability Vital Sign     Unable to Pay for Housing in the Last Year: No     Number of Places Lived in the Last Year: 1     Unstable Housing in the Last Year: No     Medication List with Changes/Refills   Current Medications    ATORVASTATIN (LIPITOR) 40 MG TABLET    TAKE 1 TABLET EVERY DAY    CELECOXIB (CELEBREX) 200 MG CAPSULE    Take 1 capsule (200 mg total) by mouth once daily. Take with food    DICLOFENAC SODIUM (VOLTAREN) 1 % GEL    Apply 2 g topically 3 (three) times daily as needed.    FINASTERIDE (PROSCAR) 5 MG TABLET    TAKE 1 TABLET EVERY DAY    FLUTICASONE PROPIONATE (FLONASE) 50 MCG/ACTUATION NASAL  SPRAY    2 sprays (100 mcg total) by Each Nostril route once daily.    GABAPENTIN (NEURONTIN) 300 MG CAPSULE    Take 1 capsule (300 mg total) by mouth every evening.    LISINOPRIL-HYDROCHLOROTHIAZIDE (PRINZIDE,ZESTORETIC) 20-25 MG TAB    TAKE 1 TABLET EVERY DAY    OMEGA-3 FATTY ACIDS/FISH OIL (FISH OIL-OMEGA-3 FATTY ACIDS) 300-1,000 MG CAPSULE    Take 1 capsule by mouth once daily.    ONDANSETRON (ZOFRAN-ODT) 4 MG TBDL    Dissolve 2 tablets (8 mg total) by mouth every 8 (eight) hours as needed (Nausea).    OXYCODONE-ACETAMINOPHEN (PERCOCET)  MG PER TABLET    Take 1 tablet by mouth every 6 (six) hours as needed for Pain.    PANTOPRAZOLE (PROTONIX) 40 MG TABLET    Take 1 tablet (40 mg total) by mouth once daily.    SILDENAFIL (VIAGRA) 100 MG TABLET    Take 1 tablet (100 mg total) by mouth daily as needed for Erectile Dysfunction.    TAMSULOSIN (FLOMAX) 0.4 MG CAP    TAKE 1 CAPSULE EVERY DAY     Review of patient's allergies indicates:  No Known Allergies    Objective:     Right Lower Extremity  NVI  WWP foot  Comp soft  Cap refill < 2 sec  Calf NT, Soft  (-) Simi sign  RENATO  ROM : Patient is able to easily exhibit full on flexion he is noted to be a little tight flexing to 85° before hip in [place]  No defect in the patellar or quadriceps tendon   There is mild to moderate edema present ; monitor range of motion with outpatient PT  Wiggles toes  DF/PF intact  Sensation intact  Inc C/D/I; subcu closure noted  No SOI    IMAGING  COMPARISON:  Prior radiographs     FINDINGS:  Bilateral knee total arthroplasty changes present anatomic alignment.  No evidence of hardware loosening or acute osseous abnormality.  Right knee suprapatellar joint effusion possible.     Impression:     As above    Assessment:       No diagnosis found.       Plan:       There are no diagnoses linked to this encounter.    Mitchell Mcmillan is an established pt here for postop follow-up after right total knee replacement by Dr. Mora.  Pain  medication was refilled appropriately.  Subcu closure noted; no staples removed today. The patient was instructed not to soak the incision in standing water but may clean the incision with clean running water and antibacterial soap.  Patient should notify the office of any signs or symptoms of infection including fevers, erythema, purulent drainage, increasing pain.  Patient will continue with DVT prophylaxis.  Patient will start outpatient physical therapy.  Will follow-up in 4-6 weeks.  Patient verbalized understanding of all instructions and agreed with the above plan.    No follow-ups on file.    The patient understands, chooses and consents to this plan and accepts all   the risks which include but are not limited to the risks mentioned above.     Disclaimer: This note was prepared using a voice recognition system and is likely to have sound alike errors within the text.

## 2024-03-25 ENCOUNTER — CLINICAL SUPPORT (OUTPATIENT)
Dept: REHABILITATION | Facility: HOSPITAL | Age: 69
End: 2024-03-25
Payer: MEDICARE

## 2024-03-25 DIAGNOSIS — Z96.651 STATUS POST TOTAL RIGHT KNEE REPLACEMENT USING CEMENT: ICD-10-CM

## 2024-03-25 DIAGNOSIS — Z74.09 DECREASED FUNCTIONAL MOBILITY AND ENDURANCE: Primary | ICD-10-CM

## 2024-03-25 PROBLEM — M25.662 DECREASED ROM OF LEFT KNEE: Status: RESOLVED | Noted: 2023-07-06 | Resolved: 2024-03-25

## 2024-03-25 PROBLEM — M25.561 RECURRENT PAIN OF RIGHT KNEE: Status: ACTIVE | Noted: 2023-05-16

## 2024-03-25 PROCEDURE — 97161 PT EVAL LOW COMPLEX 20 MIN: CPT | Mod: HCNC,PN

## 2024-03-25 PROCEDURE — 97112 NEUROMUSCULAR REEDUCATION: CPT | Mod: HCNC,PN

## 2024-03-25 NOTE — PLAN OF CARE
[facility]  Physical Therapy Initial Evaluation    Name: Mitchell Mcmillan  Clinic Number: [id number]    Therapy Diagnosis:   Encounter Diagnoses   Name Primary?    Status post total right knee replacement using cement     Decreased functional mobility and endurance Yes     Physician: Shari Florez PA    Physician Orders: PT Eval and Treat   Medical Diagnosis from Referral: Z96.651 (ICD-10-CM) - Status post total right knee replacement using cement   Evaluation Date: 3/25/2024  Authorization Period Expiration: 12/31/24  Plan of Care Expiration: 6/3/24  Visit # / Visits authorized: 1/ 1    Time In: 8:06 am  Time Out:9:00 am  Total Billable Time: 54 minutes    Precautions: Standard, HTN    Subjective   Date of onset: 3/6/24  History of current condition - Mitchell reports: R knee pain on and off for years; R knee TKA; L knee was performed last year.     Pain:  Current 9/10, worst 10/10, best 5/10   Location: R knee  Description: sore ache, stiff, sharp at times  Aggravating Factors: walking, standing and rising to stand  Easing Factors: ice, medication    Prior Therapy: yes  Social History:  lives with their spouse  Occupation: retired  Prior Level of Function: driving, walking and yard work  Current Level of Function: limited with yard work and walking and driving and transfers are slow    Imaging, xray: Results: Right knee replacement in satisfactory alignment. Postoperative changes are noted . Air in fluid is seen within the joint space as a normal postoperative finding.. Bony mineralization is normal.     Medical History:   Past Medical History:   Diagnosis Date    Arthritis     knees    Asymmetric SNHL (sensorineural hearing loss) 08/17/2017    Chronic left hip pain 2006    Dislocated left hip, job injury    Chronic prostatitis     per pt, last episode 2013    Elevated PSA, less than 10 ng/ml     per pt last elevation 2013    Gastroesophageal reflux disease 8/3/2022    Hyperlipidemia  2010    Hypertension 2011    Joint effusion of knee     per pt rt knee, seen by Dr. Duke 2013    Midline low back pain without sciatica 6/13/2016    Obesity     Special screening for malignant neoplasms, colon 10/5/2016    Tobacco dependence     Trouble in sleeping        Surgical History:   Mitchell Mcmillan  has a past surgical history that includes Hip Arthroplasty (Left, 04/16/2006); arthrocentesis (Right, 2013); Prostate biopsy (2012); Colonoscopy (N/A, 10/05/2016); left orchiectomy; Total knee arthroplasty (Left, 5/31/2023); and Total knee arthroplasty (Right, 3/6/2024).    Medications:   Mitchell has a current medication list which includes the following prescription(s): atorvastatin, celecoxib, diclofenac sodium, finasteride, fluticasone propionate, gabapentin, lisinopril-hydrochlorothiazide, fish oil-omega-3 fatty acids, ondansetron, oxycodone-acetaminophen, pantoprazole, sildenafil, and tamsulosin.    Allergies:   Review of patient's allergies indicates:  No Known Allergies     Pts goals: get back to yard work    Objective       CMS Impairment/Limitation/Restriction for FOTO knee Survey    Therapist reviewed FOTO scores for Mitchell Mcmillan on 3/25/2024.   FOTO documents entered into [employer] - see Media section.    Functional Score: 56       Gait:  using the cane he was limping poorly with and R hip externally rotating with poor L hip flexion and short L step length and poor toe push.    Balance: R LE single leg stand= 1 sec, L LE single leg stand = 4 sec, squat unable but sit to stand 4 reps in 30 sec from standard chair, modified with UE support and tends to keep R foot forward of neutral     Reflex/Sensation: Sensation intact to light touch B LE's . .    Girth Measurement:  (L) 47 cm; (R) 52 cm at mid patella         Knee AROM:     (R) (L)     Flexion   70 115     Extension  25 o          Hip AROM:  Flexion   120 120     ER   15 30     IR   20 30         Tibial Internal Rotation seated  0 10      Ankle  AROM:  Ankle DF(keara) 0 8       Strength:     R L  Hip flexors L2   4/5 5/5  Quadriceps L3   3/5 5/5     Hamstrings S1  4/5 5/5    Dorsiflexion L4  5/5 5/5      Plantar flexion S1  2/5 2/5    Gluteus Medius L45S1 3+/5 4/5    Gluteus Abdirizak L5S12 3/5 3+/5      Joint Mobility/palpation: very swollen in calf and knee/thigh, patellar motion poor in all directions          TREATMENT   Treatment Time In: 8:20 am  Treatment Time Out: 9:00 am  Total Treatment time separate from Evaluation: 40 minutes    Mitchell participated in neuromuscular re-education activities to improve: Balance, Kinesthetic, and Proprioception for 40 minutes. The following activities were included:    Using RW for gait to increase toe push off on arrival vs using the cane he was limping poorly with and R hip externally rotating with PT educated him on knee extension and toe push off to engage the quad  Standing heel raises 2x 10  Standing hip flexion 2x 10 per leg  Seated hip flexion 2x 10  Seated Quad set 2x 10 (sitting forward to get hamstring stretch in addition)  Seated quad set with ankle DF using towel under foot to use Ue's to help engage posterior chain muscles) 2x 20  Does much better with long seated VMO quad sets as he can see the VMO engaged 2x 10 with towel roll under the calf  Seated LAQ with cues on forward lean and hip extension to avoid hiking 3x 10 R  R side lying hip adduction 2x10 (like a clam on the bottom leg to mobilize the 2x 10  Prone hamstring curls R 3x 10        Home Exercises and Patient Education Provided    Education provided:   -Education on condition, HEP, and PT educated pt on the need to get his HEP in more to get the edema under control and to use the VMO more to reduce knee ROM loss issues    Written Home Exercises Provided: Patient instructed to cont prior HEP.  Exercises were reviewed and Mitchell was able to demonstrate them prior to the end of the session.  Mitchell demonstrated good  understanding of the education  "provided.     See EMR under Patient Instructions for exercises provided 3/25/2024.    Assessment   Mitchell is a 68 y.o. male referred to outpatient Physical Therapy with a medical diagnosis of Z96.651 (ICD-10-CM) - Status post total right knee replacement using cement . The patient presents with signs and symptoms consistent with diagnosis along with knee ROM loss, pain in R knee, decreased functional mobility and endurance and impairments which include impairments list: ROM, strength, endurance, joint mobility, muscle length, balance, posture, gait mechanics, core strength and stability, functional movement patterns, scar tissue, and edema.  These impairments are limiting patient's ability to walk, stand and manage steps. Pt needed a lot of cues to engage the proper VMO activation and does better doing this in long seated as he can't "feel" the effort at the quad vs his hip compensating but could see it.     Pt prognosis is Good.   Pt will benefit from skilled outpatient Physical Therapy to address the deficits stated above and in the chart below, provide pt/family education, and to maximize pt's level of independence.     Plan of care discussed with patient: Yes  Pt's spiritual, cultural and educational needs considered and patient is agreeable to the plan of care and goals as stated below:     Anticipated Barriers for therapy: deconditioned; lots of edema    Medical Necessity is demonstrated by the following  History  Co-morbidities and personal factors that may impact the plan of care [x] LOW: no personal factors / co-morbidities  [] MODERATE: 1-2 personal factors / co-morbidities  [] HIGH: 3+ personal factors / co-morbidities    Moderate / High Support Documentation:   Co-morbidities affecting plan of care: na    Personal Factors: none     Examination  Body Structures and Functions, activity limitations and participation restrictions that may impact the plan of care [x] LOW: addressing 1-2 elements  [] MODERATE: " 3+ elements  [] HIGH: 4+ elements (please support below)    Moderate / High Support Documentation: na     Clinical Presentation [] LOW: stable  [] MODERATE: Evolving  [] HIGH: Unstable     Decision Making/ Complexity Score: low         Goals:  Short Term Goals: In 4 weeks:  1.Pt to be educated on HEP.  2.Patient to demo increased AROM to flexion to 115 degrees or better  3.Patient to increase strength hip flexion to 4/5 or better.  4.Patient to have decreased pain to 3/10 or better.  5.Patient to increase LE balance to single leg stand up to 10 sec.  6.Patient to improve score on the FOTO by 10%.  7.Patient to decrease swelling to 2 cm    Long Term Goals: In 10 weeks 6/3/24  1. Patient to perform daily activities including managing steps with 1 rail or one cane up to 3 flights.  2. Patient to demonstrate increased knee AROM to extension to 0.  3. Patient to demonstrate increased LE strength to quad to 5/5 R.  4. Patient to have decreased pain to 2/10.  5. Patient to improve score on the FOTO to 78.      Plan   Plan of care Certification: 3/25/2024 to 6/3/24.    Outpatient Physical Therapy 2 times weekly for 10 weeks to include the following interventions: Electrical Stimulation IFC, NMES, Gait Training, Manual Therapy, Moist Heat/ Ice, Neuromuscular Re-ed, Patient Education, Self Care, Therapeutic Activities, Therapeutic Exercise, and DN.     Kathleen Nguyen, PT, CIDN, SFMA    Thank you for this referral.    These services are reasonable and necessary for the conditions set forth above while under my care.

## 2024-03-25 NOTE — PROGRESS NOTES
[facility]  Physical Therapy Initial Evaluation    Name: Mitchell Mcmillan  Clinic Number: [id number]    Therapy Diagnosis:   Encounter Diagnoses   Name Primary?    Status post total right knee replacement using cement     Decreased functional mobility and endurance Yes     Physician: Shari Florez PA    Physician Orders: PT Eval and Treat   Medical Diagnosis from Referral: Z96.651 (ICD-10-CM) - Status post total right knee replacement using cement   Evaluation Date: 3/25/2024  Authorization Period Expiration: 12/31/24  Plan of Care Expiration: 6/3/24  Visit # / Visits authorized: 1/ 1    Time In: 8:06 am  Time Out:9:00 am  Total Billable Time: 54 minutes    Precautions: Standard, HTN    Subjective   Date of onset: 3/6/24  History of current condition - Mitchell reports: R knee pain on and off for years; R knee TKA; L knee was performed last year.     Pain:  Current 9/10, worst 10/10, best 5/10   Location: R knee  Description: sore ache, stiff, sharp at times  Aggravating Factors: walking, standing and rising to stand  Easing Factors: ice, medication    Prior Therapy: yes  Social History:  lives with their spouse  Occupation: retired  Prior Level of Function: driving, walking and yard work  Current Level of Function: limited with yard work and walking and driving and transfers are slow    Imaging, xray: Results: Right knee replacement in satisfactory alignment. Postoperative changes are noted . Air in fluid is seen within the joint space as a normal postoperative finding.. Bony mineralization is normal.     Medical History:   Past Medical History:   Diagnosis Date    Arthritis     knees    Asymmetric SNHL (sensorineural hearing loss) 08/17/2017    Chronic left hip pain 2006    Dislocated left hip, job injury    Chronic prostatitis     per pt, last episode 2013    Elevated PSA, less than 10 ng/ml     per pt last elevation 2013    Gastroesophageal reflux disease 8/3/2022    Hyperlipidemia  2010    Hypertension 2011    Joint effusion of knee     per pt rt knee, seen by Dr. Duke 2013    Midline low back pain without sciatica 6/13/2016    Obesity     Special screening for malignant neoplasms, colon 10/5/2016    Tobacco dependence     Trouble in sleeping        Surgical History:   Mitchell Mcmillan  has a past surgical history that includes Hip Arthroplasty (Left, 04/16/2006); arthrocentesis (Right, 2013); Prostate biopsy (2012); Colonoscopy (N/A, 10/05/2016); left orchiectomy; Total knee arthroplasty (Left, 5/31/2023); and Total knee arthroplasty (Right, 3/6/2024).    Medications:   Mitchell has a current medication list which includes the following prescription(s): atorvastatin, celecoxib, diclofenac sodium, finasteride, fluticasone propionate, gabapentin, lisinopril-hydrochlorothiazide, fish oil-omega-3 fatty acids, ondansetron, oxycodone-acetaminophen, pantoprazole, sildenafil, and tamsulosin.    Allergies:   Review of patient's allergies indicates:  No Known Allergies     Pts goals: get back to yard work    Objective       CMS Impairment/Limitation/Restriction for FOTO knee Survey    Therapist reviewed FOTO scores for Mitchell Mcmillan on 3/25/2024.   FOTO documents entered into [employer] - see Media section.    Functional Score: 56       Gait:  using the cane he was limping poorly with and R hip externally rotating with poor L hip flexion and short L step length and poor toe push.    Balance: R LE single leg stand= 1 sec, L LE single leg stand = 4 sec, squat unable but sit to stand 4 reps in 30 sec from standard chair, modified with UE support and tends to keep R foot forward of neutral     Reflex/Sensation: Sensation intact to light touch B LE's . .    Girth Measurement:  (L) 47 cm; (R) 52 cm at mid patella         Knee AROM:     (R) (L)     Flexion   70 115     Extension  25 o          Hip AROM:  Flexion   120 120     ER   15 30     IR   20 30         Tibial Internal Rotation seated  0 10      Ankle  AROM:  Ankle DF(keara) 0 8       Strength:     R L  Hip flexors L2   4/5 5/5  Quadriceps L3   3/5 5/5     Hamstrings S1  4/5 5/5    Dorsiflexion L4  5/5 5/5      Plantar flexion S1  2/5 2/5    Gluteus Medius L45S1 3+/5 4/5    Gluteus Abdirizak L5S12 3/5 3+/5      Joint Mobility/palpation: very swollen in calf and knee/thigh, patellar motion poor in all directions          TREATMENT   Treatment Time In: 8:20 am  Treatment Time Out: 9:00 am  Total Treatment time separate from Evaluation: 40 minutes    Mitchell participated in neuromuscular re-education activities to improve: Balance, Kinesthetic, and Proprioception for 40 minutes. The following activities were included:    Using RW for gait to increase toe push off on arrival vs using the cane he was limping poorly with and R hip externally rotating with PT educated him on knee extension and toe push off to engage the quad  Standing heel raises 2x 10  Standing hip flexion 2x 10 per leg  Seated hip flexion 2x 10  Seated Quad set 2x 10 (sitting forward to get hamstring stretch in addition)  Seated quad set with ankle DF using towel under foot to use Ue's to help engage posterior chain muscles) 2x 20  Does much better with long seated VMO quad sets as he can see the VMO engaged 2x 10 with towel roll under the calf  Seated LAQ with cues on forward lean and hip extension to avoid hiking 3x 10 R  R side lying hip adduction 2x10 (like a clam on the bottom leg to mobilize the 2x 10  Prone hamstring curls R 3x 10        Home Exercises and Patient Education Provided    Education provided:   -Education on condition, HEP, and PT educated pt on the need to get his HEP in more to get the edema under control and to use the VMO more to reduce knee ROM loss issues    Written Home Exercises Provided: Patient instructed to cont prior HEP.  Exercises were reviewed and Mitchell was able to demonstrate them prior to the end of the session.  Mitchell demonstrated good  understanding of the education  "provided.     See EMR under Patient Instructions for exercises provided 3/25/2024.    Assessment   Mitchell is a 68 y.o. male referred to outpatient Physical Therapy with a medical diagnosis of Z96.651 (ICD-10-CM) - Status post total right knee replacement using cement . The patient presents with signs and symptoms consistent with diagnosis along with knee ROM loss, pain in R knee, decreased functional mobility and endurance and impairments which include impairments list: ROM, strength, endurance, joint mobility, muscle length, balance, posture, gait mechanics, core strength and stability, functional movement patterns, scar tissue, and edema.  These impairments are limiting patient's ability to walk, stand and manage steps. Pt needed a lot of cues to engage the proper VMO activation and does better doing this in long seated as he can't "feel" the effort at the quad vs his hip compensating but could see it.     Pt prognosis is Good.   Pt will benefit from skilled outpatient Physical Therapy to address the deficits stated above and in the chart below, provide pt/family education, and to maximize pt's level of independence.     Plan of care discussed with patient: Yes  Pt's spiritual, cultural and educational needs considered and patient is agreeable to the plan of care and goals as stated below:     Anticipated Barriers for therapy: deconditioned; lots of edema    Medical Necessity is demonstrated by the following  History  Co-morbidities and personal factors that may impact the plan of care [x] LOW: no personal factors / co-morbidities  [] MODERATE: 1-2 personal factors / co-morbidities  [] HIGH: 3+ personal factors / co-morbidities    Moderate / High Support Documentation:   Co-morbidities affecting plan of care: na    Personal Factors: none     Examination  Body Structures and Functions, activity limitations and participation restrictions that may impact the plan of care [x] LOW: addressing 1-2 elements  [] MODERATE: " 3+ elements  [] HIGH: 4+ elements (please support below)    Moderate / High Support Documentation: na     Clinical Presentation [] LOW: stable  [] MODERATE: Evolving  [] HIGH: Unstable     Decision Making/ Complexity Score: low         Goals:  Short Term Goals: In 4 weeks:  1.Pt to be educated on HEP.  2.Patient to demo increased AROM to flexion to 115 degrees or better  3.Patient to increase strength hip flexion to 4/5 or better.  4.Patient to have decreased pain to 3/10 or better.  5.Patient to increase LE balance to single leg stand up to 10 sec.  6.Patient to improve score on the FOTO by 10%.  7.Patient to decrease swelling to 2 cm    Long Term Goals: In 10 weeks 6/3/24  1. Patient to perform daily activities including managing steps with 1 rail or one cane up to 3 flights.  2. Patient to demonstrate increased knee AROM to extension to 0.  3. Patient to demonstrate increased LE strength to quad to 5/5 R.  4. Patient to have decreased pain to 2/10.  5. Patient to improve score on the FOTO to 78.      Plan   Plan of care Certification: 3/25/2024 to 6/3/24.    Outpatient Physical Therapy 2 times weekly for 10 weeks to include the following interventions: Electrical Stimulation IFC, NMES, Gait Training, Manual Therapy, Moist Heat/ Ice, Neuromuscular Re-ed, Patient Education, Self Care, Therapeutic Activities, Therapeutic Exercise, and DN .     Kathleen Nguyen, PT, CIDN, SFMA    Thank you for this referral.    These services are reasonable and necessary for the conditions set forth above while under my care.

## 2024-03-27 ENCOUNTER — CLINICAL SUPPORT (OUTPATIENT)
Dept: REHABILITATION | Facility: HOSPITAL | Age: 69
End: 2024-03-27
Payer: MEDICARE

## 2024-03-27 DIAGNOSIS — Z74.09 DECREASED FUNCTIONAL MOBILITY AND ENDURANCE: Primary | ICD-10-CM

## 2024-03-27 PROCEDURE — 97140 MANUAL THERAPY 1/> REGIONS: CPT | Mod: HCNC,PN

## 2024-03-27 PROCEDURE — 97110 THERAPEUTIC EXERCISES: CPT | Mod: HCNC,PN

## 2024-03-27 PROCEDURE — 97112 NEUROMUSCULAR REEDUCATION: CPT | Mod: HCNC,PN

## 2024-03-27 NOTE — PROGRESS NOTES
Physical Therapy Daily Treatment Note     Name: Mitchell Mcmillan  Clinic Number: [id number]    Therapy Diagnosis:   Encounter Diagnosis   Name Primary?    Decreased functional mobility and endurance Yes     Physician: Shari Florez PA    Visit Date: 3/27/2024    Physician Orders: PT Eval and Treat   Medical Diagnosis from Referral: Z96.651 (ICD-10-CM) - Status post total right knee replacement using cement   Evaluation Date: 3/25/2024  Authorization Period Expiration: 24  Plan of Care Expiration: 6/3/24  Visit # / Visits authorized:  (including eval)    Foto:1/3  Progress Note Due Date:30 days from 3/25/24    Precautions: Standard HTN    Time In: 7:35 am   Time Out: 8:45 am  Total Time: 70 minutes    SUBJECTIVE       Today, pt reports: able to put his socks on after the last session.  He was compliant with home exercise program.  Response to previous treatment: no new issues  Functional change: dressing easier    Pre-Treatment Pain: 510  Post-Treatment Pain: 10  Location: R knee    OBJECTIVE     Knee flexion 88 degrees (improved from 70)    TREATMENT     Mitchell received therapeutic exercises to develop strength, endurance, and ROM for 11 minutes includin min on shuttle 5 bands  Nustep level 2 6 min      Mitchell received the following manual therapy techniques: Joint mobilizations, Manual traction, and Soft tissue Mobilization were applied to the: R knee for 15 minutes, including:    Soft tissue mobs for edema management  Patellar mobs  Tibial mobs for flexion/extension  ITB and later quad friction massage as tendon very ropey      Mitchell participated in neuromuscular re-education activities to improve: Balance, Kinesthetic, and Proprioception for 44 minutes. The following activities were included:      Quad sets 1 x30  Long seated quad sets with Ankle DF using strap 2x 20  SLR with max cues 3 x 7 R  LAQ 3x 10 seated R  SAQ 5# 3x 10 R  Side lying hip abduction R 3 x10  Side lying clams 3x 10  B  Prone hamstring curls 5# R 3x 10  Leaning hip extension 2x 8      Mitchell participated in dynamic functional therapeutic activities to improve functional performance for 0  minutes, including:          Mitchell participated in gait training to improve functional mobility and safety for 0  minutes, including:      Mitchell received the following direct contact modalities after being cleared for contraindications:     Mitchell received the following supervised modalities after being cleared for contradictions:     Mitchell received hot pack for 0 minutes to 0.    Mitchell received cold pack for 0 minutes to 0.  Mitchell received electrical stimulation for 0 minutes to 0      Home Exercises Provided and Patient Education Provided     Education/Self-Care provided: (during therex)    Patient educated on the importance of improved core and upper and lower extremity strength in order to improve alignment of the spine and upper and lower extremities with static positions and dynamic movement.   Patient educated on the importance of strong core and lower extremity musculature in order to improve both static and dynamic balance, improve gait mechanics, reduce fall risk and improve household and community mobility.       Written Home Exercises Provided: Patient instructed to cont prior HEP.  Exercises were reviewed and Mitchell was able to demonstrate them prior to the end of the session.  Mitchell demonstrated good  understanding of the education provided.     See EMR under Patient Instructions for exercises provided prior visit.    ASSESSMENT   Pt tolerated therex with good gain in knee flexion. Lateral quad, hamstring and ITB very tight and limiting ROM as well as edema limits his ROM. Pt tolerated exercise well with reports of increased fatigue but no increased pain. Pt demonstrated good understanding of exercises and required moderate cueing to maintain proper form.      Mitchell Is progressing well towards his goals.   Pt prognosis is  Good.     Pt will continue to benefit from skilled outpatient physical therapy to address the deficits listed in the problem list box on initial evaluation, provide pt/family education and to maximize pt's level of independence in the home and community environment.     Pt's spiritual, cultural and educational needs considered and pt agreeable to plan of care and goals.     Anticipated barriers to physical therapy: lots of edema    Goals:   Short Term Goals: In 4 weeks:  1.Pt to be educated on HEP. progressing  2.Patient to demo increased AROM to flexion to 115 degrees or better  3.Patient to increase strength hip flexion to 4/5 or better.  4.Patient to have decreased pain to 3/10 or better.  5.Patient to increase LE balance to single leg stand up to 10 sec.  6.Patient to improve score on the FOTO by 10%.  7.Patient to decrease swelling to 2 cm     Long Term Goals: In 10 weeks 6/3/24  1. Patient to perform daily activities including managing steps with 1 rail or one cane up to 3 flights.  2. Patient to demonstrate increased knee AROM to extension to 0.  3. Patient to demonstrate increased LE strength to quad to 5/5 R.  4. Patient to have decreased pain to 2/10.  5. Patient to improve score on the FOTO to 78.    PLAN   Plan of care Certification: 3/25/2024 to 6/3/24.     Outpatient Physical Therapy 2 times weekly for 10 weeks to include the following interventions: Electrical Stimulation IFC, NMES, Gait Training, Manual Therapy, Moist Heat/ Ice, Neuromuscular Re-ed, Patient Education, Self Care, Therapeutic Activities, Therapeutic Exercise, and DN. Continue Plan of Care (POC) and progress per patient tolerance.    Kathleen Nguyen, PT, CIDN, SFMA

## 2024-04-02 ENCOUNTER — CLINICAL SUPPORT (OUTPATIENT)
Dept: REHABILITATION | Facility: HOSPITAL | Age: 69
End: 2024-04-02
Payer: MEDICARE

## 2024-04-02 DIAGNOSIS — Z74.09 DECREASED FUNCTIONAL MOBILITY AND ENDURANCE: Primary | ICD-10-CM

## 2024-04-02 PROCEDURE — 97112 NEUROMUSCULAR REEDUCATION: CPT | Mod: HCNC,PN,CQ

## 2024-04-02 PROCEDURE — 97140 MANUAL THERAPY 1/> REGIONS: CPT | Mod: HCNC,PN,CQ

## 2024-04-02 PROCEDURE — 97110 THERAPEUTIC EXERCISES: CPT | Mod: HCNC,PN,CQ

## 2024-04-02 NOTE — PROGRESS NOTES
Physical Therapy Daily Treatment Note     Name: Mitchell Mcmillan  Clinic Number: [id number]    Therapy Diagnosis:   Encounter Diagnosis   Name Primary?    Decreased functional mobility and endurance Yes       Physician: Shari Florez PA    Visit Date: 2024    Physician Orders: PT Eval and Treat   Medical Diagnosis from Referral: Z96.651 (ICD-10-CM) - Status post total right knee replacement using cement   Evaluation Date: 3/25/2024  Authorization Period Expiration: 24  Plan of Care Expiration: 6/3/24  Visit # / Visits authorized: 3/ 20 (including eval)    Foto: 1/3  Progress Note Due Date: 30 days from 3/25/24    Precautions: Standard HTN    Time In: 7:08 am   Time Out: 8:10 am  Total Time: 62 minutes    SUBJECTIVE     Today, pt reports: increased stiffness in knee this morning. He was able to stand from sitting on his sofa after last session.  He was compliant with home exercise program.  Response to previous treatment: no new issues  Functional change: dressing easier    Pre-Treatment Pain: 6/10  Post-Treatment Pain: 5/10  Location: R knee    OBJECTIVE         TREATMENT     Mitchell received therapeutic exercises to develop strength, endurance, and ROM for 12 minutes includin min on shuttle 4 bands today  Nustep 6 min    Mitchell received the following manual therapy techniques: Joint mobilizations, Manual traction, and Soft tissue Mobilization were applied to the: R knee for 10 minutes, including:    Soft tissue mobs for edema management  Patellar mobs  Tibial mobs for flexion/extension  ITB and later quad friction massage as tendon very ropey      Mitchell participated in neuromuscular re-education activities to improve: Balance, Kinesthetic, and Proprioception for 40 minutes. The following activities were included:    Quad sets 1 x30  Long seated quad sets with Ankle DF using strap 2x 20  LAQ 3x 10 seated R  SLR with max cues 3 x 8 R  Prone hamstring curls 5# R 3x 10  Prone TKE 2x10  SAQ 5# 3x 10  R  Side lying hip abduction R 3 x10  Side lying clams 3x 10 B  Leaning hip extension 2x 8    Mitchell participated in dynamic functional therapeutic activities to improve functional performance for 0  minutes, including:          Mitchell participated in gait training to improve functional mobility and safety for 0  minutes, including:      Mitchell received the following direct contact modalities after being cleared for contraindications:     Mitchell received the following supervised modalities after being cleared for contradictions:     Mitchell received hot pack for 0 minutes to 0.    Mitchell received cold pack for 0 minutes to 0.  Mitchell received electrical stimulation for 0 minutes to 0      Home Exercises Provided and Patient Education Provided     Education/Self-Care provided: (during therex)    Patient educated on the importance of improved core and upper and lower extremity strength in order to improve alignment of the spine and upper and lower extremities with static positions and dynamic movement.   Patient educated on the importance of strong core and lower extremity musculature in order to improve both static and dynamic balance, improve gait mechanics, reduce fall risk and improve household and community mobility.       Written Home Exercises Provided: Patient instructed to cont prior HEP.  Exercises were reviewed and Mitchell was able to demonstrate them prior to the end of the session.  Mitchell demonstrated good  understanding of the education provided.     See EMR under Patient Instructions for exercises provided prior visit.    ASSESSMENT   Pt presents to therapy with increased stiffness in right knee which reduced throughout therapy session. He continues to have increased tightness in hamstrings, quad and it band addressed with manual therapy today. Main focus was quad strengthening which moderate cues were required to properly engage without compensating.    Mitchell Is progressing well towards his goals.   Pt  prognosis is Good.     Pt will continue to benefit from skilled outpatient physical therapy to address the deficits listed in the problem list box on initial evaluation, provide pt/family education and to maximize pt's level of independence in the home and community environment.     Pt's spiritual, cultural and educational needs considered and pt agreeable to plan of care and goals.     Anticipated barriers to physical therapy: lots of edema    Goals:   Short Term Goals: In 4 weeks:  1.Pt to be educated on HEP. progressing  2.Patient to demo increased AROM to flexion to 115 degrees or better  3.Patient to increase strength hip flexion to 4/5 or better.  4.Patient to have decreased pain to 3/10 or better.  5.Patient to increase LE balance to single leg stand up to 10 sec.  6.Patient to improve score on the FOTO by 10%.  7.Patient to decrease swelling to 2 cm     Long Term Goals: In 10 weeks 6/3/24  1. Patient to perform daily activities including managing steps with 1 rail or one cane up to 3 flights.  2. Patient to demonstrate increased knee AROM to extension to 0.  3. Patient to demonstrate increased LE strength to quad to 5/5 R.  4. Patient to have decreased pain to 2/10.  5. Patient to improve score on the FOTO to 78.    PLAN   Plan of care Certification: 3/25/2024 to 6/3/24.     Outpatient Physical Therapy 2 times weekly for 10 weeks to include the following interventions: Electrical Stimulation IFC, NMES, Gait Training, Manual Therapy, Moist Heat/ Ice, Neuromuscular Re-ed, Patient Education, Self Care, Therapeutic Activities, Therapeutic Exercise, and DN. Continue Plan of Care (POC) and progress per patient tolerance.    Lexi Matamoros PTA,

## 2024-04-03 ENCOUNTER — CLINICAL SUPPORT (OUTPATIENT)
Dept: REHABILITATION | Facility: HOSPITAL | Age: 69
End: 2024-04-03
Payer: MEDICARE

## 2024-04-03 DIAGNOSIS — Z74.09 DECREASED FUNCTIONAL MOBILITY AND ENDURANCE: Primary | ICD-10-CM

## 2024-04-03 PROCEDURE — 97112 NEUROMUSCULAR REEDUCATION: CPT | Mod: HCNC,PN

## 2024-04-03 NOTE — PROGRESS NOTES
Physical Therapy Daily Treatment Note     Name: Mitchell Mcmillan  Clinic Number: [id number]    Therapy Diagnosis:   Encounter Diagnosis   Name Primary?    Decreased functional mobility and endurance Yes     Physician: Shari Florez PA    Visit Date: 4/3/2024    Physician Orders: PT Eval and Treat   Medical Diagnosis from Referral: Z96.651 (ICD-10-CM) - Status post total right knee replacement using cement   Evaluation Date: 3/25/2024  Authorization Period Expiration: 24  Plan of Care Expiration: 6/3/24  Visit # / Visits authorized:  (including eval)    Foto: 1/3  Progress Note Due Date: 30 days from 3/25/24    Precautions: Standard HTN    Time In: 7:04 am   Time Out: 8:00 am  Total BillableTime: 30 minutes    SUBJECTIVE     Today, pt reports: knee swelled up a lot this weekend  He was compliant with home exercise program.  Response to previous treatment: no new issues  Functional change: dressing easier    Pre-Treatment Pain: 8/10  Post-Treatment Pain: 6/10  Location: R knee    OBJECTIVE       TREATMENT     Mitchell received therapeutic exercises to develop strength, endurance, and ROM for 10 minutes includin min on shuttle 2 bands in L side lying  Bike 6 min (upright)    Mitchell received the following manual therapy techniques: Joint mobilizations, Manual traction, and Soft tissue Mobilization were applied to the: R knee for 0 minutes, including:    Deferred:  Soft tissue mobs for edema management  Patellar mobs  Tibial mobs for flexion/extension  ITB and later quad friction massage as tendon very ropey      Mitchell participated in neuromuscular re-education activities to improve: Balance, Kinesthetic, and Proprioception for 45 minutes. The following activities were included:    Long seated Quad sets 6x10 with 3 sec R (lots of manual resistance from PT to retrain quad extension  SAQ 3x 10 R  LAQ 3x 10 seated R  SLR with max cues 3 x 10 R  Prone hamstring curls 1x15 with PT to assist EROM; then add  5# R 3x 10  L side lying R hip abduction 2x 10      Deferred:  Prone TKE 2x10  SAQ 5# 3x 10 R  Side lying clams 3x 10 B  Leaning hip extension 2x 8  Long seated quad sets with Ankle DF using strap 2x 20    Mitchell participated in dynamic functional therapeutic activities to improve functional performance for 0  minutes, including:    Mitchell participated in gait training to improve functional mobility and safety for 0  minutes, including:      Mitchell received the following direct contact modalities after being cleared for contraindications:     Mitchell received the following supervised modalities after being cleared for contradictions:     Mitchell received hot pack for 0 minutes to 0.    Mitchell received cold pack for 0 minutes to 0.  Mitchell received electrical stimulation for 0 minutes to 0      Home Exercises Provided and Patient Education Provided     Education/Self-Care provided: (during therex)    Patient educated on the importance of improved core and upper and lower extremity strength in order to improve alignment of the spine and upper and lower extremities with static positions and dynamic movement.   Patient educated on the importance of strong core and lower extremity musculature in order to improve both static and dynamic balance, improve gait mechanics, reduce fall risk and improve household and community mobility.       Written Home Exercises Provided: Patient instructed to cont prior HEP.  Exercises were reviewed and Mitchell was able to demonstrate them prior to the end of the session.  Mitchell demonstrated good  understanding of the education provided.     See EMR under Patient Instructions for exercises provided prior visit.    ASSESSMENT   Pt presents to therapy with increased swelling in right knee which reduced throughout therapy session. He continues to have increased tightness in hamstrings, quad and it band addressed with manual therapy today. Patient lagging with extension walking in to building and  using his hip to knee extend vs VMO so he needed max cues to relearn to use the VMO. Pt has been compliant with HEP and but not with proper quality so activating the quad for knee extension has been poor. PT used a lot of feed back to correct this today. PT to continue to focus on quad strength to reduce edema.     Mitchell Is progressing well towards his goals.   Pt prognosis is Good.     Pt will continue to benefit from skilled outpatient physical therapy to address the deficits listed in the problem list box on initial evaluation, provide pt/family education and to maximize pt's level of independence in the home and community environment.     Pt's spiritual, cultural and educational needs considered and pt agreeable to plan of care and goals.     Anticipated barriers to physical therapy: lots of edema    Goals:   Short Term Goals: In 4 weeks:  1.Pt to be educated on HEP. progressing  2.Patient to demo increased AROM to flexion to 115 degrees or better  3.Patient to increase strength hip flexion to 4/5 or better.  4.Patient to have decreased pain to 3/10 or better.  5.Patient to increase LE balance to single leg stand up to 10 sec.  6.Patient to improve score on the FOTO by 10%.  7.Patient to decrease swelling to 2 cm     Long Term Goals: In 10 weeks 6/3/24  1. Patient to perform daily activities including managing steps with 1 rail or one cane up to 3 flights.  2. Patient to demonstrate increased knee AROM to extension to 0.  3. Patient to demonstrate increased LE strength to quad to 5/5 R.  4. Patient to have decreased pain to 2/10.  5. Patient to improve score on the FOTO to 78.    PLAN   Plan of care Certification: 3/25/2024 to 6/3/24.     Outpatient Physical Therapy 2 times weekly for 10 weeks to include the following interventions: Electrical Stimulation IFC, NMES, Gait Training, Manual Therapy, Moist Heat/ Ice, Neuromuscular Re-ed, Patient Education, Self Care, Therapeutic Activities, Therapeutic Exercise,  and DN. Continue Plan of Care (POC) and progress per patient tolerance.    Kathleen Nguyen, PT,

## 2024-04-05 ENCOUNTER — CLINICAL SUPPORT (OUTPATIENT)
Dept: REHABILITATION | Facility: HOSPITAL | Age: 69
End: 2024-04-05
Payer: MEDICARE

## 2024-04-05 DIAGNOSIS — M25.662 DECREASED ROM OF LEFT KNEE: ICD-10-CM

## 2024-04-05 DIAGNOSIS — M79.604 PAIN AND SWELLING OF RIGHT LOWER EXTREMITY: Primary | ICD-10-CM

## 2024-04-05 DIAGNOSIS — Z74.09 DECREASED FUNCTIONAL MOBILITY AND ENDURANCE: Primary | ICD-10-CM

## 2024-04-05 DIAGNOSIS — M79.89 PAIN AND SWELLING OF RIGHT LOWER EXTREMITY: Primary | ICD-10-CM

## 2024-04-05 DIAGNOSIS — Z96.651 STATUS POST TOTAL RIGHT KNEE REPLACEMENT USING CEMENT: ICD-10-CM

## 2024-04-05 PROCEDURE — 97140 MANUAL THERAPY 1/> REGIONS: CPT | Mod: HCNC,PN

## 2024-04-05 PROCEDURE — 97014 ELECTRIC STIMULATION THERAPY: CPT | Mod: HCNC,PN

## 2024-04-05 PROCEDURE — 97110 THERAPEUTIC EXERCISES: CPT | Mod: HCNC,PN

## 2024-04-05 PROCEDURE — 97112 NEUROMUSCULAR REEDUCATION: CPT | Mod: HCNC,PN

## 2024-04-05 NOTE — PROGRESS NOTES
Physical Therapy Daily Treatment Note     Name: Mitchell Mcmillan  Clinic Number: [id number]    Therapy Diagnosis:   Encounter Diagnoses   Name Primary?    Decreased functional mobility and endurance Yes    Status post total right knee replacement using cement     Decreased ROM of left knee      Physician: Shari Florez PA    Visit Date: 2024    Physician Orders: PT Eval and Treat   Medical Diagnosis from Referral: Z96.651 (ICD-10-CM) - Status post total right knee replacement using cement   Evaluation Date: 3/25/2024  Authorization Period Expiration: 24  Plan of Care Expiration: 6/3/24  Visit # / Visits authorized:  (including eval)    Foto: 2/3  Progress Note Due Date: 30 days from 3/25/24    Precautions: Standard HTN    Time In: 7:34 am   Time Out: 8:58 am ended at 8:30 with one on one then used estim   Total BillableTime: 56 minutes plus 20 min of unattended estim     SUBJECTIVE     Today, pt reports: knee is still very swollen and he can't activate his quad well. Pain is much less but the knee is still very warm  He was compliant with home exercise program.  Response to previous treatment: no new issues  Functional change: dressing easier    Pre-Treatment Pain: 5/10    Location: R knee    OBJECTIVE       TREATMENT     Mitchell received therapeutic exercises to develop strength, endurance, and ROM for 15 minutes includin banded squats on shuttle 4 min  Bike upright 6 min  Seated tail gators x 2 sets of 3 min     Mitchell received the following manual therapy techniques: Joint mobilizations, Manual traction, and Soft tissue Mobilization were applied to the: R knee for 15 minutes, including:    Patellar mobs and edema management soft tissue work  Tibial mobs for flexion/extension  ITB and later quad friction massage as tendon very ropey    Mitchell participated in neuromuscular re-education activities to improve: Balance, Kinesthetic, and Proprioception for 30 minutes. The following activities  were included:    Long seated Quad sets 6x10 with 3 sec R (lots of manual resistance from PT to retrain quad extension  SAQ 3x 10 R  LAQ 3x 10 seated R  SLR with max cues 3 x 10 R  B heel raises 1x 30  Leaning hip extension 3x 10  Prone hamstring curls 5# R 3x 10  L side lying R hip abduction 2x 10  Leaning hip extension 2x 8    Deferred:  Prone TKE 2x10  SAQ 5# 3x 10 R  Side lying clams 3x 10 B  Long seated quad sets with Ankle DF using strap 2x 20    Mitchell participated in dynamic functional therapeutic activities to improve functional performance for 0  minutes, including:    Mitchell participated in gait training to improve functional mobility and safety for 0  minutes, including:      Mitchell received the following direct contact modalities after being cleared for contraindications:     Mitchell received the following supervised modalities after being cleared for contradictions:     Mitchell received hot pack for 0 minutes to 0.    Mitchell received cold pack for 0 minutes to 0.  PT noted good pulse in R LE at the ankle   After cleared for contraindications, pt received:  Mitchell received electrical stimulation for biphasic for 10 min to the quad then [place] 10/10 for 10 min while pt worked on quad sets with leg elevated at heel intermittently to let gravity assist but pt only tolerated about 8 min then had to return the leg to supine on the plinth. PT encourage long sitting and ankle DF also during to help reduce posterior hamstring/calf tension. PT used towels to also reduce his tendency to lateral hip rotate.      Home Exercises Provided and Patient Education Provided     Education/Self-Care provided: (during therex)    Patient educated on the importance of improved core and upper and lower extremity strength in order to improve alignment of the spine and upper and lower extremities with static positions and dynamic movement.   Patient educated on the importance of strong core and lower extremity musculature in order  to improve both static and dynamic balance, improve gait mechanics, reduce fall risk and improve household and community mobility.       Written Home Exercises Provided: Patient instructed to cont prior HEP.  Exercises were reviewed and Mitchell was able to demonstrate them prior to the end of the session.  Mitchell demonstrated good  understanding of the education provided.     See EMR under Patient Instructions for exercises provided prior visit.    ASSESSMENT   Pt presents with a lot of edema  still. Pt knee is warm and he is not able to quad set well still due to a tendency to push from the hip more than quad. PT added estim and more manual to help with edema and to help pt find the quad set control better. Pt holds a quad set for less than 3 sec and lags with SLR, quad sets, SAQ and LAQ still. PT notes the quad engages best with SLR even with the lag and with PT using manual cues to help him try to work from nearly 0 extension with AAROM from PT to engage the quad in slow eccentric lowering as he still has poor proprioception at the quad. Gait is also quad deficient in firing. Lateral hip is very tight so PT to add in hip internal rotation work as well. PT sent a note to the Dr office regarding the edema issues noted.   . PT to continue to focus on quad strength to reduce edema.     Mitchell Is progressing well towards his goals.   Pt prognosis is Good.     Pt will continue to benefit from skilled outpatient physical therapy to address the deficits listed in the problem list box on initial evaluation, provide pt/family education and to maximize pt's level of independence in the home and community environment.     Pt's spiritual, cultural and educational needs considered and pt agreeable to plan of care and goals.     Anticipated barriers to physical therapy: lots of edema    Goals:   Short Term Goals: In 4 weeks:  1.Pt to be educated on HEP. progressing  2.Patient to demo increased AROM to flexion to 115 degrees or  better. progressing  3.Patient to increase strength hip flexion to 4/5 or better. progressing  4.Patient to have decreased pain to 3/10 or better. progressing  5.Patient to increase LE balance to single leg stand up to 10 sec.  6.Patient to improve score on the FOTO by 10%.  7.Patient to decrease swelling by 2 cm     Long Term Goals: In 10 weeks 6/3/24  1. Patient to perform daily activities including managing steps with 1 rail or one cane up to 3 flights.  2. Patient to demonstrate increased knee AROM to extension to 0.  3. Patient to demonstrate increased LE strength to quad to 5/5 R.  4. Patient to have decreased pain to 2/10.  5. Patient to improve score on the FOTO to 78.    PLAN   Plan of care Certification: 3/25/2024 to 6/3/24.     Outpatient Physical Therapy 2 times weekly for 10 weeks to include the following interventions: Electrical Stimulation IFC, NMES, Gait Training, Manual Therapy, Moist Heat/ Ice, Neuromuscular Re-ed, Patient Education, Self Care, Therapeutic Activities, Therapeutic Exercise, and DN. Continue Plan of Care (POC) and progress per patient tolerance.    Kathleen Nguyen, PT,

## 2024-04-08 ENCOUNTER — CLINICAL SUPPORT (OUTPATIENT)
Dept: REHABILITATION | Facility: HOSPITAL | Age: 69
End: 2024-04-08
Payer: MEDICARE

## 2024-04-08 DIAGNOSIS — Z74.09 DECREASED FUNCTIONAL MOBILITY AND ENDURANCE: Primary | ICD-10-CM

## 2024-04-08 PROCEDURE — 97140 MANUAL THERAPY 1/> REGIONS: CPT | Mod: PN

## 2024-04-08 PROCEDURE — 97014 ELECTRIC STIMULATION THERAPY: CPT | Mod: PN

## 2024-04-08 PROCEDURE — 97110 THERAPEUTIC EXERCISES: CPT | Mod: PN

## 2024-04-08 PROCEDURE — 97112 NEUROMUSCULAR REEDUCATION: CPT | Mod: PN

## 2024-04-08 NOTE — PROGRESS NOTES
Physical Therapy Daily Treatment Note and Progress Note     Name: Mitchell Mcmillan  Clinic Number: [id number]    Therapy Diagnosis:   Encounter Diagnosis   Name Primary?    Decreased functional mobility and endurance Yes     Physician: Shari Florez PA    Visit Date: 2024    Physician Orders: PT Eval and Treat   Medical Diagnosis from Referral: Z96.651 (ICD-10-CM) - Status post total right knee replacement using cement   Evaluation Date: 3/25/2024  Authorization Period Expiration: 24  Plan of Care Expiration: 6/3/24  Visit # / Visits authorized:  (including eval)    Foto: 2/3  Progress Note Due Date: 30 days from 24    Precautions: Standard HTN    Time In: 7:03 am   Time Out: 7:58 am skilled PT time then estim for another 15 min unsupervised  Total BillableTime: 55 min plus untimed estim    SUBJECTIVE     Today, pt reports: knee edema is mildly improved.   He was compliant with home exercise program.  Response to previous treatment: no new issues  Functional change: elevating the leg higher like PT suggested helped some     Pre-Treatment Pain: 5/10    Location: R knee    OBJECTIVE     15-95 ROM (25-70 at eval) on R LE  Mid patella motion was 52 cm at eval but today after some to 52 cm  Able to hold a quad set up to 5 sec now (slowly improving)    TREATMENT     Mitchell received therapeutic exercises to develop strength, endurance, and ROM for 15 minutes includin banded squats on shuttle 4 min  Soleus stretch with foot on 2 inch block 2x 10  Standing heel raises with cues on full ROM 4x 10 5 sec hold  Bike upright 3 min seat at 12; seat at 9 3 min    Deferred:  Seated tail gators x 2 sets of 3 min     Mitchell received the following manual therapy techniques: Joint mobilizations, Manual traction, and Soft tissue Mobilization were applied to the: R knee for 15 minutes, including:    R LE long leg axial traction  PT tibial mobs for flexion/extension ROM gains  Patellar mobs and edema  management soft tissue work (less ropey today but still has multiple tissue tight limiting mobility)    Mitchell participated in neuromuscular re-education activities to improve: Balance, Kinesthetic, and Proprioception for 30 minutes. The following activities were included:    Long seated Quad sets 4x10 with 5 sec R (lots of manual resistance from PT to retrain quad extension  LAQ 3x 10 seated R  L single leg high steps to 4th step to increase R quad activation 3x 10  R single leg toe drags with long L step to engaged toe push off, knee flexion and hip flexor activation 3x 50 ft  Prone hamstring curls 5# R 3x 10 R  Prone hip internal rotation/external 5# 3x 10 R  SAQ 3x 10 R 5#    Deferred:    SLR with max cues 3 x 10 R  Leaning hip extension 3x 10  L side lying R hip abduction 2x 10  Leaning hip extension 2x 8  Prone TKE 2x10  Side lying clams 3x 10 B      Mitchell participated in dynamic functional therapeutic activities to improve functional performance for 0  minutes, including:    Mitchell participated in gait training to improve functional mobility and safety for 0  minutes, including:      Mitchell received the following direct contact modalities after being cleared for contraindications:     Mitchell received the following supervised modalities after being cleared for contradictions:     Mitchell received hot pack for 0 minutes to 0.    Mitchell received cold pack for 0 minutes to 0.  PT noted good pulse in R LE at the ankle   After cleared for contraindications, pt received:  Mitchell received electrical stimulation for biphasic for 15 min to the quad then [place] 10/10 for 15 min while pt worked on quad sets and SAQ with leg elevated at heel intermittently to let gravity assisted.      Home Exercises Provided and Patient Education Provided     Education/Self-Care provided: (during therex)    Patient educated on the importance of improved core and upper and lower extremity strength in order to improve alignment of the  spine and upper and lower extremities with static positions and dynamic movement.   Patient educated on the importance of strong core and lower extremity musculature in order to improve both static and dynamic balance, improve gait mechanics, reduce fall risk and improve household and community mobility.       Written Home Exercises Provided: Patient instructed to cont prior HEP.  Exercises were reviewed and Mitchell was able to demonstrate them prior to the end of the session.  Mitchell demonstrated good  understanding of the education provided.     See EMR under Patient Instructions for exercises provided prior visit.    ASSESSMENT   Pt presents with a lot of edema still like at the time of eval. Pt ROM is slowly improving but gait is so poor with a lateral shift that he is not getting calf stretch and the lateral hip is staying tight as he rotates the hip externally with gait.   PT to continue to focus on quad strength to reduce edema and ROM gains.     Mitchell Is progressing well towards his goals.   Pt prognosis is Good.     Pt will continue to benefit from skilled outpatient physical therapy to address the deficits listed in the problem list box on initial evaluation, provide pt/family education and to maximize pt's level of independence in the home and community environment.     Pt's spiritual, cultural and educational needs considered and pt agreeable to plan of care and goals.     Anticipated barriers to physical therapy: lots of edema    Goals:   Short Term Goals: In 4 weeks:  1.Pt to be educated on HEP. met  2.Patient to demo increased AROM to flexion to 115 degrees or better. progressing  3.Patient to increase strength hip flexion to 4/5 or better. progressing  4.Patient to have decreased pain to 3/10 or better. progressing  5.Patient to increase LE balance to single leg stand up to 10 sec. progressing  6.Patient to improve score on the FOTO by 10%. No improving yet  7.Patient to decrease swelling by 2 cm.  Not met     Long Term Goals: In 10 weeks 6/3/24  1. Patient to perform daily activities including managing steps with 1 rail or one cane up to 3 flights.  2. Patient to demonstrate increased knee AROM to extension to 0.  3. Patient to demonstrate increased LE strength to quad to 5/5 R.  4. Patient to have decreased pain to 2/10.  5. Patient to improve score on the FOTO to 78.    PLAN   Plan of care Certification: 3/25/2024 to 6/3/24.     Outpatient Physical Therapy 2 times weekly for 10 weeks to include the following interventions: Electrical Stimulation IFC, NMES, Gait Training, Manual Therapy, Moist Heat/ Ice, Neuromuscular Re-ed, Patient Education, Self Care, Therapeutic Activities, Therapeutic Exercise, and DN. Continue Plan of Care (POC) and progress per patient tolerance.    Kathleen Nguyen, PT,

## 2024-04-10 ENCOUNTER — CLINICAL SUPPORT (OUTPATIENT)
Dept: REHABILITATION | Facility: HOSPITAL | Age: 69
End: 2024-04-10
Payer: MEDICARE

## 2024-04-10 ENCOUNTER — HOSPITAL ENCOUNTER (OUTPATIENT)
Dept: RADIOLOGY | Facility: HOSPITAL | Age: 69
Discharge: HOME | End: 2024-04-10
Attending: PHYSICIAN ASSISTANT
Payer: MEDICARE

## 2024-04-10 DIAGNOSIS — Z74.09 DECREASED FUNCTIONAL MOBILITY AND ENDURANCE: Primary | ICD-10-CM

## 2024-04-10 DIAGNOSIS — M79.604 PAIN AND SWELLING OF RIGHT LOWER EXTREMITY: ICD-10-CM

## 2024-04-10 DIAGNOSIS — M79.89 PAIN AND SWELLING OF RIGHT LOWER EXTREMITY: ICD-10-CM

## 2024-04-10 PROCEDURE — 97112 NEUROMUSCULAR REEDUCATION: CPT | Mod: PN

## 2024-04-10 PROCEDURE — 93971 EXTREMITY STUDY: CPT | Mod: 26,RT,, | Performed by: RADIOLOGY

## 2024-04-10 PROCEDURE — 97140 MANUAL THERAPY 1/> REGIONS: CPT | Mod: PN

## 2024-04-10 PROCEDURE — 93971 EXTREMITY STUDY: CPT | Mod: TC,RT

## 2024-04-10 RX ORDER — OXYCODONE AND ACETAMINOPHEN 10; 325 MG/1; MG/1
1 TABLET ORAL EVERY 8 HOURS PRN
Qty: 21 TABLET | Refills: 0 | Status: SHIPPED | OUTPATIENT
Start: 2024-04-10 | End: 2024-05-06 | Stop reason: SDUPTHER

## 2024-04-10 NOTE — PROGRESS NOTES
Physical Therapy Daily Treatment Note     Name: Mitchell Mcmillan  Clinic Number: [id number]    Therapy Diagnosis:   Encounter Diagnosis   Name Primary?    Decreased functional mobility and endurance Yes     Physician: Shari Florez PA    Visit Date: 4/10/2024    Physician Orders: PT Eval and Treat   Medical Diagnosis from Referral: Z96.651 (ICD-10-CM) - Status post total right knee replacement using cement   Evaluation Date: 3/25/2024  Authorization Period Expiration: 12/31/24  Plan of Care Expiration: 6/3/24  Visit # / Visits authorized: 7/ 20 (including eval)    Foto: 2/3  Progress Note Due Date: 30 days from 4/8/24    Precautions: Standard HTN    Time In: 7:05 am   Time Out: 8:10 am skilled PT time   Total BillableTime: 38 min plus untimed estim    SUBJECTIVE     Today, pt reports: knee edema is mildly improved.   He was compliant with home exercise program.  Response to previous treatment: no new issues  Functional change: elevating the leg higher like PT suggested helped some     Pre-Treatment Pain: 5/10    Location: R knee    OBJECTIVE         TREATMENT     Mitchell received therapeutic exercises to develop strength, endurance, and ROM for 9 minutes including:  Bike upright 5 min seat at 12  5 banded squats on shuttle 4 min    Deferred:  Soleus stretch with foot on 2 inch block 2x 10  Standing heel raises with cues on full ROM 4x 10 5 sec hold  Seated tail gators x 2 sets of 3 min     Mitchell received the following manual therapy techniques: Joint mobilizations, Manual traction, and Soft tissue Mobilization were applied to the: R knee for 15 minutes, including:    PT tibial mobs for flexion/extension ROM gains  Patellar mobs and edema management soft tissue work (less ropey today but still has multiple tissue tight limiting mobility)    Mitchell participated in neuromuscular re-education activities to improve: Balance, Kinesthetic, and Proprioception for 30 minutes. The following activities were  included:    Long seated Quad sets 5x10 with 5 sec R (lots of manual resistance from PT to retrain quad extension  Long seated quad sets with ankle pulled in DF with strap 3x 10  Supine heel slides with plinth elevated to 80 degrees and TKE 3x 10  Prone hamstring curls 7# 3x 10 R  Prone TKE 3x 10  Prone R knee TKE with hamstring curls 2x 10  LAQ 3x 10 seated R  Prone hip internal rotation/external 5# 3x 10 R  SAQ 3x 10 R 5#  PT had pt palpate his quad with PT aiding in teaching VMO activation as he doesn't feel the quad well enough to know when he is activating the hip vs VMO so we focused on Quad sets with overpressure and static hold 2x 6 for 5-10 sec holds (pt can't hold past 10 sec yet)    Deferred:  L single leg high steps to 4th step to increase R quad activation 3x 10  R single leg toe drags with long L step to engaged toe push off, knee flexion and hip flexor activation 3x 50 ft  SLR with max cues 3 x 10 R  Leaning hip extension 3x 10  L side lying R hip abduction 2x 10    Mitchell participated in dynamic functional therapeutic activities to improve functional performance for 0  minutes, including:    Mitchell participated in gait training to improve functional mobility and safety for 0  minutes, including:      Mitchell received the following direct contact modalities after being cleared for contraindications:     Mitchell received the following supervised modalities after being cleared for contradictions:     Mitchell received hot pack for 0 minutes to 0.    Mitchell received cold pack for 0 minutes to 0.  PT noted good pulse in R LE at the ankle   After cleared for contraindications, pt received:  Deferred:  Mitchell received electrical stimulation for biphasic for 0 min to the quad then [place] 10/10 for 0 min while pt worked on quad sets and SAQ with leg elevated at heel intermittently to let gravity assisted.      Home Exercises Provided and Patient Education Provided     Education/Self-Care provided: (during  therex)    Patient educated on the importance of improved core and upper and lower extremity strength in order to improve alignment of the spine and upper and lower extremities with static positions and dynamic movement.   Patient educated on the importance of strong core and lower extremity musculature in order to improve both static and dynamic balance, improve gait mechanics, reduce fall risk and improve household and community mobility.       Written Home Exercises Provided: Patient instructed to cont prior HEP.  Exercises were reviewed and Mitchell was able to demonstrate them prior to the end of the session.  Mitchell demonstrated good  understanding of the education provided.     See EMR under Patient Instructions for exercises provided prior visit.    ASSESSMENT   Pt presents with a lot of edema still but more isolated to the knee today. Pt has very poor VMO activation so PT worked with pt on tasks to improve this. Pt doesn't understand how to activate the VMO still and compensates at the hip and ankle more so we focused over and again on knee extension tasks in a variety of postures and at times with knee extension hanging with 7# weight off table between hamstring curls as well. PT to advance as tolerated.       Mitchell Is progressing well towards his goals.   Pt prognosis is Good.     Pt will continue to benefit from skilled outpatient physical therapy to address the deficits listed in the problem list box on initial evaluation, provide pt/family education and to maximize pt's level of independence in the home and community environment.     Pt's spiritual, cultural and educational needs considered and pt agreeable to plan of care and goals.     Anticipated barriers to physical therapy: lots of edema    Goals:   Short Term Goals: In 4 weeks:  1.Pt to be educated on HEP. met  2.Patient to demo increased AROM to flexion to 115 degrees or better. progressing  3.Patient to increase strength hip flexion to 4/5 or  better. progressing  4.Patient to have decreased pain to 3/10 or better. progressing  5.Patient to increase LE balance to single leg stand up to 10 sec. progressing  6.Patient to improve score on the FOTO by 10%. No improving yet  7.Patient to decrease swelling by 2 cm. Not met     Long Term Goals: In 10 weeks 6/3/24  1. Patient to perform daily activities including managing steps with 1 rail or one cane up to 3 flights.  2. Patient to demonstrate increased knee AROM to extension to 0.  3. Patient to demonstrate increased LE strength to quad to 5/5 R.  4. Patient to have decreased pain to 2/10.  5. Patient to improve score on the FOTO to 78.    PLAN   Plan of care Certification: 3/25/2024 to 6/3/24.     Outpatient Physical Therapy 2 times weekly for 10 weeks to include the following interventions: Electrical Stimulation IFC, NMES, Gait Training, Manual Therapy, Moist Heat/ Ice, Neuromuscular Re-ed, Patient Education, Self Care, Therapeutic Activities, Therapeutic Exercise, and DN. Continue Plan of Care (POC) and progress per patient tolerance.    Kathleen Nguyen, PT,

## 2024-04-11 ENCOUNTER — TELEPHONE (OUTPATIENT)
Dept: ORTHOPEDICS | Facility: CLINIC | Age: 69
End: 2024-04-11
Payer: MEDICARE

## 2024-04-11 ENCOUNTER — EXTERNAL HOME HEALTH (OUTPATIENT)
Dept: HOME HEALTH SERVICES | Facility: HOSPITAL | Age: 69
End: 2024-04-11
Payer: MEDICARE

## 2024-04-11 NOTE — TELEPHONE ENCOUNTER
Called and spoke with patient - informed him medication was sent to pharmacy yesterday - verbalized understanding and thankful for call     Patient stated that he will  medications tomorrow

## 2024-04-11 NOTE — TELEPHONE ENCOUNTER
----- Message from Isabell Patel sent at 4/11/2024 10:41 AM CDT -----  Contact: [phone]  Pt wants to confirm his oxyCODONE-acetaminophen (PERCOCET)  mg per tablet was called in to [facility].    Pt is still in physical therapy following knee sx and he states he is still in a lot of pain due to this. Requesting another month of pain med to be called in. Please call pt, he states this is his second message. He only has one tablet left

## 2024-04-12 ENCOUNTER — CLINICAL SUPPORT (OUTPATIENT)
Dept: REHABILITATION | Facility: HOSPITAL | Age: 69
End: 2024-04-12
Payer: MEDICARE

## 2024-04-12 DIAGNOSIS — Z74.09 DECREASED FUNCTIONAL MOBILITY AND ENDURANCE: Primary | ICD-10-CM

## 2024-04-12 PROCEDURE — 97112 NEUROMUSCULAR REEDUCATION: CPT | Mod: PN

## 2024-04-12 PROCEDURE — 97110 THERAPEUTIC EXERCISES: CPT | Mod: PN

## 2024-04-12 NOTE — PROGRESS NOTES
Physical Therapy Daily Treatment Note     Name: Mitchell Mcmillan  Clinic Number: [id number]    Therapy Diagnosis:   Encounter Diagnosis   Name Primary?    Decreased functional mobility and endurance Yes     Physician: Shari Florez PA    Visit Date: 4/12/2024    Physician Orders: PT Eval and Treat   Medical Diagnosis from Referral: Z96.651 (ICD-10-CM) - Status post total right knee replacement using cement   Evaluation Date: 3/25/2024  Authorization Period Expiration: 12/31/24  Plan of Care Expiration: 6/3/24  Visit # / Visits authorized: 8/ 20 (including eval)    Foto: 2/3  Progress Note Due Date: 30 days from 4/8/24    Precautions: Standard HTN    Time In: 7:35 am   Time Out: 8:30 am skilled PT time   Total BillableTime: 40 min one on one time    SUBJECTIVE     Today, pt reports: knee edema is mildly improved.   He was compliant with home exercise program.  Response to previous treatment: no new issues  Functional change: elevating the leg higher like PT suggested helped some     Pre-Treatment Pain: 4/10    Location: R knee    OBJECTIVE         TREATMENT     Mitchell received therapeutic exercises to develop strength, endurance, and ROM for 16 minutes including:    Bike upright 6 min seat at 12  5 banded squats on shuttle 4 min  Tailgators 10 # 3 min x 2    Deferred:  Soleus stretch with foot on 2 inch block 2x 10  Standing heel raises with cues on full ROM 4x 10 5 sec hold  Seated tail gators x 2 sets of 3 min     Mitchell received the following manual therapy techniques: Joint mobilizations, Manual traction, and Soft tissue Mobilization were applied to the: R knee for 0 minutes, including:    Deferred:  PT tibial mobs for flexion/extension ROM gains  Patellar mobs and edema management soft tissue work (less ropey today but still has multiple tissue tight limiting mobility)    Mitchell participated in neuromuscular re-education activities to improve: Balance, Kinesthetic, and Proprioception for 39 minutes. The  following activities were included:    Long seated Quad sets 5x10 with 5 sec R (lots of manual resistance from PT to retrain quad extension  Long seated quad sets with ankle pulled in DF with strap 3x 10 10 #   Seated Quad on stool 3x 10 R LE  Seated LAQ 10 # 3x 10  Supine heel slides with plinth elevated to 80 degrees and TKE 3x 10  Prone hamstring curls 10# 3x 10 R  SAQ 3x 10 R 5#      Deferred:  Prone hip internal rotation/external 5# 3x 10 R  PT had pt palpate his quad with PT aiding in teaching VMO activation as he doesn't feel the quad well enough to know when he is activating the hip vs VMO so we focused on Quad sets with overpressure and static hold 2x 6 for 5-10 sec holds (pt can't hold past 10 sec yet)  L single leg high steps to 4th step to increase R quad activation 3x 10  R single leg toe drags with long L step to engaged toe push off, knee flexion and hip flexor activation 3x 50 ft  SLR with max cues 3 x 10 R  Leaning hip extension 3x 10  L side lying R hip abduction 2x 10    Mitchell participated in dynamic functional therapeutic activities to improve functional performance for 0  minutes, including:    Mitchell participated in gait training to improve functional mobility and safety for 0  minutes, including:      Mitchell received the following direct contact modalities after being cleared for contraindications:     Mitchell received the following supervised modalities after being cleared for contradictions:     Mitchell received hot pack for 0 minutes to 0.    Mitchell received cold pack for 0 minutes to 0.  PT noted good pulse in R LE at the ankle   After cleared for contraindications, pt received:  Deferred:  Mitchell received electrical stimulation for biphasic for 0 min to the quad then Vietnamese 10/10 for 0 min while pt worked on quad sets and SAQ with leg elevated at heel intermittently to let gravity assisted.      Home Exercises Provided and Patient Education Provided     Education/Self-Care provided:  (during therex)    Patient educated on the importance of improved core and upper and lower extremity strength in order to improve alignment of the spine and upper and lower extremities with static positions and dynamic movement.   Patient educated on the importance of strong core and lower extremity musculature in order to improve both static and dynamic balance, improve gait mechanics, reduce fall risk and improve household and community mobility.       Written Home Exercises Provided: Patient instructed to cont prior HEP.  Exercises were reviewed and Mitchell was able to demonstrate them prior to the end of the session.  Mitchell demonstrated good  understanding of the education provided.     See EMR under Patient Instructions for exercises provided prior visit.    ASSESSMENT   Pt presents with less edema, improved quad activation and less cues needed to help engage the quad vs hip. Pt still can't fully straighten the knee but it is advancing. . PT to advance as tolerated.       Mitchell Is progressing well towards his goals.   Pt prognosis is Good.     Pt will continue to benefit from skilled outpatient physical therapy to address the deficits listed in the problem list box on initial evaluation, provide pt/family education and to maximize pt's level of independence in the home and community environment.     Pt's spiritual, cultural and educational needs considered and pt agreeable to plan of care and goals.     Anticipated barriers to physical therapy: lots of edema    Goals:   Short Term Goals: In 4 weeks:  1.Pt to be educated on HEP. met  2.Patient to demo increased AROM to flexion to 115 degrees or better. progressing  3.Patient to increase strength hip flexion to 4/5 or better. progressing  4.Patient to have decreased pain to 3/10 or better. progressing  5.Patient to increase LE balance to single leg stand up to 10 sec. progressing  6.Patient to improve score on the FOTO by 10%. No improving yet  7.Patient to  decrease swelling by 2 cm. Not met     Long Term Goals: In 10 weeks 6/3/24  1. Patient to perform daily activities including managing steps with 1 rail or one cane up to 3 flights.  2. Patient to demonstrate increased knee AROM to extension to 0.  3. Patient to demonstrate increased LE strength to quad to 5/5 R.  4. Patient to have decreased pain to 2/10.  5. Patient to improve score on the FOTO to 78.    PLAN   Plan of care Certification: 3/25/2024 to 6/3/24.     Outpatient Physical Therapy 2 times weekly for 10 weeks to include the following interventions: Electrical Stimulation IFC, NMES, Gait Training, Manual Therapy, Moist Heat/ Ice, Neuromuscular Re-ed, Patient Education, Self Care, Therapeutic Activities, Therapeutic Exercise, and DN. Continue Plan of Care (POC) and progress per patient tolerance.    Kathleen Nguyen, PT,

## 2024-04-15 ENCOUNTER — CLINICAL SUPPORT (OUTPATIENT)
Dept: REHABILITATION | Facility: HOSPITAL | Age: 69
End: 2024-04-15
Payer: MEDICARE

## 2024-04-15 DIAGNOSIS — Z74.09 DECREASED FUNCTIONAL MOBILITY AND ENDURANCE: Primary | ICD-10-CM

## 2024-04-15 PROCEDURE — 97110 THERAPEUTIC EXERCISES: CPT | Mod: PN

## 2024-04-15 PROCEDURE — 97112 NEUROMUSCULAR REEDUCATION: CPT | Mod: PN

## 2024-04-15 NOTE — PROGRESS NOTES
Physical Therapy Daily Treatment Note     Name: Mitchell Mcmillan  Clinic Number: [id number]    Therapy Diagnosis:   Encounter Diagnosis   Name Primary?    Decreased functional mobility and endurance Yes     Physician: Shari Florez PA    Visit Date: 4/15/2024    Physician Orders: PT Eval and Treat   Medical Diagnosis from Referral: Z96.651 (ICD-10-CM) - Status post total right knee replacement using cement   Evaluation Date: 3/25/2024  Authorization Period Expiration: 12/31/24  Plan of Care Expiration: 6/3/24  Visit # / Visits authorized: 9/ 20 (including eval)    Foto: 2/3  Progress Note Due Date: 30 days from 4/8/24    Precautions: Standard HTN    Time In: 7:08 am   Time Out: 8:03 am skilled PT time   Total BillableTime: 55 min one on one time    SUBJECTIVE     Today, pt reports: pt reports he doesn't like long walks yet as it doesn't feel strong enough yet  He was compliant with home exercise program.  Response to previous treatment: no new issues  Functional change: elevating the leg higher like PT suggested helped some     Pre-Treatment Pain: 4/10    Location: R knee    OBJECTIVE     Pt arrived with R knee flexed during gait and moderate edema still. Pt R hip stays in flexed posture and he still neglects R LE use well with sit to stand. Pt tends to use the cane in both hands in front and drag the R hip in external rotation with poor toe push off.     TREATMENT     Mitchell received therapeutic exercises to develop strength, endurance, and ROM for 8 minutes including:    Bike upright 8 min seat at 10 for knee circulation, ROM and increase muscle recruitment at the hip, knee and ankle    Deferred:  5 banded squats on shuttle 4 min  Tailgators 10 # 3 min x 2  Soleus stretch with foot on 2 inch block 2x 10  Standing heel raises with cues on full ROM 4x 10 5 sec hold      Mitchell received the following manual therapy techniques: Joint mobilizations, Manual traction, and Soft tissue Mobilization were applied to the:  R knee for 0 minutes, including:    Deferred:  PT tibial mobs for flexion/extension ROM gains  Patellar mobs and edema management soft tissue work (less ropey today but still has multiple tissue tight limiting mobility)    Mitchell participated in neuromuscular re-education activities to improve: Balance, Kinesthetic, and Proprioception for 45 minutes. The following activities were included:    Standing TKE with purple band for resistance 3x 10 R with L foot stepping forward and back  Sit to stand from 24 inch box with R dominant Purple band cueing R TKE 2x 15  Sit to stand from 24 inch box with R dominant purple band cueing R hip extension 5x10  Step through with L foot 3x 15 while R hip extension engaged with purple band  Sit to stand from 24 inch box with purple band engaging R hip adductors 3x 10 with L foot side stepping  Step though with L foot stepping up to 4 inch step with R hip extensors engaged with purple band 3x 10      Deferred:   Long seated Quad sets 5x10 with 5 sec R (lots of manual resistance from PT to retrain quad extension  Long seated quad sets with ankle pulled in DF with strap 3x 10 10 #   Seated Quad on stool 3x 10 R LE  Seated LAQ 10 # 3x 10  Supine heel slides with plinth elevated to 80 degrees and TKE 3x 10  Prone hamstring curls 10# 3x 10 R  SAQ 3x 10 R 5#  Prone hip internal rotation/external 5# 3x 10 R  PT had pt palpate his quad with PT aiding in teaching VMO activation as he doesn't feel the quad well enough to know when he is activating the hip vs VMO so we focused on Quad sets with overpressure and static hold 2x 6 for 5-10 sec holds (pt can't hold past 10 sec yet)  L single leg high steps to 4th step to increase R quad activation 3x 10  R single leg toe drags with long L step to engaged toe push off, knee flexion and hip flexor activation 3x 50 ft  SLR with max cues 3 x 10 R  Leaning hip extension 3x 10  L side lying R hip abduction 2x 10    Mitchell participated in dynamic functional  therapeutic activities to improve functional performance for 0  minutes, including:    Mitchell participated in gait training to improve functional mobility and safety for 0  minutes, including:      Mitchell received the following direct contact modalities after being cleared for contraindications:     Mitchell received the following supervised modalities after being cleared for contradictions:     Mitchell received hot pack for 0 minutes to 0.    Mitchell received cold pack for 0 minutes to 0.  PT noted good pulse in R LE at the ankle   After cleared for contraindications, pt received:  Deferred:  Mitchell received electrical stimulation for biphasic for 0 min to the quad then [place] 10/10 for 0 min while pt worked on quad sets and SAQ with leg elevated at heel intermittently to let gravity assisted.      Home Exercises Provided and Patient Education Provided     Education/Self-Care provided: (during therex)    Patient educated on the importance of improved core and upper and lower extremity strength in order to improve alignment of the spine and upper and lower extremities with static positions and dynamic movement.   Patient educated on the importance of strong core and lower extremity musculature in order to improve both static and dynamic balance, improve gait mechanics, reduce fall risk and improve household and community mobility.       Written Home Exercises Provided: Patient instructed to cont prior HEP.  Exercises were reviewed and Mitchell was able to demonstrate them prior to the end of the session.  Mitchell demonstrated good  understanding of the education provided.     See EMR under Patient Instructions for exercises provided prior visit.    ASSESSMENT   Pt presents with less edema but gait is still very poor and sit to stand favors the R LE rather then engages the R so PT focused the session on hip extension over the knee to engage hip and knee extension, improved quad activation and less cues needed to help  engage the quad vs hip. Pt still can't fully straighten the knee but it is advancing and had the best step progression improvement and better R knee extension when he worked more on hip extension to activate the hip over the knee. PT to work more on hip capsular mobility as well as glute and adductor engagement to reduce poor gait habits as done today as he was the most upright at the end of the session. PT to advance as tolerated.       Mitchell Is progressing well towards his goals.   Pt prognosis is Good.     Pt will continue to benefit from skilled outpatient physical therapy to address the deficits listed in the problem list box on initial evaluation, provide pt/family education and to maximize pt's level of independence in the home and community environment.     Pt's spiritual, cultural and educational needs considered and pt agreeable to plan of care and goals.     Anticipated barriers to physical therapy: lots of edema    Goals:   Short Term Goals: In 4 weeks:  1.Pt to be educated on HEP. met  2.Patient to demo increased AROM to flexion to 115 degrees or better. progressing  3.Patient to increase strength hip flexion to 4/5 or better. progressing  4.Patient to have decreased pain to 3/10 or better. progressing  5.Patient to increase LE balance to single leg stand up to 10 sec. progressing  6.Patient to improve score on the FOTO by 10%. No improving yet  7.Patient to decrease swelling by 2 cm. Not met     Long Term Goals: In 10 weeks 6/3/24  1. Patient to perform daily activities including managing steps with 1 rail or one cane up to 3 flights.  2. Patient to demonstrate increased knee AROM to extension to 0.  3. Patient to demonstrate increased LE strength to quad to 5/5 R.  4. Patient to have decreased pain to 2/10.  5. Patient to improve score on the FOTO to 78.    PLAN   Plan of care Certification: 3/25/2024 to 6/3/24.     Outpatient Physical Therapy 2 times weekly for 10 weeks to include the following  interventions: Electrical Stimulation IFC, NMES, Gait Training, Manual Therapy, Moist Heat/ Ice, Neuromuscular Re-ed, Patient Education, Self Care, Therapeutic Activities, Therapeutic Exercise, and DN. Continue Plan of Care (POC) and progress per patient tolerance.    Kathlene Nguyen, PT,

## 2024-04-17 ENCOUNTER — CLINICAL SUPPORT (OUTPATIENT)
Dept: REHABILITATION | Facility: HOSPITAL | Age: 69
End: 2024-04-17
Payer: MEDICARE

## 2024-04-17 DIAGNOSIS — Z74.09 DECREASED FUNCTIONAL MOBILITY AND ENDURANCE: Primary | ICD-10-CM

## 2024-04-17 PROCEDURE — 97112 NEUROMUSCULAR REEDUCATION: CPT | Mod: PN

## 2024-04-17 PROCEDURE — 97110 THERAPEUTIC EXERCISES: CPT | Mod: PN

## 2024-04-17 NOTE — PROGRESS NOTES
Physical Therapy Daily Treatment Note     Name: Mitchell Mcmillan  Clinic Number: [id number]    Therapy Diagnosis:   Encounter Diagnosis   Name Primary?    Decreased functional mobility and endurance Yes       Physician: Shari Florez PA    Visit Date: 4/17/2024    Physician Orders: PT Eval and Treat   Medical Diagnosis from Referral: Z96.651 (ICD-10-CM) - Status post total right knee replacement using cement   Evaluation Date: 3/25/2024  Authorization Period Expiration: 12/31/24  Plan of Care Expiration: 6/3/24  Visit # / Visits authorized: 10/ 20 (including eval)    Foto: 2/3  Progress Note Due Date: 30 days from 4/8/24    Precautions: Standard HTN    Time In: 7:09 am   Time Out: 8:03 am skilled PT time   Total BillableTime: 54 min one on one time    SUBJECTIVE     Today, pt reports: he had some soreness in the ankle but is improving with walking easier  He was compliant with home exercise program.  Response to previous treatment: no new issues  Functional change: elevating the leg higher like PT suggested helped some     Pre-Treatment Pain: 4/10    Location: R knee    OBJECTIVE     Pt arrived with R knee extending better and foot clearing with better toe push off.    TREATMENT     Mitchell received therapeutic exercises to develop strength, endurance, and ROM for 15 minutes including:    Bike upright 5 min seat at 10 for knee circulation, ankle and knee ROM and increase muscle recruitment at the hip, knee and ankle  Side lying L leg press on shuttle 4 bands 4x 10  Single leg press R 4 bands 4x 10    Deferred:  5 banded squats on shuttle 4 min  Tailgators 10 # 3 min x 2  Soleus stretch with foot on 2 inch block 2x 10  Standing heel raises with cues on full ROM 4x 10 5 sec hold      Mitchell received the following manual therapy techniques: Joint mobilizations, Manual traction, and Soft tissue Mobilization were applied to the: R knee for 0 minutes, including:    Deferred:  PT tibial mobs for flexion/extension ROM  gains  Patellar mobs and edema management soft tissue work (less ropey today but still has multiple tissue tight limiting mobility)    Mitchell participated in neuromuscular re-education activities to improve: Balance, Kinesthetic, and Proprioception for 39 minutes. The following activities were included:    Step through with L foot 3x 10 while R hip extension engaged with purple band   Toe taps L LE with R hip engaged to cue hip extension with purple band 3x 10  Sit to stand from 24 inch box with R dominant purple band cueing R hip extension 3x 15  Sit to stand from 24 inch box with purple band engaging R hip adductors 3x 10 with L foot side stepping  L foot side stepping with R adductor engaged by purple band 1x 10; then L foot taps to stool 2x 10  Standing TKE with purple band for resistance 3x 10 R with L foot stepping forward and back  Sit to stand from 24 inch box with R dominant Purple band cueing R TKE 2x 15  Seated LAQ 8# R 2x 15  Long seated quad set with DF 3x 10 with strap to assist DF ROM  Prone hamstring curls 8# 3x 10 R  SAQ 1x 10 R 8#    Deferred:   Supine heel slides with plinth elevated to 80 degrees and TKE 3x 10  Step though with L foot stepping up to 4 inch step with R hip extensors engaged with purple band 3x 10  Prone hip internal rotation/external 5# 3x 10 R  L single leg high steps to 4th step to increase R quad activation 3x 10  R single leg toe drags with long L step to engaged toe push off, knee flexion and hip flexor activation 3x 50 ft  SLR with max cues 3 x 10 R  Leaning hip extension 3x 10  L side lying R hip abduction 2x 10    Mitchell participated in dynamic functional therapeutic activities to improve functional performance for 0  minutes, including:    Mitchell participated in gait training to improve functional mobility and safety for 0  minutes, including:      Mitchell received the following direct contact modalities after being cleared for contraindications:     Mitchell received the  following supervised modalities after being cleared for contradictions:     Mitchell received hot pack for 0 minutes to 0.    Mitchell received cold pack for 0 minutes to 0.  PT noted good pulse in R LE at the ankle   After cleared for contraindications, pt received:  Deferred:  Mitchell received electrical stimulation for biphasic for 0 min to the quad then [place] 10/10 for 0 min while pt worked on quad sets and SAQ with leg elevated at heel intermittently to let gravity assisted.      Home Exercises Provided and Patient Education Provided     Education/Self-Care provided: (during therex)    Patient educated on the importance of improved core and upper and lower extremity strength in order to improve alignment of the spine and upper and lower extremities with static positions and dynamic movement.   Patient educated on the importance of strong core and lower extremity musculature in order to improve both static and dynamic balance, improve gait mechanics, reduce fall risk and improve household and community mobility.       Written Home Exercises Provided: Patient instructed to cont prior HEP.  Exercises were reviewed and Mitchell was able to demonstrate them prior to the end of the session.  Mitchell demonstrated good  understanding of the education provided.     See EMR under Patient Instructions for exercises provided prior visit.    ASSESSMENT   Pt presents with less edema , better toe push off and slow gains in knee extension when walking as he is engaging the quad better. Pt's R hip flexor and quad and calf are still tight limiting flexion and extension EROM. PT continued to focus on glute and adductor/internal rotation activation to help the screw home mechanism at the R knee. VMO firing is improving but still has mild knee extension lag of 8 degrees. PT to advance as tolerated.       Mitchell Is progressing well towards his goals.   Pt prognosis is Good.     Pt will continue to benefit from skilled outpatient physical  therapy to address the deficits listed in the problem list box on initial evaluation, provide pt/family education and to maximize pt's level of independence in the home and community environment.     Pt's spiritual, cultural and educational needs considered and pt agreeable to plan of care and goals.     Anticipated barriers to physical therapy: lots of edema    Goals:   Short Term Goals: In 4 weeks:  1.Pt to be educated on HEP. met  2.Patient to demo increased AROM to flexion to 115 degrees or better. progressing  3.Patient to increase strength hip flexion to 4/5 or better. progressing  4.Patient to have decreased pain to 3/10 or better. progressing  5.Patient to increase LE balance to single leg stand up to 10 sec. progressing  6.Patient to improve score on the FOTO by 10%. No improving yet  7.Patient to decrease swelling by 2 cm. Not met     Long Term Goals: In 10 weeks 6/3/24  1. Patient to perform daily activities including managing steps with 1 rail or one cane up to 3 flights.  2. Patient to demonstrate increased knee AROM to extension to 0.  3. Patient to demonstrate increased LE strength to quad to 5/5 R.  4. Patient to have decreased pain to 2/10.  5. Patient to improve score on the FOTO to 78.    PLAN   Plan of care Certification: 3/25/2024 to 6/3/24.     Outpatient Physical Therapy 2 times weekly for 10 weeks to include the following interventions: Electrical Stimulation IFC, NMES, Gait Training, Manual Therapy, Moist Heat/ Ice, Neuromuscular Re-ed, Patient Education, Self Care, Therapeutic Activities, Therapeutic Exercise, and DN. Continue Plan of Care (POC) and progress per patient tolerance.    Kathleen Nguyen, PT,

## 2024-04-19 ENCOUNTER — CLINICAL SUPPORT (OUTPATIENT)
Dept: REHABILITATION | Facility: HOSPITAL | Age: 69
End: 2024-04-19
Payer: MEDICARE

## 2024-04-19 DIAGNOSIS — Z74.09 DECREASED FUNCTIONAL MOBILITY AND ENDURANCE: Primary | ICD-10-CM

## 2024-04-19 PROCEDURE — 97110 THERAPEUTIC EXERCISES: CPT | Mod: PN,CQ

## 2024-04-19 PROCEDURE — 97112 NEUROMUSCULAR REEDUCATION: CPT | Mod: PN,CQ

## 2024-04-19 NOTE — PROGRESS NOTES
"Physical Therapy Daily Treatment Note     Name: Mitchell Mcmillan  Clinic Number: [id number]    Therapy Diagnosis:   Encounter Diagnosis   Name Primary?    Decreased functional mobility and endurance Yes       Physician: Shari Florez PA    Visit Date: 4/19/2024    Physician Orders: PT Eval and Treat   Medical Diagnosis from Referral: Z96.651 (ICD-10-CM) - Status post total right knee replacement using cement   Evaluation Date: 3/25/2024  Authorization Period Expiration: 12/31/24  Plan of Care Expiration: 6/3/24  Visit # / Visits authorized: 10/ 20 (including eval)    Foto: 2/3  Progress Note Due Date: 30 days from 4/8/24    Precautions: Standard HTN    Time In: 7:30 am   Time Out: 8:33 am  Total BillableTime: 32 minutes    SUBJECTIVE     Today, pt reports: is doing better overall with improvements knee Range of Motion and strength. Patient reports still not feeling the right knee is moving as smoothly as his left when he had surgery on the left with more tightness and stiffness present.   He was compliant with home exercise program.  Response to previous treatment: minor soreness  Functional change: walking better    Pre-Treatment Pain: 3/10    Location: R knee    OBJECTIVE     Pt arrived with R knee extending better and foot clearing with better toe push off.    TREATMENT     Mitchell received therapeutic exercises to develop strength, endurance, and ROM for 12 minutes including:     Bike upright 5 min seat at 10   Shuttle Single leg 4B 2 minutes each  Seated Heel slides with overpressure; x15  Supine Heel Slides on incline; 2 x10  Gastroc stretch at wall; 3 x20"       Mitchell participated in neuromuscular re-education activities to improve: Balance, Kinesthetic, and Proprioception for 20 minutes. The following activities were included:    Supine hangs 5# 4 minutes  Long seated quad set with DF 10x 10"  Seated LAQ 8# R 3 x10   Standing TKE, Green Theraband; 2 x12  Step Ups no riser; x15 each  Lateral step overs no " riser; x15 each      Home Exercises Provided and Patient Education Provided     Education/Self-Care provided:    Written Home Exercises Provided: Patient instructed to cont prior HEP.  Exercises were reviewed and Mitchell was able to demonstrate them prior to the end of the session.  Mitchell demonstrated good  understanding of the education provided.     See EMR under Patient Instructions for exercises provided prior visit.    ASSESSMENT     Continued focus of treatment attempting to improve knee Range of Motion and quadriceps strength. Continued deficits noted in both Range of Motion and strength compared to Left Lower Extremity. Discussed with patient about the importance of consistency with home exercises for greater likelihood to improve current deficits. Patient demonstrated no limitations of pain with prescribed activities with only workout soreness and tiredness present. Plan to continue progression of treatment to further improve patient's quality of life as tolerated.    Mitchell Is progressing well towards his goals.   Pt prognosis is Good.     Pt will continue to benefit from skilled outpatient physical therapy to address the deficits listed in the problem list box on initial evaluation, provide pt/family education and to maximize pt's level of independence in the home and community environment.     Pt's spiritual, cultural and educational needs considered and pt agreeable to plan of care and goals.     Anticipated barriers to physical therapy: lots of edema    Goals:   Short Term Goals: In 4 weeks:  1.Pt to be educated on HEP. met  2.Patient to demo increased AROM to flexion to 115 degrees or better. progressing  3.Patient to increase strength hip flexion to 4/5 or better. progressing  4.Patient to have decreased pain to 3/10 or better. progressing  5.Patient to increase LE balance to single leg stand up to 10 sec. progressing  6.Patient to improve score on the FOTO by 10%. No improving yet  7.Patient to  decrease swelling by 2 cm. Not met     Long Term Goals: In 10 weeks 6/3/24  1. Patient to perform daily activities including managing steps with 1 rail or one cane up to 3 flights.  2. Patient to demonstrate increased knee AROM to extension to 0.  3. Patient to demonstrate increased LE strength to quad to 5/5 R.  4. Patient to have decreased pain to 2/10.  5. Patient to improve score on the FOTO to 78.    PLAN   Plan of care Certification: 3/25/2024 to 6/3/24.     Outpatient Physical Therapy 2 times weekly for 10 weeks to include the following interventions: Electrical Stimulation IFC, NMES, Gait Training, Manual Therapy, Moist Heat/ Ice, Neuromuscular Re-ed, Patient Education, Self Care, Therapeutic Activities, Therapeutic Exercise, and DN. Continue Plan of Care (POC) and progress per patient tolerance.    Piero Lobo PTA,

## 2024-04-22 ENCOUNTER — CLINICAL SUPPORT (OUTPATIENT)
Dept: REHABILITATION | Facility: HOSPITAL | Age: 69
End: 2024-04-22
Payer: MEDICARE

## 2024-04-22 DIAGNOSIS — Z74.09 DECREASED FUNCTIONAL MOBILITY AND ENDURANCE: Primary | ICD-10-CM

## 2024-04-22 PROCEDURE — 97112 NEUROMUSCULAR REEDUCATION: CPT | Mod: PN

## 2024-04-22 PROCEDURE — 97110 THERAPEUTIC EXERCISES: CPT | Mod: PN

## 2024-04-22 NOTE — PROGRESS NOTES
"Physical Therapy Daily Treatment Note     Name: Mitchell Mcmillan  Clinic Number: [id number]    Therapy Diagnosis:   Encounter Diagnosis   Name Primary?    Decreased functional mobility and endurance Yes       Physician: Shari Florez PA    Visit Date: 4/22/2024    Physician Orders: PT Eval and Treat   Medical Diagnosis from Referral: Z96.651 (ICD-10-CM) - Status post total right knee replacement using cement   Evaluation Date: 3/25/2024  Authorization Period Expiration: 12/31/24  Plan of Care Expiration: 6/3/24  Visit # / Visits authorized: 12/ 20 (including eval)    Foto: 2/3  Progress Note Due Date: 30 days from 4/8/24    Precautions: Standard HTN    Time In: 7:00 am   Time Out: 8:06 am  Total BillableTime: 43 minutes    SUBJECTIVE     Today, pt reports he is still having a lot of swelling but he mowed the yard this weekend.   He was compliant with home exercise program.  Response to previous treatment: minor soreness  Functional change: arrived with knee flexed again today so gait antalgic    Pre-Treatment Pain: 3/10    Location: R knee    OBJECTIVE     Pt arrived with R knee swollen, flexed and poor hip extension over the knee with mild toe push off only    TREATMENT     Mitchell received therapeutic exercises to develop strength, endurance, and ROM for 15 minutes including:     Bike upright 5 min seat at 10 then 2 min with level 2 resistance  Shuttle Single leg 4B 2 minutes each  Gastroc stretch at wall; 3 x20"       Mitchell participated in neuromuscular re-education activities to improve: Balance, Kinesthetic, and Proprioception for 50 minutes. The following activities were included:    Sit to stand with R TKE assistance from purple band from 24 inch 3x 20  Sit to stand with R adduction resisted from purple band from 24 inch seat 2x 20  Sit to stand with R hip abduction resisted from purple band from 24 inch seat 2x 20  Standing L hip flexion to step stool with R TKE assisted from purple band 3x 20  Standing L " "hip stepping with L hip flexion resisted by purple band 3x 20  Long seated quad set with DF 10x 10"  SAQ 8# 3 x10    Deferred:  Lateral step overs no riser; x15 each      Home Exercises Provided and Patient Education Provided     Education/Self-Care provided:    Written Home Exercises Provided: Patient instructed to cont prior HEP.  Exercises were reviewed and Mitchell was able to demonstrate them prior to the end of the session.  Mitchell demonstrated good  understanding of the education provided.     See EMR under Patient Instructions for exercises provided prior visit.    ASSESSMENT     Continued focus of treatment attempting to improve knee extension on the R LE to engage the VMO and hip extensor otherwise he hip adducts in and rotates in avoiding EROM knee extension. Pt knee flexion and extension continues to be limited by severe edema. PT to consult with lymphedema specialist to see if wraps may help.     Plan to continue progression of treatment to further improve patient's quality of life as tolerated.    Mitchell Is progressing well towards his goals.   Pt prognosis is Good.     Pt will continue to benefit from skilled outpatient physical therapy to address the deficits listed in the problem list box on initial evaluation, provide pt/family education and to maximize pt's level of independence in the home and community environment.     Pt's spiritual, cultural and educational needs considered and pt agreeable to plan of care and goals.     Anticipated barriers to physical therapy: lots of edema    Goals:   Short Term Goals: In 4 weeks:  1.Pt to be educated on HEP. met  2.Patient to demo increased AROM to flexion to 115 degrees or better. progressing  3.Patient to increase strength hip flexion to 4/5 or better. progressing  4.Patient to have decreased pain to 3/10 or better. progressing  5.Patient to increase LE balance to single leg stand up to 10 sec. progressing  6.Patient to improve score on the FOTO by 10%. No " improving yet  7.Patient to decrease swelling by 2 cm. Not met     Long Term Goals: In 10 weeks 6/3/24  1. Patient to perform daily activities including managing steps with 1 rail or one cane up to 3 flights.  2. Patient to demonstrate increased knee AROM to extension to 0.  3. Patient to demonstrate increased LE strength to quad to 5/5 R.  4. Patient to have decreased pain to 2/10.  5. Patient to improve score on the FOTO to 78.    PLAN   Plan of care Certification: 3/25/2024 to 6/3/24.     Outpatient Physical Therapy 2 times weekly for 10 weeks to include the following interventions: Electrical Stimulation IFC, NMES, Gait Training, Manual Therapy, Moist Heat/ Ice, Neuromuscular Re-ed, Patient Education, Self Care, Therapeutic Activities, Therapeutic Exercise, and DN. Continue Plan of Care (POC) and progress per patient tolerance.    Kathleen Nguyen, PT,

## 2024-04-24 ENCOUNTER — OFFICE VISIT (OUTPATIENT)
Dept: CARDIOLOGY | Facility: CLINIC | Age: 69
End: 2024-04-24
Payer: MEDICARE

## 2024-04-24 ENCOUNTER — CLINICAL SUPPORT (OUTPATIENT)
Dept: REHABILITATION | Facility: HOSPITAL | Age: 69
End: 2024-04-24
Payer: MEDICARE

## 2024-04-24 VITALS
WEIGHT: 261.44 LBS | OXYGEN SATURATION: 98 % | BODY MASS INDEX: 31.84 KG/M2 | HEART RATE: 88 BPM | HEIGHT: 76 IN | DIASTOLIC BLOOD PRESSURE: 78 MMHG | SYSTOLIC BLOOD PRESSURE: 136 MMHG | RESPIRATION RATE: 16 BRPM

## 2024-04-24 DIAGNOSIS — E66.9 OBESITY (BMI 30.0-34.9): ICD-10-CM

## 2024-04-24 DIAGNOSIS — E78.5 HYPERLIPIDEMIA, UNSPECIFIED HYPERLIPIDEMIA TYPE: ICD-10-CM

## 2024-04-24 DIAGNOSIS — Z87.891 EX-SMOKER: Primary | ICD-10-CM

## 2024-04-24 DIAGNOSIS — I70.0 AORTIC ATHEROSCLEROSIS: ICD-10-CM

## 2024-04-24 DIAGNOSIS — M19.90 ARTHRITIS: ICD-10-CM

## 2024-04-24 DIAGNOSIS — I10 ESSENTIAL HYPERTENSION: ICD-10-CM

## 2024-04-24 DIAGNOSIS — Z74.09 DECREASED FUNCTIONAL MOBILITY AND ENDURANCE: Primary | ICD-10-CM

## 2024-04-24 PROCEDURE — 99214 OFFICE O/P EST MOD 30 MIN: CPT | Mod: HCNC,S$GLB,, | Performed by: INTERNAL MEDICINE

## 2024-04-24 PROCEDURE — 4010F ACE/ARB THERAPY RXD/TAKEN: CPT | Mod: HCNC,CPTII,S$GLB, | Performed by: INTERNAL MEDICINE

## 2024-04-24 PROCEDURE — 3008F BODY MASS INDEX DOCD: CPT | Mod: HCNC,CPTII,S$GLB, | Performed by: INTERNAL MEDICINE

## 2024-04-24 PROCEDURE — 3288F FALL RISK ASSESSMENT DOCD: CPT | Mod: HCNC,CPTII,S$GLB, | Performed by: INTERNAL MEDICINE

## 2024-04-24 PROCEDURE — 1159F MED LIST DOCD IN RCRD: CPT | Mod: HCNC,CPTII,S$GLB, | Performed by: INTERNAL MEDICINE

## 2024-04-24 PROCEDURE — 1101F PT FALLS ASSESS-DOCD LE1/YR: CPT | Mod: HCNC,CPTII,S$GLB, | Performed by: INTERNAL MEDICINE

## 2024-04-24 PROCEDURE — 99999 PR PBB SHADOW E&M-EST. PATIENT-LVL IV: CPT | Mod: PBBFAC,,, | Performed by: INTERNAL MEDICINE

## 2024-04-24 PROCEDURE — 97110 THERAPEUTIC EXERCISES: CPT | Mod: PN

## 2024-04-24 PROCEDURE — 3075F SYST BP GE 130 - 139MM HG: CPT | Mod: HCNC,CPTII,S$GLB, | Performed by: INTERNAL MEDICINE

## 2024-04-24 PROCEDURE — 97112 NEUROMUSCULAR REEDUCATION: CPT | Mod: PN

## 2024-04-24 PROCEDURE — 3078F DIAST BP <80 MM HG: CPT | Mod: HCNC,CPTII,S$GLB, | Performed by: INTERNAL MEDICINE

## 2024-04-24 NOTE — PROGRESS NOTES
Subjective:   Patient ID:  Mitchell Mcmillan is a 68 y.o. male who presents for evaluation of No chief complaint on file.      HPI  4.24.2024  Comes in for a 1 year follow-up.    Interim he had both knees replaced.    Denies dyspnea on exertion, or chest pain.    He had a recent lower extremity duplex for swelling which came back negative for DVT.    He states he is picking up on his activity pattern after both knees.  His most recent knee surgery was done 6 weeks ago however he states otherwise denies limitations.  States he used to do the yd and be very active without any cardiac symptoms.      4.2023  68 yo male, ex smoker, htn, hld   Good functional status, used stairs recently at his nephew no chest pain   Works in his yard, not limited except for his knee   Denies cardiac symptoms, see ros   Ekg normal   Missed his statin recently     Past Medical History:   Diagnosis Date    Arthritis     knees    Asymmetric SNHL (sensorineural hearing loss) 08/17/2017    Chronic left hip pain 2006    Dislocated left hip, job injury    Chronic prostatitis     per pt, last episode 2013    Elevated PSA, less than 10 ng/ml     per pt last elevation 2013    Gastroesophageal reflux disease 8/3/2022    Hyperlipidemia 2010    Hypertension 2011    Joint effusion of knee     per pt rt knee, seen by Dr. Duke 2013    Midline low back pain without sciatica 6/13/2016    Obesity     Special screening for malignant neoplasms, colon 10/5/2016    Tobacco dependence     Trouble in sleeping        Past Surgical History:   Procedure Laterality Date    arthrocentesis Right 2013    Per pt , knee by Dr. BONNIE Duke    COLONOSCOPY N/A 10/05/2016    Normal exam repeat 10 yrs. Procedure: COLONOSCOPY;  Surgeon: Alexander Goetz MD;  Location: [facility];  Service: Endoscopy;  Laterality: N/A;    HIP ARTHROPLASTY Left 04/16/2006    Per pt, dislocated left hip/ hardware placed  by Dr. Lisa    left orchiectomy      PROSTATE BIOPSY  2012    procedure  performed at [facility] due to elevated PSA - benign    TOTAL KNEE ARTHROPLASTY Left 2023    Procedure: ARTHROPLASTY, KNEE, TOTAL;  Surgeon: Ellis Mora MD;  Location: [facility] OR;  Service: Orthopedics;  Laterality: Left;    TOTAL KNEE ARTHROPLASTY Right 3/6/2024    Procedure: ARTHROPLASTY, KNEE, TOTAL;  Surgeon: Ellis Mora MD;  Location: [facility] OR;  Service: Orthopedics;  Laterality: Right;       Social History     Tobacco Use    Smoking status: Former     Current packs/day: 0.00     Average packs/day: 0.1 packs/day for 46.0 years (4.6 ttl pk-yrs)     Types: Cigarettes     Start date: 6/3/2022     Quit date: 10/19/2022     Years since quittin.5    Smokeless tobacco: Never   Substance Use Topics    Alcohol use: Yes     Alcohol/week: 2.0 standard drinks of alcohol     Types: 2 Glasses of wine per week    Drug use: No       Family History   Problem Relation Name Age of Onset    Depression Mother      Early death Mother      Hypertension Mother      Heart disease Mother  53         from MI    Arthritis Father      Stroke Father      Diabetes Sister      No Known Problems Brother      Hypertension Sister      Hypertension Sister      No Known Problems Brother      No Known Problems Brother      No Known Problems Daughter      Cancer Neg Hx      COPD Neg Hx      Kidney disease Neg Hx         Review of Systems   Cardiovascular:  Negative for chest pain, dyspnea on exertion, palpitations and syncope.   Genitourinary: Negative.    Neurological: Negative.        Current Outpatient Medications   Medication Sig Dispense Refill    atorvastatin (LIPITOR) 40 MG tablet TAKE 1 TABLET EVERY DAY 90 tablet 3    celecoxib (CELEBREX) 200 MG capsule Take 1 capsule (200 mg total) by mouth once daily. Take with food 30 capsule 6    diclofenac sodium (VOLTAREN) 1 % Gel Apply 2 g topically 3 (three) times daily as needed. 2 Tube 6    finasteride (PROSCAR) 5 mg tablet TAKE 1 TABLET EVERY DAY 90 tablet 3     fluticasone propionate (FLONASE) 50 mcg/actuation nasal spray 2 sprays (100 mcg total) by Each Nostril route once daily. 16 g 1    gabapentin (NEURONTIN) 300 MG capsule Take 1 capsule (300 mg total) by mouth every evening. 30 capsule 11    lisinopriL-hydrochlorothiazide (PRINZIDE,ZESTORETIC) 20-25 mg Tab TAKE 1 TABLET EVERY DAY 90 tablet 3    omega-3 fatty acids/fish oil (FISH OIL-OMEGA-3 FATTY ACIDS) 300-1,000 mg capsule Take 1 capsule by mouth once daily.      ondansetron (ZOFRAN-ODT) 4 MG TbDL Dissolve 2 tablets (8 mg total) by mouth every 8 (eight) hours as needed (Nausea). 21 tablet 0    oxyCODONE-acetaminophen (PERCOCET)  mg per tablet Take 1 tablet by mouth every 8 (eight) hours as needed for Pain. 21 tablet 0    sildenafiL (VIAGRA) 100 MG tablet Take 1 tablet (100 mg total) by mouth daily as needed for Erectile Dysfunction. 30 tablet 10    tamsulosin (FLOMAX) 0.4 mg Cap TAKE 1 CAPSULE EVERY DAY 90 capsule 3    pantoprazole (PROTONIX) 40 MG tablet Take 1 tablet (40 mg total) by mouth once daily. 30 tablet 1     No current facility-administered medications for this visit.       Objective:   Objective:  Wt Readings from Last 3 Encounters:   04/24/24 118.6 kg (261 lb 7.5 oz)   03/21/24 120.8 kg (266 lb 5.1 oz)   03/06/24 120.8 kg (266 lb 3.3 oz)     Temp Readings from Last 3 Encounters:   03/06/24 97.4 °F (36.3 °C) (Temporal)   02/13/24 98.2 °F (36.8 °C) (Temporal)   09/19/23 96.6 °F (35.9 °C) (Tympanic)     BP Readings from Last 3 Encounters:   04/24/24 136/78   03/06/24 (!) 161/72   02/13/24 (!) 158/91     Pulse Readings from Last 3 Encounters:   04/24/24 88   03/06/24 75   02/13/24 68       Physical Exam  Vitals reviewed.   Constitutional:       Appearance: He is well-developed.   Neck:      Vascular: No carotid bruit.   Cardiovascular:      Rate and Rhythm: Normal rate and regular rhythm.      Pulses: Intact distal pulses.      Heart sounds: Normal heart sounds. No murmur heard.  Pulmonary:      Breath  "sounds: Normal breath sounds.   Neurological:      Mental Status: He is oriented to person, place, and time.         Lab Results   Component Value Date    CHOL 265 (H) 09/19/2023    CHOL 277 (H) 09/23/2022    CHOL 197 08/28/2020     Lab Results   Component Value Date    HDL 55 09/19/2023    HDL 58 09/23/2022    HDL 58 08/28/2020     Lab Results   Component Value Date    LDLCALC 177.2 (H) 09/19/2023    LDLCALC 187.0 (H) 09/23/2022    LDLCALC 94.6 08/28/2020     Lab Results   Component Value Date    TRIG 164 (H) 09/19/2023    TRIG 160 (H) 09/23/2022    TRIG 222 (H) 08/28/2020     Lab Results   Component Value Date    CHOLHDL 20.8 09/19/2023    CHOLHDL 20.9 09/23/2022    CHOLHDL 29.4 08/28/2020       Chemistry        Component Value Date/Time     02/13/2024 0919    K 4.2 02/13/2024 0919     02/13/2024 0919    CO2 23 02/13/2024 0919    BUN 15 02/13/2024 0919    CREATININE 1.0 02/13/2024 0919     02/13/2024 0919        Component Value Date/Time    CALCIUM 9.9 02/13/2024 0919    ALKPHOS 69 09/19/2023 0818    AST 16 09/19/2023 0818    ALT 8 (L) 09/19/2023 0818    BILITOT 0.4 09/19/2023 0818    ESTGFRAFRICA >60.0 08/28/2020 1405    EGFRNONAA >60.0 08/28/2020 1405          Lab Results   Component Value Date    TSH 1.525 09/19/2023     No results found for: "INR", "PROTIME"  Lab Results   Component Value Date    WBC 7.19 02/13/2024    HGB 11.3 (L) 03/06/2024    HCT 35.4 (L) 03/06/2024    MCV 84 02/13/2024     02/13/2024     BNP  @LABRCNTIP(BNP,BNPTRIAGEBLO)@  CrCl cannot be calculated (Patient's most recent lab result is older than the maximum 7 days allowed.).     Imaging:  ======  No results found for this or any previous visit.    No results found for this or any previous visit.    No results found for this or any previous visit.    No results found for this or any previous visit.    No results found for this or any previous visit.    No valid procedures specified.    Diagnostic Results:  ECG: " Reviewed    The 10-year ASCVD risk score (Michelle CONNORS, et al., 2019) is: 22.1%    Values used to calculate the score:      Age: 68 years      Sex: Male      Is Non- : Yes      Diabetic: No      Tobacco smoker: No      Systolic Blood Pressure: 136 mmHg      Is BP treated: Yes      HDL Cholesterol: 55 mg/dL      Total Cholesterol: 265 mg/dL    Assessment and Plan:   Ex-smoker    Aortic atherosclerosis    Essential hypertension    Obesity (BMI 30.0-34.9)    Hyperlipidemia, unspecified hyperlipidemia type    Arthritis    Reviewed all tests and above medical conditions with patient in detail and formulated treatment plan.  Risk factor modification discussed.   Cardiac low salt diet discussed.  Maintaining healthy weight and weight loss goals were discussed in clinic.  Had surgeries on both knees since last visit  No dvt on recent ultrasound   Compliance with statin as LDL not at goal less than 70 , states was not taking his medication  Angina free, euvolemic, hypertension controlled  Follow up in 12 months

## 2024-04-24 NOTE — PROGRESS NOTES
Physical Therapy Daily Treatment Note     Name: Mitchell Mcmillan  Clinic Number: [id number]    Therapy Diagnosis:   Encounter Diagnosis   Name Primary?    Decreased functional mobility and endurance Yes       Physician: Shari Florez PA    Visit Date: 4/24/2024    Physician Orders: PT Eval and Treat   Medical Diagnosis from Referral: Z96.651 (ICD-10-CM) - Status post total right knee replacement using cement   Evaluation Date: 3/25/2024  Authorization Period Expiration: 12/31/24  Plan of Care Expiration: 6/3/24  Visit # / Visits authorized: 13/ 20 (including eval)    Foto: 2/3  Progress Note Due Date: 30 days from 4/8/24    Precautions: Standard HTN    Time In: 7:06 am   Time Out: 8:15 am  Total BillableTime: 69 minutes    SUBJECTIVE     Today, pt reports he is very stiff this am but trying to work on his HEP  He was compliant with home exercise program.  Response to previous treatment: minor soreness  Functional change: arrived with knee flexed again today so gait antalgic    Pre-Treatment Pain: 9/10 pain 3/10 at end of session    Location: R knee    OBJECTIVE   Knee extension lags by 12 degrees today    TREATMENT     Mitchell received therapeutic exercises to develop strength, endurance, and ROM for  30 minutes including:     Bike upright 8 min seat at 10 (unable to make full circles today for first 5 min as so swollen and stiff but usually can spin without any difficulty)  Shuttle 6 bands 3 min then staggered with R dominant 4x 10  Shuttle calf stretch with DF/PF activation making sure he engages the R quad 4x 10  Shuttle calf stretch with L foot walking up/down while R stays in knee extension and ankle DF 6 bands 4x10       Mitchell participated in neuromuscular re-education activities to improve: Balance, Kinesthetic, and Proprioception for 39 minutes. The following activities were included:    R hip internal rotation  1x 10 without resistance to train motor control, 3x 10 manually resisted  Long seated quad sets  "3x 10 R  L hip SLR in long sitting 3x 10  Seated LAQ 9# with max cues to engage EROM extension and hip extension   L foot toe taps to 18 inch box with R knee engaged 2x 15  Sit to stand with R TKE assistance from purple band from 18 inch 2x15  Sit to stand from 18 inch box with shift to the R cues to avoid leaning L and pushing with L hand 3x5  Long seated quad set with DF 10x 10" (using strap)  SAQ 8# 3 x10  Prone hamstring curls 3x 10 R  Long seated hip sweeps up and over 4 inch object with focus on quad engagement 3x 8    Deferred:  Sit to stand with R adduction resisted from purple band from 24 inch seat 2x 20  Sit to stand with R hip abduction resisted from purple band from 24 inch seat 2x 20  Standing L hip flexion to step stool with R TKE assisted from purple band 3x 20  Standing L hip stepping with L hip flexion resisted by purple band 3x 20  Lateral step overs no riser; x15 each      Home Exercises Provided and Patient Education Provided     Education/Self-Care provided:    Written Home Exercises Provided: Patient instructed to cont prior HEP.  Exercises were reviewed and Mitchell was able to demonstrate them prior to the end of the session.  Mitchell demonstrated good  understanding of the education provided.     See EMR under Patient Instructions for exercises provided prior visit.    ASSESSMENT     Continued focus skilled PT on quad engagement using nearly constant demonstration, manual cues and verbal cues. Pt came in with poor carry over from the last session as he was walking again in flexed in knee posture and avoiding using the R LE to rise from simple sit to stand. PT focused the session on teaching him to engage the R LE into functioning with simple transfer education using banding to help engage muscle recruitment, etc as he just uses poor engagement resulting in neglecting knee extension.  PT to consult with lymphedema specialist to see if wraps may help.     Plan to continue progression of " treatment to further improve patient's quality of life as tolerated.    Mitchell Is progressing well towards his goals.   Pt prognosis is Good.     Pt will continue to benefit from skilled outpatient physical therapy to address the deficits listed in the problem list box on initial evaluation, provide pt/family education and to maximize pt's level of independence in the home and community environment.     Pt's spiritual, cultural and educational needs considered and pt agreeable to plan of care and goals.     Anticipated barriers to physical therapy: lots of edema    Goals:   Short Term Goals: In 4 weeks:  1.Pt to be educated on HEP. met  2.Patient to demo increased AROM to flexion to 115 degrees or better. progressing  3.Patient to increase strength hip flexion to 4/5 or better. progressing  4.Patient to have decreased pain to 3/10 or better. progressing  5.Patient to increase LE balance to single leg stand up to 10 sec. progressing  6.Patient to improve score on the FOTO by 10%. No improving yet  7.Patient to decrease swelling by 2 cm. Not met     Long Term Goals: In 10 weeks 6/3/24  1. Patient to perform daily activities including managing steps with 1 rail or one cane up to 3 flights.  2. Patient to demonstrate increased knee AROM to extension to 0.  3. Patient to demonstrate increased LE strength to quad to 5/5 R.  4. Patient to have decreased pain to 2/10.  5. Patient to improve score on the FOTO to 78.    PLAN   Plan of care Certification: 3/25/2024 to 6/3/24.     Outpatient Physical Therapy 2 times weekly for 10 weeks to include the following interventions: Electrical Stimulation IFC, NMES, Gait Training, Manual Therapy, Moist Heat/ Ice, Neuromuscular Re-ed, Patient Education, Self Care, Therapeutic Activities, Therapeutic Exercise, and DN. Continue Plan of Care (POC) and progress per patient tolerance.    Kathleen Nguyen, PT

## 2024-04-26 ENCOUNTER — CLINICAL SUPPORT (OUTPATIENT)
Dept: REHABILITATION | Facility: HOSPITAL | Age: 69
End: 2024-04-26
Payer: MEDICARE

## 2024-04-26 DIAGNOSIS — Z74.09 DECREASED FUNCTIONAL MOBILITY AND ENDURANCE: Primary | ICD-10-CM

## 2024-04-26 PROCEDURE — 97110 THERAPEUTIC EXERCISES: CPT | Mod: HCNC,PN,CQ

## 2024-04-26 PROCEDURE — 97112 NEUROMUSCULAR REEDUCATION: CPT | Mod: HCNC,PN,CQ

## 2024-04-26 NOTE — PROGRESS NOTES
"Physical Therapy Daily Treatment Note     Name: Mitchell Mcmillan  Clinic Number: [id number]    Therapy Diagnosis:   Encounter Diagnosis   Name Primary?    Decreased functional mobility and endurance Yes       Physician: Shari Florez PA    Visit Date: 4/26/2024    Physician Orders: PT Eval and Treat   Medical Diagnosis from Referral: Z96.651 (ICD-10-CM) - Status post total right knee replacement using cement   Evaluation Date: 3/25/2024  Authorization Period Expiration: 12/31/24  Plan of Care Expiration: 6/3/24  Visit # / Visits authorized: 13/ 20 (including eval)    Foto: 2/3  Progress Note Due Date: 30 days from 4/8/24    Precautions: Standard HTN    Time In: 7:30 am   Time Out: 8:30 am  Total BillableTime: 54 minutes    SUBJECTIVE     Today, pt reports continuing to have primary complaints of stiffness and pain mostly in the morning with improvements throughout the day  He was compliant with home exercise program.  Response to previous treatment: minor soreness  Functional change: arrived with knee flexed again today so gait antalgic    Pre-Treatment Pain: 5/10 pain 2/10 at end of session    Location: R knee    OBJECTIVE   Knee extension lags by 12 degrees today    TREATMENT     Mitchell received therapeutic exercises to develop strength, endurance, and ROM for 24 minutes including:     Bike upright 5 min seat at 10 for knee flexion ROM  Nustep 4 minutes resistance 5  Shuttle DBL 6B 2 minutes  Shuttle Single leg 4B 1 minutes each  Gastroc stretch at wall; 3 x20"       Mitchell participated in neuromuscular re-education activities to improve: Balance, Kinesthetic, and Proprioception for 30 minutes. The following activities were included:     Supine hangs 10# 4 minutes  Long seated quad set into SLR over cone 2 x10  Seated LAQ 9# R 3 x10   STS from 20" box with Green Theraband TKE; 2 x12  Step Ups to stair step; x15 each  Lateral step overs no riser; x15       Home Exercises Provided and Patient Education Provided "     Education/Self-Care provided:    Written Home Exercises Provided: Patient instructed to cont prior HEP.  Exercises were reviewed and Mitchell was able to demonstrate them prior to the end of the session.  Mitchell demonstrated good  understanding of the education provided.     See EMR under Patient Instructions for exercises provided prior visit.    ASSESSMENT     Patient continues to demonstrate limitations of terminal knee extension especially in stance. Patient demonstrates antalgic gait primarily when attempting to take a few steps without cane with improvements in posture with use of assistive device. Patient demonstrates limitation Discussed with patient about continuing to regularly address quad activation/strength and working knee Range of Motion throughout the day. Plan to continue progression of treatment further into function as patient is able to tolerate.    Mitchell Is progressing well towards his goals.   Pt prognosis is Good.     Pt will continue to benefit from skilled outpatient physical therapy to address the deficits listed in the problem list box on initial evaluation, provide pt/family education and to maximize pt's level of independence in the home and community environment.     Pt's spiritual, cultural and educational needs considered and pt agreeable to plan of care and goals.     Anticipated barriers to physical therapy: lots of edema    Goals:   Short Term Goals: In 4 weeks:  1.Pt to be educated on HEP. met  2.Patient to demo increased AROM to flexion to 115 degrees or better. progressing  3.Patient to increase strength hip flexion to 4/5 or better. progressing  4.Patient to have decreased pain to 3/10 or better. progressing  5.Patient to increase LE balance to single leg stand up to 10 sec. progressing  6.Patient to improve score on the FOTO by 10%. No improving yet  7.Patient to decrease swelling by 2 cm. Not met     Long Term Goals: In 10 weeks 6/3/24  1. Patient to perform daily  activities including managing steps with 1 rail or one cane up to 3 flights.  2. Patient to demonstrate increased knee AROM to extension to 0.  3. Patient to demonstrate increased LE strength to quad to 5/5 R.  4. Patient to have decreased pain to 2/10.  5. Patient to improve score on the FOTO to 78.    PLAN   Plan of care Certification: 3/25/2024 to 6/3/24.     Outpatient Physical Therapy 2 times weekly for 10 weeks to include the following interventions: Electrical Stimulation IFC, NMES, Gait Training, Manual Therapy, Moist Heat/ Ice, Neuromuscular Re-ed, Patient Education, Self Care, Therapeutic Activities, Therapeutic Exercise, and DN. Continue Plan of Care (POC) and progress per patient tolerance.    Piero Lobo, PTA

## 2024-04-29 ENCOUNTER — CLINICAL SUPPORT (OUTPATIENT)
Dept: REHABILITATION | Facility: HOSPITAL | Age: 69
End: 2024-04-29
Payer: MEDICARE

## 2024-04-29 DIAGNOSIS — Z74.09 DECREASED FUNCTIONAL MOBILITY AND ENDURANCE: Primary | ICD-10-CM

## 2024-04-29 PROCEDURE — 97110 THERAPEUTIC EXERCISES: CPT | Mod: HCNC,PN

## 2024-04-29 PROCEDURE — 97112 NEUROMUSCULAR REEDUCATION: CPT | Mod: HCNC,PN

## 2024-04-29 NOTE — PROGRESS NOTES
Physical Therapy Daily Treatment Note     Name: Mitchell Mcmillan  Clinic Number: [id number]    Therapy Diagnosis:   Encounter Diagnosis   Name Primary?    Decreased functional mobility and endurance Yes       Physician: Shari Florez PA    Visit Date: 4/29/2024    Physician Orders: PT Eval and Treat   Medical Diagnosis from Referral: Z96.651 (ICD-10-CM) - Status post total right knee replacement using cement   Evaluation Date: 3/25/2024  Authorization Period Expiration: 12/31/24  Plan of Care Expiration: 6/3/24  Visit # / Visits authorized: 13/ 20 (including eval)    Foto: 2/3  Progress Note Due Date: 30 days from 4/8/24    Precautions: Standard HTN    Time In: 7:30 am   Time Out: 8:30 am  Total BillableTime: 25 minutes one on one PT time only (remainder with tech)    SUBJECTIVE     Today, pt reports continuing to have knee pain due to swelling which is slowly improving but not quickly at all.  He was compliant with home exercise program.  Response to previous treatment: minor soreness  Functional change: arrived with knee flexed again today so gait antalgic    Pre-Treatment Pain: 5/10 pain     Location: R knee    OBJECTIVE   Last session: Knee extension lags by 12 degrees     TREATMENT     Mitchell received therapeutic exercises to develop strength, endurance, and ROM for 24 minutes including:     Bike upright 5 min seat at 11 for knee flexion ROM  Shuttle DBL 6B 3 minutes  Shuttle side lying hip abduction leg press 3 min 4 band  Shuttle calf stretch with DF/PF activation making sure he engages the R quad 4x 10           Mitchell participated in neuromuscular re-education activities to improve: Balance, Kinesthetic, and Proprioception for 30 minutes. The following activities were included:    Shuttle calf stretch with L foot walking up/down while R stays in knee extension and ankle DF 6 bands 4x10  Sit to stand with R TKE assistance from purple band from 20 inch 3x15  L foot toe taps to 6 inch step with R TKE  "assistance from purple band 3x 15  R foot toe taps to 18 inch box with L hip extension engaged 3x 15  R single leg stand with R TKE assistance from purple band 3x 15  Long seated quad set with DF 10x 10" (using strap)  SAQ 8# 3 x10    Deferred:  R hip internal rotation  1x 10 without resistance to train motor control, 3x 10 manually resisted  Long seated quad sets 3x 10 R  L hip SLR in long sitting 3x 10  Seated LAQ 9# with max cues to engage EROM extension and hip extension   Prone hamstring curls 3x 10 R  Long seated hip sweeps up and over 4 inch object with focus on quad engagement 3x 8   Supine hangs 10# 4 minutes  Long seated quad set into SLR over cone 2 x10  Seated LAQ 9# R 3 x10   Step Ups to stair step; x15 each    Home Exercises Provided and Patient Education Provided     Education/Self-Care provided:    Written Home Exercises Provided: Patient instructed to cont prior HEP.  Exercises were reviewed and Mitchell was able to demonstrate them prior to the end of the session.  Mitchell demonstrated good  understanding of the education provided.     See EMR under Patient Instructions for exercises provided prior visit.    ASSESSMENT     Patient continues to demonstrate due to edema, poor VMO activation and limited hip extension. PT focused on hip engagement over the R knee to teach firing the quad with R single leg stand to improve functional transfers and gait. Pt still tends to neglect good swing through push off as well as times. Knee extension lag was less on arrival today but continues to be a limiting factor with carry over.  Plan to continue progression of treatment further into function as patient is able to tolerate.    Mitchell Is progressing well towards his goals.   Pt prognosis is Good.     Pt will continue to benefit from skilled outpatient physical therapy to address the deficits listed in the problem list box on initial evaluation, provide pt/family education and to maximize pt's level of independence " in the home and community environment.     Pt's spiritual, cultural and educational needs considered and pt agreeable to plan of care and goals.     Anticipated barriers to physical therapy: lots of edema    Goals:   Short Term Goals: In 4 weeks:  1.Pt to be educated on HEP. met  2.Patient to demo increased AROM to flexion to 115 degrees or better. progressing  3.Patient to increase strength hip flexion to 4/5 or better. progressing  4.Patient to have decreased pain to 3/10 or better. progressing  5.Patient to increase LE balance to single leg stand up to 10 sec. progressing  6.Patient to improve score on the FOTO by 10%. No improving yet  7.Patient to decrease swelling by 2 cm. Not met     Long Term Goals: In 10 weeks 6/3/24  1. Patient to perform daily activities including managing steps with 1 rail or one cane up to 3 flights.  2. Patient to demonstrate increased knee AROM to extension to 0.  3. Patient to demonstrate increased LE strength to quad to 5/5 R.  4. Patient to have decreased pain to 2/10.  5. Patient to improve score on the FOTO to 78.    PLAN   Plan of care Certification: 3/25/2024 to 6/3/24.     Outpatient Physical Therapy 2 times weekly for 10 weeks to include the following interventions: Electrical Stimulation IFC, NMES, Gait Training, Manual Therapy, Moist Heat/ Ice, Neuromuscular Re-ed, Patient Education, Self Care, Therapeutic Activities, Therapeutic Exercise, and DN. Continue Plan of Care (POC) and progress per patient tolerance.    Kathleen Nguyen, PT

## 2024-05-01 ENCOUNTER — CLINICAL SUPPORT (OUTPATIENT)
Dept: REHABILITATION | Facility: HOSPITAL | Age: 69
End: 2024-05-01
Payer: MEDICARE

## 2024-05-01 DIAGNOSIS — Z74.09 DECREASED FUNCTIONAL MOBILITY AND ENDURANCE: Primary | ICD-10-CM

## 2024-05-01 PROCEDURE — 97112 NEUROMUSCULAR REEDUCATION: CPT | Mod: HCNC,PN

## 2024-05-01 NOTE — PROGRESS NOTES
Physical Therapy Daily Treatment Note     Name: Mitchell Mcmillan  Clinic Number: [id number]    Therapy Diagnosis:   Encounter Diagnosis   Name Primary?    Decreased functional mobility and endurance Yes       Physician: Shari Florez PA    Visit Date: 5/1/2024    Physician Orders: PT Eval and Treat   Medical Diagnosis from Referral: Z96.651 (ICD-10-CM) - Status post total right knee replacement using cement   Evaluation Date: 3/25/2024  Authorization Period Expiration: 12/31/24  Plan of Care Expiration: 6/3/24  Visit # / Visits authorized: 13/ 20 (including eval)    Foto: 2/3  Progress Note Due Date: 30 days from 4/8/24    Precautions: Standard HTN    Time In: 7:10 am   Time Out: 8:06 am  Total BillableTime: 24 minutes one on one PT time only (remainder with tech)    SUBJECTIVE     Today, pt reports sees the Dr next week. Swelling is still a big issue limiting EROM  He was compliant with home exercise program.  Response to previous treatment: minor soreness  Functional change: arrived with knee flexed again today so gait antalgic    Pre-Treatment Pain: 5/10 pain     Location: R knee    OBJECTIVE   Last session: Knee extension lags by 12 degrees   Hamstring and calf trigger points aren't present today    TREATMENT     Mitchell received therapeutic exercises to develop strength, endurance, and ROM for 11 minutes including:   Shuttle DBL 6B 3 minutes  Tail gators 10 # 3 min  Bike upright 5 min seat at 11 for knee flexion ROM at end of session    Deferred:  Shuttle side lying hip abduction leg press 3 min 4 band  Shuttle calf stretch with DF/PF activation making sure he engages the R quad 4x 10       Mitchell participated in neuromuscular re-education activities to improve: Balance, Kinesthetic, and Proprioception for 45 minutes. The following activities were included:    Seated LAQ 3x 20 with AAROM from PT to help gain EROM activation at quad  Prone knee external to internal knee ROM flexed with 10 # on ankle to work on  "rotator motor control 3x 10  Prone hip in niesha position external to internal rotation 10 # for motor control  Prone hamstring curls 10 # with bolster at thigh to help use gravity to challenge motor control  R side lying R hip internal rotation 1x 30  Long seated quad set with DF 10x 10" (using strap)  Hip NIESHA 10 x 10 seated  SAQ 8# 3 x10  Long seated quad sets 3x 10 R  Step ups to 6 inch step with mild parallel bar support 3x 10 R up/L down      Deferred:  Shuttle calf stretch with L foot walking up/down while R stays in knee extension and ankle DF 6 bands 4x10  Sit to stand with R TKE assistance from purple band from 20 inch 3x15  L foot toe taps to 6 inch step with R TKE assistance from purple band 3x 15  R foot toe taps to 18 inch box with L hip extension engaged 3x 15  R single leg stand with R TKE assistance from purple band 3x 15L hip SLR in long sitting 3x 10  Long seated hip sweeps up and over 4 inch object with focus on quad engagement 3x 8   Supine hangs 10# 4 minutes  Long seated quad set into SLR over cone 2 x10  Seated LAQ 9# R 3 x10   Step Ups to stair step; x15 each    Home Exercises Provided and Patient Education Provided     Education/Self-Care provided:    Written Home Exercises Provided: Patient instructed to cont prior HEP.  Exercises were reviewed and Mitchell was able to demonstrate them prior to the end of the session.  Mitchell demonstrated good  understanding of the education provided.     See EMR under Patient Instructions for exercises provided prior visit.    ASSESSMENT     Patient continues to demonstrate due to edema, poor VMO activation and limited hip internal and external rotation. PT added in rotational work more today to free up the hip over knee extension. Pt leaves moving better but EROM is still very difficult to gain. PT focused on EROM knee extension after freeing up motor control at the hip. Plan to continue progression of treatment further into function as patient is able to " caden Medrano Is progressing well towards his goals.   Pt prognosis is Good.     Pt will continue to benefit from skilled outpatient physical therapy to address the deficits listed in the problem list box on initial evaluation, provide pt/family education and to maximize pt's level of independence in the home and community environment.     Pt's spiritual, cultural and educational needs considered and pt agreeable to plan of care and goals.     Anticipated barriers to physical therapy: lots of edema    Goals:   Short Term Goals: In 4 weeks:  1.Pt to be educated on HEP. met  2.Patient to demo increased AROM to flexion to 115 degrees or better. progressing  3.Patient to increase strength hip flexion to 4/5 or better. progressing  4.Patient to have decreased pain to 3/10 or better. progressing  5.Patient to increase LE balance to single leg stand up to 10 sec. progressing  6.Patient to improve score on the FOTO by 10%. No improving yet  7.Patient to decrease swelling by 2 cm. Not met     Long Term Goals: In 10 weeks 6/3/24  1. Patient to perform daily activities including managing steps with 1 rail or one cane up to 3 flights.  2. Patient to demonstrate increased knee AROM to extension to 0.  3. Patient to demonstrate increased LE strength to quad to 5/5 R.  4. Patient to have decreased pain to 2/10.  5. Patient to improve score on the FOTO to 78.    PLAN   Plan of care Certification: 3/25/2024 to 6/3/24.     Outpatient Physical Therapy 2 times weekly for 10 weeks to include the following interventions: Electrical Stimulation IFC, NMES, Gait Training, Manual Therapy, Moist Heat/ Ice, Neuromuscular Re-ed, Patient Education, Self Care, Therapeutic Activities, Therapeutic Exercise, and DN. Continue Plan of Care (POC) and progress per patient tolerance.    Kathleen Nguyen, PT

## 2024-05-03 ENCOUNTER — CLINICAL SUPPORT (OUTPATIENT)
Dept: REHABILITATION | Facility: HOSPITAL | Age: 69
End: 2024-05-03
Payer: MEDICARE

## 2024-05-03 DIAGNOSIS — Z74.09 DECREASED FUNCTIONAL MOBILITY AND ENDURANCE: Primary | ICD-10-CM

## 2024-05-03 PROCEDURE — 97112 NEUROMUSCULAR REEDUCATION: CPT | Mod: HCNC,PN,CQ

## 2024-05-03 PROCEDURE — 97140 MANUAL THERAPY 1/> REGIONS: CPT | Mod: HCNC,PN,CQ

## 2024-05-03 PROCEDURE — 97110 THERAPEUTIC EXERCISES: CPT | Mod: HCNC,PN,CQ

## 2024-05-03 NOTE — PROGRESS NOTES
"Physical Therapy Daily Treatment Note     Name: Mitchell Mcmillan  Clinic Number: [id number]    Therapy Diagnosis:   Encounter Diagnosis   Name Primary?    Decreased functional mobility and endurance Yes       Physician: Shari Florez PA    Visit Date: 5/3/2024    Physician Orders: PT Eval and Treat   Medical Diagnosis from Referral: Z96.651 (ICD-10-CM) - Status post total right knee replacement using cement   Evaluation Date: 3/25/2024  Authorization Period Expiration: 12/31/24  Plan of Care Expiration: 6/3/24  Visit # / Visits authorized: 16/ 20 (including eval)    Foto: 2/3  Progress Note Due Date: 30 days from 4/8/24    Precautions: Standard HTN    Time In: 7:30 am   Time Out: 8:30 am  Total BillableTime: 60 minutes     SUBJECTIVE     Today, pt reports: stiffness this morning but has been attempting to do as much as he can at home in attempt to improve Range of Motion and strength.  He was compliant with home exercise program.  Response to previous treatment: minor soreness  Functional change: arrived with knee flexed again today so gait antalgic    Pre-Treatment Pain: 5/10 pain     Location: R knee    OBJECTIVE   Last session: Knee extension lags by 12 degrees   Hamstring and calf trigger points aren't present today    TREATMENT     Manual therapeutic interventions including joint mobilization and Range of Motion to right knee for 10 minutes including:    Knee extension Range of Motion with overpressure  Knee flexion Range of Motion with overpressure    Mitchell received therapeutic exercises to develop strength, endurance, and ROM for 18 minutes including:    Nustep; 4 minutes resistance 5  Upright Bike; 5 minutes for flexion ROM  Shuttle DBL 6B 2 minutes  Supine Hangs; 15# 5 minutes  Seated heel slides inverted; 10 x10"  Prone Hamstring Curl; 5 x20"       Mitchell participated in neuromuscular re-education activities to improve: Balance, Kinesthetic, and Proprioception for 32 minutes. The following activities " "were included:    Seated LAQ 3x 10 with strap assist to reach terminal extension  Long seated quad set with DF 10x 10" (using strap)  SLR with strap assist; 2 x10  SAQ 8# 3 x10  Prone TKE; 2 x10  Prone knee external to internal knee ROM flexed with 10 # on ankle to work on rotator motor control 3x 10  Step ups to 6 inch step with mild parallel bar support 3x 10 R up/L down      Home Exercises Provided and Patient Education Provided     Education/Self-Care provided:    Written Home Exercises Provided: Patient instructed to cont prior HEP.  Exercises were reviewed and Mitchell was able to demonstrate them prior to the end of the session.  Mitchell demonstrated good  understanding of the education provided.     See EMR under Patient Instructions for exercises provided prior visit.    ASSESSMENT     Continued focus of treatment attempting to improve knee Range of Motion. Patient continues to demonstrate limitations with both extension and flexion with tightness and pain present. Continued inclusion of strap assist with long arc quadriceps as well as straight leg raises to assist reaching Discussed with patient about continuing to regularly work on movement throughout the day every day for greater likelihood of progression. Plan to follow up with patient following MD appointment next visit.     Mitchell Is progressing well towards his goals.   Pt prognosis is Good.     Pt will continue to benefit from skilled outpatient physical therapy to address the deficits listed in the problem list box on initial evaluation, provide pt/family education and to maximize pt's level of independence in the home and community environment.     Pt's spiritual, cultural and educational needs considered and pt agreeable to plan of care and goals.     Anticipated barriers to physical therapy: lots of edema    Goals:   Short Term Goals: In 4 weeks:  1.Pt to be educated on HEP. met  2.Patient to demo increased AROM to flexion to 115 degrees or better. " progressing  3.Patient to increase strength hip flexion to 4/5 or better. progressing  4.Patient to have decreased pain to 3/10 or better. progressing  5.Patient to increase LE balance to single leg stand up to 10 sec. progressing  6.Patient to improve score on the FOTO by 10%. No improving yet  7.Patient to decrease swelling by 2 cm. Not met     Long Term Goals: In 10 weeks 6/3/24  1. Patient to perform daily activities including managing steps with 1 rail or one cane up to 3 flights.  2. Patient to demonstrate increased knee AROM to extension to 0.  3. Patient to demonstrate increased LE strength to quad to 5/5 R.  4. Patient to have decreased pain to 2/10.  5. Patient to improve score on the FOTO to 78.    PLAN   Plan of care Certification: 3/25/2024 to 6/3/24.     Outpatient Physical Therapy 2 times weekly for 10 weeks to include the following interventions: Electrical Stimulation IFC, NMES, Gait Training, Manual Therapy, Moist Heat/ Ice, Neuromuscular Re-ed, Patient Education, Self Care, Therapeutic Activities, Therapeutic Exercise, and DN. Continue Plan of Care (POC) and progress per patient tolerance.    Piero Lobo, PTA

## 2024-05-06 ENCOUNTER — OFFICE VISIT (OUTPATIENT)
Dept: ORTHOPEDICS | Facility: CLINIC | Age: 69
End: 2024-05-06
Payer: MEDICARE

## 2024-05-06 ENCOUNTER — CLINICAL SUPPORT (OUTPATIENT)
Dept: REHABILITATION | Facility: HOSPITAL | Age: 69
End: 2024-05-06
Payer: MEDICARE

## 2024-05-06 VITALS — BODY MASS INDEX: 31.84 KG/M2 | HEIGHT: 76 IN | WEIGHT: 261.44 LBS

## 2024-05-06 DIAGNOSIS — Z96.651 STATUS POST TOTAL RIGHT KNEE REPLACEMENT USING CEMENT: Primary | ICD-10-CM

## 2024-05-06 DIAGNOSIS — I89.0 LYMPHEDEMA OF RIGHT LOWER EXTREMITY: ICD-10-CM

## 2024-05-06 DIAGNOSIS — M17.12 ARTHRITIS OF KNEE, LEFT: ICD-10-CM

## 2024-05-06 DIAGNOSIS — M17.11 ARTHRITIS OF KNEE, RIGHT: ICD-10-CM

## 2024-05-06 DIAGNOSIS — Z74.09 DECREASED FUNCTIONAL MOBILITY AND ENDURANCE: Primary | ICD-10-CM

## 2024-05-06 PROCEDURE — 3288F FALL RISK ASSESSMENT DOCD: CPT | Mod: HCNC,CPTII,S$GLB, | Performed by: PHYSICIAN ASSISTANT

## 2024-05-06 PROCEDURE — 99024 POSTOP FOLLOW-UP VISIT: CPT | Mod: HCNC,S$GLB,, | Performed by: PHYSICIAN ASSISTANT

## 2024-05-06 PROCEDURE — 1125F AMNT PAIN NOTED PAIN PRSNT: CPT | Mod: HCNC,CPTII,S$GLB, | Performed by: PHYSICIAN ASSISTANT

## 2024-05-06 PROCEDURE — 97535 SELF CARE MNGMENT TRAINING: CPT | Mod: HCNC,PN

## 2024-05-06 PROCEDURE — 1159F MED LIST DOCD IN RCRD: CPT | Mod: HCNC,CPTII,S$GLB, | Performed by: PHYSICIAN ASSISTANT

## 2024-05-06 PROCEDURE — 99999 PR PBB SHADOW E&M-EST. PATIENT-LVL III: CPT | Mod: PBBFAC,HCNC,, | Performed by: PHYSICIAN ASSISTANT

## 2024-05-06 PROCEDURE — 1101F PT FALLS ASSESS-DOCD LE1/YR: CPT | Mod: HCNC,CPTII,S$GLB, | Performed by: PHYSICIAN ASSISTANT

## 2024-05-06 PROCEDURE — 4010F ACE/ARB THERAPY RXD/TAKEN: CPT | Mod: HCNC,CPTII,S$GLB, | Performed by: PHYSICIAN ASSISTANT

## 2024-05-06 RX ORDER — CYCLOBENZAPRINE HCL 10 MG
10 TABLET ORAL 3 TIMES DAILY PRN
Qty: 30 TABLET | Refills: 1 | Status: SHIPPED | OUTPATIENT
Start: 2024-05-06

## 2024-05-06 RX ORDER — OXYCODONE AND ACETAMINOPHEN 10; 325 MG/1; MG/1
1 TABLET ORAL EVERY 8 HOURS PRN
Qty: 21 TABLET | Refills: 0 | Status: SHIPPED | OUTPATIENT
Start: 2024-05-06

## 2024-05-06 RX ORDER — GABAPENTIN 300 MG/1
300 CAPSULE ORAL 2 TIMES DAILY
Qty: 60 CAPSULE | Refills: 11 | Status: SHIPPED | OUTPATIENT
Start: 2024-05-06 | End: 2025-05-06

## 2024-05-06 NOTE — PROGRESS NOTES
"Physical Therapy Daily Treatment Note     Name: Mitchell Mcmillan  Clinic Number: [id number]    Therapy Diagnosis:   No diagnosis found.      Physician: Shari Florez PA    Visit Date: 5/6/2024    Physician Orders: PT Eval and Treat   Medical Diagnosis from Referral: Z96.651 (ICD-10-CM) - Status post total right knee replacement using cement   Evaluation Date: 3/25/2024  Authorization Period Expiration: 12/31/24  Plan of Care Expiration: 6/3/24  Visit # / Visits authorized: 16/ 20 (including eval)    Foto: 2/3  Progress Note Due Date: 30 days from 4/8/24    Precautions: Standard HTN    Time In: 7:30 am   Time Out: 8:30 am  Total BillableTime: 60 minutes     SUBJECTIVE     Today, pt reports: referred by treating therapist to address chronic swelling right knee.  He was compliant with home exercise program.  Response to previous treatment: minor soreness  Functional change: arrived with knee flexed again today so gait antalgic    Pre-Treatment Pain: 5/10 pain     Location: R knee    OBJECTIVE   Last session: Knee extension lags by 12 degrees   Hamstring and calf trigger points aren't present today    TREATMENT     Manual therapeutic interventions including joint mobilization and Range of Motion to right knee for 10 minutes including:    Knee extension Range of Motion with overpressure  Knee flexion Range of Motion with overpressure    Mitchell received therapeutic exercises to develop strength, endurance, and ROM for 18 minutes including:    Nustep; 4 minutes resistance 5  Upright Bike; 5 minutes for flexion ROM  Shuttle DBL 6B 2 minutes  Supine Hangs; 15# 5 minutes  Seated heel slides inverted; 10 x10"  Prone Hamstring Curl; 5 x20"       Mitchell participated in neuromuscular re-education activities to improve: Balance, Kinesthetic, and Proprioception for 32 minutes. The following activities were included:    Seated LAQ 3x 10 with strap assist to reach terminal extension  Long seated quad set with DF 10x 10" (using " strap)  SLR with strap assist; 2 x10  SAQ 8# 3 x10  Prone TKE; 2 x10  Prone knee external to internal knee ROM flexed with 10 # on ankle to work on rotator motor control 3x 10  Step ups to 6 inch step with mild parallel bar support 3x 10 R up/L down      Home Exercises Provided and Patient Education Provided     Education/Self-Care provided:    Written Home Exercises Provided: Patient instructed to cont prior HEP.  Exercises were reviewed and Mitchell was able to demonstrate them prior to the end of the session.  Mitchell demonstrated good  understanding of the education provided.     See EMR under Patient Instructions for exercises provided prior visit.    ASSESSMENT     Continued focus of treatment attempting to improve knee Range of Motion. Patient continues to demonstrate limitations with both extension and flexion with tightness and pain present. Continued inclusion of strap assist with long arc quadriceps as well as straight leg raises to assist reaching Discussed with patient about continuing to regularly work on movement throughout the day every day for greater likelihood of progression. Plan to follow up with patient following MD appointment next visit.     Mitchell Is progressing well towards his goals.   Pt prognosis is Good.     Pt will continue to benefit from skilled outpatient physical therapy to address the deficits listed in the problem list box on initial evaluation, provide pt/family education and to maximize pt's level of independence in the home and community environment.     Pt's spiritual, cultural and educational needs considered and pt agreeable to plan of care and goals.     Anticipated barriers to physical therapy: lots of edema    Goals:   Short Term Goals: In 4 weeks:  1.Pt to be educated on HEP. met  2.Patient to demo increased AROM to flexion to 115 degrees or better. progressing  3.Patient to increase strength hip flexion to 4/5 or better. progressing  4.Patient to have decreased pain to  3/10 or better. progressing  5.Patient to increase LE balance to single leg stand up to 10 sec. progressing  6.Patient to improve score on the FOTO by 10%. No improving yet  7.Patient to decrease swelling by 2 cm. Not met     Long Term Goals: In 10 weeks 6/3/24  1. Patient to perform daily activities including managing steps with 1 rail or one cane up to 3 flights.  2. Patient to demonstrate increased knee AROM to extension to 0.  3. Patient to demonstrate increased LE strength to quad to 5/5 R.  4. Patient to have decreased pain to 2/10.  5. Patient to improve score on the FOTO to 78.    PLAN   Plan of care Certification: 3/25/2024 to 6/3/24.     Outpatient Physical Therapy 2 times weekly for 10 weeks to include the following interventions: Electrical Stimulation IFC, NMES, Gait Training, Manual Therapy, Moist Heat/ Ice, Neuromuscular Re-ed, Patient Education, Self Care, Therapeutic Activities, Therapeutic Exercise, and DN. Continue Plan of Care (POC) and progress per patient tolerance.    Ping Du, PT

## 2024-05-06 NOTE — PROGRESS NOTES
Physical Therapy Daily Treatment Note     Name: Mitchell Mcmillan  Clinic Number: [id number]    Therapy Diagnosis:   Encounter Diagnoses   Name Primary?    Decreased functional mobility and endurance Yes    Lymphedema of right lower extremity      Physician: Shari Florez PA    Visit Date: 5/6/2024    Physician Orders: PT Eval and Treat   Medical Diagnosis from Referral: Z96.651 (ICD-10-CM) - Status post total right knee replacement using cement   Evaluation Date: 3/25/2024  Authorization Period Expiration: 12/31/24  Plan of Care Expiration: 6/3/24  Visit # / Visits authorized: 17/ 20 (including eval)    Foto: 2/3  Progress Note Due Date: 30 days from 4/8/24    Precautions: Standard HTN    Time In: 12:30 am   Time Out: 1:25 am  Total BillableTime: 55 minutes     SUBJECTIVE     Today, pt reports: treating therapist, Kathleen, wanted him to be on my schedule due to chronic swelling right knee.   He was compliant with home exercise program.  Response to previous treatment:  Functional change:     Pre-Treatment Pain: 5/10 pain     Location: R knee    OBJECTIVE   Last session: Knee extension lags by 12 degrees   Hamstring and calf trigger points aren't present today    TREATMENT     Self care/home management x 55 minutes (patient assessed for compression garment needs)    Evaluation complete for assessment of right leg swelling:  Stage II lymphedema due to trauma from surgery  Skin: poor skin elasticity right ankle, lower leg, knee and distal thigh with some pitting edema around the ankle.    -obtained measurements of right leg  Thigh (10 cm above patella)-54 cm right lower extremity [left- 52 cm]  SBP -54.5 cm [left-52 cm]  15 cm below SBP- 44 cm [left- 42.5 cm]  20 cm below SBP- 43.5 cm   Ankle- 26 cm [left - 25 cm]        -measured for calf and knee velcro wraps  Black Medium Tall - calf  Black Large - knee    -instructed how to effectively use velcro compression wraps to decrease fluid in right leg (demonstration  provided). Recommended 1-2 more sessions for garment instruction and Manual Lymph Drainage right leg.    -faxed information to [employer] for right calf and knee velcro compression wrap  -sent message to Lisa Florez with information obtained from today's visit and recommendations for compression    -recommended elevation of right leg and continued exercise prior to next visit.      Home Exercises Provided and Patient Education Provided     Education/Self-Care provided:    Written Home Exercises Provided: Patient instructed to cont prior HEP.  Exercises were reviewed and Mitchell was able to demonstrate them prior to the end of the session.  Mitchell demonstrated good  understanding of the education provided.     See EMR under Patient Instructions for exercises provided prior visit.    ASSESSMENT     Due to the chronicity of Mitchell's right lower extremity swelling and unchanging pain, he was added to my schedule today to exam his right leg for lymphedema. After evaluation, it was determined that his right leg swelling is a lymphatic issue that can occur after a surgery. He was educated on the etiology of lymphedema and how to effectively manage it. He opted to purchase velcro compression wraps vs bandaging of the leg. Since he wants to use insurance to help pay for the compression wraps, a message was sent to his referring MD (Shari Florez) to let her know that she will be receiving something to sign in order for insurance to pay for his compression wraps.      Mitchell Is progressing well towards his goals.   Pt prognosis is Good.     Pt will continue to benefit from skilled outpatient physical therapy to address the deficits listed in the problem list box on initial evaluation, provide pt/family education and to maximize pt's level of independence in the home and community environment.     Pt's spiritual, cultural and educational needs considered and pt agreeable to plan of care and goals.     Anticipated barriers to  physical therapy: lots of edema    Goals:   Short Term Goals: In 4 weeks:  1.Pt to be educated on HEP. met  2.Patient to demo increased AROM to flexion to 115 degrees or better. progressing  3.Patient to increase strength hip flexion to 4/5 or better. progressing  4.Patient to have decreased pain to 3/10 or better. progressing  5.Patient to increase LE balance to single leg stand up to 10 sec. progressing  6.Patient to improve score on the FOTO by 10%. No improving yet  7.Patient to decrease swelling by 2 cm. Not met     Long Term Goals: In 10 weeks 6/3/24  1. Patient to perform daily activities including managing steps with 1 rail or one cane up to 3 flights.  2. Patient to demonstrate increased knee AROM to extension to 0.  3. Patient to demonstrate increased LE strength to quad to 5/5 R.  4. Patient to have decreased pain to 2/10.  5. Patient to improve score on the FOTO to 78.    PLAN   Plan of care Certification: 3/25/2024 to 6/3/24.     Outpatient Physical Therapy 2 times weekly for 10 weeks to include the following interventions: Electrical Stimulation IFC, NMES, Gait Training, Manual Therapy, Moist Heat/ Ice, Neuromuscular Re-ed, Patient Education, Self Care, Therapeutic Activities, Therapeutic Exercise, and DN. Continue Plan of Care (POC) and progress per patient tolerance.    Ping Du, PT

## 2024-05-06 NOTE — PROGRESS NOTES
Patient ID: Mitchell Mcmillan is a 68 y.o. male.    Chief Complaint: Post-op Evaluation (Right TKA 03/06/2024)      HPI: Mitchell Mcmillan  is a 68 y.o. male who c/o Post-op Evaluation (Right TKA 03/06/2024)     Post op visit 2  Patient notes pain is  10/10   The patient presents today stating he is doing okay since surgery he notices he has not making the advancement as he did on the left side  He stating the extremity stays quite stiff and swollen  He is able to warm it up in the morning using his bicycle but takes him a couple of minutes before he is able to make the full [place] range of motion  He notes pain at night as well  He states he is starting lymphedema clinic today at 12 in hopes to improve some of the swelling which I am agreement with  He is taking his medication as directed  We discussed increasing his Neurontin to 2 pills at night as well as adding a muscle relaxer today  Additionally he inquires about a refill which I will provide  him with for pain medication    Equipment  he has using a cane to assist with ambulation today  DVT  compliant  Therapy  compliant; he is beginning lymphedema clinic today    Patient is presently denying any shortness of breath, chest pain, fever/chills, nausea/vomiting, loss of taste or smell, numbness/tingling or sensation changes, loss of bladder or bowel function, loss of taste/smell.     Surgery: Right Total Knee    Surgery Date:   03/06/2024    Past Medical History:   Diagnosis Date    Arthritis     knees    Asymmetric SNHL (sensorineural hearing loss) 08/17/2017    Chronic left hip pain 2006    Dislocated left hip, job injury    Chronic prostatitis     per pt, last episode 2013    Elevated PSA, less than 10 ng/ml     per pt last elevation 2013    Gastroesophageal reflux disease 8/3/2022    Hyperlipidemia 2010    Hypertension 2011    Joint effusion of knee     per pt rt knee, seen by Dr. Duke 2013    Midline low back pain without sciatica 6/13/2016    Obesity      Special screening for malignant neoplasms, colon 10/5/2016    Tobacco dependence     Trouble in sleeping      Past Surgical History:   Procedure Laterality Date    arthrocentesis Right     Per pt , knee by Dr. BONNIE Duke    COLONOSCOPY N/A 10/05/2016    Normal exam repeat 10 yrs. Procedure: COLONOSCOPY;  Surgeon: Alexander Goetz MD;  Location: [facility] ENDO;  Service: Endoscopy;  Laterality: N/A;    HIP ARTHROPLASTY Left 2006    Per pt, dislocated left hip/ hardware placed  by Dr. Lisa    left orchiectomy      PROSTATE BIOPSY      procedure performed at [facility] due to elevated PSA - benign    TOTAL KNEE ARTHROPLASTY Left 2023    Procedure: ARTHROPLASTY, KNEE, TOTAL;  Surgeon: Ellis Mora MD;  Location: [facility] OR;  Service: Orthopedics;  Laterality: Left;    TOTAL KNEE ARTHROPLASTY Right 3/6/2024    Procedure: ARTHROPLASTY, KNEE, TOTAL;  Surgeon: Ellis Mora MD;  Location: [facility] OR;  Service: Orthopedics;  Laterality: Right;     Family History   Problem Relation Name Age of Onset    Depression Mother      Early death Mother      Hypertension Mother      Heart disease Mother  53         from MI    Arthritis Father      Stroke Father      Diabetes Sister      No Known Problems Brother      Hypertension Sister      Hypertension Sister      No Known Problems Brother      No Known Problems Brother      No Known Problems Daughter      Cancer Neg Hx      COPD Neg Hx      Kidney disease Neg Hx       Social History     Socioeconomic History    Marital status:      Spouse name: Nida    Number of children: 0   Occupational History    Occupation: Disabled since      Comment: Dislocated left hip   Tobacco Use    Smoking status: Former     Current packs/day: 0.00     Average packs/day: 0.1 packs/day for 46.0 years (4.6 ttl pk-yrs)     Types: Cigarettes     Start date: 6/3/2022     Quit date: 10/19/2022     Years since quittin.5    Smokeless tobacco: Never   Substance  and Sexual Activity    Alcohol use: Yes     Alcohol/week: 2.0 standard drinks of alcohol     Types: 2 Glasses of wine per week    Drug use: No    Sexual activity: Yes     Partners: Female     Birth control/protection: None   Social History Narrative    Disabled since 2006.  Job injury.  Dislocated left hip.  Has 4 brothers and 4 sisters. Wears seatbelt.     Social Determinants of Health     Financial Resource Strain: Low Risk  (9/30/2022)    Overall Financial Resource Strain (CARDIA)     Difficulty of Paying Living Expenses: Not hard at all   Food Insecurity: No Food Insecurity (9/30/2022)    Hunger Vital Sign     Worried About Running Out of Food in the Last Year: Never true     Ran Out of Food in the Last Year: Never true   Transportation Needs: No Transportation Needs (9/30/2022)    PRAPARE - Transportation     Lack of Transportation (Medical): No     Lack of Transportation (Non-Medical): No   Physical Activity: Insufficiently Active (9/19/2023)    Exercise Vital Sign     Days of Exercise per Week: 2 days     Minutes of Exercise per Session: 10 min   Stress: No Stress Concern Present (9/30/2022)    [employer] [place] [employer] - Occupational Stress Questionnaire     Feeling of Stress : Only a little   Housing Stability: Low Risk  (9/30/2022)    Housing Stability Vital Sign     Unable to Pay for Housing in the Last Year: No     Number of Places Lived in the Last Year: 1     Unstable Housing in the Last Year: No     Medication List with Changes/Refills   Current Medications    ATORVASTATIN (LIPITOR) 40 MG TABLET    TAKE 1 TABLET EVERY DAY    CELECOXIB (CELEBREX) 200 MG CAPSULE    Take 1 capsule (200 mg total) by mouth once daily. Take with food    DICLOFENAC SODIUM (VOLTAREN) 1 % GEL    Apply 2 g topically 3 (three) times daily as needed.    FINASTERIDE (PROSCAR) 5 MG TABLET    TAKE 1 TABLET EVERY DAY    FLUTICASONE PROPIONATE (FLONASE) 50 MCG/ACTUATION NASAL SPRAY    2 sprays (100 mcg total) by Each  Nostril route once daily.    GABAPENTIN (NEURONTIN) 300 MG CAPSULE    Take 1 capsule (300 mg total) by mouth every evening.    LISINOPRIL-HYDROCHLOROTHIAZIDE (PRINZIDE,ZESTORETIC) 20-25 MG TAB    TAKE 1 TABLET EVERY DAY    OMEGA-3 FATTY ACIDS/FISH OIL (FISH OIL-OMEGA-3 FATTY ACIDS) 300-1,000 MG CAPSULE    Take 1 capsule by mouth once daily.    ONDANSETRON (ZOFRAN-ODT) 4 MG TBDL    Dissolve 2 tablets (8 mg total) by mouth every 8 (eight) hours as needed (Nausea).    OXYCODONE-ACETAMINOPHEN (PERCOCET)  MG PER TABLET    Take 1 tablet by mouth every 8 (eight) hours as needed for Pain.    PANTOPRAZOLE (PROTONIX) 40 MG TABLET    Take 1 tablet (40 mg total) by mouth once daily.    SILDENAFIL (VIAGRA) 100 MG TABLET    Take 1 tablet (100 mg total) by mouth daily as needed for Erectile Dysfunction.    TAMSULOSIN (FLOMAX) 0.4 MG CAP    TAKE 1 CAPSULE EVERY DAY     Review of patient's allergies indicates:  No Known Allergies    Objective:     Right Lower Extremity  NVI  WWP foot  Comp soft  Cap refill < 2 sec  Calf NT, soft  (-) Simi sign  RENATO  ROM :  patient is able to exhibit full extension with flexion to 9095° before hip in [place]; previously patient was able to reach 85° at 1st postop appointment.  I did discuss with him that today I would like to see more in his range of motion.  We briefly discussed manipulation procedure.  Patient is set to begin lymphedema clinic today.  Wiggles toes  DF/PF intact  Sensation intact  Inc C/D/I  Mild to moderate swelling present  No SOI; no warmth or erythema    Imaging:      No imaging obtained today    Assessment:       No diagnosis found.       Plan:       There are no diagnoses linked to this encounter.    Mitchell Mcmillan is an established pt here for postop follow-up after right total knee replacement by Dr. Mora.  The patient will continue the current medication regimen and treatment plan.  Patient should notify the office of any signs or symptoms of infection including  fevers, erythema, purulent drainage, increasing pain.  Patient will continue with DVT prophylaxis until at least 6 weeks postop.  Patient will continue outpatient physical therapy.  Will follow-up as scheduled.  We will continue to closely monitor range of motion.  Discuss this with Dr. Mora.  We will further evaluate manipulation at next appointment as patient is due to begin lymphedema clinic. Patient verbalized understanding of all instructions and agreed with the above plan.    No follow-ups on file.    The patient understands, chooses and consents to this plan and accepts all   the risks which include but are not limited to the risks mentioned above.     Disclaimer: This note was prepared using a voice recognition system and is likely to have sound alike errors within the text.

## 2024-05-08 ENCOUNTER — CLINICAL SUPPORT (OUTPATIENT)
Dept: REHABILITATION | Facility: HOSPITAL | Age: 69
End: 2024-05-08
Payer: MEDICARE

## 2024-05-08 DIAGNOSIS — I89.0 LYMPHEDEMA OF RIGHT LOWER EXTREMITY: ICD-10-CM

## 2024-05-08 DIAGNOSIS — Z74.09 DECREASED FUNCTIONAL MOBILITY AND ENDURANCE: Primary | ICD-10-CM

## 2024-05-08 PROCEDURE — 97110 THERAPEUTIC EXERCISES: CPT | Mod: HCNC,PN

## 2024-05-08 PROCEDURE — 97112 NEUROMUSCULAR REEDUCATION: CPT | Mod: HCNC,PN

## 2024-05-08 NOTE — PROGRESS NOTES
Physical Therapy Daily Treatment Note and Progress Note     Name: Mitchell Mcmillan  Clinic Number: [id number]    Therapy Diagnosis:   Encounter Diagnoses   Name Primary?    Decreased functional mobility and endurance Yes    Lymphedema of right lower extremity          Physician: Shari Florez PA    Visit Date: 5/8/2024    Physician Orders: PT Eval and Treat   Medical Diagnosis from Referral: Z96.651 (ICD-10-CM) - Status post total right knee replacement using cement   Evaluation Date: 3/25/2024  Authorization Period Expiration: 12/31/24  Plan of Care Expiration: 6/3/24  Visit # / Visits authorized: 16/ 20 (including eval)    Foto: 2/3  Progress Note Due Date: 30 days from 5/8/24    Precautions: Standard HTN    Time In: 7:08 am   Time Out: 8:04 am  Total BillableTime: 56 minutes one on one with PT    SUBJECTIVE     Today, pt reports: he saw the Dr and if edema doesn't respond to wrapping, they may need to work with draining the knee per pt.  He was compliant with home exercise program.  Response to previous treatment: minor soreness  Functional change: arrived with knee flexed again today so gait antalgic    Pre-Treatment Pain: 5/10 pain     Location: R knee    OBJECTIVE     Girth Measurement:  (L) 47 cm; (R) 52 cm at mid patella at eval; today 51 cm                                               At eval  Today  Knee AROM:                                                  (R)       (L) R L                                      Flexion                         70        115 106 115                                       Extension                    25        0 12 0                                      Prone flexion    82 90                Hook lying flexion   100 112    Hip AROM:                 Flexion                         120      120 120 120                                      ER                               15        30 32 55 (prone)                                      IR                                 20       "  30 35 52 (prone                                        Ankle AROM:             Ankle DF(keara)            0          8 4                                        Strength:                                                        R          L R L  Hip flexors L2                          4/5       5/5 4+ 5  Quadriceps L3                        3/5       5/5    3 5                               Hamstrings S1                        4/5       5/5 4+ 5                          Dorsiflexion L4                        5/5       5/5      5 5                                   Plantar flexion S1                    2/5       2/5 2 2                          Gluteus Medius L45S1           3+/5     4/5 4 4                           Gluteus Abdirizak L5S12        3/5       3+/5 4 4                              TREATMENT     Manual therapeutic interventions including joint mobilization and Range of Motion to right knee for 0 minutes including:    Deferred:  Knee extension Range of Motion with overpressure  Knee flexion Range of Motion with overpressure    Mitchell received therapeutic exercises to develop strength, endurance, and ROM for 15 minutes including:    Elliptical 5 min  Seated tail gators 10 # 2x 10  Prone hip internal to external rotation 3x 10    Deferred:  Nustep; 4 minutes resistance 5  Upright Bike; 5 minutes for flexion ROM  Shuttle DBL 6B 2 minutes  Supine Hangs; 15# 5 minutes  Seated heel slides inverted; 10 x10"  Prone Hamstring Curl; 5 x20"       Mitchell participated in neuromuscular re-education activities to improve: Balance, Kinesthetic, and Proprioception for 41 minutes. The following activities were included:    Prone hamstring curls 10 # 3x15  Seated LAQ 3x 10 with strap assist to reach terminal extension 3x 10  Long seated quad set 2x 20  SLR with strap to assist in improving ankle DF and terminal knee extension cues 3 x10  Supine sciatic nerve glides R 3x 15  R hip internal rotation with lower leg off the table " with Assistance from PT to train motor control 2x 10  SAQ 5# 2x20    Deferred:  Prone knee external to internal knee ROM flexed with 10 # on ankle to work on rotator motor control 3x 10  Step ups to 6 inch step with mild parallel bar support 3x 10 R up/L down      Home Exercises Provided and Patient Education Provided     Education/Self-Care provided:    Written Home Exercises Provided: Patient instructed to cont prior HEP.  Exercises were reviewed and Mitchell was able to demonstrate them prior to the end of the session.  Mitchell demonstrated good  understanding of the education provided.     See EMR under Patient Instructions for exercises provided prior visit.    ASSESSMENT     Continued focus of treatment attempting to improve knee Range of Motion. Patient continues to demonstrate limitations with both extension and flexion with tightness and pain present. Continued inclusion of strap assist with long arc quadriceps as well as straight leg raises to assist reaching Discussed with patient about continuing to regularly work on movement throughout the day every day for greater likelihood of progression. Plan to follow up with patient following MD appointment next visit.     Mitchell Is progressing well towards his goals.   Pt prognosis is Good.     Pt will continue to benefit from skilled outpatient physical therapy to address the deficits listed in the problem list box on initial evaluation, provide pt/family education and to maximize pt's level of independence in the home and community environment.     Pt's spiritual, cultural and educational needs considered and pt agreeable to plan of care and goals.     Anticipated barriers to physical therapy: lots of edema    Goals:   Short Term Goals: In 4 weeks:  1.Pt to be educated on HEP. met  2.Patient to demo increased AROM to flexion to 115 degrees or better. progressing  3.Patient to increase strength hip flexion to 4/5 or better. progressing  4.Patient to have decreased  pain to 3/10 or better. progressing  5.Patient to increase LE balance to single leg stand up to 10 sec. progressing  6.Patient to improve score on the FOTO by 10%. No improving yet  7.Patient to decrease swelling by 2 cm. Not met     Long Term Goals: In 10 weeks 6/3/24  1. Patient to perform daily activities including managing steps with 1 rail or one cane up to 3 flights.  2. Patient to demonstrate increased knee AROM to extension to 0.  3. Patient to demonstrate increased LE strength to quad to 5/5 R.  4. Patient to have decreased pain to 2/10.  5. Patient to improve score on the FOTO to 78.    PLAN   Plan of care Certification: 3/25/2024 to 6/3/24.     Outpatient Physical Therapy 2 times weekly for 10 weeks to include the following interventions: Electrical Stimulation IFC, NMES, Gait Training, Manual Therapy, Moist Heat/ Ice, Neuromuscular Re-ed, Patient Education, Self Care, Therapeutic Activities, Therapeutic Exercise, and DN. Continue Plan of Care (POC) and progress per patient tolerance.    Kathleen Nguyen, PT

## 2024-05-10 ENCOUNTER — CLINICAL SUPPORT (OUTPATIENT)
Dept: REHABILITATION | Facility: HOSPITAL | Age: 69
End: 2024-05-10
Payer: MEDICARE

## 2024-05-10 DIAGNOSIS — I89.0 LYMPHEDEMA OF RIGHT LOWER EXTREMITY: ICD-10-CM

## 2024-05-10 DIAGNOSIS — Z74.09 DECREASED FUNCTIONAL MOBILITY AND ENDURANCE: Primary | ICD-10-CM

## 2024-05-10 PROCEDURE — 97110 THERAPEUTIC EXERCISES: CPT | Mod: KX,HCNC,PN,CQ

## 2024-05-10 PROCEDURE — 97112 NEUROMUSCULAR REEDUCATION: CPT | Mod: KX,HCNC,PN,CQ

## 2024-05-10 NOTE — PROGRESS NOTES
"Physical Therapy Daily Treatment Note     Name: Mitchell Mcmillan  Clinic Number: [id number]    Therapy Diagnosis:   Encounter Diagnoses   Name Primary?    Decreased functional mobility and endurance Yes    Lymphedema of right lower extremity          Physician: Shari Florez PA    Visit Date: 5/10/2024    Physician Orders: PT Eval and Treat   Medical Diagnosis from Referral: Z96.651 (ICD-10-CM) - Status post total right knee replacement using cement   Evaluation Date: 3/25/2024  Authorization Period Expiration: 12/31/24  Plan of Care Expiration: 6/3/24  Visit # / Visits authorized: 19/ 20 (including eval)    Foto: 2/3  Progress Note Due Date: 30 days from 5/8/24    Precautions: Standard HTN    Time In: 7:30am   Time Out: 8:33am  Total BillableTime: 30 minutes    SUBJECTIVE     Today, pt reports: is supposed to be getting wraps for his right knee following treatment today.   He was compliant with home exercise program.  Response to previous treatment: minor soreness  Functional change: arrived with knee flexed again today so gait antalgic    Pre-Treatment Pain: 3/10 pain     Location: R knee    OBJECTIVE     Objective measures not updated unless otherwise stated.                              TREATMENT     Manual therapeutic interventions including joint mobilization and Range of Motion to right knee for 05 minutes including:    Knee extension Range of Motion with overpressure  Knee flexion Range of Motion with overpressure    Mitchell received therapeutic exercises to develop strength, endurance, and ROM for 13 minutes including:     Upright Bike; 5 minutes for flexion ROM  Shuttle DBL 6B 2 minutes  Supine Hangs; 15# 5 minutes  Seated heel slides inverted; 10 x10"  Prone quad stretch; 5 x20  Step Ups with BUE as needed; 2 x10       Mitchell participated in neuromuscular re-education activities to improve: Balance, Kinesthetic, and Proprioception for 14 minutes. The following activities were included:      Seated LAQ " "3x 10 with strap assist to reach terminal extension  Long seated quad set with DF 10x 10" (using strap)  SLR with strap assist; 2 x10  SAQ 8# 3 x10  Prone TKE; 3 x10      Home Exercises Provided and Patient Education Provided     Education/Self-Care provided:    Written Home Exercises Provided: Patient instructed to cont prior HEP.  Exercises were reviewed and Mitchell was able to demonstrate them prior to the end of the session.  Mitchell demonstrated good  understanding of the education provided.     See EMR under Patient Instructions for exercises provided prior visit.    ASSESSMENT     Patient continues to present with swelling in right knee. Continued limitations of knee flexion and extension present with discomfort still most present at current end range of flexion. Following supine hangs and manual overpressure for extension patient does demonstrate slight improvements in extension Range of Motion. Discussed with patient about continuing to be persistent with daily activities in attempt for greater likelihood to have progress. Plan to follow up with patient next week about any benefits with the wraps he is supposed to  today.    Mitchell Is progressing well towards his goals.   Pt prognosis is Good.     Pt will continue to benefit from skilled outpatient physical therapy to address the deficits listed in the problem list box on initial evaluation, provide pt/family education and to maximize pt's level of independence in the home and community environment.     Pt's spiritual, cultural and educational needs considered and pt agreeable to plan of care and goals.     Anticipated barriers to physical therapy: lots of edema    Goals:   Short Term Goals: In 4 weeks:  1.Pt to be educated on HEP. met  2.Patient to demo increased AROM to flexion to 115 degrees or better. progressing  3.Patient to increase strength hip flexion to 4/5 or better. progressing  4.Patient to have decreased pain to 3/10 or better. " progressing  5.Patient to increase LE balance to single leg stand up to 10 sec. progressing  6.Patient to improve score on the FOTO by 10%. No improving yet  7.Patient to decrease swelling by 2 cm. Not met     Long Term Goals: In 10 weeks 6/3/24  1. Patient to perform daily activities including managing steps with 1 rail or one cane up to 3 flights.  2. Patient to demonstrate increased knee AROM to extension to 0.  3. Patient to demonstrate increased LE strength to quad to 5/5 R.  4. Patient to have decreased pain to 2/10.  5. Patient to improve score on the FOTO to 78.    PLAN   Plan of care Certification: 3/25/2024 to 6/3/24.     Outpatient Physical Therapy 2 times weekly for 10 weeks to include the following interventions: Electrical Stimulation IFC, NMES, Gait Training, Manual Therapy, Moist Heat/ Ice, Neuromuscular Re-ed, Patient Education, Self Care, Therapeutic Activities, Therapeutic Exercise, and DN. Continue Plan of Care (POC) and progress per patient tolerance.    Piero Lobo, PTA

## 2024-05-15 ENCOUNTER — CLINICAL SUPPORT (OUTPATIENT)
Dept: REHABILITATION | Facility: HOSPITAL | Age: 69
End: 2024-05-15
Payer: MEDICARE

## 2024-05-15 DIAGNOSIS — Z74.09 DECREASED FUNCTIONAL MOBILITY AND ENDURANCE: Primary | ICD-10-CM

## 2024-05-15 DIAGNOSIS — I89.0 LYMPHEDEMA OF RIGHT LOWER EXTREMITY: ICD-10-CM

## 2024-05-15 PROCEDURE — 97112 NEUROMUSCULAR REEDUCATION: CPT | Mod: HCNC,PN

## 2024-05-15 PROCEDURE — 97110 THERAPEUTIC EXERCISES: CPT | Mod: HCNC,PN

## 2024-05-15 PROCEDURE — 97140 MANUAL THERAPY 1/> REGIONS: CPT | Mod: HCNC,PN

## 2024-05-15 NOTE — PLAN OF CARE
Physical Therapy Daily Treatment Note and Progress Note     Name: Mitchell Mcmillan  Clinic Number: [id number]    Therapy Diagnosis:   Encounter Diagnoses   Name Primary?    Decreased functional mobility and endurance Yes    Lymphedema of right lower extremity          Physician: Shari Florez PA    Visit Date: 5/8/2024    Physician Orders: PT Eval and Treat   Medical Diagnosis from Referral: Z96.651 (ICD-10-CM) - Status post total right knee replacement using cement   Evaluation Date: 3/25/2024  Authorization Period Expiration: 12/31/24  Plan of Care Expiration: 6/3/24  Visit # / Visits authorized: 16/ 20 (including eval)    Foto: 2/3  Progress Note Due Date: 30 days from 5/8/24    Precautions: Standard HTN    Time In: 7:08 am   Time Out: 8:04 am  Total BillableTime: 56 minutes one on one with PT    SUBJECTIVE     Today, pt reports: he saw the Dr and if edema doesn't respond to wrapping, they may need to work with draining the knee per pt.  He was compliant with home exercise program.  Response to previous treatment: minor soreness  Functional change: arrived with knee flexed again today so gait antalgic    Pre-Treatment Pain: 5/10 pain     Location: R knee    OBJECTIVE     Girth Measurement:  (L) 47 cm; (R) 52 cm at mid patella at eval; today 51 cm                                               At eval  Today  Knee AROM:                                                  (R)       (L) R L                                      Flexion                         70        115 106 115                                       Extension                    25        0 12 0                                      Prone flexion    82 90                Hook lying flexion   100 112    Hip AROM:                 Flexion                         120      120 120 120                                      ER                               15        30 32 55 (prone)                                      IR                                 20       "  30 35 52 (prone                                        Ankle AROM:             Ankle DF(keara)            0          8 4                                        Strength:                                                        R          L R L  Hip flexors L2                          4/5       5/5 4+ 5  Quadriceps L3                        3/5       5/5    3 5                               Hamstrings S1                        4/5       5/5 4+ 5                          Dorsiflexion L4                        5/5       5/5      5 5                                   Plantar flexion S1                    2/5       2/5 2 2                          Gluteus Medius L45S1           3+/5     4/5 4 4                           Gluteus Abdirizak L5S12        3/5       3+/5 4 4                              TREATMENT     Manual therapeutic interventions including joint mobilization and Range of Motion to right knee for 0 minutes including:    Deferred:  Knee extension Range of Motion with overpressure  Knee flexion Range of Motion with overpressure    Mitchell received therapeutic exercises to develop strength, endurance, and ROM for 15 minutes including:    Elliptical 5 min  Seated tail gators 10 # 2x 10  Prone hip internal to external rotation 3x 10    Deferred:  Nustep; 4 minutes resistance 5  Upright Bike; 5 minutes for flexion ROM  Shuttle DBL 6B 2 minutes  Supine Hangs; 15# 5 minutes  Seated heel slides inverted; 10 x10"  Prone Hamstring Curl; 5 x20"       Mitchell participated in neuromuscular re-education activities to improve: Balance, Kinesthetic, and Proprioception for 41 minutes. The following activities were included:    Prone hamstring curls 10 # 3x15  Seated LAQ 3x 10 with strap assist to reach terminal extension 3x 10  Long seated quad set 2x 20  SLR with strap to assist in improving ankle DF and terminal knee extension cues 3 x10  Supine sciatic nerve glides R 3x 15  R hip internal rotation with lower leg off the table " with Assistance from PT to train motor control 2x 10  SAQ 5# 2x20    Deferred:  Prone knee external to internal knee ROM flexed with 10 # on ankle to work on rotator motor control 3x 10  Step ups to 6 inch step with mild parallel bar support 3x 10 R up/L down      Home Exercises Provided and Patient Education Provided     Education/Self-Care provided:    Written Home Exercises Provided: Patient instructed to cont prior HEP.  Exercises were reviewed and Mitchell was able to demonstrate them prior to the end of the session.  Mitchell demonstrated good  understanding of the education provided.     See EMR under Patient Instructions for exercises provided prior visit.    ASSESSMENT     Continued focus of treatment attempting to improve knee Range of Motion. Patient continues to demonstrate limitations with both extension and flexion with tightness and pain present. Continued inclusion of strap assist with long arc quadriceps as well as straight leg raises to assist reaching Discussed with patient about continuing to regularly work on movement throughout the day every day for greater likelihood of progression. Plan to follow up with patient following MD appointment next visit.     Mitchell Is progressing well towards his goals.   Pt prognosis is Good.     Pt will continue to benefit from skilled outpatient physical therapy to address the deficits listed in the problem list box on initial evaluation, provide pt/family education and to maximize pt's level of independence in the home and community environment.     Pt's spiritual, cultural and educational needs considered and pt agreeable to plan of care and goals.     Anticipated barriers to physical therapy: lots of edema    Goals:   Short Term Goals: In 4 weeks:  1.Pt to be educated on HEP. met  2.Patient to demo increased AROM to flexion to 115 degrees or better. progressing  3.Patient to increase strength hip flexion to 4/5 or better. progressing  4.Patient to have decreased  pain to 3/10 or better. progressing  5.Patient to increase LE balance to single leg stand up to 10 sec. progressing  6.Patient to improve score on the FOTO by 10%. No improving yet  7.Patient to decrease swelling by 2 cm. Not met     Long Term Goals: In 10 weeks 6/3/24  1. Patient to perform daily activities including managing steps with 1 rail or one cane up to 3 flights.  2. Patient to demonstrate increased knee AROM to extension to 0.  3. Patient to demonstrate increased LE strength to quad to 5/5 R.  4. Patient to have decreased pain to 2/10.  5. Patient to improve score on the FOTO to 78.    PLAN   Plan of care Certification: 3/25/2024 to 6/3/24.     Outpatient Physical Therapy 2 times weekly for 10 weeks to include the following interventions: Electrical Stimulation IFC, NMES, Gait Training, Manual Therapy, Moist Heat/ Ice, Neuromuscular Re-ed, Patient Education, Self Care, Therapeutic Activities, Therapeutic Exercise, and DN. Continue Plan of Care (POC) and progress per patient tolerance.    Kathleen Nguyen, PT

## 2024-05-15 NOTE — PROGRESS NOTES
Physical Therapy Daily Treatment Note     Name: Mitchell Mcmillan  Clinic Number: [id number]    Therapy Diagnosis:   Encounter Diagnoses   Name Primary?    Decreased functional mobility and endurance Yes    Lymphedema of right lower extremity          Physician: Shari Florez PA    Visit Date: 5/15/2024    Physician Orders: PT Eval and Treat   Medical Diagnosis from Referral: Z96.651 (ICD-10-CM) - Status post total right knee replacement using cement   Evaluation Date: 3/25/2024  Authorization Period Expiration: 12/31/24  Plan of Care Expiration: 6/3/24  Visit # / Visits authorized: 21/ 20 (including eval)    Foto: 2/3  Progress Note Due Date: 30 days from 5/8/24    Precautions: Standard HTN    Time In: 7:35 am   Time Out: 8:45 am  Total BillableTime: 70 minutes    SUBJECTIVE     Today, pt reports: wraps haven't come in yet. Sharp pain at night is better.   He was compliant with home exercise program.  Response to previous treatment: minor soreness  Functional change: arrived with knee flexed again today so gait antalgic    Pre-Treatment Pain: 5/10 pain     Location: R knee    OBJECTIVE    degrees  Edema less noted today but still very warm to touch  Objective measures not updated unless otherwise stated.                              TREATMENT     Manual therapeutic interventions including joint mobilization and Range of Motion to right knee for  8 minutes including:    Patellar mobs, scar mobs and hamstring soft tissue work to reduce guarding with extension    Deferred:  Knee extension Range of Motion with overpressure  Knee flexion Range of Motion with overpressure    Mitchell received therapeutic exercises to develop strength, endurance, and ROM for 32 minutes including:     Shuttle 6 bands B 3 min ; R single leg 3 bands 3 min  Prone hamstring curls R 2x 10 add 10 # 3x 10 (bolster under thigh)  Prone hamstring stretch static 10 # 1 min x 3 (bolster under thigh)  Seated tail gators 10 # 2 min; then full ROM  "of LAQ 2x 10  Seated STELLA stretch R then L 3x 10 5 sec hold each cues on forward lean to help stretch the glute more    Deferred:  Upright Bike; 5 minutes for flexion ROM  Seated heel slides inverted; 10 x10"  Prone quad stretch; 5 x20         Mitchell participated in neuromuscular re-education activities to improve: Balance, Kinesthetic, and Proprioception for 30 minutes. The following activities were included:      Single leg step up 3 x15 R leading up and L down (performed at treadmill so he could use the rail mildly for support and PT changed pace throughout to change motor control challenges)  Seated LAQ 3x 10 with strap assist to reach terminal extension static hold 5 sec each  Seated LAQ 10 # 3 sec hold x 3x 10 R  Supine (hip and knee at 90) eccentric loaded knee flexion/extension LAQ 4# 2x 10    Deferred:  SLR with strap assist; 2 x10  SAQ 8# 3 x10  Prone TKE; 3 x10      Home Exercises Provided and Patient Education Provided     Education/Self-Care provided:    Written Home Exercises Provided: Patient instructed to cont prior HEP.  Exercises were reviewed and Mitchell was able to demonstrate them prior to the end of the session.  Mitchell demonstrated good  understanding of the education provided.     See EMR under Patient Instructions for exercises provided prior visit.    ASSESSMENT     Patient continues to present with swelling in right knee limiting knee flexion and extension. PT focused more on hip mobility today and eccentric loading as well as VMO loading to increase edea management, balance and functional ROM to allow better quad activation. Pt still waiting on his knee wrap to assist with edema. PT to advance as tolerated but edema really restricts him a lot.    Mitchell Is progressing well towards his goals.   Pt prognosis is Good.     Pt will continue to benefit from skilled outpatient physical therapy to address the deficits listed in the problem list box on initial evaluation, provide pt/family " education and to maximize pt's level of independence in the home and community environment.     Pt's spiritual, cultural and educational needs considered and pt agreeable to plan of care and goals.     Anticipated barriers to physical therapy: lots of edema    Goals:   Short Term Goals: In 4 weeks:  1.Pt to be educated on HEP. met  2.Patient to demo increased AROM to flexion to 115 degrees or better. progressing  3.Patient to increase strength hip flexion to 4/5 or better. progressing  4.Patient to have decreased pain to 3/10 or better. progressing  5.Patient to increase LE balance to single leg stand up to 10 sec. progressing  6.Patient to improve score on the FOTO by 10%. No improving yet  7.Patient to decrease swelling by 2 cm. Not met     Long Term Goals: In 10 weeks 6/3/24  1. Patient to perform daily activities including managing steps with 1 rail or one cane up to 3 flights.  2. Patient to demonstrate increased knee AROM to extension to 0.  3. Patient to demonstrate increased LE strength to quad to 5/5 R.  4. Patient to have decreased pain to 2/10.  5. Patient to improve score on the FOTO to 78.    PLAN   Plan of care Certification: 3/25/2024 to 6/3/24.     Outpatient Physical Therapy 2 times weekly for 10 weeks to include the following interventions: Electrical Stimulation IFC, NMES, Gait Training, Manual Therapy, Moist Heat/ Ice, Neuromuscular Re-ed, Patient Education, Self Care, Therapeutic Activities, Therapeutic Exercise, and DN. Continue Plan of Care (POC) and progress per patient tolerance.    Kathleen Nguyen, PT

## 2024-05-20 ENCOUNTER — CLINICAL SUPPORT (OUTPATIENT)
Dept: REHABILITATION | Facility: HOSPITAL | Age: 69
End: 2024-05-20
Payer: MEDICARE

## 2024-05-20 DIAGNOSIS — Z74.09 DECREASED FUNCTIONAL MOBILITY AND ENDURANCE: Primary | ICD-10-CM

## 2024-05-20 DIAGNOSIS — I89.0 LYMPHEDEMA OF RIGHT LOWER EXTREMITY: ICD-10-CM

## 2024-05-20 PROCEDURE — 97140 MANUAL THERAPY 1/> REGIONS: CPT | Mod: HCNC,PN

## 2024-05-20 PROCEDURE — 97112 NEUROMUSCULAR REEDUCATION: CPT | Mod: HCNC,PN

## 2024-05-20 NOTE — PROGRESS NOTES
Physical Therapy Daily Treatment Note     Name: Mitchell Mcmillan  Clinic Number: [id number]    Therapy Diagnosis:   Encounter Diagnoses   Name Primary?    Decreased functional mobility and endurance Yes    Lymphedema of right lower extremity        Physician: Shari Florez PA    Visit Date: 5/20/2024    Physician Orders: PT Eval and Treat   Medical Diagnosis from Referral: Z96.651 (ICD-10-CM) - Status post total right knee replacement using cement   Evaluation Date: 3/25/2024  Authorization Period Expiration: 12/31/24  Plan of Care Expiration: 6/3/24  Visit # / Visits authorized: 22/ 20 (including eval)    Foto: 2/3  Progress Note Due Date: 30 days from 5/8/24    Precautions: Standard HTN    Time In: 7:16 am (pt late)   Time Out: 8:11 am  Total BillableTime: 55 minutes    SUBJECTIVE     Today, pt reports: wraps haven't come in yet but now are approved by insurance.   He was compliant with home exercise program.  Response to previous treatment: minor soreness  Functional change: arrived with knee flexed again today so gait antalgic    Pre-Treatment Pain: 4/10 pain     Location: R knee    OBJECTIVE    degrees  Edema less in the calf today but the knee is still hot and swollen                              TREATMENT     Manual therapeutic interventions including joint mobilization and Range of Motion to right knee for  2 minutes including:    Patellar mobs, scar mobs and hamstring soft tissue work to reduce guarding with extension and flexion  Scar tight in upper mid scar area still    Mitchell received therapeutic exercises to develop strength, endurance, and ROM for 30 minutes including:     Shuttle 6 bands B 3 min   Shuttle with R calf stretch with knee extension (foot hangs off 1/2 way) on 4 bands 3 min while L foot walks up/down  shuttle R single leg 4 bands 3x10  Prone hamstring curls R 2x 10 add 10 # 3x 10 (bolster under thigh)  Prone hamstring stretch static with hip internal and external rotation using  "stretch 2x 10 each (bolster under thigh)  Seated tail gators 10 # 2 min; then full ROM of LAQ 2x 10  Seated STELLA stretch R then L 3x 10 5 sec hold each cues on forward lean to help stretch the glute more    Deferred:  Upright Bike; 5 minutes for flexion ROM  Seated heel slides inverted; 10 x10"           Mitchell participated in neuromuscular re-education activities to improve: Balance, Kinesthetic, and Proprioception for 23 minutes. The following activities were included:      Long seated quad stretch with quad sets and ankle DF using strap to assist Ankle ROM while engaging the quad 1x 30  Leaning hip extension on plinth 3x 10 B  Seated LAQ 3x 10 with strap assist to reach terminal extension static hold 5 sec each  Seated LAQ 10 # 3 sec hold x 3x 10 R  Single leg step up 2x15 R leading up and L down (performed at steps so L foot could rise 2 steps up and he could use one rail)  Backward step ups R foot on steps (2 rails for safety)  Side step ups 3x 10 6 inch step    Deferred:  Supine (hip and knee at 90) eccentric loaded knee flexion/extension LAQ 4# 2x 10  SLR with strap assist; 2 x10  SAQ 8# 3 x10      Home Exercises Provided and Patient Education Provided     Education/Self-Care provided:    Written Home Exercises Provided: Patient instructed to cont prior HEP.  Exercises were reviewed and Mitchell was able to demonstrate them prior to the end of the session.  Mitchell demonstrated good  understanding of the education provided.     See EMR under Patient Instructions for exercises provided prior visit.    ASSESSMENT     Patient continues to present with swelling in right knee but calf edema was greatly reduced. PT worked to try to help him engage the quad better with a variety of tasks to reduce edema but he is still very limited by the edema. Pt hip rotation and quad stretch are improving so prone flexion is getter better and lateral hip tension is less. . Pt still waiting on his knee wrap to assist with edema. PT " to advance as tolerated but edema really restricts him a lot.    Mitchell Is progressing well towards his goals.   Pt prognosis is Good.     Pt will continue to benefit from skilled outpatient physical therapy to address the deficits listed in the problem list box on initial evaluation, provide pt/family education and to maximize pt's level of independence in the home and community environment.     Pt's spiritual, cultural and educational needs considered and pt agreeable to plan of care and goals.     Anticipated barriers to physical therapy: lots of edema    Goals:   Short Term Goals: In 4 weeks:  1.Pt to be educated on HEP. met  2.Patient to demo increased AROM to flexion to 115 degrees or better. progressing  3.Patient to increase strength hip flexion to 4/5 or better. progressing  4.Patient to have decreased pain to 3/10 or better. progressing  5.Patient to increase LE balance to single leg stand up to 10 sec. progressing  6.Patient to improve score on the FOTO by 10%. No improving yet  7.Patient to decrease swelling by 2 cm. Not met     Long Term Goals: In 10 weeks 6/3/24  1. Patient to perform daily activities including managing steps with 1 rail or one cane up to 3 flights.  2. Patient to demonstrate increased knee AROM to extension to 0.  3. Patient to demonstrate increased LE strength to quad to 5/5 R.  4. Patient to have decreased pain to 2/10.  5. Patient to improve score on the FOTO to 78.    PLAN   Plan of care Certification: 3/25/2024 to 6/3/24.     Outpatient Physical Therapy 2 times weekly for 10 weeks to include the following interventions: Electrical Stimulation IFC, NMES, Gait Training, Manual Therapy, Moist Heat/ Ice, Neuromuscular Re-ed, Patient Education, Self Care, Therapeutic Activities, Therapeutic Exercise, and DN. Continue Plan of Care (POC) and progress per patient tolerance.    Kathleen Nguyen, PT

## 2024-05-24 ENCOUNTER — CLINICAL SUPPORT (OUTPATIENT)
Dept: REHABILITATION | Facility: HOSPITAL | Age: 69
End: 2024-05-24
Payer: MEDICARE

## 2024-05-24 DIAGNOSIS — I89.0 LYMPHEDEMA OF RIGHT LOWER EXTREMITY: ICD-10-CM

## 2024-05-24 DIAGNOSIS — Z74.09 DECREASED FUNCTIONAL MOBILITY AND ENDURANCE: Primary | ICD-10-CM

## 2024-05-24 PROCEDURE — 97112 NEUROMUSCULAR REEDUCATION: CPT | Mod: HCNC,PN

## 2024-05-24 PROCEDURE — 97140 MANUAL THERAPY 1/> REGIONS: CPT | Mod: HCNC,PN

## 2024-05-24 NOTE — PROGRESS NOTES
Physical Therapy Daily Treatment Note     Name: Mitchell Mcmillan  Clinic Number: [id number]    Therapy Diagnosis:   Encounter Diagnoses   Name Primary?    Decreased functional mobility and endurance Yes    Lymphedema of right lower extremity        Physician: Shari Florez PA    Visit Date: 5/24/2024    Physician Orders: PT Eval and Treat   Medical Diagnosis from Referral: Z96.651 (ICD-10-CM) - Status post total right knee replacement using cement   Evaluation Date: 3/25/2024  Authorization Period Expiration: 12/31/24  Plan of Care Expiration: 6/3/24  Visit # / Visits authorized: 23/ 20 (including eval)    Foto: 2/3  Progress Note Due Date: 30 days from 5/8/24    Precautions: Standard HTN    Time In: 7:30 am   Time Out: 8:31 am  Total BillableTime: 31 minutes one on one time with PT only    SUBJECTIVE     Today, pt reports: he had the best pain relief after the last session  He was compliant with home exercise program.  Response to previous treatment: minor soreness  Functional change: arrived with knee flexed again today so gait antalgic    Pre-Treatment Pain: 6/10 pain     Location: R knee    OBJECTIVE                                 TREATMENT     Manual therapeutic interventions including joint mobilization and Range of Motion to right knee for  10 minutes including (with PT):    R hip mobs for increase hip external rotation to increase STELLA ROM, knee extension and flexion  R LE long leg axial pumps    Deferred:  Patellar mobs, scar mobs and hamstring soft tissue work to reduce guarding with extension and flexion  Scar tight in upper mid scar area still    Mitchell received therapeutic exercises to develop strength, endurance, and ROM for 30 minutes including (with tech)    Bike seat 11 6 min   Shuttle 6 bands B 3 min   Shuttle with R calf stretch with knee extension (foot hangs off 1/2 way) on 4 bands 3x10 while L foot walks up/down  shuttle R single leg 4 bands 3x10  Prone hamstring curls R 2x 10 add 10 # 3x  "10 (bolster under thigh)  Prone hamstring stretch static with hip internal and external rotation using stretch 2x 10 each (bolster under thigh) 10 #  Seated tail gators 10 # 2 min; then full ROM of LAQ 1x 10  Seated STELLA stretch R then L 1x 10 5 sec hold each cues on forward lean to help stretch the glute more    Deferred:  Seated heel slides inverted; 10 x10"       Mitchell participated in neuromuscular re-education activities to improve: Balance, Kinesthetic, and Proprioception for 21 minutes. The following activities were included (with PT and tech):      Long seated quad stretch with quad sets and ankle DF using strap to assist Ankle ROM while engaging the quad 1x 30  Leaning hip extension on plinth 3x 10 B  Seated LAQ 2x 10 with strap assist to reach terminal extension static hold 5 sec each  Seated LAQ 10 # 3 sec hold x 3x 10 R  Backward step ups R foot on steps (2 rails for safety      Deferred:  Single leg step up 2x15 R leading up and L down (performed at steps so L foot could rise 2 steps up and he could use one rail)  )  Side step ups 3x 10 6 inch step  Supine (hip and knee at 90) eccentric loaded knee flexion/extension LAQ 4# 2x 10  SLR with strap assist; 2 x10  SAQ 8# 3 x10      Home Exercises Provided and Patient Education Provided     Education/Self-Care provided:    Written Home Exercises Provided: Patient instructed to cont prior HEP.  Exercises were reviewed and Mitchell was able to demonstrate them prior to the end of the session.  Mitchell demonstrated good  understanding of the education provided.     See EMR under Patient Instructions for exercises provided prior visit.    ASSESSMENT     Patient continues to present with swelling in right knee but on arrival today reported he had the best pain relief after our last session. PT proceeded with him directly to the bike which usually he is limited with on full circles at the early part of the session. However, pt was able to fully perform the circles on " arrival suggesting less tension and improved knee flexion ROM than on prior visits, functionally.  PT to advance as tolerated but edema continues to restricts him and recruiting the VMO is still a challenge at [facility] due to this and due to poor motor control. PT assisted ROBB LAQ to help him move towards functional quad sets.       Mitchell Is progressing well towards his goals.   Pt prognosis is Good.     Pt will continue to benefit from skilled outpatient physical therapy to address the deficits listed in the problem list box on initial evaluation, provide pt/family education and to maximize pt's level of independence in the home and community environment.     Pt's spiritual, cultural and educational needs considered and pt agreeable to plan of care and goals.     Anticipated barriers to physical therapy: lots of edema    Goals:   Short Term Goals: In 4 weeks:  1.Pt to be educated on HEP. met  2.Patient to demo increased AROM to flexion to 115 degrees or better. progressing  3.Patient to increase strength hip flexion to 4/5 or better. progressing  4.Patient to have decreased pain to 3/10 or better. progressing  5.Patient to increase LE balance to single leg stand up to 10 sec. progressing  6.Patient to improve score on the FOTO by 10%. No improving yet  7.Patient to decrease swelling by 2 cm. Not met     Long Term Goals: In 10 weeks 6/3/24  1. Patient to perform daily activities including managing steps with 1 rail or one cane up to 3 flights.  2. Patient to demonstrate increased knee AROM to extension to 0.  3. Patient to demonstrate increased LE strength to quad to 5/5 R.  4. Patient to have decreased pain to 2/10.  5. Patient to improve score on the FOTO to 78.    PLAN   Plan of care Certification: 3/25/2024 to 6/3/24.     Outpatient Physical Therapy 2 times weekly for 10 weeks to include the following interventions: Electrical Stimulation IFC, NMES, Gait Training, Manual Therapy, Moist Heat/ Ice, Neuromuscular  Re-ed, Patient Education, Self Care, Therapeutic Activities, Therapeutic Exercise, and DN. Continue Plan of Care (POC) and progress per patient tolerance.    Kathleen Nguyen, PT

## 2024-05-29 ENCOUNTER — CLINICAL SUPPORT (OUTPATIENT)
Dept: REHABILITATION | Facility: HOSPITAL | Age: 69
End: 2024-05-29
Payer: MEDICARE

## 2024-05-29 DIAGNOSIS — Z74.09 DECREASED FUNCTIONAL MOBILITY AND ENDURANCE: Primary | ICD-10-CM

## 2024-05-29 DIAGNOSIS — I89.0 LYMPHEDEMA OF RIGHT LOWER EXTREMITY: ICD-10-CM

## 2024-05-29 PROCEDURE — 97140 MANUAL THERAPY 1/> REGIONS: CPT | Mod: HCNC,PN

## 2024-05-29 PROCEDURE — 97112 NEUROMUSCULAR REEDUCATION: CPT | Mod: HCNC,PN

## 2024-05-29 NOTE — PROGRESS NOTES
Physical Therapy Daily Treatment Note     Name: Mitchell Mcmillan  Clinic Number: [id number]    Therapy Diagnosis:   Encounter Diagnoses   Name Primary?    Decreased functional mobility and endurance Yes    Lymphedema of right lower extremity        Physician: Shari Florez PA    Visit Date: 5/29/2024    Physician Orders: PT Eval and Treat   Medical Diagnosis from Referral: Z96.651 (ICD-10-CM) - Status post total right knee replacement using cement   Evaluation Date: 3/25/2024  Authorization Period Expiration: 12/31/24  Plan of Care Expiration: 6/3/24  Visit # / Visits authorized: 23/ 20 (including eval)    Foto: 2/3  Progress Note Due Date: 30 days from 5/8/24    Precautions: Standard HTN    Time In: 7:50 am   Time Out: 9:05 am  Total BillableTime: 25 minutes one on one time with PT only    SUBJECTIVE     Today, pt reports: he had a great last session until he fell asleep Sunday night with R leg hanging off edge of bed and work with no tolerance for good knee extension. Limping since then and did not do his HEP. He feels like he pulled something as he can't move the knee well.    He was  not compliant with home exercise program.  Response to previous treatment: minor soreness  Functional change: arrived with knee flexed again today so gait antalgic    Pre-Treatment Pain: 8/10 pain   Post pain: 4/10    Location: R knee    OBJECTIVE     40 to 90 degrees ROM on arrival (very guarded) at arrival   degrees at end of session  Tight lateral hip guarding      TREATMENT     Manual therapeutic interventions including joint mobilization and Range of Motion to right knee for  15 minutes including (with PT):    R LE long leg axial pumps  Soft tissue mobs to the hamstring on R and quad  Patellar mobs    Deferred:  R hip mobs for increase hip external rotation to increase STELLA ROM, knee extension and flexion  Scar tight in upper mid scar area still    Mitchell received therapeutic exercises to develop strength, endurance,  "and ROM for 35 minutes including (with tech)    Long seated hamstring stretch 2x10  Seated LAQ 3x 10 R 2 sec hold  Seated tail gators 2 min  SLR 2x 10 per leg  Prone hamstring curls 3 x20 R for ROM  Leaning hip extension 3x 10  Seated tail gators 5 # 2 min; then full ROM of LAQ 4x 10 5 #    Deferred:  Bike seat 11 6 min   Shuttle 6 bands B 3 min   Shuttle with R calf stretch with knee extension (foot hangs off 1/2 way) on 4 bands 3x10 while L foot walks up/down  shuttle R single leg 4 bands 3x10  Seated STELLA stretch R then L 1x 10 5 sec hold each cues on forward lean to help stretch the glute more  Seated heel slides inverted; 10 x10"       Mitchell participated in neuromuscular re-education activities to improve: Balance, Kinesthetic, and Proprioception for 25 minutes. The following activities were included (with PT and tech):      Long seated quad stretch with quad sets and ankle DF using strap to assist Ankle ROM while engaging the quad 3x 30  Prone hamstring curls 5# 3x 30 motor control  Seated LAQ 5# # 3 sec hold x 3x 20 R motor control    Deferred:  Backward step ups R foot on steps (2 rails for safety  Single leg step up 2x15 R leading up and L down (performed at steps so L foot could rise 2 steps up and he could use one rail)  )  Side step ups 3x 10 6 inch step  Supine (hip and knee at 90) eccentric loaded knee flexion/extension LAQ 4# 2x 10  SLR with strap assist; 2 x10  SAQ 8# 3 x10      Home Exercises Provided and Patient Education Provided     Education/Self-Care provided:    Written Home Exercises Provided: Patient instructed to cont prior HEP.  Exercises were reviewed and Mitchell was able to demonstrate them prior to the end of the session.  Mitchell demonstrated good  understanding of the education provided.     See EMR under Patient Instructions for exercises provided prior visit.    ASSESSMENT     Patient arrived with severe swelling reporting he thinks he pulled something. PT noted ROM loss and edema " that improved with tasks to reduce edema, engage the quad and reducing guarding of the hamstring and hip flexor. PT found no significant heat or signs of tears but quad activation was poor due to guarding and improved by end of session. PT to advance at tolerated. Edema behind knee but calf no edema. ROM improved a lot after PT session.       Mitchell Is progressing well towards his goals.   Pt prognosis is Good.     Pt will continue to benefit from skilled outpatient physical therapy to address the deficits listed in the problem list box on initial evaluation, provide pt/family education and to maximize pt's level of independence in the home and community environment.     Pt's spiritual, cultural and educational needs considered and pt agreeable to plan of care and goals.     Anticipated barriers to physical therapy: lots of edema    Goals:   Short Term Goals: In 4 weeks:  1.Pt to be educated on HEP. met  2.Patient to demo increased AROM to flexion to 115 degrees or better. progressing  3.Patient to increase strength hip flexion to 4/5 or better. progressing  4.Patient to have decreased pain to 3/10 or better. progressing  5.Patient to increase LE balance to single leg stand up to 10 sec. progressing  6.Patient to improve score on the FOTO by 10%. No improving yet  7.Patient to decrease swelling by 2 cm. Not met     Long Term Goals: In 10 weeks 6/3/24  1. Patient to perform daily activities including managing steps with 1 rail or one cane up to 3 flights.  2. Patient to demonstrate increased knee AROM to extension to 0.  3. Patient to demonstrate increased LE strength to quad to 5/5 R.  4. Patient to have decreased pain to 2/10.  5. Patient to improve score on the FOTO to 78.    PLAN   Plan of care Certification: 3/25/2024 to 6/3/24.     Outpatient Physical Therapy 2 times weekly for 10 weeks to include the following interventions: Electrical Stimulation IFC, NMES, Gait Training, Manual Therapy, Moist Heat/ Ice,  Neuromuscular Re-ed, Patient Education, Self Care, Therapeutic Activities, Therapeutic Exercise, and DN. Continue Plan of Care (POC) and progress per patient tolerance.    Kathleen Nguyen, PT

## 2024-06-03 ENCOUNTER — CLINICAL SUPPORT (OUTPATIENT)
Dept: REHABILITATION | Facility: HOSPITAL | Age: 69
End: 2024-06-03
Payer: MEDICARE

## 2024-06-03 DIAGNOSIS — I89.0 LYMPHEDEMA OF RIGHT LOWER EXTREMITY: ICD-10-CM

## 2024-06-03 DIAGNOSIS — Z74.09 DECREASED FUNCTIONAL MOBILITY AND ENDURANCE: Primary | ICD-10-CM

## 2024-06-03 PROCEDURE — 97110 THERAPEUTIC EXERCISES: CPT | Mod: HCNC,PN

## 2024-06-03 PROCEDURE — 97112 NEUROMUSCULAR REEDUCATION: CPT | Mod: HCNC,PN

## 2024-06-03 NOTE — PROGRESS NOTES
Physical Therapy Daily Treatment Note and Plan of Care Update     Name: Mitchell Mcimllan  Clinic Number: [id number]    Therapy Diagnosis:   Encounter Diagnoses   Name Primary?    Decreased functional mobility and endurance Yes    Lymphedema of right lower extremity        Physician: Shari Florez PA    Visit Date: 6/3/2024    Physician Orders: PT Eval and Treat   Medical Diagnosis from Referral: Z96.651 (ICD-10-CM) - Status post total right knee replacement using cement   Evaluation Date: 3/25/2024  Authorization Period Expiration: 12/31/24  Plan of Care Expiration: 6/3/24 extend to 7/5/24  Visit # / Visits authorized: 25/ 30 (including eval)    Foto: 2/3  Progress Note Due Date: 30 days from 6/3/24    Precautions: Standard HTN    Time In: 7:17 am  (pt late)  Time Out: 8:08 am  Total BillableTime: 38 minutes one on one time with PT only    SUBJECTIVE     Today, pt reports: he is moving better but still doesn't have his swelling brace and he is not able to get the leg fully straight.    He was compliant with home exercise program.  Response to previous treatment: minor soreness  Functional change: arrived with knee flexed again today so gait antalgic    Pre-Treatment Pain: 7/10 pain   Post pain: 4/10    Location: R knee    OBJECTIVE        degrees at end of session today as arrived again in knee flexion around 20-30 degrees  Walking better than last session      TREATMENT     Manual therapeutic interventions including joint mobilization and Range of Motion to right knee for  0  minutes including (with PT):    Deferred:  R LE long leg axial pumps  Soft tissue mobs to the hamstring on R and quad  Patellar mobs  R hip mobs for increase hip external rotation to increase STELLA ROM, knee extension and flexion  Scar tight in upper mid scar area still    Mitchell received therapeutic exercises to develop strength, endurance, and ROM for 11 minutes including (with tech)    Shuttle 7 banded squats 3 min  Seated tail  "destiney 3 min with 10 #  Long seated hamstring stretch 2x10    Deferred:  Seated LAQ 3x 10 R 2 sec hold  SLR 2x 10 per leg  Leaning hip extension 3x 10  Bike seat 11 6 min   Shuttle 6 bands B 3 min   Shuttle with R calf stretch with knee extension (foot hangs off 1/2 way) on 4 bands 3x10 while L foot walks up/down  shuttle R single leg 4 bands 3x10  Seated STELLA stretch R then L 1x 10 5 sec hold each cues on forward lean to help stretch the glute more  Seated heel slides inverted; 10 x10"       Mitchell participated in neuromuscular re-education activities to improve: Balance, Kinesthetic, and Proprioception for 40 minutes. The following activities were included (with PT and tech):      Shuttle R knee VMO held in extension while L foot walks up/down 5x 10  R single leg press on shuttle 2 min with max cues on quad set  R hip adduction activation (supine straight leg from 90 hip flexion performing horizontal abduction) 3x 8  Prone hamstring curls 2x 10 then 3 x10 R for ROM 10 #  Supine hip flexion with L knee in flexion and R hanging off table for hip flexion with 10 # 3x 10  Prone hip internal external rotation 10 # 2x 10  Long seated quad stretch with quad sets and ankle DF using strap to assist Ankle ROM while engaging the quad 3x 30  Long seated hip flexion over cone with L LE hanging off edge of bed with focus on VMO 2x 8 R (fatigues quickly)  Seated LAQ 10 #1x 15  R motor control    Deferred:  Backward step ups R foot on steps (2 rails for safety  Single leg step up 2x15 R leading up and L down (performed at steps so L foot could rise 2 steps up and he could use one rail)  Side step ups 3x 10 6 inch step  Supine (hip and knee at 90) eccentric loaded knee flexion/extension LAQ 4# 2x 10  SLR with strap assist; 2 x10  SAQ 8# 3 x10      Home Exercises Provided and Patient Education Provided     Education/Self-Care provided:    Written Home Exercises Provided: Patient instructed to cont prior HEP.  Exercises were " reviewed and Mitchell was able to demonstrate them prior to the end of the session.  Mitchell demonstrated good  understanding of the education provided.     See EMR under Patient Instructions for exercises provided prior visit.    ASSESSMENT     Patient is using poor knee extension still with gait. PT noted a lot of calf and knee limited knee extension considerably. Pt still has very poor VMO activation.  PT to advance at tolerated. ROM did not improve a lot today.      Mitchell Is progressing well towards his goals.   Pt prognosis is Good.     Pt will continue to benefit from skilled outpatient physical therapy to address the deficits listed in the problem list box on initial evaluation, provide pt/family education and to maximize pt's level of independence in the home and community environment.     Pt's spiritual, cultural and educational needs considered and pt agreeable to plan of care and goals.     Anticipated barriers to physical therapy: lots of edema    Goals:   Short Term Goals: In 4 weeks:  1.Pt to be educated on HEP. met  2.Patient to demo increased AROM to flexion to 115 degrees or better. progressing  3.Patient to increase strength hip flexion to 4/5 or better. progressing  4.Patient to have decreased pain to 3/10 or better. progressing  5.Patient to increase LE balance to single leg stand up to 10 sec. progressing  6.Patient to improve score on the FOTO by 10%. No improving yet  7.Patient to decrease swelling by 2 cm. Not met     Long Term Goals: In 10 weeks 6/3/24 extend to 7/5/24  1. Patient to perform daily activities including managing steps with 1 rail or one cane up to 3 flights.  2. Patient to demonstrate increased knee AROM to extension to 0. Progressing poorly  3. Patient to demonstrate increased LE strength to quad to 5/5 R. Progressing poorly due to edema  4. Patient to have decreased pain to 2/10. Not met  5. Patient to improve score on the FOTO to 78. Not met    PLAN   Plan of care  Certification: 3/25/2024 to 6/3/24 extend to 7/5/24     Outpatient Physical Therapy 2 times weekly for 5 weeks ( effective week of 6/3/24) to include the following interventions: Electrical Stimulation IFC, NMES, Gait Training, Manual Therapy, Moist Heat/ Ice, Neuromuscular Re-ed, Patient Education, Self Care, Therapeutic Activities, Therapeutic Exercise, and DN. Continue Plan of Care (POC) and progress per patient tolerance.    Kathleen Nguyen, PT

## 2024-06-03 NOTE — PLAN OF CARE
Physical Therapy Daily Treatment Note and Plan of Care Update     Name: Mitchell Mcmillan  Clinic Number: [id number]    Therapy Diagnosis:   Encounter Diagnoses   Name Primary?    Decreased functional mobility and endurance Yes    Lymphedema of right lower extremity        Physician: Shari Florez PA    Visit Date: 6/3/2024    Physician Orders: PT Eval and Treat   Medical Diagnosis from Referral: Z96.651 (ICD-10-CM) - Status post total right knee replacement using cement   Evaluation Date: 3/25/2024  Authorization Period Expiration: 12/31/24  Plan of Care Expiration: 6/3/24 extend to 7/5/24  Visit # / Visits authorized: 25/ 30 (including eval)    Foto: 2/3  Progress Note Due Date: 30 days from 6/3/24    Precautions: Standard HTN    Time In: 7:17 am  (pt late)  Time Out: 8:08 am  Total BillableTime: 38 minutes one on one time with PT only    SUBJECTIVE     Today, pt reports: he is moving better but still doesn't have his swelling brace and he is not able to get the leg fully straight.    He was compliant with home exercise program.  Response to previous treatment: minor soreness  Functional change: arrived with knee flexed again today so gait antalgic    Pre-Treatment Pain: 7/10 pain   Post pain: 4/10    Location: R knee    OBJECTIVE        degrees at end of session today as arrived again in knee flexion around 20-30 degrees  Walking better than last session      TREATMENT     Manual therapeutic interventions including joint mobilization and Range of Motion to right knee for  0  minutes including (with PT):    Deferred:  R LE long leg axial pumps  Soft tissue mobs to the hamstring on R and quad  Patellar mobs  R hip mobs for increase hip external rotation to increase STELLA ROM, knee extension and flexion  Scar tight in upper mid scar area still    Mitchell received therapeutic exercises to develop strength, endurance, and ROM for 11 minutes including (with tech)    Shuttle 7 banded squats 3 min  Seated tail  "destiney 3 min with 10 #  Long seated hamstring stretch 2x10    Deferred:  Seated LAQ 3x 10 R 2 sec hold  SLR 2x 10 per leg  Leaning hip extension 3x 10  Bike seat 11 6 min   Shuttle 6 bands B 3 min   Shuttle with R calf stretch with knee extension (foot hangs off 1/2 way) on 4 bands 3x10 while L foot walks up/down  shuttle R single leg 4 bands 3x10  Seated STELLA stretch R then L 1x 10 5 sec hold each cues on forward lean to help stretch the glute more  Seated heel slides inverted; 10 x10"       Mitchell participated in neuromuscular re-education activities to improve: Balance, Kinesthetic, and Proprioception for 40 minutes. The following activities were included (with PT and tech):      Shuttle R knee VMO held in extension while L foot walks up/down 5x 10  R single leg press on shuttle 2 min with max cues on quad set  R hip adduction activation (supine straight leg from 90 hip flexion performing horizontal abduction) 3x 8  Prone hamstring curls 2x 10 then 3 x10 R for ROM 10 #  Supine hip flexion with L knee in flexion and R hanging off table for hip flexion with 10 # 3x 10  Prone hip internal external rotation 10 # 2x 10  Long seated quad stretch with quad sets and ankle DF using strap to assist Ankle ROM while engaging the quad 3x 30  Long seated hip flexion over cone with L LE hanging off edge of bed with focus on VMO 2x 8 R (fatigues quickly)  Seated LAQ 10 #1x 15  R motor control    Deferred:  Backward step ups R foot on steps (2 rails for safety  Single leg step up 2x15 R leading up and L down (performed at steps so L foot could rise 2 steps up and he could use one rail)  Side step ups 3x 10 6 inch step  Supine (hip and knee at 90) eccentric loaded knee flexion/extension LAQ 4# 2x 10  SLR with strap assist; 2 x10  SAQ 8# 3 x10      Home Exercises Provided and Patient Education Provided     Education/Self-Care provided:    Written Home Exercises Provided: Patient instructed to cont prior HEP.  Exercises were " reviewed and Mitchell was able to demonstrate them prior to the end of the session.  Mitchell demonstrated good  understanding of the education provided.     See EMR under Patient Instructions for exercises provided prior visit.    ASSESSMENT     Patient is using poor knee extension still with gait. PT noted a lot of calf and knee limited knee extension considerably. Pt still has very poor VMO activation.  PT to advance at tolerated. ROM did not improve a lot today.      Mitchell Is progressing well towards his goals.   Pt prognosis is Good.     Pt will continue to benefit from skilled outpatient physical therapy to address the deficits listed in the problem list box on initial evaluation, provide pt/family education and to maximize pt's level of independence in the home and community environment.     Pt's spiritual, cultural and educational needs considered and pt agreeable to plan of care and goals.     Anticipated barriers to physical therapy: lots of edema    Goals:   Short Term Goals: In 4 weeks:  1.Pt to be educated on HEP. met  2.Patient to demo increased AROM to flexion to 115 degrees or better. progressing  3.Patient to increase strength hip flexion to 4/5 or better. progressing  4.Patient to have decreased pain to 3/10 or better. progressing  5.Patient to increase LE balance to single leg stand up to 10 sec. progressing  6.Patient to improve score on the FOTO by 10%. No improving yet  7.Patient to decrease swelling by 2 cm. Not met     Long Term Goals: In 10 weeks 6/3/24 extend to 7/5/24  1. Patient to perform daily activities including managing steps with 1 rail or one cane up to 3 flights.  2. Patient to demonstrate increased knee AROM to extension to 0. Progressing poorly  3. Patient to demonstrate increased LE strength to quad to 5/5 R. Progressing poorly due to edema  4. Patient to have decreased pain to 2/10. Not met  5. Patient to improve score on the FOTO to 78. Not met    PLAN   Plan of care  Certification: 3/25/2024 to 6/3/24 extend to 7/5/24     Outpatient Physical Therapy 2 times weekly for 5 weeks ( effective week of 6/3/24) to include the following interventions: Electrical Stimulation IFC, NMES, Gait Training, Manual Therapy, Moist Heat/ Ice, Neuromuscular Re-ed, Patient Education, Self Care, Therapeutic Activities, Therapeutic Exercise, and DN. Continue Plan of Care (POC) and progress per patient tolerance.    Kathleen Nguyen, PT

## 2024-06-06 ENCOUNTER — TELEPHONE (OUTPATIENT)
Dept: PREADMISSION TESTING | Facility: HOSPITAL | Age: 69
End: 2024-06-06
Payer: MEDICARE

## 2024-06-06 ENCOUNTER — HOSPITAL ENCOUNTER (OUTPATIENT)
Dept: RADIOLOGY | Facility: HOSPITAL | Age: 69
Discharge: HOME | End: 2024-06-06
Attending: ORTHOPAEDIC SURGERY
Payer: MEDICARE

## 2024-06-06 ENCOUNTER — OFFICE VISIT (OUTPATIENT)
Dept: ORTHOPEDICS | Facility: CLINIC | Age: 69
End: 2024-06-06
Payer: MEDICARE

## 2024-06-06 VITALS — HEIGHT: 76 IN | BODY MASS INDEX: 31.84 KG/M2 | WEIGHT: 261.44 LBS

## 2024-06-06 DIAGNOSIS — M17.11 ARTHRITIS OF KNEE, RIGHT: ICD-10-CM

## 2024-06-06 DIAGNOSIS — Z01.818 PREOP TESTING: Primary | ICD-10-CM

## 2024-06-06 DIAGNOSIS — Z96.651 STATUS POST TOTAL RIGHT KNEE REPLACEMENT USING CEMENT: Primary | ICD-10-CM

## 2024-06-06 DIAGNOSIS — Z96.651 STATUS POST TOTAL RIGHT KNEE REPLACEMENT USING CEMENT: ICD-10-CM

## 2024-06-06 DIAGNOSIS — M24.661 ARTHROFIBROSIS OF KNEE JOINT, RIGHT: ICD-10-CM

## 2024-06-06 DIAGNOSIS — M24.661 ARTHROFIBROSIS OF KNEE JOINT, RIGHT: Primary | ICD-10-CM

## 2024-06-06 DIAGNOSIS — Z96.652 HISTORY OF TOTAL LEFT KNEE REPLACEMENT: ICD-10-CM

## 2024-06-06 PROCEDURE — 99999 PR PBB SHADOW E&M-EST. PATIENT-LVL III: CPT | Mod: PBBFAC,HCNC,, | Performed by: ORTHOPAEDIC SURGERY

## 2024-06-06 PROCEDURE — 1125F AMNT PAIN NOTED PAIN PRSNT: CPT | Mod: CPTII,S$GLB,, | Performed by: ORTHOPAEDIC SURGERY

## 2024-06-06 PROCEDURE — 73562 X-RAY EXAM OF KNEE 3: CPT | Mod: 26,HCNC,LT, | Performed by: RADIOLOGY

## 2024-06-06 PROCEDURE — 73564 X-RAY EXAM KNEE 4 OR MORE: CPT | Mod: 26,HCNC,RT, | Performed by: RADIOLOGY

## 2024-06-06 PROCEDURE — 73562 X-RAY EXAM OF KNEE 3: CPT | Mod: TC,HCNC,LT

## 2024-06-06 PROCEDURE — 3288F FALL RISK ASSESSMENT DOCD: CPT | Mod: CPTII,S$GLB,, | Performed by: ORTHOPAEDIC SURGERY

## 2024-06-06 PROCEDURE — 99024 POSTOP FOLLOW-UP VISIT: CPT | Mod: S$GLB,,, | Performed by: ORTHOPAEDIC SURGERY

## 2024-06-06 PROCEDURE — 73564 X-RAY EXAM KNEE 4 OR MORE: CPT | Mod: TC,HCNC,RT

## 2024-06-06 PROCEDURE — 4010F ACE/ARB THERAPY RXD/TAKEN: CPT | Mod: CPTII,S$GLB,, | Performed by: ORTHOPAEDIC SURGERY

## 2024-06-06 PROCEDURE — 1159F MED LIST DOCD IN RCRD: CPT | Mod: CPTII,S$GLB,, | Performed by: ORTHOPAEDIC SURGERY

## 2024-06-06 PROCEDURE — 1101F PT FALLS ASSESS-DOCD LE1/YR: CPT | Mod: CPTII,S$GLB,, | Performed by: ORTHOPAEDIC SURGERY

## 2024-06-06 RX ORDER — OXYCODONE AND ACETAMINOPHEN 10; 325 MG/1; MG/1
1 TABLET ORAL EVERY 8 HOURS PRN
Qty: 30 TABLET | Refills: 0 | Status: ON HOLD | OUTPATIENT
Start: 2024-06-06 | End: 2024-06-12 | Stop reason: HOSPADM

## 2024-06-06 NOTE — PATIENT INSTRUCTIONS
You having severe limitation of motion not like the other side that was done last year  The right knee motion is around 5° to 95° despite physical therapy pushing on it  You still having quite a bit of pain around 7/10  Gabapentin 300 mg take 2 tablets at night instead of 1 in the morning 1 at night  You having hard time sleeping  You having stiffness in the knee   We will proceed with manipulation of your right total knee replacement   The are slight risk of getting a fracture during the manipulation however we get x-ray after we manipulate to confirm there was no fracture occurring.  There is slight risk of nerve damage blood clot.  You had a previous ultrasound that was negative for blood clot.  Please wear your compressive stockings and as well where you neoprene sleeves around the knee to help with the swelling   Will schedule you for next week right total knee manipulation

## 2024-06-06 NOTE — H&P (VIEW-ONLY)
Subjective:     Patient ID: Mitchell Mcmillan is a 68 y.o. male.    Chief Complaint: Pain and Post-op Evaluation of the Right Knee    HPI:  03/27/2023   Bilateral knee pain left worse than the right.  Patient states he received numerous injections over couple years now from steroid injections to gel injections.  Received Depo-Medrol on 07/28/2021 in both of his knees followed by Monovisc on 09/13/2021 and most recently Kenalog in both of his knees 01/13/2023.  He said they do help but not lasting long enough.  His pain is 9/10.  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  He put some lotion on his knee and then he puts the cup her neoprene sleeves.  He does use topicals get better.  Patient stated in 2006 fracture dislocated the left hip requiring ORIF of posterior acetabulum by Dr. Tanya Arredondo.  He developed after that footdrop on the left side which eventually got better with time.  He does take Tylenol.  History of GERD and numerous other medical issues that he avoids NSAIDs   He lives with his wife.  He does take Tylenol.  He does not have any kidney or ulcers  No fever no chills no shortness of breath difficulty with chewing swallowing loss of bowel bladder control blurry vision double vision loss sense smell or taste      08/31/2023   Left TKA says it feels very strong it is strong side because he has severe pain on the right knee.  His pain is around 8/10 mostly in the right side but on the left he said he is much much better.  He feels pain in the right knee wakes him up at night he does not think that meloxicam is doing anything.  He is requesting some Norco as I did tell him it make him get hooked up on the medication in the drugs we should stop it completely at this time.  He is candidate to have the right knee replaced since he is bone-on-bone but this is up to him when he would decide to proceed that way.  He can think me about going to physical  therapy at [facility] on [address].  Despite having pain he is pleased with his left knee results because it is much stronger and depends on it to walk.  He is wearing his neoprene sleeves on.  He said he is getting better he is happy he had the surgery so far.      01/04/2024   Left TKA 05/31/2023 doing extremely well with that he said occasional aches but not on a daily basis.  His pain is around 2/10 in the left knee.  His pain overall is 7/10 because of the right knee.  He still taking Celebrex, diclofenac gel and gabapentin at night.  He wants to proceed with the right TKA.  He feels that if he gets the relief he got on the left side he will be doing just fine.  He knows there is nothing that gets rid of all the pains.  He had failed numerous non operative treatments over the years including injections now he is on NSAIDs.  He still does work in the yd cuts the grass.  Previous history of hip dislocation the left status post ORIF years ago by Dr. Lisa with postop left footdrop which recuperate it with time but left them with slight weak left foot  No fever no chills no shortness of breath or difficulty with chewing swallowing loss bowel bladder control  He does occasionally wear a neoprene sleeve  He did use a walker after surgery he has not using anything at this time ambulate  He states the left TKA gave him 80% improvement    06/06/2024   Right TKA 03/04/2024 using [facility] posterior stabilize to match the left TKA that was performed 05/31/2023  Patient did his physical therapy ambulating carrying his cane in his hand.  He is having hard time with his right total knee with therapy he said he can not get it to bend and they put him on a stomach and they tried to stretch it.  He is having quite a bit of pain around 6-7/10.  He is taking gabapentin 300 twice a day occasional Percocets.  As far as the left knee is concerned he said he has no problems with that he has great motion very happy with the  results.  No fever no chills no shortness of breath or difficulty with chewing swallowing loss of bowel bladder control  He stated the right knee is definitely different than the left  Past Medical History:   Diagnosis Date    Arthritis     knees    Asymmetric SNHL (sensorineural hearing loss) 2017    Chronic left hip pain 2006    Dislocated left hip, job injury    Chronic prostatitis     per pt, last episode     Elevated PSA, less than 10 ng/ml     per pt last elevation     Gastroesophageal reflux disease 8/3/2022    Hyperlipidemia     Hypertension     Joint effusion of knee     per pt rt knee, seen by Dr. Duke     Midline low back pain without sciatica 2016    Obesity     Special screening for malignant neoplasms, colon 10/5/2016    Tobacco dependence     Trouble in sleeping      Past Surgical History:   Procedure Laterality Date    arthrocentesis Right     Per pt , knee by Dr. BONNIE Duke    COLONOSCOPY N/A 10/05/2016    Normal exam repeat 10 yrs. Procedure: COLONOSCOPY;  Surgeon: Alexander Goetz MD;  Location: [facility];  Service: Endoscopy;  Laterality: N/A;    HIP ARTHROPLASTY Left 2006    Per pt, dislocated left hip/ hardware placed  by Dr. Lisa    left orchiectomy      PROSTATE BIOPSY      procedure performed at [facility] due to elevated PSA - benign    TOTAL KNEE ARTHROPLASTY Left 2023    Procedure: ARTHROPLASTY, KNEE, TOTAL;  Surgeon: Ellis Mora MD;  Location: [facility] OR;  Service: Orthopedics;  Laterality: Left;    TOTAL KNEE ARTHROPLASTY Right 3/6/2024    Procedure: ARTHROPLASTY, KNEE, TOTAL;  Surgeon: Ellis Mora MD;  Location: [facility] OR;  Service: Orthopedics;  Laterality: Right;     Family History   Problem Relation Name Age of Onset    Depression Mother      Early death Mother      Hypertension Mother      Heart disease Mother  53         from MI    Arthritis Father      Stroke Father      Diabetes Sister      No  Known Problems Brother      Hypertension Sister      Hypertension Sister      No Known Problems Brother      No Known Problems Brother      No Known Problems Daughter      Cancer Neg Hx      COPD Neg Hx      Kidney disease Neg Hx       Social History     Socioeconomic History    Marital status:      Spouse name: Nida    Number of children: 0   Occupational History    Occupation: Disabled since      Comment: Dislocated left hip   Tobacco Use    Smoking status: Former     Current packs/day: 0.00     Average packs/day: 0.1 packs/day for 46.0 years (4.6 ttl pk-yrs)     Types: Cigarettes     Start date: 6/3/2022     Quit date: 10/19/2022     Years since quittin.6    Smokeless tobacco: Never   Substance and Sexual Activity    Alcohol use: Yes     Alcohol/week: 2.0 standard drinks of alcohol     Types: 2 Glasses of wine per week    Drug use: No    Sexual activity: Yes     Partners: Female     Birth control/protection: None   Social History Narrative    Disabled since .  Job injury.  Dislocated left hip.  Has 4 brothers and 4 sisters. Wears seatbelt.     Social Determinants of Health     Financial Resource Strain: Low Risk  (2022)    Overall Financial Resource Strain (CARDIA)     Difficulty of Paying Living Expenses: Not hard at all   Food Insecurity: No Food Insecurity (2022)    Hunger Vital Sign     Worried About Running Out of Food in the Last Year: Never true     Ran Out of Food in the Last Year: Never true   Transportation Needs: No Transportation Needs (2022)    PRAPARE - Transportation     Lack of Transportation (Medical): No     Lack of Transportation (Non-Medical): No   Physical Activity: Insufficiently Active (2023)    Exercise Vital Sign     Days of Exercise per Week: 2 days     Minutes of Exercise per Session: 10 min   Stress: No Stress Concern Present (2022)    [employer] [place] [employer] - Occupational Stress Questionnaire     Feeling of Stress : Only a  little   Housing Stability: Low Risk  (9/30/2022)    Housing Stability Vital Sign     Unable to Pay for Housing in the Last Year: No     Number of Places Lived in the Last Year: 1     Unstable Housing in the Last Year: No     Medication List with Changes/Refills   New Medications    OXYCODONE-ACETAMINOPHEN (PERCOCET)  MG PER TABLET    Take 1 tablet by mouth every 8 (eight) hours as needed for Pain.   Current Medications    ATORVASTATIN (LIPITOR) 40 MG TABLET    TAKE 1 TABLET EVERY DAY    CELECOXIB (CELEBREX) 200 MG CAPSULE    Take 1 capsule (200 mg total) by mouth once daily. Take with food    CYCLOBENZAPRINE (FLEXERIL) 10 MG TABLET    Take 1 tablet (10 mg total) by mouth 3 (three) times daily as needed for Muscle spasms.    DICLOFENAC SODIUM (VOLTAREN) 1 % GEL    Apply 2 g topically 3 (three) times daily as needed.    FINASTERIDE (PROSCAR) 5 MG TABLET    TAKE 1 TABLET EVERY DAY    FLUTICASONE PROPIONATE (FLONASE) 50 MCG/ACTUATION NASAL SPRAY    2 sprays (100 mcg total) by Each Nostril route once daily.    GABAPENTIN (NEURONTIN) 300 MG CAPSULE    Take 1 capsule (300 mg total) by mouth 2 (two) times daily.    LISINOPRIL-HYDROCHLOROTHIAZIDE (PRINZIDE,ZESTORETIC) 20-25 MG TAB    TAKE 1 TABLET EVERY DAY    OMEGA-3 FATTY ACIDS/FISH OIL (FISH OIL-OMEGA-3 FATTY ACIDS) 300-1,000 MG CAPSULE    Take 1 capsule by mouth once daily.    ONDANSETRON (ZOFRAN-ODT) 4 MG TBDL    Dissolve 2 tablets (8 mg total) by mouth every 8 (eight) hours as needed (Nausea).    OXYCODONE-ACETAMINOPHEN (PERCOCET)  MG PER TABLET    Take 1 tablet by mouth every 8 (eight) hours as needed for Pain.    PANTOPRAZOLE (PROTONIX) 40 MG TABLET    Take 1 tablet (40 mg total) by mouth once daily.    SILDENAFIL (VIAGRA) 100 MG TABLET    Take 1 tablet (100 mg total) by mouth daily as needed for Erectile Dysfunction.    TAMSULOSIN (FLOMAX) 0.4 MG CAP    TAKE 1 CAPSULE EVERY DAY     Review of patient's allergies indicates:  No Known Allergies  Review of  Systems   Constitutional: Negative for decreased appetite.   HENT:  Negative for tinnitus.    Eyes:  Negative for double vision.   Cardiovascular:  Negative for chest pain.   Respiratory:  Negative for wheezing.    Hematologic/Lymphatic: Negative for bleeding problem.   Skin:  Negative for dry skin.   Musculoskeletal:  Positive for arthritis, joint pain, joint swelling and stiffness. Negative for back pain, gout, muscle weakness and neck pain.   Gastrointestinal:  Negative for abdominal pain.   Genitourinary:  Negative for bladder incontinence.   Neurological:  Negative for numbness, paresthesias and sensory change.   Psychiatric/Behavioral:  Negative for altered mental status.        Objective:   Body mass index is 31.83 kg/m².  There were no vitals filed for this visit.       General    Constitutional: He is oriented to person, place, and time. He appears well-developed.   HENT:   Head: Atraumatic.   Eyes: EOM are normal.   Pulmonary/Chest: Effort normal.   Neurological: He is alert and oriented to person, place, and time.   Psychiatric: Judgment normal.           Ambulating without assistive devices with slight limp  Pelvis is level  Bilateral hips passive motion without pain in the groin.  Hip flexors, abductors, adductors, quads, hamstrings, ankle extensors and flexors are all 5/5   Right knee preop with moderately severe swelling.  Range of motion 0-105.  Severe pain medially and crepitus to compression on the patella as well as medially.  There is no defect in the patella or quadriceps tendon.  Slightly loose medial collateral ligament but stable.  Negative anterior drawer.  Right knee postop TKA surgical incision healed well.  Collaterals and cruciates stable.  No defect in the patella or quadriceps tendon.  Mild swelling.  Range of motion is limited at around 5-7 degrees of flexion contracture and flexion to 95° with moderate swell  Left knee preop exam with severe swelling.  Range of motion 5-105.  Medial  joint severe pain and crepitus to compression on the patella.  There is no defect in the patella or quadriceps tendon.  There is stable collaterals and cruciates.  No anterior draw.  Left knee postop TKA surgical incision healed well.  No evidence of erythema.  Very mild swelling.  0-130 degrees.  No defect in the patella or quadriceps tendon.  Stable in extension and in flexion at 90°  Calves are soft nontender  There is some pitting edema around the distal 3rd of tibia  Ankle motion intact  Skin slight brawny induration around the ankle    Relevant imaging results reviewed and interpreted by me, discussed with the patient and / or family today  Ultrasound right leg few weeks ago with negative for DVT  X-ray 06/06/2024 right TKA in excellent alignment patella midline no evidence of failure.  We looked at x-rays from the left knee also left TKA in excellent alignment.  Bilateral knees with [facility] posterior stabilize primary knee system    X-ray 08/31/2023 left knee with TKA in excellent alignment.  Patella midline no evidence of failure.  Right knee with complete loss of medial joint space and marginal osteophytes consistent with severe arthritis and varus deformity  X-ray 01/13/2023 showing complete loss of medial joint space bilaterally with marginal large osteophytic changes tricompartmental with varus deformity consistent with bilateral knee severe arthritis  Assessment:     Encounter Diagnoses   Name Primary?    Status post total right knee replacement using cement     History of total left knee replacement     Arthrofibrosis of knee joint, right Yes        Plan:   Arthrofibrosis of knee joint, right    Status post total right knee replacement using cement    History of total left knee replacement    Other orders  -     oxyCODONE-acetaminophen (PERCOCET)  mg per tablet; Take 1 tablet by mouth every 8 (eight) hours as needed for Pain.  Dispense: 30 tablet; Refill: 0         Patient Instructions    You having severe limitation of motion not like the other side that was done last year  The right knee motion is around 5° to 95° despite physical therapy pushing on it  You still having quite a bit of pain around 7/10  Gabapentin 300 mg take 2 tablets at night instead of 1 in the morning 1 at night  You having hard time sleeping  You having stiffness in the knee   We will proceed with manipulation of your right total knee replacement   The are slight risk of getting a fracture during the manipulation however we get x-ray after we manipulate to confirm there was no fracture occurring.  There is slight risk of nerve damage blood clot.  You had a previous ultrasound that was negative for blood clot.  Please wear your compressive stockings and as well where you neoprene sleeves around the knee to help with the swelling   Will schedule you for next week right total knee manipulation    He would like to have his knee matching the other side and we will use [facility]  Disclaimer: This note was prepared using a voice recognition system and is likely to have sound alike errors within the text.

## 2024-06-06 NOTE — PROGRESS NOTES
Subjective:     Patient ID: Mitchell Mcmillan is a 68 y.o. male.    Chief Complaint: Pain and Post-op Evaluation of the Right Knee    HPI:  03/27/2023   Bilateral knee pain left worse than the right.  Patient states he received numerous injections over couple years now from steroid injections to gel injections.  Received Depo-Medrol on 07/28/2021 in both of his knees followed by Monovisc on 09/13/2021 and most recently Kenalog in both of his knees 01/13/2023.  He said they do help but not lasting long enough.  His pain is 9/10.  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  He put some lotion on his knee and then he puts the cup her neoprene sleeves.  He does use topicals get better.  Patient stated in 2006 fracture dislocated the left hip requiring ORIF of posterior acetabulum by Dr. Tanya Arredondo.  He developed after that footdrop on the left side which eventually got better with time.  He does take Tylenol.  History of GERD and numerous other medical issues that he avoids NSAIDs   He lives with his wife.  He does take Tylenol.  He does not have any kidney or ulcers  No fever no chills no shortness of breath difficulty with chewing swallowing loss of bowel bladder control blurry vision double vision loss sense smell or taste      08/31/2023   Left TKA says it feels very strong it is strong side because he has severe pain on the right knee.  His pain is around 8/10 mostly in the right side but on the left he said he is much much better.  He feels pain in the right knee wakes him up at night he does not think that meloxicam is doing anything.  He is requesting some Norco as I did tell him it make him get hooked up on the medication in the drugs we should stop it completely at this time.  He is candidate to have the right knee replaced since he is bone-on-bone but this is up to him when he would decide to proceed that way.  He can think me about going to physical  therapy at [facility] on [address].  Despite having pain he is pleased with his left knee results because it is much stronger and depends on it to walk.  He is wearing his neoprene sleeves on.  He said he is getting better he is happy he had the surgery so far.      01/04/2024   Left TKA 05/31/2023 doing extremely well with that he said occasional aches but not on a daily basis.  His pain is around 2/10 in the left knee.  His pain overall is 7/10 because of the right knee.  He still taking Celebrex, diclofenac gel and gabapentin at night.  He wants to proceed with the right TKA.  He feels that if he gets the relief he got on the left side he will be doing just fine.  He knows there is nothing that gets rid of all the pains.  He had failed numerous non operative treatments over the years including injections now he is on NSAIDs.  He still does work in the yd cuts the grass.  Previous history of hip dislocation the left status post ORIF years ago by Dr. Lisa with postop left footdrop which recuperate it with time but left them with slight weak left foot  No fever no chills no shortness of breath or difficulty with chewing swallowing loss bowel bladder control  He does occasionally wear a neoprene sleeve  He did use a walker after surgery he has not using anything at this time ambulate  He states the left TKA gave him 80% improvement    06/06/2024   Right TKA 03/04/2024 using [facility] posterior stabilize to match the left TKA that was performed 05/31/2023  Patient did his physical therapy ambulating carrying his cane in his hand.  He is having hard time with his right total knee with therapy he said he can not get it to bend and they put him on a stomach and they tried to stretch it.  He is having quite a bit of pain around 6-7/10.  He is taking gabapentin 300 twice a day occasional Percocets.  As far as the left knee is concerned he said he has no problems with that he has great motion very happy with the  results.  No fever no chills no shortness of breath or difficulty with chewing swallowing loss of bowel bladder control  He stated the right knee is definitely different than the left  Past Medical History:   Diagnosis Date    Arthritis     knees    Asymmetric SNHL (sensorineural hearing loss) 2017    Chronic left hip pain 2006    Dislocated left hip, job injury    Chronic prostatitis     per pt, last episode     Elevated PSA, less than 10 ng/ml     per pt last elevation     Gastroesophageal reflux disease 8/3/2022    Hyperlipidemia     Hypertension     Joint effusion of knee     per pt rt knee, seen by Dr. Duke     Midline low back pain without sciatica 2016    Obesity     Special screening for malignant neoplasms, colon 10/5/2016    Tobacco dependence     Trouble in sleeping      Past Surgical History:   Procedure Laterality Date    arthrocentesis Right     Per pt , knee by Dr. BONNIE Duke    COLONOSCOPY N/A 10/05/2016    Normal exam repeat 10 yrs. Procedure: COLONOSCOPY;  Surgeon: Alexander Goetz MD;  Location: [facility];  Service: Endoscopy;  Laterality: N/A;    HIP ARTHROPLASTY Left 2006    Per pt, dislocated left hip/ hardware placed  by Dr. Lisa    left orchiectomy      PROSTATE BIOPSY      procedure performed at [facility] due to elevated PSA - benign    TOTAL KNEE ARTHROPLASTY Left 2023    Procedure: ARTHROPLASTY, KNEE, TOTAL;  Surgeon: Ellis Mora MD;  Location: [facility] OR;  Service: Orthopedics;  Laterality: Left;    TOTAL KNEE ARTHROPLASTY Right 3/6/2024    Procedure: ARTHROPLASTY, KNEE, TOTAL;  Surgeon: Ellis Mora MD;  Location: [facility] OR;  Service: Orthopedics;  Laterality: Right;     Family History   Problem Relation Name Age of Onset    Depression Mother      Early death Mother      Hypertension Mother      Heart disease Mother  53         from MI    Arthritis Father      Stroke Father      Diabetes Sister      No  Known Problems Brother      Hypertension Sister      Hypertension Sister      No Known Problems Brother      No Known Problems Brother      No Known Problems Daughter      Cancer Neg Hx      COPD Neg Hx      Kidney disease Neg Hx       Social History     Socioeconomic History    Marital status:      Spouse name: Nida    Number of children: 0   Occupational History    Occupation: Disabled since      Comment: Dislocated left hip   Tobacco Use    Smoking status: Former     Current packs/day: 0.00     Average packs/day: 0.1 packs/day for 46.0 years (4.6 ttl pk-yrs)     Types: Cigarettes     Start date: 6/3/2022     Quit date: 10/19/2022     Years since quittin.6    Smokeless tobacco: Never   Substance and Sexual Activity    Alcohol use: Yes     Alcohol/week: 2.0 standard drinks of alcohol     Types: 2 Glasses of wine per week    Drug use: No    Sexual activity: Yes     Partners: Female     Birth control/protection: None   Social History Narrative    Disabled since .  Job injury.  Dislocated left hip.  Has 4 brothers and 4 sisters. Wears seatbelt.     Social Determinants of Health     Financial Resource Strain: Low Risk  (2022)    Overall Financial Resource Strain (CARDIA)     Difficulty of Paying Living Expenses: Not hard at all   Food Insecurity: No Food Insecurity (2022)    Hunger Vital Sign     Worried About Running Out of Food in the Last Year: Never true     Ran Out of Food in the Last Year: Never true   Transportation Needs: No Transportation Needs (2022)    PRAPARE - Transportation     Lack of Transportation (Medical): No     Lack of Transportation (Non-Medical): No   Physical Activity: Insufficiently Active (2023)    Exercise Vital Sign     Days of Exercise per Week: 2 days     Minutes of Exercise per Session: 10 min   Stress: No Stress Concern Present (2022)    [employer] [place] [employer] - Occupational Stress Questionnaire     Feeling of Stress : Only a  little   Housing Stability: Low Risk  (9/30/2022)    Housing Stability Vital Sign     Unable to Pay for Housing in the Last Year: No     Number of Places Lived in the Last Year: 1     Unstable Housing in the Last Year: No     Medication List with Changes/Refills   New Medications    OXYCODONE-ACETAMINOPHEN (PERCOCET)  MG PER TABLET    Take 1 tablet by mouth every 8 (eight) hours as needed for Pain.   Current Medications    ATORVASTATIN (LIPITOR) 40 MG TABLET    TAKE 1 TABLET EVERY DAY    CELECOXIB (CELEBREX) 200 MG CAPSULE    Take 1 capsule (200 mg total) by mouth once daily. Take with food    CYCLOBENZAPRINE (FLEXERIL) 10 MG TABLET    Take 1 tablet (10 mg total) by mouth 3 (three) times daily as needed for Muscle spasms.    DICLOFENAC SODIUM (VOLTAREN) 1 % GEL    Apply 2 g topically 3 (three) times daily as needed.    FINASTERIDE (PROSCAR) 5 MG TABLET    TAKE 1 TABLET EVERY DAY    FLUTICASONE PROPIONATE (FLONASE) 50 MCG/ACTUATION NASAL SPRAY    2 sprays (100 mcg total) by Each Nostril route once daily.    GABAPENTIN (NEURONTIN) 300 MG CAPSULE    Take 1 capsule (300 mg total) by mouth 2 (two) times daily.    LISINOPRIL-HYDROCHLOROTHIAZIDE (PRINZIDE,ZESTORETIC) 20-25 MG TAB    TAKE 1 TABLET EVERY DAY    OMEGA-3 FATTY ACIDS/FISH OIL (FISH OIL-OMEGA-3 FATTY ACIDS) 300-1,000 MG CAPSULE    Take 1 capsule by mouth once daily.    ONDANSETRON (ZOFRAN-ODT) 4 MG TBDL    Dissolve 2 tablets (8 mg total) by mouth every 8 (eight) hours as needed (Nausea).    OXYCODONE-ACETAMINOPHEN (PERCOCET)  MG PER TABLET    Take 1 tablet by mouth every 8 (eight) hours as needed for Pain.    PANTOPRAZOLE (PROTONIX) 40 MG TABLET    Take 1 tablet (40 mg total) by mouth once daily.    SILDENAFIL (VIAGRA) 100 MG TABLET    Take 1 tablet (100 mg total) by mouth daily as needed for Erectile Dysfunction.    TAMSULOSIN (FLOMAX) 0.4 MG CAP    TAKE 1 CAPSULE EVERY DAY     Review of patient's allergies indicates:  No Known Allergies  Review of  Systems   Constitutional: Negative for decreased appetite.   HENT:  Negative for tinnitus.    Eyes:  Negative for double vision.   Cardiovascular:  Negative for chest pain.   Respiratory:  Negative for wheezing.    Hematologic/Lymphatic: Negative for bleeding problem.   Skin:  Negative for dry skin.   Musculoskeletal:  Positive for arthritis, joint pain, joint swelling and stiffness. Negative for back pain, gout, muscle weakness and neck pain.   Gastrointestinal:  Negative for abdominal pain.   Genitourinary:  Negative for bladder incontinence.   Neurological:  Negative for numbness, paresthesias and sensory change.   Psychiatric/Behavioral:  Negative for altered mental status.        Objective:   Body mass index is 31.83 kg/m².  There were no vitals filed for this visit.       General    Constitutional: He is oriented to person, place, and time. He appears well-developed.   HENT:   Head: Atraumatic.   Eyes: EOM are normal.   Pulmonary/Chest: Effort normal.   Neurological: He is alert and oriented to person, place, and time.   Psychiatric: Judgment normal.           Ambulating without assistive devices with slight limp  Pelvis is level  Bilateral hips passive motion without pain in the groin.  Hip flexors, abductors, adductors, quads, hamstrings, ankle extensors and flexors are all 5/5   Right knee preop with moderately severe swelling.  Range of motion 0-105.  Severe pain medially and crepitus to compression on the patella as well as medially.  There is no defect in the patella or quadriceps tendon.  Slightly loose medial collateral ligament but stable.  Negative anterior drawer.  Right knee postop TKA surgical incision healed well.  Collaterals and cruciates stable.  No defect in the patella or quadriceps tendon.  Mild swelling.  Range of motion is limited at around 5-7 degrees of flexion contracture and flexion to 95° with moderate swell  Left knee preop exam with severe swelling.  Range of motion 5-105.  Medial  joint severe pain and crepitus to compression on the patella.  There is no defect in the patella or quadriceps tendon.  There is stable collaterals and cruciates.  No anterior draw.  Left knee postop TKA surgical incision healed well.  No evidence of erythema.  Very mild swelling.  0-130 degrees.  No defect in the patella or quadriceps tendon.  Stable in extension and in flexion at 90°  Calves are soft nontender  There is some pitting edema around the distal 3rd of tibia  Ankle motion intact  Skin slight brawny induration around the ankle    Relevant imaging results reviewed and interpreted by me, discussed with the patient and / or family today  Ultrasound right leg few weeks ago with negative for DVT  X-ray 06/06/2024 right TKA in excellent alignment patella midline no evidence of failure.  We looked at x-rays from the left knee also left TKA in excellent alignment.  Bilateral knees with [facility] posterior stabilize primary knee system    X-ray 08/31/2023 left knee with TKA in excellent alignment.  Patella midline no evidence of failure.  Right knee with complete loss of medial joint space and marginal osteophytes consistent with severe arthritis and varus deformity  X-ray 01/13/2023 showing complete loss of medial joint space bilaterally with marginal large osteophytic changes tricompartmental with varus deformity consistent with bilateral knee severe arthritis  Assessment:     Encounter Diagnoses   Name Primary?    Status post total right knee replacement using cement     History of total left knee replacement     Arthrofibrosis of knee joint, right Yes        Plan:   Arthrofibrosis of knee joint, right    Status post total right knee replacement using cement    History of total left knee replacement    Other orders  -     oxyCODONE-acetaminophen (PERCOCET)  mg per tablet; Take 1 tablet by mouth every 8 (eight) hours as needed for Pain.  Dispense: 30 tablet; Refill: 0         Patient Instructions    You having severe limitation of motion not like the other side that was done last year  The right knee motion is around 5° to 95° despite physical therapy pushing on it  You still having quite a bit of pain around 7/10  Gabapentin 300 mg take 2 tablets at night instead of 1 in the morning 1 at night  You having hard time sleeping  You having stiffness in the knee   We will proceed with manipulation of your right total knee replacement   The are slight risk of getting a fracture during the manipulation however we get x-ray after we manipulate to confirm there was no fracture occurring.  There is slight risk of nerve damage blood clot.  You had a previous ultrasound that was negative for blood clot.  Please wear your compressive stockings and as well where you neoprene sleeves around the knee to help with the swelling   Will schedule you for next week right total knee manipulation    He would like to have his knee matching the other side and we will use [facility]  Disclaimer: This note was prepared using a voice recognition system and is likely to have sound alike errors within the text.

## 2024-06-06 NOTE — TELEPHONE ENCOUNTER
Pre op instructions reviewed with pt over telephone, verbalized understanding.    To confirm, Surgery is scheduled on 6/12/24. We will call you late afternoon the business day prior to surgery with your arrival time.    *Please report to the [facility] Lobby (1st Floor) located off of [facility] (2nd Entrance/Building on the left, in front of the flag pole).  Address: [address] [place] [address] [place] [address]      INSTRUCTIONS IMPORTANT!!!  DO NOT Eat, Drink, or Smoke after 12 midnight unless instructed otherwise by your Surgeon. OK to brush teeth, no gum, candy or mints!    >>>MEDICATION INSTRUCTIONS<<<: Morning of Surgery, take small sip of water with ONLY these medications:  Flomax     *Diabetic/ Prediabetic Patients: !!!If you take diabetic or weight loss medication, Do NOT take morning of surgery unless instructed by Doctor!!!  Metformin to be stopped 24 hrs prior to surgery.   Long Acting Insulin Instructions: HOLD the night before surgery unless instructed differently by Provider!  Ozempic/ Mounjaro/ Wegovy/ Trulicity/ Semaglutide injections or weight loss medication to be stopped 7 days prior to surgery.    !!!STOP ALL Aspirins, NSAIDS, WEIGHT LOSS INJECTIONS/PILLS, Herbal supplements, & Vitamins 7 DAYS BEFORE SURGERY!!!    ____  Avoid Alcoholic beverages 3 days prior to surgery, as it can thin the blood.  ____  NO Acrylic/fake nails or nail polish worn day of surgery (specifically hand/arm & foot surgeries).  ____  NO powder, lotions, deodorants, oils or cream on body.  ____  Remove all jewelry & piercings & foreign objects before arrival & leave at home.  ____  Remove Dentures, Hearing Aids & Contact Lens prior to surgery.  ____  Bring photo ID and insurance information to hospital (Leave Valuables at Home).  ____  If going home the same day, arrange for a ride home. You will not be able to drive for 24 hrs if Anesthesia was used.   ____  Females (ages 11-60): may need to give a urine sample  the morning of surgery; please see Pre op Nurse prior to using the restroom.  ____  Males: Stop ED medications (Viagra, Cialis) 24 hrs prior to surgery.  ____  Wear clean, loose fitting clothing to allow for dressings/ bandages.      Bathing Instructions:    -Shower with anti-bacterial Soap (Hibiclens or Dial) the night before surgery and the morning of.   -Do not use Hibiclens on your face or genitals.   -Apply clean clothes after shower.  -Do not shave your face or body 2 days prior to surgery unless instructed otherwise by your Surgeon.  -Do not shave pubic hair 7 days prior to surgery (gyn pt's).    [facility] Visitor/Ride Policy:  Only 2 adults allowed in pre op/recovery area during your procedure. You MUST HAVE A RIDE HOME from a responsible adult that you know and trust. Medical Transport, Uber or Lyft can ONLY be used if patient has a responsible adult to accompany them during ride home.       *Signs and symptoms of Infection Before or After Surgery:               !!!If you experience any fever, chills, nausea/ vomiting, foul odor/ excessive drainage from surgical site, flu-like symptoms, new wounds or cuts, PLEASE CALL THE SURGEON OFFICE at [phone] or SEND MESSAGE THROUGH [employer] PORTAL!!!     *If you are running late the morning of surgery, please call the [facility] Surgery Dept @ [phone].     *Billing questions:  [phone]     Thank you,  -[facility] Pre Admit Dept.  [phone] or [phone]  M-F 7:30 am-4:00 pm (Closed Major Holidays)

## 2024-06-11 ENCOUNTER — TELEPHONE (OUTPATIENT)
Dept: PREADMISSION TESTING | Facility: HOSPITAL | Age: 69
End: 2024-06-11
Payer: MEDICARE

## 2024-06-11 ENCOUNTER — ANESTHESIA EVENT (OUTPATIENT)
Dept: SURGERY | Facility: HOSPITAL | Age: 69
End: 2024-06-11
Payer: MEDICARE

## 2024-06-11 NOTE — TELEPHONE ENCOUNTER
Called and spoke with Pt about the following:     Please arrive to [facility] ([facility] at 7:00 am on 6/12/2024 for your scheduled procedure.  Address: [address] [place] [address] [place]. [address] (2nd Building on left, 1st Floor Lobby)  >>>NO eating or drinking after midnight unless instructed otherwise by your Surgeon<<<

## 2024-06-11 NOTE — ANESTHESIA PREPROCEDURE EVALUATION
06/11/2024  Mitchell Mcmillan is a 68 y.o., male.    Patient Active Problem List   Diagnosis    Essential hypertension    Hyperlipidemia    Obesity (BMI 30.0-34.9)    Chronic hip pain    Midline low back pain without sciatica    Genu varum of both lower extremities    Primary osteoarthritis of both knees    Elevated PSA    Arthritis    Sensorineural hearing loss (SNHL) of both ears    Benign prostatic hyperplasia    Erectile dysfunction    Gastroesophageal reflux disease    History of tobacco use    Preoperative examination    Acute pain of right knee    Aortic atherosclerosis    Primary osteoarthritis of right knee    Decreased functional mobility and endurance    Lymphedema of right lower extremity     Past Medical History:   Diagnosis Date    Arthritis     knees    Asymmetric SNHL (sensorineural hearing loss) 08/17/2017    Chronic left hip pain 2006    Dislocated left hip, job injury    Chronic prostatitis     per pt, last episode 2013    Elevated PSA, less than 10 ng/ml     per pt last elevation 2013    Gastroesophageal reflux disease 8/3/2022    Hyperlipidemia 2010    Hypertension 2011    Joint effusion of knee     per pt rt knee, seen by Dr. Duke 2013    Midline low back pain without sciatica 6/13/2016    Obesity     Special screening for malignant neoplasms, colon 10/5/2016    Tobacco dependence     Trouble in sleeping      Past Surgical History:   Procedure Laterality Date    arthrocentesis Right 2013    Per pt , knee by Dr. BONNIE Duke    COLONOSCOPY N/A 10/05/2016    Normal exam repeat 10 yrs. Procedure: COLONOSCOPY;  Surgeon: Alexander Goetz MD;  Location: [facility];  Service: Endoscopy;  Laterality: N/A;    HIP ARTHROPLASTY Left 04/16/2006    Per pt, dislocated left hip/ hardware placed  by Dr. Lisa    left orchiectomy      PROSTATE BIOPSY  2012    procedure performed at [facility] due  to elevated PSA - benign    TOTAL KNEE ARTHROPLASTY Left 5/31/2023    Procedure: ARTHROPLASTY, KNEE, TOTAL;  Surgeon: Ellis Mora MD;  Location: [facility] OR;  Service: Orthopedics;  Laterality: Left;    TOTAL KNEE ARTHROPLASTY Right 3/6/2024    Procedure: ARTHROPLASTY, KNEE, TOTAL;  Surgeon: Ellis Mora MD;  Location: [facility] OR;  Service: Orthopedics;  Laterality: Right;       Pre-op Assessment    I have reviewed the Patient Summary Reports.    I have reviewed the NPO Status.   I have reviewed the Medications.     Review of Systems  Anesthesia Hx:  No problems with previous Anesthesia   History of prior surgery of interest to airway management or planning:  Previous anesthesia: General           Social:  Non-Smoker, Former Smoker       Hematology/Oncology:  Hematology Normal                                     Cardiovascular:     Hypertension           hyperlipidemia                             Pulmonary:  Pulmonary Normal                       Renal/:  Renal/ Normal                 Hepatic/GI:     GERD             Musculoskeletal:  Arthritis               Neurological:  Neurology Normal                                      Endocrine:  Endocrine Normal    BMI 32      Obesity / BMI > 30         Anesthesia Plan  Type of Anesthesia, risks & benefits discussed:    Anesthesia Type: MAC  Intra-op Monitoring Plan: Standard ASA Monitors  Post Op Pain Control Plan: multimodal analgesia and IV/PO Opioids PRN  Induction:  IV  Airway Plan: Direct, Post-Induction  Informed Consent: Informed consent signed with the Patient and all parties understand the risks and agree with anesthesia plan.  All questions answered.   ASA Score: 3  Day of Surgery Review of History & Physical: H&P Update referred to the surgeon/provider.  Anesthesia Plan Notes: Had coffee with sugar only at 05:30    Ready For Surgery From Anesthesia Perspective.     .      Chemistry        Component Value Date/Time     06/06/2024 0821    K  4.4 06/06/2024 0821     06/06/2024 0821    CO2 26 06/06/2024 0821    BUN 14 06/06/2024 0821    CREATININE 1.0 06/06/2024 0821     06/06/2024 0821        Component Value Date/Time    CALCIUM 10.1 06/06/2024 0821    ALKPHOS 77 06/06/2024 0821    AST 12 06/06/2024 0821    ALT 8 (L) 06/06/2024 0821    BILITOT 0.4 06/06/2024 0821    ESTGFRAFRICA >60.0 08/28/2020 1405    EGFRNONAA >60.0 08/28/2020 1405        Lab Results   Component Value Date    WBC 7.67 06/06/2024    HGB 11.6 (L) 06/06/2024    HCT 37.6 (L) 06/06/2024    MCV 83 06/06/2024     06/06/2024

## 2024-06-12 ENCOUNTER — TELEPHONE (OUTPATIENT)
Dept: FAMILY MEDICINE | Facility: CLINIC | Age: 69
End: 2024-06-12
Payer: MEDICARE

## 2024-06-12 ENCOUNTER — HOSPITAL ENCOUNTER (OUTPATIENT)
Facility: HOSPITAL | Age: 69
Discharge: HOME | End: 2024-06-12
Attending: ORTHOPAEDIC SURGERY | Admitting: ORTHOPAEDIC SURGERY
Payer: MEDICARE

## 2024-06-12 ENCOUNTER — ANESTHESIA (OUTPATIENT)
Dept: SURGERY | Facility: HOSPITAL | Age: 69
End: 2024-06-12
Payer: MEDICARE

## 2024-06-12 DIAGNOSIS — M24.661 ARTHROFIBROSIS OF KNEE JOINT, RIGHT: Primary | ICD-10-CM

## 2024-06-12 PROCEDURE — 71000015 HC POSTOP RECOV 1ST HR: Performed by: ORTHOPAEDIC SURGERY

## 2024-06-12 PROCEDURE — 25000003 PHARM REV CODE 250: Performed by: ORTHOPAEDIC SURGERY

## 2024-06-12 PROCEDURE — 63600175 PHARM REV CODE 636 W HCPCS: Performed by: NURSE ANESTHETIST, CERTIFIED REGISTERED

## 2024-06-12 PROCEDURE — 27570 FIXATION OF KNEE JOINT: CPT | Mod: RT,,, | Performed by: ORTHOPAEDIC SURGERY

## 2024-06-12 PROCEDURE — 37000008 HC ANESTHESIA 1ST 15 MINUTES: Performed by: ORTHOPAEDIC SURGERY

## 2024-06-12 PROCEDURE — 37000009 HC ANESTHESIA EA ADD 15 MINS: Performed by: ORTHOPAEDIC SURGERY

## 2024-06-12 PROCEDURE — 71000033 HC RECOVERY, INTIAL HOUR: Performed by: ORTHOPAEDIC SURGERY

## 2024-06-12 PROCEDURE — 36000704 HC OR TIME LEV I 1ST 15 MIN: Performed by: ORTHOPAEDIC SURGERY

## 2024-06-12 PROCEDURE — 36000705 HC OR TIME LEV I EA ADD 15 MIN: Performed by: ORTHOPAEDIC SURGERY

## 2024-06-12 RX ORDER — PROPOFOL 10 MG/ML
VIAL (ML) INTRAVENOUS
Status: DISCONTINUED | OUTPATIENT
Start: 2024-06-12 | End: 2024-06-12

## 2024-06-12 RX ORDER — BUPIVACAINE HYDROCHLORIDE AND EPINEPHRINE 2.5; 5 MG/ML; UG/ML
INJECTION, SOLUTION EPIDURAL; INFILTRATION; INTRACAUDAL; PERINEURAL
Status: DISCONTINUED | OUTPATIENT
Start: 2024-06-12 | End: 2024-06-12 | Stop reason: HOSPADM

## 2024-06-12 RX ORDER — OXYCODONE AND ACETAMINOPHEN 5; 325 MG/1; MG/1
1 TABLET ORAL
Status: DISCONTINUED | OUTPATIENT
Start: 2024-06-12 | End: 2024-06-12 | Stop reason: HOSPADM

## 2024-06-12 RX ORDER — CHLORHEXIDINE GLUCONATE ORAL RINSE 1.2 MG/ML
10 SOLUTION DENTAL 2 TIMES DAILY
Status: DISCONTINUED | OUTPATIENT
Start: 2024-06-12 | End: 2024-06-12 | Stop reason: HOSPADM

## 2024-06-12 RX ORDER — BUPIVACAINE HYDROCHLORIDE AND EPINEPHRINE 2.5; 5 MG/ML; UG/ML
30 INJECTION, SOLUTION EPIDURAL; INFILTRATION; INTRACAUDAL; PERINEURAL ONCE
Status: DISCONTINUED | OUTPATIENT
Start: 2024-06-12 | End: 2024-06-12 | Stop reason: HOSPADM

## 2024-06-12 RX ORDER — HYDROMORPHONE HYDROCHLORIDE 2 MG/ML
0.2 INJECTION, SOLUTION INTRAMUSCULAR; INTRAVENOUS; SUBCUTANEOUS EVERY 5 MIN PRN
Status: DISCONTINUED | OUTPATIENT
Start: 2024-06-12 | End: 2024-06-12 | Stop reason: HOSPADM

## 2024-06-12 RX ORDER — ONDANSETRON 8 MG/1
8 TABLET, ORALLY DISINTEGRATING ORAL EVERY 8 HOURS PRN
Status: DISCONTINUED | OUTPATIENT
Start: 2024-06-12 | End: 2024-06-12 | Stop reason: HOSPADM

## 2024-06-12 RX ORDER — SODIUM CHLORIDE 9 MG/ML
INJECTION, SOLUTION INTRAVENOUS CONTINUOUS
Status: DISCONTINUED | OUTPATIENT
Start: 2024-06-12 | End: 2024-06-12 | Stop reason: HOSPADM

## 2024-06-12 RX ORDER — MIDAZOLAM HYDROCHLORIDE 1 MG/ML
INJECTION INTRAMUSCULAR; INTRAVENOUS
Status: DISCONTINUED | OUTPATIENT
Start: 2024-06-12 | End: 2024-06-12

## 2024-06-12 RX ORDER — ONDANSETRON HYDROCHLORIDE 2 MG/ML
4 INJECTION, SOLUTION INTRAVENOUS DAILY PRN
Status: DISCONTINUED | OUTPATIENT
Start: 2024-06-12 | End: 2024-06-12 | Stop reason: HOSPADM

## 2024-06-12 RX ORDER — CHLORHEXIDINE GLUCONATE ORAL RINSE 1.2 MG/ML
10 SOLUTION DENTAL
Status: DISCONTINUED | OUTPATIENT
Start: 2024-06-12 | End: 2024-06-12 | Stop reason: HOSPADM

## 2024-06-12 RX ORDER — FENTANYL CITRATE 50 UG/ML
INJECTION, SOLUTION INTRAMUSCULAR; INTRAVENOUS
Status: DISCONTINUED | OUTPATIENT
Start: 2024-06-12 | End: 2024-06-12

## 2024-06-12 RX ORDER — HYDROCODONE BITARTRATE AND ACETAMINOPHEN 5; 325 MG/1; MG/1
1 TABLET ORAL EVERY 4 HOURS PRN
Status: DISCONTINUED | OUTPATIENT
Start: 2024-06-12 | End: 2024-06-12 | Stop reason: HOSPADM

## 2024-06-12 RX ADMIN — PROPOFOL 50 MG: 10 INJECTION, EMULSION INTRAVENOUS at 08:06

## 2024-06-12 RX ADMIN — SODIUM CHLORIDE, POTASSIUM CHLORIDE, SODIUM LACTATE AND CALCIUM CHLORIDE: 600; 310; 30; 20 INJECTION, SOLUTION INTRAVENOUS at 08:06

## 2024-06-12 RX ADMIN — FENTANYL CITRATE 100 MCG: 50 INJECTION, SOLUTION INTRAMUSCULAR; INTRAVENOUS at 08:06

## 2024-06-12 RX ADMIN — MIDAZOLAM HYDROCHLORIDE 2 MG: 1 INJECTION, SOLUTION INTRAMUSCULAR; INTRAVENOUS at 08:06

## 2024-06-12 NOTE — OP NOTE
[facility] ([facility])  Orthopedic Surgery  Operative Note    SUMMARY     Date of Procedure: 6/12/2024     Procedure: Procedure(s) (LRB):  MANIPULATION, WITH ANESTHESIA (Right)     Right total knee manipulation  Right knee injection  Surgeons and Role:     * Ellis Mora MD - Primary    Assisting Surgeon:  Rachana MEHTA    Pre-Operative Diagnosis: Status post total right knee replacement using cement [Z96.651]  Arthrofibrosis of knee joint, right [M24.661]    Post-Operative Diagnosis: Post-Op Diagnosis Codes:     * Status post total right knee replacement using cement [Z96.651]     * Arthrofibrosis of knee joint, right [M24.661]    Anesthesia: Monitor Anesthesia Care  Injection 30 cc 0.25% Marcaine with epi  15 cc injected intra-articular  15 cc used for genicular block for postop pain  Significant Surgical Tasks Conducted by the Assistant(s), if Applicable:  Needed assistance to help hold the extremity and place dressing    Complications: none   X-ray obtained multiple views of the right knee there was no fracture seen    Estimated Blood Loss (EBL):  0 mL           Implants: None    Specimens: None           Condition: Good    Disposition: PACU - hemodynamically stable.    Attestation: I performed the procedure.    Description:  History of right total knee replacement did not achieve good motion as compared to the left total knee.  Seen in the office in recommended manipulation.  Did tell him that he needs to start physical therapy immediately.  I did tell him he will have an Ace wrap on his leg and a dressing that he needs to remove the next day.  He needs to apply ice on the knee.  There is a risk of nerve damage vascular damage infection blood clot fat clot fracture.  We did obtain x-ray at the end of the case confirmed no fracture seen  After administering anesthesia and sedation patient right knee was washed with soaking alcohol twice and then ChloraPrep.  Using a spinal needle proceeded  injecting the joint from the anterolateral aspect with 15 cc of 0.25% Marcaine with epi.  We will proceeded in injecting around 3 fingers above the superior pole of the patella the genicular nerve subcutaneously going from the lateral side to the medial side with 15 cc of 0.25% Marcaine with epi.  After that was done sterile 4 x 4 is applied and an Ace wrap.  Proceeded with a flexion manipulation we felt some adhesions tear.  We achieved 125° of flexion and compared to the other total knee it was even.  Then with extension gently applying force and we heard a pop and we got full extension at that point.  We checked it on the C-arm AP and lateral and oblique views and on the fluoroscopy no fracture seen.  Tolerated procedure well

## 2024-06-12 NOTE — DISCHARGE INSTRUCTIONS
Remove the dressing in 24 hours   Weightbear as tolerated   Ice the knee to help with the swelling   Please call therapist and resume physical therapy as soon as possible

## 2024-06-12 NOTE — TRANSFER OF CARE
"Anesthesia Transfer of Care Note    Patient: Mitcehll Mcmillan    Procedure(s) Performed: Procedure(s) (LRB):  MANIPULATION, WITH ANESTHESIA (Right)    Patient location: PACU    Anesthesia Type: MAC    Transport from OR: Transported from OR on room air with adequate spontaneous ventilation    Post pain: adequate analgesia    Post assessment: no apparent anesthetic complications    Post vital signs: stable    Level of consciousness: responds to stimulation    Nausea/Vomiting: no nausea/vomiting    Complications: none    Transfer of care protocol was followed      Last vitals: Visit Vitals  /72   Pulse 79   Temp 36.9 °C (98.4 °F) (Temporal)   Resp 18   Ht 6' 4" (1.93 m)   Wt 115.2 kg (253 lb 13.8 oz)   SpO2 98%   BMI 30.90 kg/m²     "

## 2024-06-12 NOTE — DISCHARGE SUMMARY
[facility] ([facility])  Discharge Note  Short Stay    Procedure(s) (LRB):  MANIPULATION, WITH ANESTHESIA (Right)  Injection right knee    OUTCOME: Patient tolerated treatment/procedure well without complication and is now ready for discharge.    DISPOSITION: Home or Self Care    FINAL DIAGNOSIS:  <principal problem not specified>  Arthrofibrosis right knee/TKA  FOLLOWUP: In clinic    DISCHARGE INSTRUCTIONS:  No discharge procedures on file.     TIME SPENT ON DISCHARGE:  20 minutes

## 2024-06-13 ENCOUNTER — TELEPHONE (OUTPATIENT)
Dept: REHABILITATION | Facility: HOSPITAL | Age: 69
End: 2024-06-13
Payer: MEDICARE

## 2024-06-13 ENCOUNTER — TELEPHONE (OUTPATIENT)
Dept: FAMILY MEDICINE | Facility: CLINIC | Age: 69
End: 2024-06-13
Payer: MEDICARE

## 2024-06-13 VITALS
HEIGHT: 76 IN | DIASTOLIC BLOOD PRESSURE: 72 MMHG | TEMPERATURE: 99 F | BODY MASS INDEX: 30.92 KG/M2 | WEIGHT: 253.88 LBS | OXYGEN SATURATION: 98 % | SYSTOLIC BLOOD PRESSURE: 132 MMHG | RESPIRATION RATE: 16 BRPM | HEART RATE: 78 BPM

## 2024-06-13 NOTE — TELEPHONE ENCOUNTER
Pt had told our PAR he can't come until next week even when she encouraged it strongly so PT called also as post manipulation they want pt in immediately if able. PT had room to see him today but he declined and also declined Friday until PT was able to encourage him to at least come tomorrow due to the serious nature of his surgery and need for immediate assistance to advance.

## 2024-06-13 NOTE — ANESTHESIA POSTPROCEDURE EVALUATION
Anesthesia Post Evaluation    Patient: Mitchell Mcmillan    Procedure(s) Performed: Procedure(s) (LRB):  MANIPULATION, WITH ANESTHESIA (Right)  INJECTION (Right)    Final Anesthesia Type: MAC      Patient location during evaluation: PACU  Patient participation: Yes- Able to Participate  Level of consciousness: awake and alert  Post-procedure vital signs: reviewed and stable  Airway patency: patent      Anesthetic complications: no      Cardiovascular status: blood pressure returned to baseline  Respiratory status: unassisted and spontaneous ventilation  Hydration status: euvolemic  Follow-up not needed.              Vitals Value Taken Time   /72 06/12/24 0900   Temp 37.2 °C (99 °F) 06/12/24 0835   Pulse 79 06/12/24 0905   Resp 24 06/12/24 0904   SpO2 99 % 06/12/24 0905   Vitals shown include unfiled device data.      Event Time   Out of Recovery 09:05:38         Pain/Li Score: Li Score: 10 (6/12/2024  9:05 AM)

## 2024-06-14 ENCOUNTER — CLINICAL SUPPORT (OUTPATIENT)
Dept: REHABILITATION | Facility: HOSPITAL | Age: 69
End: 2024-06-14
Payer: MEDICARE

## 2024-06-14 DIAGNOSIS — Z74.09 DECREASED FUNCTIONAL MOBILITY AND ENDURANCE: Primary | ICD-10-CM

## 2024-06-14 DIAGNOSIS — I89.0 LYMPHEDEMA OF RIGHT LOWER EXTREMITY: ICD-10-CM

## 2024-06-14 PROCEDURE — 97140 MANUAL THERAPY 1/> REGIONS: CPT | Mod: PN

## 2024-06-14 PROCEDURE — 97112 NEUROMUSCULAR REEDUCATION: CPT | Mod: PN

## 2024-06-14 NOTE — PROGRESS NOTES
Physical Therapy Daily Treatment Note and Updated Plan of Care     Name: Mitchell Mcmillan  Clinic Number: [id number]    Therapy Diagnosis:   Encounter Diagnoses   Name Primary?    Decreased functional mobility and endurance Yes    Lymphedema of right lower extremity        Physician: Shari Florez PA    Visit Date: 6/14/2024    Physician Orders: PT Eval and Treat   Medical Diagnosis from Referral: Z96.651 (ICD-10-CM) - Status post total right knee replacement using cement   Evaluation Date: 3/25/2024  Authorization Period Expiration: 12/31/24  Plan of Care Expiration: 6/3/24 extend to 7/19/24  Visit # / Visits authorized: 26/ 30 (including eval)    Foto: 2/3  Progress Note Due Date: 30 days from 6/14/24    Precautions: Standard HTN    Time In: 8:15 am    Time Out: 9:25 am  Total BillableTime: 70 minutes one on one time with PT only    SUBJECTIVE     Today, pt reports: he had a manipulation on 6/12/24. PT has been trying to get him in but he was declining to attend until next week. PT was able to get him to come in today and noted he is wearing only part of his wrap on the leg so swelling in the calf and ankle are significant     He was compliant with home exercise program.  Response to previous treatment: minor soreness  Functional change: arrived with knee flexed again today so gait antalgic    Pre-Treatment Pain: 4/10 pain   Post pain: 4/10 knee 5/10 ankle    Location: R knee, ankle 9/10    OBJECTIVE       Girth Measurement:  (L) 47 cm; (R) 52 cm at mid patella at eval; today 48.5 cm                                                                                                                 At eval             Today End of session today  Knee AROM:                                                  (R)       (L)       R L                                      Flexion                         70        115    98 115                                              Extension                    25        0 15 0  8                                       Prone flexion                                      94        90                                      Hook lying flexion                               100      112  95     Hip AROM:                 Flexion                         120      120    120 120                                      ER                               15        30       32 55 (prone)                                      IR                                 20        30      35 45 (prone)                                        Ankle AROM:             Ankle DF(keara)            0          8         4   6                                        Strength:                                                        R          L        R L  Hip flexors L2                          4/5       5/5      4+ 5  Quadriceps L3                        3/5       5/5       3+ 5                               Hamstrings S1                        4/5       5/5      4+ 5                          Dorsiflexion L4                        5/5       5/5      5 5                                   Plantar flexion S1                    2/5       2/5      2 2                          Gluteus Medius L45S1           3+/5     4/5      4 4                                    Gluteus Abdirizak L5S12        3/5       3+/5    4 4                               TREATMENT     Manual therapeutic interventions including joint mobilization and Range of Motion to right knee for  10  minutes including (with PT):    Mobs for ankle DF, knee flexion and extension grade II tib/femoral and patellar mobs  Deferred:  R LE long leg axial pumps  Soft tissue mobs to the hamstring on R and quad  Patellar mobs  R hip mobs for increase hip external rotation to increase STELLA ROM, knee extension and flexion  Scar tight in upper mid scar area still    Mitchell received therapeutic exercises to develop strength, endurance, and ROM for 30 minutes including (with tech)    Shuttle 5 banded  squats 3 min  Shuttle calf stretch 5 bands B 3x 15  Seated tail gators 3 min with 7 #  Long seated hamstring stretch 3x10 with strap to assist and anterior lean hold 5 sec each  Prone static hamstring stretch 7# 20 x 20 sec hold  Supine elevated leg to manage edema while performing ankle pumps 1x 50  Prone hip internal/external rotation with 7# 2x 10    Deferred:  Bike seat 11 6 min   Seated STELLA stretch R then L 1x 10 5 sec hold each cues on forward lean to help stretch the glute more         Mitchell participated in neuromuscular re-education activities to improve: Balance, Kinesthetic, and Proprioception for 30 minutes. The following activities were included (with PT and tech):      R single leg press on shuttle 2 min with max cues on quad set  Shuttle R knee VMO held in extension while L foot walks up/down 5x 10  Seated LAQ 3x 10 R 2 sec hold   SLR 2x 10 per leg R with focus on quad activation  Leaning hip extension 3x 10  Heel raises standing 1x 40  Prone hamstring curls 2x 10 then 3 x10 R for ROM 7 #  Backward step ups R foot on steps (2 rails for safety) 1x15 with focus on heel drive and quad extension    Deferred:  R hip adduction activation (supine straight leg from 90 hip flexion performing horizontal abduction) 3x 8  Supine hip flexion with L knee in flexion and R hanging off table for hip flexion with 10 # 3x 10  Long seated hip flexion over cone with L LE hanging off edge of bed with focus on VMO 2x 8 R (fatigues quickly)  Single leg step up 2x15 R leading up and L down (performed at steps so L foot could rise 2 steps up and he could use one rail)  Side step ups 3x 10 6 inch step      Home Exercises Provided and Patient Education Provided     Education/Self-Care provided:    Written Home Exercises Provided: Patient instructed to cont prior HEP.  Exercises were reviewed and Mitchell was able to demonstrate them prior to the end of the session.  Mitchell demonstrated good  understanding of the education  provided.     See EMR under Patient Instructions for exercises provided prior visit.    ASSESSMENT     Patient is still lagging a lot with flexion and knee extension. Pt was not wearing his compression garment completely or correctly so edema in the calf and foot was elevated and limiting his tolerance to heel strike. Pt was walking in with knee flexed and weight shifted to the L to unweigh the R resulting in poor guarding behaviors. PT continued to encourage proper garment use, elevation and knee extension and flexion to help advance his current status which is still lagging in both directions. PT reiterated multiple times the importance of compliance with therex and edema management strategies to advance and get the best benefit from this surgery.      Mitchell Is progressing well towards his goals.   Pt prognosis is Good.     Pt will continue to benefit from skilled outpatient physical therapy to address the deficits listed in the problem list box on initial evaluation, provide pt/family education and to maximize pt's level of independence in the home and community environment.     Pt's spiritual, cultural and educational needs considered and pt agreeable to plan of care and goals.     Anticipated barriers to physical therapy: lots of edema    Goals:   Short Term Goals: In 4 weeks:  1.Pt to be educated on HEP. met  2.Patient to demo increased AROM to flexion to 115 degrees or better. progressing  3.Patient to increase strength hip flexion to 4/5 or better. met  4.Patient to have decreased pain to 3/10 or better. progressing  5.Patient to increase LE balance to single leg stand up to 10 sec. progressing  6.Patient to improve score on the FOTO by 10%. No improving yet  7.Patient to decrease swelling by 2 cm. met     Long Term Goals: extend to 7/19/24  1. Patient to perform daily activities including managing steps with 1 rail or one cane up to 3 flights.  2. Patient to demonstrate increased knee AROM to extension to  0. Progressing but still lagging considerably  3. Patient to demonstrate increased LE strength to quad to 5/5 R. Progressing poorly due to edema  4. Patient to have decreased pain to 2/10. Not met  5. Patient to improve score on the FOTO to 78. Not met    PLAN   Plan of care Certification: 3/25/2024 to 6/3/24 extend to 7/19/24     Outpatient Physical Therapy 1 w 4 then 2 times weekly for 4 weeks ( effective week of 6/17/24) to include the following interventions: Electrical Stimulation IFC, NMES, Gait Training, Manual Therapy, Moist Heat/ Ice, Neuromuscular Re-ed, Patient Education, Self Care, Therapeutic Activities, Therapeutic Exercise, and DN. Continue Plan of Care (POC) and progress per patient tolerance.    Kathleen Nguyen, PT

## 2024-06-17 ENCOUNTER — CLINICAL SUPPORT (OUTPATIENT)
Dept: REHABILITATION | Facility: HOSPITAL | Age: 69
End: 2024-06-17
Payer: MEDICARE

## 2024-06-17 DIAGNOSIS — I89.0 LYMPHEDEMA OF RIGHT LOWER EXTREMITY: ICD-10-CM

## 2024-06-17 DIAGNOSIS — Z74.09 DECREASED FUNCTIONAL MOBILITY AND ENDURANCE: Primary | ICD-10-CM

## 2024-06-17 PROCEDURE — 97112 NEUROMUSCULAR REEDUCATION: CPT | Mod: PN

## 2024-06-17 PROCEDURE — 97110 THERAPEUTIC EXERCISES: CPT | Mod: PN

## 2024-06-17 NOTE — PROGRESS NOTES
Physical Therapy Daily Treatment Note     Name: Mitchell Mcmillan  Clinic Number: [id number]    Therapy Diagnosis:   Encounter Diagnoses   Name Primary?    Decreased functional mobility and endurance Yes    Lymphedema of right lower extremity        Physician: Shari Florez PA    Visit Date: 2024    Physician Orders: PT Eval and Treat   Medical Diagnosis from Referral: Z96.651 (ICD-10-CM) - Status post total right knee replacement using cement   Evaluation Date: 3/25/2024  Authorization Period Expiration: 24  Plan of Care Expiration:  24  Visit # / Visits authorized:  (including eval)    Foto: 2/3  Progress Note Due Date: 30 days from 24    Precautions: Standard HTN    Time In: 7:05 am    Time Out: 7:58 am  Total BillableTime: 53 minutes one on one time with PT only    SUBJECTIVE     Today, pt reports: he has a  to attend this week so he will be limited in his ability to attend. Pt is not walking with his knee straight still but states he elevated it a lot this weekend.     He was compliant with home exercise program.  Response to previous treatment: minor soreness  Functional change: arrived with knee flexed again today so gait antalgic    Pre-Treatment Pain: 9/10 pain   Post pain: 5/10    Location: R knee, ankle 9/10    OBJECTIVE   Pt arrived with knee at 35 degrees flexion and poor knee extension when seated and walking.                                                                 TREATMENT     Manual therapeutic interventions including joint mobilization and Range of Motion to right knee for  0 minutes including (with PT):  Deferred:  Mobs for ankle DF, knee flexion and extension grade II tib/femoral and patellar mobs  R LE long leg axial pumps  Soft tissue mobs to the hamstring on R and quad  Patellar mobs  R hip mobs for increase hip external rotation to increase STELLA ROM, knee extension and flexion  Scar tight in upper mid scar area still    Mitchell received therapeutic  exercises to develop strength, endurance, and ROM for 23 minutes including (with tech)    Shuttle 5 banded squats 3 min  Long seated hamstring stretch 3x10 with strap to assist and anterior lean hold 5 sec each  Prone hamstring static stretch 9# 10 x20 sec  PT educated pt in applying his leg compression devices and application    Deferred:  Shuttle calf stretch 5 bands B 3x 15  Seated tail gators 3 min with 7 #  Supine elevated leg to manage edema while performing ankle pumps 1x 50  Prone hip internal/external rotation with 7# 2x 10  Bike seat 11 6 min   Seated STELLA stretch R then L 1x 10 5 sec hold each cues on forward lean to help stretch the glute kip Medrano participated in neuromuscular re-education activities to improve: Balance, Kinesthetic, and Proprioception for 30 minutes. The following activities were included (with PT and tech):      Prone hamstring curls 9# 3x 10 R  Stepping with R VMO engaged 3x 10 reps  High marching L with R vmo engaged cues 2x 10; add tandem gait 3x 10   Seated LAQ 3x 10 R  SLR R 2x 10    Deferred:  R single leg press on shuttle 2 min with max cues on quad set  Shuttle R knee VMO held in extension while L foot walks up/down 5x 10  Leaning hip extension 3x 10  Heel raises standing 1x 40  Prone hamstring curls 2x 10 then 3 x10 R for ROM 7 #  Backward step ups R foot on steps (2 rails for safety) 1x15 with focus on heel drive and quad extension  R hip adduction activation (supine straight leg from 90 hip flexion performing horizontal abduction) 3x 8  Supine hip flexion with L knee in flexion and R hanging off table for hip flexion with 10 # 3x 10  Long seated hip flexion over cone with L LE hanging off edge of bed with focus on VMO 2x 8 R (fatigues quickly)  Single leg step up 2x15 R leading up and L down (performed at steps so L foot could rise 2 steps up and he could use one rail)  Side step ups 3x 10 6 inch step      Home Exercises Provided and Patient Education Provided      Education/Self-Care provided:    Written Home Exercises Provided: Patient instructed to cont prior HEP.  Exercises were reviewed and Mitchell was able to demonstrate them prior to the end of the session.  Mitchell demonstrated good  understanding of the education provided.     See EMR under Patient Instructions for exercises provided prior visit.    ASSESSMENT   PT found pt hasn't worked on his HEP hand out in over 3 weeks but has been working on standing hamstring curls chiefly which has significantly limited his progress. PT discussed with pt the need to work on his basic quad sets, long seated quad sets, and prone hamstring curls to advance. PT referred him back to his initial handout to work on daily to advance and to work on R knee extension when the L hip steps to engage the R knee better with gait as we worked on today. PT reiterated multiple times the importance of compliance with therex and edema management strategies to advance and get the best benefit from this surgery.      Mitchell Is progressing well towards his goals.   Pt prognosis is Good.     Pt will continue to benefit from skilled outpatient physical therapy to address the deficits listed in the problem list box on initial evaluation, provide pt/family education and to maximize pt's level of independence in the home and community environment.     Pt's spiritual, cultural and educational needs considered and pt agreeable to plan of care and goals.     Anticipated barriers to physical therapy: lots of edema    Goals:   Short Term Goals: In 4 weeks:  1.Pt to be educated on HEP. met  2.Patient to demo increased AROM to flexion to 115 degrees or better. progressing  3.Patient to increase strength hip flexion to 4/5 or better. met  4.Patient to have decreased pain to 3/10 or better. progressing  5.Patient to increase LE balance to single leg stand up to 10 sec. progressing  6.Patient to improve score on the FOTO by 10%. No improving yet  7.Patient to  decrease swelling by 2 cm. met     Long Term Goals: extend to 7/19/24  1. Patient to perform daily activities including managing steps with 1 rail or one cane up to 3 flights.  2. Patient to demonstrate increased knee AROM to extension to 0. Progressing but still lagging considerably  3. Patient to demonstrate increased LE strength to quad to 5/5 R. Progressing poorly due to edema  4. Patient to have decreased pain to 2/10. Not met  5. Patient to improve score on the FOTO to 78. Not met    PLAN   Plan of care Certification: 3/25/2024 to 7/19/24     Outpatient Physical Therapy 1 w 4, 2 times weekly for 4 weeks ( effective week of 6/17/24) to include the following interventions: Electrical Stimulation IFC, NMES, Gait Training, Manual Therapy, Moist Heat/ Ice, Neuromuscular Re-ed, Patient Education, Self Care, Therapeutic Activities, Therapeutic Exercise, and DN. Continue Plan of Care (POC) and progress per patient tolerance.    Kathleen Nguyen, PT

## 2024-06-18 ENCOUNTER — CLINICAL SUPPORT (OUTPATIENT)
Dept: REHABILITATION | Facility: HOSPITAL | Age: 69
End: 2024-06-18
Payer: MEDICARE

## 2024-06-18 DIAGNOSIS — I89.0 LYMPHEDEMA OF RIGHT LOWER EXTREMITY: ICD-10-CM

## 2024-06-18 DIAGNOSIS — Z74.09 DECREASED FUNCTIONAL MOBILITY AND ENDURANCE: Primary | ICD-10-CM

## 2024-06-18 PROCEDURE — 97110 THERAPEUTIC EXERCISES: CPT | Mod: PN

## 2024-06-18 PROCEDURE — 97140 MANUAL THERAPY 1/> REGIONS: CPT | Mod: PN

## 2024-06-18 PROCEDURE — 97112 NEUROMUSCULAR REEDUCATION: CPT | Mod: PN

## 2024-06-18 NOTE — PROGRESS NOTES
Physical Therapy Daily Treatment Note     Name: Mitchell Mcmillan  Clinic Number: [id number]    Therapy Diagnosis:   Encounter Diagnoses   Name Primary?    Decreased functional mobility and endurance Yes    Lymphedema of right lower extremity        Physician: Shari Florez PA    Visit Date: 6/18/2024    Physician Orders: PT Eval and Treat   Medical Diagnosis from Referral: Z96.651 (ICD-10-CM) - Status post total right knee replacement using cement   Evaluation Date: 3/25/2024  Authorization Period Expiration: 12/31/24  Plan of Care Expiration: 6/3/24 extend to 7/19/24  Visit # / Visits authorized: 28/ 30 (including eval)    Foto: 2/3  Progress Note Due Date: 30 days from 6/14/24    Precautions: Standard HTN    Time In: 7:03 am    Time Out: 8:07 am  Total BillableTime: 64 minutes one on one time with PT only    SUBJECTIVE     Today, pt reports: he wore his edema brace but still has trouble straightening the knee this am.    He was compliant with home exercise program.  Response to previous treatment: minor soreness  Functional change: arrived with knee flexed again today so gait antalgic    Pre-Treatment Pain: 7/10 pain   Post pain: 5/10    Location: R knee 6/10, ankle 7/10    OBJECTIVE   Pt arrived with knee at 35 degrees flexion and poor knee extension when seated and walking on arrival on the last session but today at about 25 degrees improving to a lag of 7 degrees by the end of the session today. Knee flexion 102 degrees.                                                  TREATMENT     Manual therapeutic interventions including joint mobilization and Range of Motion to right knee for  15 minutes including (with PT):    Tibial medial rotation glides in seated and side lying grade II-III  Long leg axial traction in prone  Over pressure for knee extension in prone and supine     Deferred:  Mobs for ankle DF, knee flexion and extension grade II tib/femoral and patellar mobs  R LE long leg axial pumps  Soft tissue  mobs to the hamstring on R and quad  Patellar mobs  R hip mobs for increase hip external rotation to increase STELLA ROM, knee extension and flexion  Scar tight in upper mid scar area still    Mitchell received therapeutic exercises to develop strength, endurance, and ROM for 30 minutes including (with tech)    Shuttle 5 banded squats 3 min  Long seated hamstring stretch 3x10 with strap to assist and anterior lean hold 5 sec each  Prone hamstring static stretch 9# 10x20 sec  Tailgators 9 # 2 min   Prone hip internal/external rotation with 9# 2x 10  Seated R side trunk lean with L knee flexed (to R hip internal rotate in flexed posture) 2x 10  Bike 5 min with cues on internal rotation at tip, lateral shift at hip and heel drive    Deferred:  Shuttle calf stretch 5 bands B 3x 15  Supine elevated leg to manage edema while performing ankle pumps 1x 50  Bike seat 11 6 min   Seated STELLA stretch R then L 1x 10 5 sec hold each cues on forward lean to help stretch the glute more         Mitchell participated in neuromuscular re-education activities to improve: Balance, Kinesthetic, and Proprioception for 19 minutes. The following activities were included (with PT and tech):      Prone hamstring curls 9# 2x15 R  Prone TKE R 3x 10   Seated LAQ 3x 10 R 9#  Seated SAQ 2x 10  Standing TKE with L foot toe tap (max cues to reduce anterior lean, lateral lean and poor quad activation) 2x 10  Leaning hip extension 3x 10    Deferred:  Stepping with R VMO engaged 3x 10 reps  High marching L with R vmo engaged cues 2x 10; add tandem gait 3x 10   SLR R 2x 10  R single leg press on shuttle 2 min with max cues on quad set  Shuttle R knee VMO held in extension while L foot walks up/down 5x 10  Backward step ups R foot on steps (2 rails for safety) 1x15 with focus on heel drive and quad extension  R hip adduction activation (supine straight leg from 90 hip flexion performing horizontal abduction) 3x 8  Supine hip flexion with L knee in flexion  and R hanging off table for hip flexion with 10 # 3x 10  Long seated hip flexion over cone with L LE hanging off edge of bed with focus on VMO 2x 8 R (fatigues quickly)  Single leg step up 2x15 R leading up and L down (performed at steps so L foot could rise 2 steps up and he could use one rail)  Side step ups 3x 10 6 inch step      Home Exercises Provided and Patient Education Provided     Education/Self-Care provided:    Written Home Exercises Provided: Patient instructed to cont prior HEP.  Exercises were reviewed and Mitchell was able to demonstrate them prior to the end of the session.  Mitchell demonstrated good  understanding of the education provided.     See EMR under Patient Instructions for exercises provided prior visit.    ASSESSMENT   PT found pt is still walking with knee flexed. Edema is improved as compression sleeve is tighter than last session. Pt tends to have tight medial tissue tension at hamstring and calf still limiting functional mobility as well a poor motor control patterns needing cues. PT to advance as tolerated.      Mitchell Is progressing well towards his goals.   Pt prognosis is Good.     Pt will continue to benefit from skilled outpatient physical therapy to address the deficits listed in the problem list box on initial evaluation, provide pt/family education and to maximize pt's level of independence in the home and community environment.     Pt's spiritual, cultural and educational needs considered and pt agreeable to plan of care and goals.     Anticipated barriers to physical therapy: lots of edema    Goals:   Short Term Goals: In 4 weeks:  1.Pt to be educated on HEP. met  2.Patient to demo increased AROM to flexion to 115 degrees or better. progressing  3.Patient to increase strength hip flexion to 4/5 or better. met  4.Patient to have decreased pain to 3/10 or better. progressing  5.Patient to increase LE balance to single leg stand up to 10 sec. progressing  6.Patient to improve  score on the FOTO by 10%. No improving yet  7.Patient to decrease swelling by 2 cm. met     Long Term Goals: extend to 7/19/24  1. Patient to perform daily activities including managing steps with 1 rail or one cane up to 3 flights.  2. Patient to demonstrate increased knee AROM to extension to 0. Progressing but still lagging considerably  3. Patient to demonstrate increased LE strength to quad to 5/5 R. Progressing poorly due to edema  4. Patient to have decreased pain to 2/10. Not met  5. Patient to improve score on the FOTO to 78. Not met    PLAN   Plan of care Certification: 3/25/2024 to 7/19/24     Outpatient Physical Therapy 1w 4, 2 times weekly for 4 weeks ( effective week of 6/17/24) to include the following interventions: Electrical Stimulation IFC, NMES, Gait Training, Manual Therapy, Moist Heat/ Ice, Neuromuscular Re-ed, Patient Education, Self Care, Therapeutic Activities, Therapeutic Exercise, and DN. Continue Plan of Care (POC) and progress per patient tolerance.    Kathleen Nguyen, PT

## 2024-06-19 ENCOUNTER — CLINICAL SUPPORT (OUTPATIENT)
Dept: REHABILITATION | Facility: HOSPITAL | Age: 69
End: 2024-06-19
Payer: MEDICARE

## 2024-06-19 DIAGNOSIS — Z74.09 DECREASED FUNCTIONAL MOBILITY AND ENDURANCE: Primary | ICD-10-CM

## 2024-06-19 DIAGNOSIS — I89.0 LYMPHEDEMA OF RIGHT LOWER EXTREMITY: ICD-10-CM

## 2024-06-19 PROCEDURE — 97140 MANUAL THERAPY 1/> REGIONS: CPT | Mod: PN

## 2024-06-19 PROCEDURE — 97110 THERAPEUTIC EXERCISES: CPT | Mod: PN

## 2024-06-19 PROCEDURE — 97112 NEUROMUSCULAR REEDUCATION: CPT | Mod: PN

## 2024-06-19 NOTE — PROGRESS NOTES
Physical Therapy Daily Treatment Note     Name: Mitchell Mcmillan  Clinic Number: [id number]    Therapy Diagnosis:   Encounter Diagnoses   Name Primary?    Decreased functional mobility and endurance Yes    Lymphedema of right lower extremity        Physician: Shari Florez PA    Visit Date: 6/19/2024    Physician Orders: PT Eval and Treat   Medical Diagnosis from Referral: Z96.651 (ICD-10-CM) - Status post total right knee replacement using cement   Evaluation Date: 3/25/2024  Authorization Period Expiration: 12/31/24  Plan of Care Expiration: 7/19/24  Visit # / Visits authorized: 29/ 30 (including eval)    Foto: 2/3  Progress Note Due Date: 30 days from 6/14/24    Precautions: Standard HTN    Time In: 7:02 am    Time Out: 8:05 am  Total BillableTime: 63 minutes one on one time with PT only    SUBJECTIVE     Today, pt reports: he arrived with his edema managing brace on upside down.   He was compliant with home exercise program.  Response to previous treatment: minor soreness  Functional change: arrived with knee flexed again today so gait antalgic    Pre-Treatment Pain: 5/10 pain   Post pain: 5/10    Location: R knee 5/10, ankle 3/10    OBJECTIVE   Pt arrived with knee at lagging 25 degrees of full extension when seated and walking on arrival on the last session but today at improved to knee extension lag by 10 degrees by the end of the session today. Knee flexion 104 degrees.                                                  TREATMENT     Manual therapeutic interventions including joint mobilization and Range of Motion to right knee for  15 minutes including (with PT):    Tibial medial rotation glides in seated and side lying grade II-III  Long leg axial traction in prone  Over pressure for knee extension in prone and supine   Soft tissue mobilization to the medial hamstring on R PATTIE Medrano received therapeutic exercises to develop strength, endurance, and ROM for 30 minutes including (with tech)    Shuttle 5  banded squats 5 min  Long seated hamstring stretch 3x10 with strap to assist and anterior lean hold 5 sec each  Prone hamstring static stretch 9# 10x20   R hip internal/external rotation ROM mobs 3x 10 (in supine)  Tailgators 9 # 2 min   Prone hip internal/external rotation with 9# 2x 10    Deferred:  Seated R side trunk lean with L knee flexed (to R hip internal rotate in flexed posture) 2x 10  Bike 5 min with cues on internal rotation at tip, lateral shift at hip and heel drive  Shuttle calf stretch 5 bands B 3x 15  Supine elevated leg to manage edema while performing ankle pumps 1x 50  Bike seat 11 6 min   Seated STELLA stretch R then L 1x 10 5 sec hold each cues on forward lean to help stretch the glute more         Mitchell participated in neuromuscular re-education activities to improve: Balance, Kinesthetic, and Proprioception for 18 minutes. The following activities were included (with PT and tech):      Prone hamstring curls 9# 4x10 R  Prone TKE R 3x 10   Seated LAQ 3x 10 R 9#  Seated SAQ 2x 10  Leaning hip extension 3x 10    Deferred:  Standing TKE with L foot toe tap (max cues to reduce anterior lean, lateral lean and poor quad activation) 2x 10  Stepping with R VMO engaged 3x 10 reps  High marching L with R vmo engaged cues 2x 10; add tandem gait 3x 10   SLR R 2x 10  R single leg press on shuttle 2 min with max cues on quad set  Shuttle R knee VMO held in extension while L foot walks up/down 5x 10  Backward step ups R foot on steps (2 rails for safety) 1x15 with focus on heel drive and quad extension  R hip adduction activation (supine straight leg from 90 hip flexion performing horizontal abduction) 3x 8  Supine hip flexion with L knee in flexion and R hanging off table for hip flexion with 10 # 3x 10  Long seated hip flexion over cone with L LE hanging off edge of bed with focus on VMO 2x 8 R (fatigues quickly)  Single leg step up 2x15 R leading up and L down (performed at steps so L foot could rise 2  steps up and he could use one rail)  Side step ups 3x 10 6 inch step      Home Exercises Provided and Patient Education Provided     Education/Self-Care provided:    Written Home Exercises Provided: Patient instructed to cont prior HEP.  Exercises were reviewed and Mitchell was able to demonstrate them prior to the end of the session.  Mitchell demonstrated good  understanding of the education provided.     See EMR under Patient Instructions for exercises provided prior visit.    ASSESSMENT   PT found pt tending to baby the knee with sit to stand from his chair and deviating into R hip internal rotation. PT had pt work on hip control with transfers and educated him to use the L LE with transfers sit to stand to engage the leg more with transfers. PT continued with educating him in proper compression sleeve use as it was upside down on the calf and not fitting correctly. PT noted he arrived without his SC but PT still neglecting full extension. PT to advance as tolerated with more training to help engage EROM extension and flexion.    Mitchell Is progressing well towards his goals.   Pt prognosis is Good.     Pt will continue to benefit from skilled outpatient physical therapy to address the deficits listed in the problem list box on initial evaluation, provide pt/family education and to maximize pt's level of independence in the home and community environment.     Pt's spiritual, cultural and educational needs considered and pt agreeable to plan of care and goals.     Anticipated barriers to physical therapy: lots of edema    Goals:   Short Term Goals: In 4 weeks:  1.Pt to be educated on HEP. met  2.Patient to demo increased AROM to flexion to 115 degrees or better. progressing  3.Patient to increase strength hip flexion to 4/5 or better. met  4.Patient to have decreased pain to 3/10 or better. progressing  5.Patient to increase LE balance to single leg stand up to 10 sec. progressing  6.Patient to improve score on the  FOTO by 10%. No improving yet  7.Patient to decrease swelling by 2 cm. met     Long Term Goals: to be met by 7/19/24  1. Patient to perform daily activities including managing steps with 1 rail or one cane up to 3 flights.  2. Patient to demonstrate increased knee AROM to extension to 0. Progressing but still lagging considerably  3. Patient to demonstrate increased LE strength to quad to 5/5 R. Progressing poorly due to edema  4. Patient to have decreased pain to 2/10. Not met  5. Patient to improve score on the FOTO to 78. Not met    PLAN   Plan of care Certification: 3/25/2024  to 7/19/24     Outpatient Physical Therapy 1w 4, 2 times weekly for 4 weeks ( effective week of 6/17/24) to include the following interventions: Electrical Stimulation IFC, NMES, Gait Training, Manual Therapy, Moist Heat/ Ice, Neuromuscular Re-ed, Patient Education, Self Care, Therapeutic Activities, Therapeutic Exercise, and DN. Continue Plan of Care (POC) and progress per patient tolerance.    Kathleen Nguyen, PT

## 2024-06-19 NOTE — PLAN OF CARE
Physical Therapy Daily Treatment Note and Updated Plan of Care     Name: Mitchell Mcmillan  Clinic Number: [id number]    Therapy Diagnosis:   Encounter Diagnoses   Name Primary?    Decreased functional mobility and endurance Yes    Lymphedema of right lower extremity        Physician: Shari Florez PA    Visit Date: 6/14/2024    Physician Orders: PT Eval and Treat   Medical Diagnosis from Referral: Z96.651 (ICD-10-CM) - Status post total right knee replacement using cement   Evaluation Date: 3/25/2024  Authorization Period Expiration: 12/31/24  Plan of Care Expiration: 6/3/24 extend to 7/19/24  Visit # / Visits authorized: 26/ 30 (including eval)    Foto: 2/3  Progress Note Due Date: 30 days from 6/14/24    Precautions: Standard HTN    Time In: 8:15 am    Time Out: 9:25 am  Total BillableTime: 70 minutes one on one time with PT only    SUBJECTIVE     Today, pt reports: he had a manipulation on 6/12/24. PT has been trying to get him in but he was declining to attend until next week. PT was able to get him to come in today and noted he is wearing only part of his wrap on the leg so swelling in the calf and ankle are significant     He was compliant with home exercise program.  Response to previous treatment: minor soreness  Functional change: arrived with knee flexed again today so gait antalgic    Pre-Treatment Pain: 4/10 pain   Post pain: 4/10 knee 5/10 ankle    Location: R knee, ankle 9/10    OBJECTIVE       Girth Measurement:  (L) 47 cm; (R) 52 cm at mid patella at eval; today 48.5 cm                                                                                                                 At eval             Today End of session today  Knee AROM:                                                  (R)       (L)       R L                                      Flexion                         70        115    98 115                                              Extension                    25        0 15 0  8                                       Prone flexion                                      94        90                                      Hook lying flexion                               100      112  95     Hip AROM:                 Flexion                         120      120    120 120                                      ER                               15        30       32 55 (prone)                                      IR                                 20        30      35 45 (prone)                                        Ankle AROM:             Ankle DF(keara)            0          8         4   6                                        Strength:                                                        R          L        R L  Hip flexors L2                          4/5       5/5      4+ 5  Quadriceps L3                        3/5       5/5       3+ 5                               Hamstrings S1                        4/5       5/5      4+ 5                          Dorsiflexion L4                        5/5       5/5      5 5                                   Plantar flexion S1                    2/5       2/5      2 2                          Gluteus Medius L45S1           3+/5     4/5      4 4                                    Gluteus Abdirizak [id number]        3/5       3+/5    4 4                               TREATMENT     Manual therapeutic interventions including joint mobilization and Range of Motion to right knee for  10  minutes including (with PT):    Mobs for ankle DF, knee flexion and extension grade II tib/femoral and patellar mobs  Deferred:  R LE long leg axial pumps  Soft tissue mobs to the hamstring on R and quad  Patellar mobs  R hip mobs for increase hip external rotation to increase STELLA ROM, knee extension and flexion  Scar tight in upper mid scar area still    Mitchell received therapeutic exercises to develop strength, endurance, and ROM for 30 minutes including (with tech)    Shuttle 5 banded  squats 3 min  Shuttle calf stretch 5 bands B 3x 15  Seated tail gators 3 min with 7 #  Long seated hamstring stretch 3x10 with strap to assist and anterior lean hold 5 sec each  Prone static hamstring stretch 7# 20 x 20 sec hold  Supine elevated leg to manage edema while performing ankle pumps 1x 50  Prone hip internal/external rotation with 7# 2x 10    Deferred:  Bike seat 11 6 min   Seated STELLA stretch R then L 1x 10 5 sec hold each cues on forward lean to help stretch the glute more         Mitchell participated in neuromuscular re-education activities to improve: Balance, Kinesthetic, and Proprioception for 30 minutes. The following activities were included (with PT and tech):      R single leg press on shuttle 2 min with max cues on quad set  Shuttle R knee VMO held in extension while L foot walks up/down 5x 10  Seated LAQ 3x 10 R 2 sec hold   SLR 2x 10 per leg R with focus on quad activation  Leaning hip extension 3x 10  Heel raises standing 1x 40  Prone hamstring curls 2x 10 then 3 x10 R for ROM 7 #  Backward step ups R foot on steps (2 rails for safety) 1x15 with focus on heel drive and quad extension    Deferred:  R hip adduction activation (supine straight leg from 90 hip flexion performing horizontal abduction) 3x 8  Supine hip flexion with L knee in flexion and R hanging off table for hip flexion with 10 # 3x 10  Long seated hip flexion over cone with L LE hanging off edge of bed with focus on VMO 2x 8 R (fatigues quickly)  Single leg step up 2x15 R leading up and L down (performed at steps so L foot could rise 2 steps up and he could use one rail)  Side step ups 3x 10 6 inch step      Home Exercises Provided and Patient Education Provided     Education/Self-Care provided:    Written Home Exercises Provided: Patient instructed to cont prior HEP.  Exercises were reviewed and Mitchell was able to demonstrate them prior to the end of the session.  Mitchell demonstrated good  understanding of the education  provided.     See EMR under Patient Instructions for exercises provided prior visit.    ASSESSMENT     Patient is still lagging a lot with flexion and knee extension. Pt was not wearing his compression garment completely or correctly so edema in the calf and foot was elevated and limiting his tolerance to heel strike. Pt was walking in with knee flexed and weight shifted to the L to unweigh the R resulting in poor guarding behaviors. PT continued to encourage proper garment use, elevation and knee extension and flexion to help advance his current status which is still lagging in both directions. PT reiterated multiple times the importance of compliance with therex and edema management strategies to advance and get the best benefit from this surgery.      Mitchell Is progressing well towards his goals.   Pt prognosis is Good.     Pt will continue to benefit from skilled outpatient physical therapy to address the deficits listed in the problem list box on initial evaluation, provide pt/family education and to maximize pt's level of independence in the home and community environment.     Pt's spiritual, cultural and educational needs considered and pt agreeable to plan of care and goals.     Anticipated barriers to physical therapy: lots of edema    Goals:   Short Term Goals: In 4 weeks:  1.Pt to be educated on HEP. met  2.Patient to demo increased AROM to flexion to 115 degrees or better. progressing  3.Patient to increase strength hip flexion to 4/5 or better. met  4.Patient to have decreased pain to 3/10 or better. progressing  5.Patient to increase LE balance to single leg stand up to 10 sec. progressing  6.Patient to improve score on the FOTO by 10%. No improving yet  7.Patient to decrease swelling by 2 cm. met     Long Term Goals: extend to 7/19/24  1. Patient to perform daily activities including managing steps with 1 rail or one cane up to 3 flights.  2. Patient to demonstrate increased knee AROM to extension to  0. Progressing but still lagging considerably  3. Patient to demonstrate increased LE strength to quad to 5/5 R. Progressing poorly due to edema  4. Patient to have decreased pain to 2/10. Not met  5. Patient to improve score on the FOTO to 78. Not met    PLAN   Plan of care Certification: 3/25/2024 to 6/3/24 extend to 7/19/24     Outpatient Physical Therapy 1 w 4 then 2 times weekly for 4 weeks ( effective week of 6/17/24) to include the following interventions: Electrical Stimulation IFC, NMES, Gait Training, Manual Therapy, Moist Heat/ Ice, Neuromuscular Re-ed, Patient Education, Self Care, Therapeutic Activities, Therapeutic Exercise, and DN. Continue Plan of Care (POC) and progress per patient tolerance.    Kathleen Nguyen, PT

## 2024-06-24 ENCOUNTER — CLINICAL SUPPORT (OUTPATIENT)
Dept: REHABILITATION | Facility: HOSPITAL | Age: 69
End: 2024-06-24
Payer: MEDICARE

## 2024-06-24 DIAGNOSIS — I89.0 LYMPHEDEMA OF RIGHT LOWER EXTREMITY: ICD-10-CM

## 2024-06-24 DIAGNOSIS — Z74.09 DECREASED FUNCTIONAL MOBILITY AND ENDURANCE: Primary | ICD-10-CM

## 2024-06-24 PROCEDURE — 97112 NEUROMUSCULAR REEDUCATION: CPT | Mod: PN

## 2024-06-24 PROCEDURE — 97110 THERAPEUTIC EXERCISES: CPT | Mod: PN

## 2024-06-24 NOTE — PROGRESS NOTES
Physical Therapy Daily Treatment Note     Name: Mitchell Mcmillan  Clinic Number: [id number]    Therapy Diagnosis:   Encounter Diagnoses   Name Primary?    Decreased functional mobility and endurance Yes    Lymphedema of right lower extremity        Physician: Shari Florez PA    Visit Date: 6/24/2024    Physician Orders: PT Eval and Treat   Medical Diagnosis from Referral: Z96.651 (ICD-10-CM) - Status post total right knee replacement using cement   Evaluation Date: 3/25/2024  Authorization Period Expiration: 12/31/24  Plan of Care Expiration: 7/19/24  Visit # / Visits authorized: 30/ 30 (including eval)    Foto: 2/3  Progress Note Due Date: 30 days from 6/14/24    Precautions: Standard HTN    Time In: 7:06 am   (pt late)  Time Out: 8:02 am  Total BillableTime: 56 minutes one on one time with PT only    SUBJECTIVE     Today, pt reports: he arrived without his edema brace. Pt states he didn't wear it on his long trip. PT encouraged use regularly to help reduce edema and keep edema down as compression socks don't reduce edema once it is present but his compression devices do.  He was compliant with home exercise program.  Response to previous treatment: minor soreness  Functional change: arrived with knee flexed again today so gait antalgic    Pre-Treatment Pain: 5/10 pain   Post pain: 5/10    Location: R knee 5/10, ankle 3/10    OBJECTIVE   Pt arrived with knee swollen after a long drive and not wearing his compression devices.     Pt ROM was very poor on arrival again as he drove out of town, didn't wear his compression device and swelling is significantly elevated again.   End of session knee extension 18 degrees (had improved to 8 degrees last week) and flexion up to 104 last week but this week after a full PT session today he was only at 95 degrees knee flexion.                                              TREATMENT        Manual therapeutic interventions including joint mobilization and Range of Motion to  right knee for  0 minutes including (with PT):     Deferred:  Tibial medial rotation glides in seated and side lying grade II-III  Long leg axial traction in prone  Over pressure for knee extension in prone and supine   Soft tissue mobilization to the medial hamstring on R PATTIE Medrano received therapeutic exercises to develop strength, endurance, and ROM for 26 minutes including (with tech)     Shuttle 5 banded squats 4 min  Shuttle for edema pumping 3 bands 3 min focusing on knee extension  Tail gators 7# 3 min  Prone hamstring static stretch 7# 10x20 sec hold     Deferred:  Long seated hamstring stretch 3x10 with strap to assist and anterior lean hold 5 sec each  Seated R side trunk lean with L knee flexed (to R hip internal rotate in flexed posture) 2x 10  Bike 5 min with cues on internal rotation at tip, lateral shift at hip and heel drive  Shuttle calf stretch 5 bands B 3x 15  Supine elevated leg to manage edema while performing ankle pumps 1x 50  Bike seat 11 6 min   Seated STELLA stretch R then L 1x 10 5 sec hold each cues on forward lean to help stretch the glute kip Medrano participated in neuromuscular re-education activities to improve: Balance, Kinesthetic, and Proprioception for 30 minutes. The following activities were included (with PT and tech):      Seated quad sets 3x 10 R also 2x 10 B to try to train using L for feed back  Prone hamstring curls 7# 4x10 R  Prone SAQ 7# to assist gaining motor control 3x 10  Long Seated LAQ 3x 10 R 7# add strap to assist EROM extension on last 2 sets  Seated SAQ 2x 10  Leaning hip extension 3x 10     Deferred:  Standing TKE with L foot toe tap (max cues to reduce anterior lean, lateral lean and poor quad activation) 2x 10  Stepping with R VMO engaged 3x 10 reps  High marching L with R vmo engaged cues 2x 10; add tandem gait 3x 10   SLR R 2x 10  R single leg press on shuttle 2 min with max cues on quad set  Shuttle R knee VMO held in extension while L  foot walks up/down 5x 10  Backward step ups R foot on steps (2 rails for safety) 1x15 with focus on heel drive and quad extension  R hip adduction activation (supine straight leg from 90 hip flexion performing horizontal abduction) 3x 8  Supine hip flexion with L knee in flexion and R hanging off table for hip flexion with 10 # 3x 10  Long seated hip flexion over cone with L LE hanging off edge of bed with focus on VMO 2x 8 R (fatigues quickly)  Single leg step up 2x15 R leading up and L down (performed at steps so L foot could rise 2 steps up and he could use one rail)  Side step ups 3x 10 6 inch step    Home Exercises Provided and Patient Education Provided     Education/Self-Care provided:    Written Home Exercises Provided: Patient instructed to cont prior HEP.  Exercises were reviewed and Mitchell was able to demonstrate them prior to the end of the session.  Mitchell demonstrated good  understanding of the education provided.     See EMR under Patient Instructions for exercises provided prior visit.    ASSESSMENT   PT performed sit to stand using the R LE to help rise better today but it is very swollen as he didn't use it much at all this weekend as he was traveling. PT explained this is the time he needs to use it the most to help get swelling down so it is more able to engage as swelling limits function considerably and increases pain.  After all these months of PT, pt still doesn't understand what a quad sets looks like and how it is needed to get the knee straight. PT had pt perform it on his L side first to help him understand but he still consistently pushes from the hip vs the knee. Pt performs best if it is done B and with pants rolled up so he can see his knees. PT to advance as tolerated with more training to help engage EROM extension and flexion.    Mitchell Is progressing well towards his goals.   Pt prognosis is Good.     Pt will continue to benefit from skilled outpatient physical therapy to address  the deficits listed in the problem list box on initial evaluation, provide pt/family education and to maximize pt's level of independence in the home and community environment.     Pt's spiritual, cultural and educational needs considered and pt agreeable to plan of care and goals.     Anticipated barriers to physical therapy: lots of edema    Goals:   Short Term Goals: In 4 weeks:  1.Pt to be educated on HEP. met  2.Patient to demo increased AROM to flexion to 115 degrees or better. progressing  3.Patient to increase strength hip flexion to 4/5 or better. met  4.Patient to have decreased pain to 3/10 or better. progressing  5.Patient to increase LE balance to single leg stand up to 10 sec. progressing  6.Patient to improve score on the FOTO by 10%. No improving yet  7.Patient to decrease swelling by 2 cm. met     Long Term Goals: to be met by 7/19/24  1. Patient to perform daily activities including managing steps with 1 rail or one cane up to 3 flights.  2. Patient to demonstrate increased knee AROM to extension to 0. Progressing but still lagging considerably  3. Patient to demonstrate increased LE strength to quad to 5/5 R. Progressing poorly due to edema  4. Patient to have decreased pain to 2/10. Not met  5. Patient to improve score on the FOTO to 78. Not met    PLAN   Plan of care Certification: 3/25/2024  to 7/19/24     Outpatient Physical Therapy 1w 4, 2 times weekly for 4 weeks ( effective week of 6/17/24) to include the following interventions: Electrical Stimulation IFC, NMES, Gait Training, Manual Therapy, Moist Heat/ Ice, Neuromuscular Re-ed, Patient Education, Self Care, Therapeutic Activities, Therapeutic Exercise, and DN. Continue Plan of Care (POC) and progress per patient tolerance.    Kathleen Nguyen, PT

## 2024-06-25 NOTE — PROGRESS NOTES
Physical Therapy Daily Treatment Note     Name: Mitchell Mcmillan  Clinic Number: [id number]    Therapy Diagnosis:   Encounter Diagnoses   Name Primary?    Decreased functional mobility and endurance Yes    Lymphedema of right lower extremity      Physician: Shari Florez PA    Visit Date: 6/26/2024    Physician Orders: PT Eval and Treat   Medical Diagnosis from Referral: Z96.651 (ICD-10-CM) - Status post total right knee replacement using cement   Evaluation Date: 3/25/2024  Authorization Period Expiration: 12/31/24  Plan of Care Expiration: 7/19/24  Visit # / Visits authorized: 31/ 31 (including eval)    Foto: 2/3  Progress Note Due Date: 30 days from 6/14/24    Precautions: Standard HTN    Time In: 7:13 am   (pt late)  Time Out: 8:00 am  Total BillableTime: 47 minutes one on one time with PT only    SUBJECTIVE     Today, pt reports: he arrived with knee extension lag and antalgia stating he had some sharp pain in the knee this am.  He was compliant with home exercise program.  Response to previous treatment: minor soreness  Functional change: arrived with knee flexed again today so gait antalgic    Pre-Treatment Pain: 5/10 pain   Post pain: 5/10    Location: R knee 5/10, ankle 3/10    OBJECTIVE   Pt arrived with knee swollen after a long drive and not wearing his compression devices.     PT arrived with knee extension lag of 25 degree on arrival today  End of session knee extension  18 degrees (had improved to 8 degrees last week) and flexion up to 104 last week but this week after a full PT session today he was only at 98 degrees knee flexion.                                              TREATMENT       -implies performed today    Manual therapeutic interventions including joint mobilization and Range of Motion to right knee for  0 minutes including (with PT):     Deferred:  Tibial medial rotation glides in seated and side lying grade II-III  Long leg axial traction in prone  Over pressure for knee extension in  prone and supine   Soft tissue mobilization to the medial hamstring on R PATTIE Medrano received therapeutic exercises to develop strength, endurance, and ROM for 17 minutes including (with tech)     -Heel raises  on steps 1x 30 and heel drops 1x 30  -Shuttle 3 banded squats 3 min fast for helping pump fluid 3 min  -Long seated hamstring stretch with foot on stool 2x 10  -Tail gators 6# 3 min  -Prone hamstring static stretch 6# 10x20 sec hold     Deferred:  Long seated hamstring stretch 3x10 with strap to assist and anterior lean hold 5 sec each  Seated R side trunk lean with L knee flexed (to R hip internal rotate in flexed posture) 2x 10  Bike 5 min with cues on internal rotation at tip, lateral shift at hip and heel drive  Shuttle calf stretch 5 bands B 3x 15  Supine elevated leg to manage edema while performing ankle pumps 1x 50  Bike seat 11 6 min   Seated STELLA stretch R then L 1x 10 5 sec hold each cues on forward lean to help stretch the glute kip Medrano participated in neuromuscular re-education activities to improve: Balance, Kinesthetic, and Proprioception for 30 minutes. The following activities were included (with PT and tech):      -R knee VMO engaged quad set standing with L foot stepping up to 2 step heights (on stairs) to engage 3x 15  (repeat 2x 15 after shuttle again today)  -Quad sets on shuttle with 4 bands B 2 min; quad sets on shuttle with 3 bands single leg 2 min  -L foot toe taps to stool with purple band to cue hip extension and black band assisting R Quad set 2x 10  -Supine R Lower leg off edge of bed marches 6# 3x 10     Deferred:  Seated quad sets 3x 10 R also 2x 10 B to try to train using L for feed back  Prone hamstring curls 7# 4x10 R  Prone SAQ 7# to assist gaining motor control 3x 10  Long Seated LAQ 3x 10 R 7# add strap to assist EROM extension on last 2 sets  Seated SAQ 2x 10  Leaning hip extension 3x 10  Standing TKE with L foot toe tap (max cues to reduce anterior  lean, lateral lean and poor quad activation) 2x 10  Stepping with R VMO engaged 3x 10 reps  High marching L with R vmo engaged cues 2x 10; add tandem gait 3x 10   SLR R 2x 10  R single leg press on shuttle 2 min with max cues on quad set  Shuttle R knee VMO held in extension while L foot walks up/down 5x 10  Backward step ups R foot on steps (2 rails for safety) 1x15 with focus on heel drive and quad extension         Home Exercises Provided and Patient Education Provided     Education/Self-Care provided:    Written Home Exercises Provided: Patient instructed to cont prior HEP.  Exercises were reviewed and Mitchell was able to demonstrate them prior to the end of the session.  Mitchell demonstrated good  understanding of the education provided.     See EMR under Patient Instructions for exercises provided prior visit.    ASSESSMENT   Pt is still very guarded in the knee, hip and ankle. PT noted his edema is still very high even with the compression garments so PT may refer him back to lymphedema management. Pt to see the Dr tomorrow. PT noted ROM at best 8 extension but he is still back up to 18 at best today due to guarding and edema and flexion only at 95 degrees (has been up to 105 degrees). PT to advance as tolerated.    Mitchell Is progressing well towards his goals.   Pt prognosis is Good.     Pt will continue to benefit from skilled outpatient physical therapy to address the deficits listed in the problem list box on initial evaluation, provide pt/family education and to maximize pt's level of independence in the home and community environment.     Pt's spiritual, cultural and educational needs considered and pt agreeable to plan of care and goals.     Anticipated barriers to physical therapy: lots of edema    Goals:   Short Term Goals: In 4 weeks:  1.Pt to be educated on HEP. met  2.Patient to demo increased AROM to flexion to 115 degrees or better. progressing  3.Patient to increase strength hip flexion to 4/5 or  better. met  4.Patient to have decreased pain to 3/10 or better. progressing  5.Patient to increase LE balance to single leg stand up to 10 sec. progressing  6.Patient to improve score on the FOTO by 10%. No improving yet  7.Patient to decrease swelling by 2 cm. met     Long Term Goals: to be met by 7/19/24  1. Patient to perform daily activities including managing steps with 1 rail or one cane up to 3 flights.  2. Patient to demonstrate increased knee AROM to extension to 0. Progressing but still lagging considerably  3. Patient to demonstrate increased LE strength to quad to 5/5 R. Progressing poorly due to edema  4. Patient to have decreased pain to 2/10. Not met  5. Patient to improve score on the FOTO to 78. Not met    PLAN   Plan of care Certification: 3/25/2024  to 7/19/24     Outpatient Physical Therapy 1w 4, 2 times weekly for 4 weeks ( effective week of 6/17/24) to include the following interventions: Electrical Stimulation IFC, NMES, Gait Training, Manual Therapy, Moist Heat/ Ice, Neuromuscular Re-ed, Patient Education, Self Care, Therapeutic Activities, Therapeutic Exercise, and DN. Continue Plan of Care (POC) and progress per patient tolerance.    Kathleen Nguyen, PT

## 2024-06-26 ENCOUNTER — CLINICAL SUPPORT (OUTPATIENT)
Dept: REHABILITATION | Facility: HOSPITAL | Age: 69
End: 2024-06-26
Payer: MEDICARE

## 2024-06-26 DIAGNOSIS — I89.0 LYMPHEDEMA OF RIGHT LOWER EXTREMITY: ICD-10-CM

## 2024-06-26 DIAGNOSIS — Z74.09 DECREASED FUNCTIONAL MOBILITY AND ENDURANCE: Primary | ICD-10-CM

## 2024-06-26 PROCEDURE — 97112 NEUROMUSCULAR REEDUCATION: CPT | Mod: PN

## 2024-06-26 PROCEDURE — 97110 THERAPEUTIC EXERCISES: CPT | Mod: PN

## 2024-06-27 ENCOUNTER — OFFICE VISIT (OUTPATIENT)
Dept: ORTHOPEDICS | Facility: CLINIC | Age: 69
End: 2024-06-27
Payer: MEDICARE

## 2024-06-27 VITALS — BODY MASS INDEX: 30.93 KG/M2 | WEIGHT: 254 LBS | HEIGHT: 76 IN

## 2024-06-27 DIAGNOSIS — M24.661 ARTHROFIBROSIS OF KNEE JOINT, RIGHT: ICD-10-CM

## 2024-06-27 DIAGNOSIS — Z98.890 POST-OPERATIVE STATE: ICD-10-CM

## 2024-06-27 DIAGNOSIS — Z96.651 STATUS POST TOTAL RIGHT KNEE REPLACEMENT USING CEMENT: Primary | ICD-10-CM

## 2024-06-27 PROCEDURE — 1159F MED LIST DOCD IN RCRD: CPT | Mod: CPTII,S$GLB,, | Performed by: PHYSICIAN ASSISTANT

## 2024-06-27 PROCEDURE — 1101F PT FALLS ASSESS-DOCD LE1/YR: CPT | Mod: CPTII,S$GLB,, | Performed by: PHYSICIAN ASSISTANT

## 2024-06-27 PROCEDURE — 4010F ACE/ARB THERAPY RXD/TAKEN: CPT | Mod: CPTII,S$GLB,, | Performed by: PHYSICIAN ASSISTANT

## 2024-06-27 PROCEDURE — 99024 POSTOP FOLLOW-UP VISIT: CPT | Mod: S$GLB,,, | Performed by: PHYSICIAN ASSISTANT

## 2024-06-27 PROCEDURE — 99999 PR PBB SHADOW E&M-EST. PATIENT-LVL III: CPT | Mod: PBBFAC,,, | Performed by: PHYSICIAN ASSISTANT

## 2024-06-27 PROCEDURE — 1160F RVW MEDS BY RX/DR IN RCRD: CPT | Mod: CPTII,S$GLB,, | Performed by: PHYSICIAN ASSISTANT

## 2024-06-27 PROCEDURE — 3288F FALL RISK ASSESSMENT DOCD: CPT | Mod: CPTII,S$GLB,, | Performed by: PHYSICIAN ASSISTANT

## 2024-06-27 PROCEDURE — 1125F AMNT PAIN NOTED PAIN PRSNT: CPT | Mod: CPTII,S$GLB,, | Performed by: PHYSICIAN ASSISTANT

## 2024-06-27 RX ORDER — SILDENAFIL 100 MG/1
100 TABLET, FILM COATED ORAL DAILY PRN
Qty: 30 TABLET | Refills: 10 | Status: SHIPPED | OUTPATIENT
Start: 2024-06-27

## 2024-06-27 NOTE — PROGRESS NOTES
Patient ID: Mitchell Mcmillan is a 68 y.o. male.    Chief Complaint: Post-op Evaluation (R TK manipulation 6/12/24 )      HPI: Mitchell Mcmillan  is a 68 y.o. male who c/o Post-op Evaluation (R TK manipulation 6/12/24 )     Post op visit 1   Patient notes pain is 6/10   The patient is doing okay since surgery he states he has still having a lot of pain and stiffness   He states he is compliant with therapy as wearing a soft brace to help with swelling  He feels the range of motion has improved since undergoing the manipulation   He has not using any devices to assist with ambulation    Patient is presently denying any shortness of breath, chest pain, fever/chills, nausea/vomiting, loss of taste or smell, numbness/tingling or sensation changes, loss of bladder or bowel function, loss of taste/smell.     Surgery: Right Total Knee manipulation was performed 06/12/2024   Right total knee was performed 03/06/2024    Past Medical History:   Diagnosis Date    Arthritis     knees    Asymmetric SNHL (sensorineural hearing loss) 08/17/2017    Chronic left hip pain 2006    Dislocated left hip, job injury    Chronic prostatitis     per pt, last episode 2013    Elevated PSA, less than 10 ng/ml     per pt last elevation 2013    Gastroesophageal reflux disease 8/3/2022    Hyperlipidemia 2010    Hypertension 2011    Joint effusion of knee     per pt rt knee, seen by Dr. Duke 2013    Midline low back pain without sciatica 6/13/2016    Obesity     Special screening for malignant neoplasms, colon 10/5/2016    Tobacco dependence     Trouble in sleeping      Past Surgical History:   Procedure Laterality Date    arthrocentesis Right 2013    Per pt , knee by Dr. BONNIE Duke    COLONOSCOPY N/A 10/05/2016    Normal exam repeat 10 yrs. Procedure: COLONOSCOPY;  Surgeon: Alexander Goetz MD;  Location: [facility];  Service: Endoscopy;  Laterality: N/A;    HIP ARTHROPLASTY Left 04/16/2006    Per pt, dislocated left hip/ hardware placed  by   Maria L    INJECTION Right 2024    Procedure: INJECTION;  Surgeon: Ellis Mora MD;  Location: [facility] OR;  Service: Orthopedics;  Laterality: Right;    left orchiectomy      MANIPULATION WITH ANESTHESIA Right 2024    Procedure: MANIPULATION, WITH ANESTHESIA;  Surgeon: Ellis Mora MD;  Location: [facility] OR;  Service: Orthopedics;  Laterality: Right;    PROSTATE BIOPSY      procedure performed at [facility] due to elevated PSA - benign    TOTAL KNEE ARTHROPLASTY Left 2023    Procedure: ARTHROPLASTY, KNEE, TOTAL;  Surgeon: Ellis Mora MD;  Location: [facility] OR;  Service: Orthopedics;  Laterality: Left;    TOTAL KNEE ARTHROPLASTY Right 3/6/2024    Procedure: ARTHROPLASTY, KNEE, TOTAL;  Surgeon: Ellis Mora MD;  Location: [facility] OR;  Service: Orthopedics;  Laterality: Right;     Family History   Problem Relation Name Age of Onset    Depression Mother      Early death Mother      Hypertension Mother      Heart disease Mother  53         from MI    Arthritis Father      Stroke Father      Diabetes Sister      No Known Problems Brother      Hypertension Sister      Hypertension Sister      No Known Problems Brother      No Known Problems Brother      No Known Problems Daughter      Cancer Neg Hx      COPD Neg Hx      Kidney disease Neg Hx       Social History     Socioeconomic History    Marital status:      Spouse name: Nida    Number of children: 0   Occupational History    Occupation: Disabled since      Comment: Dislocated left hip   Tobacco Use    Smoking status: Former     Current packs/day: 0.00     Average packs/day: 0.1 packs/day for 46.0 years (4.6 ttl pk-yrs)     Types: Cigarettes     Start date: 6/3/2022     Quit date: 10/19/2022     Years since quittin.6    Smokeless tobacco: Never   Substance and Sexual Activity    Alcohol use: Yes     Alcohol/week: 2.0 standard drinks of alcohol     Types: 2 Glasses of wine per week    Drug use: No     Sexual activity: Yes     Partners: Female     Birth control/protection: None   Social History Narrative    Disabled since 2006.  Job injury.  Dislocated left hip.  Has 4 brothers and 4 sisters. Wears seatbelt.     Social Determinants of Health     Financial Resource Strain: Low Risk  (9/30/2022)    Overall Financial Resource Strain (CARDIA)     Difficulty of Paying Living Expenses: Not hard at all   Food Insecurity: No Food Insecurity (9/30/2022)    Hunger Vital Sign     Worried About Running Out of Food in the Last Year: Never true     Ran Out of Food in the Last Year: Never true   Transportation Needs: No Transportation Needs (9/30/2022)    PRAPARE - Transportation     Lack of Transportation (Medical): No     Lack of Transportation (Non-Medical): No   Physical Activity: Insufficiently Active (9/19/2023)    Exercise Vital Sign     Days of Exercise per Week: 2 days     Minutes of Exercise per Session: 10 min   Stress: No Stress Concern Present (9/30/2022)    [employer] - Occupational Stress Questionnaire     Feeling of Stress : Only a little   Housing Stability: Low Risk  (9/30/2022)    Housing Stability Vital Sign     Unable to Pay for Housing in the Last Year: No     Number of Places Lived in the Last Year: 1     Unstable Housing in the Last Year: No     Medication List with Changes/Refills   Current Medications    ATORVASTATIN (LIPITOR) 40 MG TABLET    TAKE 1 TABLET EVERY DAY    CELECOXIB (CELEBREX) 200 MG CAPSULE    Take 1 capsule (200 mg total) by mouth once daily. Take with food    CYCLOBENZAPRINE (FLEXERIL) 10 MG TABLET    Take 1 tablet (10 mg total) by mouth 3 (three) times daily as needed for Muscle spasms.    DICLOFENAC SODIUM (VOLTAREN) 1 % GEL    Apply 2 g topically 3 (three) times daily as needed.    FINASTERIDE (PROSCAR) 5 MG TABLET    TAKE 1 TABLET EVERY DAY    FLUTICASONE PROPIONATE (FLONASE) 50 MCG/ACTUATION NASAL SPRAY    2 sprays (100 mcg total) by Each Nostril  route once daily.    GABAPENTIN (NEURONTIN) 300 MG CAPSULE    Take 1 capsule (300 mg total) by mouth 2 (two) times daily.    LISINOPRIL-HYDROCHLOROTHIAZIDE (PRINZIDE,ZESTORETIC) 20-25 MG TAB    TAKE 1 TABLET EVERY DAY    OMEGA-3 FATTY ACIDS/FISH OIL (FISH OIL-OMEGA-3 FATTY ACIDS) 300-1,000 MG CAPSULE    Take 1 capsule by mouth once daily.    ONDANSETRON (ZOFRAN-ODT) 4 MG TBDL    Dissolve 2 tablets (8 mg total) by mouth every 8 (eight) hours as needed (Nausea).    OXYCODONE-ACETAMINOPHEN (PERCOCET)  MG PER TABLET    Take 1 tablet by mouth every 8 (eight) hours as needed for Pain.    PANTOPRAZOLE (PROTONIX) 40 MG TABLET    Take 1 tablet (40 mg total) by mouth once daily.    SILDENAFIL (VIAGRA) 100 MG TABLET    Take 1 tablet (100 mg total) by mouth daily as needed for Erectile Dysfunction.    TAMSULOSIN (FLOMAX) 0.4 MG CAP    TAKE 1 CAPSULE EVERY DAY     Review of patient's allergies indicates:  No Known Allergies    Objective:     Right Lower Extremity  NVI  WWP foot  Comp soft  Cap refill < 2 sec  Calf NT, Soft  (-) Simi sign  RENATO  ROM : Patient shows range of motion of 10-85 degrees today.  I did discuss with him that per op note with Dr. Mora he was at 0 to 125 after the manipulation.  Wiggles toes  DF/PF intact  Sensation intact  Inc C/D/I  No SOI    IMAGING  No imaging obtained today    Assessment:       No diagnosis found.       Plan:       There are no diagnoses linked to this encounter.    Mitchell Mcmillan is an established pt here for postop follow-up after right total knee replacement by Dr. Mora.  I discussed range of motion findings today with Dr. Mora.   We will place the patient in a dynamic extension brace.  I encouraged him to push really hard with therapy as this is a significant loss of range of motion.  I would like to see him back in close follow-up for further evaluation.    Dr. Mora is aware of the patient & current presentation. He agrees with the current plan above.         No follow-ups on file.    The patient understands, chooses and consents to this plan and accepts all   the risks which include but are not limited to the risks mentioned above.     Disclaimer: This note was prepared using a voice recognition system and is likely to have sound alike errors within the text.

## 2024-07-01 ENCOUNTER — CLINICAL SUPPORT (OUTPATIENT)
Dept: REHABILITATION | Facility: HOSPITAL | Age: 69
End: 2024-07-01
Payer: MEDICARE

## 2024-07-01 DIAGNOSIS — I89.0 LYMPHEDEMA OF RIGHT LOWER EXTREMITY: ICD-10-CM

## 2024-07-01 DIAGNOSIS — Z74.09 DECREASED FUNCTIONAL MOBILITY AND ENDURANCE: Primary | ICD-10-CM

## 2024-07-01 PROCEDURE — 97110 THERAPEUTIC EXERCISES: CPT | Mod: PN

## 2024-07-01 PROCEDURE — 97112 NEUROMUSCULAR REEDUCATION: CPT | Mod: PN

## 2024-07-01 NOTE — PROGRESS NOTES
Physical Therapy Daily Treatment Note     Name: Mitchell Mcmillan  Clinic Number: [id number]    Therapy Diagnosis:   Encounter Diagnoses   Name Primary?    Decreased functional mobility and endurance Yes    Lymphedema of right lower extremity      Physician: Shari Florez PA    Visit Date: 7/1/2024    Physician Orders: PT Eval and Treat   Medical Diagnosis from Referral: Z96.651 (ICD-10-CM) - Status post total right knee replacement using cement   Evaluation Date: 3/25/2024  Authorization Period Expiration: 12/31/24  Plan of Care Expiration: 7/19/24  Visit # / Visits authorized: 32/ 31 (including eval)    Foto: 2/3  Progress Note Due Date: 30 days from 6/14/24    Precautions: Standard HTN    Time In: 7:00 am     Time Out: 8:00 am  Total BillableTime: 60 minutes one on one time with PT only    SUBJECTIVE     Today, pt reports: he wants to get dynasplint so PT provided information on how to assist with this.  PT encouraged pt to attend more this week if able  He was compliant with home exercise program.  Response to previous treatment: minor soreness  Functional change: arrived with knee flexed again today so gait antalgic    Pre-Treatment Pain: 4/10 pain   Post pain: 5/10    Location: R knee 5/10, ankle 3/10    OBJECTIVE   Pt is going out of town after today limiting his ability to attend.    PT arrived with knee extension lag of 35 degree on arrival today and ended at 18 degrees today (had improved to 8 degrees last week) and flexion up to 104 last week but this week after a full PT session today he was only at 100 degrees knee flexion.   Pt is trying to get a dynasplint to assist with knee ROM.                                             TREATMENT       Manual therapeutic interventions including joint mobilization and Range of Motion to right knee for  0 minutes including (with PT):     Deferred:  Tibial medial rotation glides in seated and side lying grade II-III  Long leg axial traction in prone  Over pressure  for knee extension in prone and supine   Soft tissue mobilization to the medial hamstring on R LE        Mitchell received therapeutic exercises to develop strength, endurance, and ROM for 25 minutes including (with tech)     Shuttle 5 bands 3 min  Calf stretch at the step 3x 10  L single hand carry 20 # 160 ft x 3  Heel raises  on steps 1x 30 and heel drops 1x 30  Baps forward/back heel drives to PF 3x 10  Long seated hamstring stretch with foot on stool 3x 20 sec  Seated piriformis and lateral hip stretches (in STELLA then add anterior lean) 5x 10 sec      Deferred:  Tail gators 6# 3 min  Prone hamstring static stretch 6# 10x20 sec hold  Long seated hamstring stretch 3x10 with strap to assist and anterior lean hold 5 sec each  Seated R side trunk lean with L knee flexed (to R hip internal rotate in flexed posture) 2x 10  Bike 5 min with cues on internal rotation at tip, lateral shift at hip and heel drive  Shuttle calf stretch 5 bands B 3x 15  Supine elevated leg to manage edema while performing ankle pumps 1x 50  Bike seat 11 6 min             Mitchell participated in neuromuscular re-education activities to improve: Balance, Kinesthetic, and Proprioception for 35 minutes. The following activities were included (with PT and tech):      R knee VMO engaged quad set standing with L foot stepping up to 2 step heights (on stairs) to engage 3x 15    R calf on stretch with L foot step forward/back to engage VMO on R 3x 10  Single leg janay dead lift 20 # with and without wall 2x 10 each  Standing on BAP with R foot and L foot steps on/off to VMO activation on R and glute engaged 3x10  Standing on BAPs with R foot and L foot side stepping off 3x 10  Sumo squat with bar to help with motor control 3x 10  Deep squat with bar to help with motor control 3x 10    Deferred:    Quad sets on shuttle with 4 bands B 2 min; quad sets on shuttle with 3 bands single leg 2 min  L foot toe taps to stool with purple band to cue hip extension  and black band assisting R Quad set 2x 10  Supine R Lower leg off edge of bed marches 6# 3x 10  Seated quad sets 3x 10 R also 2x 10 B to try to train using L for feed back  Prone hamstring curls 7# 4x10 R  Prone SAQ 7# to assist gaining motor control 3x 10  Long Seated LAQ 3x 10 R 7# add strap to assist EROM extension on last 2 sets  Seated SAQ 2x 10  Leaning hip extension 3x 10  Standing TKE with L foot toe tap (max cues to reduce anterior lean, lateral lean and poor quad activation) 2x 10  Stepping with R VMO engaged 3x 10 reps  High marching L with R vmo engaged cues 2x 10; add tandem gait 3x 10   SLR R 2x 10  R single leg press on shuttle 2 min with max cues on quad set  Shuttle R knee VMO held in extension while L foot walks up/down 5x 10  Backward step ups R foot on steps (2 rails for safety) 1x15 with focus on heel drive and quad extension         Home Exercises Provided and Patient Education Provided     Education/Self-Care provided:    Written Home Exercises Provided: Patient instructed to cont prior HEP.  Exercises were reviewed and Mitchell was able to demonstrate them prior to the end of the session.  Mitchell demonstrated good  understanding of the education provided.     See EMR under Patient Instructions for exercises provided prior visit.    ASSESSMENT   PT is very concerned that he is not progressing well and he still can't engage the VMO well. Pt had made a mild gain in knee extension with PT daily but with his vacation coming up, PT Is concerned he will decline a lot. PT reached out to the [employer] rep and informed him that pt will need to get records for him and to please let us know how we can assist. PT gave pt the business number to reach out to [facility] with [employer] so they can get the proper records. PT encouraged compliance with HEP and compression while pt is out of town. PT to advance as tolerated.    Mitchell Is progressing well towards his goals.   Pt prognosis is Good.     Pt will continue  to benefit from skilled outpatient physical therapy to address the deficits listed in the problem list box on initial evaluation, provide pt/family education and to maximize pt's level of independence in the home and community environment.     Pt's spiritual, cultural and educational needs considered and pt agreeable to plan of care and goals.     Anticipated barriers to physical therapy: lots of edema    Goals:   Short Term Goals: In 4 weeks:  1.Pt to be educated on HEP. met  2.Patient to demo increased AROM to flexion to 115 degrees or better. progressing  3.Patient to increase strength hip flexion to 4/5 or better. met  4.Patient to have decreased pain to 3/10 or better. progressing  5.Patient to increase LE balance to single leg stand up to 10 sec. progressing  6.Patient to improve score on the FOTO by 10%. No improving yet  7.Patient to decrease swelling by 2 cm. met     Long Term Goals: to be met by 7/19/24  1. Patient to perform daily activities including managing steps with 1 rail or one cane up to 3 flights.  2. Patient to demonstrate increased knee AROM to extension to 0. Progressing but still lagging considerably  3. Patient to demonstrate increased LE strength to quad to 5/5 R. Progressing poorly due to edema  4. Patient to have decreased pain to 2/10. Not met  5. Patient to improve score on the FOTO to 78. Not met    PLAN   Plan of care Certification: 3/25/2024  to 7/19/24     Outpatient Physical Therapy 1w 4, 2 times weekly for 4 weeks ( effective week of 6/17/24) to include the following interventions: Electrical Stimulation IFC, NMES, Gait Training, Manual Therapy, Moist Heat/ Ice, Neuromuscular Re-ed, Patient Education, Self Care, Therapeutic Activities, Therapeutic Exercise, and DN. Continue Plan of Care (POC) and progress per patient tolerance.    Kathleen Nguyen, PT

## 2024-07-09 ENCOUNTER — CLINICAL SUPPORT (OUTPATIENT)
Dept: REHABILITATION | Facility: HOSPITAL | Age: 69
End: 2024-07-09
Payer: MEDICARE

## 2024-07-09 DIAGNOSIS — Z74.09 DECREASED FUNCTIONAL MOBILITY AND ENDURANCE: Primary | ICD-10-CM

## 2024-07-09 DIAGNOSIS — I89.0 LYMPHEDEMA OF RIGHT LOWER EXTREMITY: ICD-10-CM

## 2024-07-09 PROCEDURE — 97112 NEUROMUSCULAR REEDUCATION: CPT | Mod: HCNC,PN

## 2024-07-09 NOTE — PROGRESS NOTES
Physical Therapy Daily Treatment Note     Name: Mitchell Mcmillan  Clinic Number: [id number]    Therapy Diagnosis:   Encounter Diagnoses   Name Primary?    Decreased functional mobility and endurance Yes    Lymphedema of right lower extremity        Physician: Shari Florez PA    Visit Date: 7/9/2024    Physician Orders: PT Eval and Treat   Medical Diagnosis from Referral: Z96.651 (ICD-10-CM) - Status post total right knee replacement using cement   Evaluation Date: 3/25/2024  Authorization Period Expiration: 12/31/24  Plan of Care Expiration: 7/19/24  Visit # / Visits authorized: 33/ 40 (including eval)    Foto: 2/3  Progress Note Due Date: 30 days from 6/14/24    Precautions: Standard HTN    Time In: 7:02 am     Time Out: 7:57 am  Total BillableTime: 31 minutes one on one time with PT only    SUBJECTIVE     Today, pt reports: he had a drive out of town and did fairly well with the Drive. Pt is waiting on the dynasplint.    He was compliant with home exercise program.  Response to previous treatment: minor soreness  Functional change: arrived with knee flexed again today so gait antalgic    Pre-Treatment Pain: 4/10 pain   Post pain: 3/10    Location: R knee and ankle    OBJECTIVE   PT arrived with knee extension lag of 20 degree on arrival today and ended at 12 degrees today (had improved to 8 degrees last week) and flexion up to 104 last week but this week after a full PT session today he was only at 102 degrees knee flexion.   Pt is trying to get a dynasplint to assist with knee ROM.                                             TREATMENT       Manual therapeutic interventions including joint mobilization and Range of Motion to right knee for  0 minutes including (with PT):     Deferred:  Tibial medial rotation glides in seated and side lying grade II-III  Long leg axial traction in prone  Over pressure for knee extension in prone and supine   Soft tissue mobilization to the medial hamstring on R PATTIE Medrano  received therapeutic exercises to develop strength, endurance, and ROM for 15 minutes including (with tech)     Shuttle 5 bands 4 min  Shuttle L side lying R leg press 3 bands 4 min  L single hand carry 20 # 160 ft x 3    Deferred:  Calf stretch at the step 3x 10  Heel raises  on steps 1x 30 and heel drops 1x 30  Baps forward/back heel drives to PF 3x 10  Long seated hamstring stretch with foot on stool 3x 20 sec  Seated piriformis and lateral hip stretches (in STELLA then add anterior lean) 5x 10 sec  Tail gators 6# 3 min  Prone hamstring static stretch 6# 10x20 sec hold  Long seated hamstring stretch 3x10 with strap to assist and anterior lean hold 5 sec each  Seated R side trunk lean with L knee flexed (to R hip internal rotate in flexed posture) 2x 10  Bike 5 min with cues on internal rotation at tip, lateral shift at hip and heel drive  Shuttle calf stretch 5 bands B 3x 15  Supine elevated leg to manage edema while performing ankle pumps 1x 50  Bike seat 11 6 min             Mitchell participated in neuromuscular re-education activities to improve: Balance, Kinesthetic, and Proprioception for 40 minutes. The following activities were included (with PT and tech):      Patellar mobs with pt activating VMO during to help train knee extension 10 x 20 sec  Single leg janay dead lift 20 # with and without wall 2x 10 each  Prone hamstring curls 4# 4x10 R  Side lying L hip abduction 2x10 per leg  Supine R Lower leg off edge of bed marches 6# 3x 10  Leaning hip extension 3x 10 B on plinth  Long Seated VMO activation with 10 #  static to assist ROM on knee 2x 10  R    Deferred:  R knee VMO engaged quad set standing with L foot stepping up to 2 step heights (on stairs) to engage 3x 15    R calf on stretch with L foot step forward/back to engage VMO on R 3x 10  Standing on BAP with R foot and L foot steps on/off to VMO activation on R and glute engaged 3x10  Standing on BAPs with R foot and L foot side stepping off 3x  10  Sumo squat with bar to help with motor control 3x 10  Deep squat with bar to help with motor control 3x 10  Quad sets on shuttle with 4 bands B 2 min; quad sets on shuttle with 3 bands single leg 2 min  L foot toe taps to stool with purple band to cue hip extension and black band assisting R Quad set 2x 10  Seated quad sets 3x 10 R also 2x 10 B to try to train using L for feed back  Prone SAQ 7# to assist gaining motor control 3x 10  Leaning hip extension 3x 10  Standing TKE with L foot toe tap (max cues to reduce anterior lean, lateral lean and poor quad activation) 2x 10  Stepping with R VMO engaged 3x 10 reps           Home Exercises Provided and Patient Education Provided     Education/Self-Care provided:    Written Home Exercises Provided: Patient instructed to cont prior HEP.  Exercises were reviewed and Mitchell was able to demonstrate them prior to the end of the session.  Mitchell demonstrated good  understanding of the education provided.     See EMR under Patient Instructions for exercises provided prior visit.    ASSESSMENT   Ptt kept some of his mobility after his last session but still can't engage the quad well due to hamstring inhibition and poor awareness of how to engage the quad better. PT continued working with mobility to increased patella mobility. PT encouraged compliance with HEP and compression while pt is out of town. PT to advance as tolerated.    Mitchell Is progressing well towards his goals.   Pt prognosis is Good.     Pt will continue to benefit from skilled outpatient physical therapy to address the deficits listed in the problem list box on initial evaluation, provide pt/family education and to maximize pt's level of independence in the home and community environment.     Pt's spiritual, cultural and educational needs considered and pt agreeable to plan of care and goals.     Anticipated barriers to physical therapy: lots of edema    Goals:   Short Term Goals: In 4 weeks:  1.Pt to be  educated on HEP. met  2.Patient to demo increased AROM to flexion to 115 degrees or better. progressing  3.Patient to increase strength hip flexion to 4/5 or better. met  4.Patient to have decreased pain to 3/10 or better. progressing  5.Patient to increase LE balance to single leg stand up to 10 sec. progressing  6.Patient to improve score on the FOTO by 10%. No improving yet  7.Patient to decrease swelling by 2 cm. met     Long Term Goals: to be met by 7/19/24  1. Patient to perform daily activities including managing steps with 1 rail or one cane up to 3 flights.  2. Patient to demonstrate increased knee AROM to extension to 0. Progressing but still lagging considerably  3. Patient to demonstrate increased LE strength to quad to 5/5 R. Progressing poorly due to edema  4. Patient to have decreased pain to 2/10. Not met  5. Patient to improve score on the FOTO to 78. Not met    PLAN   Plan of care Certification: 3/25/2024  to 7/19/24     Outpatient Physical Therapy 1w 4, 2 times weekly for 4 weeks ( effective week of 6/17/24) to include the following interventions: Electrical Stimulation IFC, NMES, Gait Training, Manual Therapy, Moist Heat/ Ice, Neuromuscular Re-ed, Patient Education, Self Care, Therapeutic Activities, Therapeutic Exercise, and DN. Continue Plan of Care (POC) and progress per patient tolerance.    Kathleen Nguyen, PT

## 2024-08-07 RX ORDER — CYCLOBENZAPRINE HCL 10 MG
10 TABLET ORAL 3 TIMES DAILY PRN
Qty: 30 TABLET | Refills: 1 | Status: SHIPPED | OUTPATIENT
Start: 2024-08-07

## 2024-08-09 ENCOUNTER — TELEPHONE (OUTPATIENT)
Dept: ORTHOPEDICS | Facility: CLINIC | Age: 69
End: 2024-08-09
Payer: MEDICARE

## 2024-08-23 ENCOUNTER — OFFICE VISIT (OUTPATIENT)
Dept: ORTHOPEDICS | Facility: CLINIC | Age: 69
End: 2024-08-23
Payer: MEDICARE

## 2024-08-23 VITALS — BODY MASS INDEX: 30.93 KG/M2 | WEIGHT: 254 LBS | HEIGHT: 76 IN

## 2024-08-23 DIAGNOSIS — M17.12 ARTHRITIS OF KNEE, LEFT: ICD-10-CM

## 2024-08-23 DIAGNOSIS — M17.11 ARTHRITIS OF KNEE, RIGHT: ICD-10-CM

## 2024-08-23 PROCEDURE — 99999 PR PBB SHADOW E&M-EST. PATIENT-LVL III: CPT | Mod: PBBFAC,HCNC,, | Performed by: PHYSICIAN ASSISTANT

## 2024-08-23 RX ORDER — GABAPENTIN 300 MG/1
300 CAPSULE ORAL 2 TIMES DAILY
Qty: 60 CAPSULE | Refills: 11 | Status: SHIPPED | OUTPATIENT
Start: 2024-08-23 | End: 2025-08-23

## 2024-08-23 NOTE — PROGRESS NOTES
Patient ID: Mitchell Mcmillan is a 69 y.o. male.    Chief Complaint: Post-op Evaluation (R TK Manipulation 6/12/24 (R TKA 3/6/24)/)      HPI: Mitchell Mcmillan  is a 69 y.o. male who c/o Post-op Evaluation (R TK Manipulation 6/12/24 (R TKA 3/6/24)/)     Post op visit 1   Patient notes pain is 6/10   The patient is doing okay since surgery he states he has still having a lot of pain and stiffness   He states he is compliant with therapy as wearing a soft brace to help with swelling  He feels the range of motion has improved since undergoing the manipulation   He has not using any devices to assist with ambulation    Patient is presently denying any shortness of breath, chest pain, fever/chills, nausea/vomiting, loss of taste or smell, numbness/tingling or sensation changes, loss of bladder or bowel function, loss of taste/smell.     8/23/2024  Patient presents today for range-of-motion check/postop visit 2  Patient states he has seen vast improvement and is very pleased with his results   He has completed therapy  He is still using his lymphedema bracing and pumps to help with swelling  He does still have a brace that he can use to support on an as-needed basis   He is walking without any devices   He is thankful for us for helping him    Surgery: Right Total Knee manipulation was performed 06/12/2024   Right total knee was performed 03/06/2024    Past Medical History:   Diagnosis Date    Arthritis     knees    Asymmetric SNHL (sensorineural hearing loss) 08/17/2017    Chronic left hip pain 2006    Dislocated left hip, job injury    Chronic prostatitis     per pt, last episode 2013    Elevated PSA, less than 10 ng/ml     per pt last elevation 2013    Gastroesophageal reflux disease 8/3/2022    Hyperlipidemia 2010    Hypertension 2011    Joint effusion of knee     per pt rt knee, seen by Dr. Duke 2013    Midline low back pain without sciatica 6/13/2016    Obesity     Special screening for malignant neoplasms, colon  10/5/2016    Tobacco dependence     Trouble in sleeping      Past Surgical History:   Procedure Laterality Date    arthrocentesis Right     Per pt , knee by Dr. BONNIE Duke    COLONOSCOPY N/A 10/05/2016    Normal exam repeat 10 yrs. Procedure: COLONOSCOPY;  Surgeon: Alexander Goetz MD;  Location: [facility];  Service: Endoscopy;  Laterality: N/A;    HIP ARTHROPLASTY Left 2006    Per pt, dislocated left hip/ hardware placed  by Dr. Lisa    INJECTION Right 2024    Procedure: INJECTION;  Surgeon: Ellis Mora MD;  Location: [facility] OR;  Service: Orthopedics;  Laterality: Right;    left orchiectomy      MANIPULATION WITH ANESTHESIA Right 2024    Procedure: MANIPULATION, WITH ANESTHESIA;  Surgeon: Ellis Mora MD;  Location: [facility];  Service: Orthopedics;  Laterality: Right;    PROSTATE BIOPSY      procedure performed at [facility] due to elevated PSA - benign    TOTAL KNEE ARTHROPLASTY Left 2023    Procedure: ARTHROPLASTY, KNEE, TOTAL;  Surgeon: Ellis Mora MD;  Location: [facility] OR;  Service: Orthopedics;  Laterality: Left;    TOTAL KNEE ARTHROPLASTY Right 3/6/2024    Procedure: ARTHROPLASTY, KNEE, TOTAL;  Surgeon: Ellis Mora MD;  Location: [facility] OR;  Service: Orthopedics;  Laterality: Right;     Family History   Problem Relation Name Age of Onset    Depression Mother      Early death Mother      Hypertension Mother      Heart disease Mother  53         from MI    Arthritis Father      Stroke Father      Diabetes Sister      No Known Problems Brother      Hypertension Sister      Hypertension Sister      No Known Problems Brother      No Known Problems Brother      No Known Problems Daughter      Cancer Neg Hx      COPD Neg Hx      Kidney disease Neg Hx       Social History     Socioeconomic History    Marital status:      Spouse name: Nida    Number of children: 0   Occupational History    Occupation: Disabled since      Comment:  Dislocated left hip   Tobacco Use    Smoking status: Former     Current packs/day: 0.00     Average packs/day: 0.1 packs/day for 46.0 years (4.6 ttl pk-yrs)     Types: Cigarettes     Start date: 6/3/2022     Quit date: 10/19/2022     Years since quittin.8    Smokeless tobacco: Never   Substance and Sexual Activity    Alcohol use: Yes     Alcohol/week: 2.0 standard drinks of alcohol     Types: 2 Glasses of wine per week    Drug use: No    Sexual activity: Yes     Partners: Female     Birth control/protection: None   Social History Narrative    Disabled since .  Job injury.  Dislocated left hip.  Has 4 brothers and 4 sisters. Wears seatbelt.     Social Determinants of Health     Financial Resource Strain: Low Risk  (2022)    Overall Financial Resource Strain (CARDIA)     Difficulty of Paying Living Expenses: Not hard at all   Food Insecurity: No Food Insecurity (2022)    Hunger Vital Sign     Worried About Running Out of Food in the Last Year: Never true     Ran Out of Food in the Last Year: Never true   Transportation Needs: No Transportation Needs (2022)    PRAPARE - Transportation     Lack of Transportation (Medical): No     Lack of Transportation (Non-Medical): No   Physical Activity: Insufficiently Active (2023)    Exercise Vital Sign     Days of Exercise per Week: 2 days     Minutes of Exercise per Session: 10 min   Stress: No Stress Concern Present (2022)    [employer] - Occupational Stress Questionnaire     Feeling of Stress : Only a little   Housing Stability: Low Risk  (2022)    Housing Stability Vital Sign     Unable to Pay for Housing in the Last Year: No     Number of Places Lived in the Last Year: 1     Unstable Housing in the Last Year: No     Medication List with Changes/Refills   Current Medications    ATORVASTATIN (LIPITOR) 40 MG TABLET    TAKE 1 TABLET EVERY DAY    CELECOXIB (CELEBREX) 200 MG CAPSULE    Take 1 capsule (200 mg total)  by mouth once daily. Take with food    CYCLOBENZAPRINE (FLEXERIL) 10 MG TABLET    Take 1 tablet (10 mg total) by mouth 3 (three) times daily as needed for Muscle spasms.    DICLOFENAC SODIUM (VOLTAREN) 1 % GEL    Apply 2 g topically 3 (three) times daily as needed.    FINASTERIDE (PROSCAR) 5 MG TABLET    TAKE 1 TABLET EVERY DAY    FLUTICASONE PROPIONATE (FLONASE) 50 MCG/ACTUATION NASAL SPRAY    2 sprays (100 mcg total) by Each Nostril route once daily.    GABAPENTIN (NEURONTIN) 300 MG CAPSULE    Take 1 capsule (300 mg total) by mouth 2 (two) times daily.    LISINOPRIL-HYDROCHLOROTHIAZIDE (PRINZIDE,ZESTORETIC) 20-25 MG TAB    TAKE 1 TABLET EVERY DAY    OMEGA-3 FATTY ACIDS/FISH OIL (FISH OIL-OMEGA-3 FATTY ACIDS) 300-1,000 MG CAPSULE    Take 1 capsule by mouth once daily.    ONDANSETRON (ZOFRAN-ODT) 4 MG TBDL    Dissolve 2 tablets (8 mg total) by mouth every 8 (eight) hours as needed (Nausea).    OXYCODONE-ACETAMINOPHEN (PERCOCET)  MG PER TABLET    Take 1 tablet by mouth every 8 (eight) hours as needed for Pain.    PANTOPRAZOLE (PROTONIX) 40 MG TABLET    Take 1 tablet (40 mg total) by mouth once daily.    SILDENAFIL (VIAGRA) 100 MG TABLET    Take 1 tablet (100 mg total) by mouth daily as needed for Erectile Dysfunction.    TAMSULOSIN (FLOMAX) 0.4 MG CAP    TAKE 1 CAPSULE EVERY DAY     Review of patient's allergies indicates:  No Known Allergies    Objective:     Right Lower Extremity  NVI  WWP foot  Comp soft  Cap refill < 2 sec  Calf NT, Soft  (-) Simi sign  RENATO  ROM : Patient shows range of motion of 3-100 and 10/15 degrees today vast improvement from our last visit.  Wiggles toes  DF/PF intact  Sensation intact  Inc C/D/I  No SOI    IMAGING  No imaging obtained today    Assessment:       No diagnosis found.       Plan:       There are no diagnoses linked to this encounter.    Mitchell Mcmillan is an established pt here for postop follow-up after right total knee replacement by Dr. Mora.  Continue current  care and medication regimen.  I will refill his gabapentin for him today.  I encouraged him to continue to use the pumps and wrappings for swelling on an as-needed basis.  Ask that he keep his scheduled appointments.     No follow-ups on file.    The patient understands, chooses and consents to this plan and accepts all   the risks which include but are not limited to the risks mentioned above.     Disclaimer: This note was prepared using a voice recognition system and is likely to have sound alike errors within the text.

## 2024-09-17 DIAGNOSIS — K21.9 GASTROESOPHAGEAL REFLUX DISEASE, UNSPECIFIED WHETHER ESOPHAGITIS PRESENT: ICD-10-CM

## 2024-09-18 RX ORDER — PANTOPRAZOLE SODIUM 40 MG/1
40 TABLET, DELAYED RELEASE ORAL DAILY
Qty: 90 TABLET | Refills: 0 | Status: SHIPPED | OUTPATIENT
Start: 2024-09-18

## 2024-09-18 NOTE — TELEPHONE ENCOUNTER
Care Due:                  Date            Visit Type   Department     Provider  --------------------------------------------------------------------------------                                EP -                              PRIMARY      [facility]  Last Visit: 09-      CARE ([facility])   MEDICINE       Jessica Chinchilla                              EP -                              PRIMARY      [facility]  Next Visit: 10-      CARE ([facility])   MEDICINE       Jessica Chinchilla                                                            Last  Test          Frequency    Reason                     Performed    Due Date  --------------------------------------------------------------------------------    Lipid Panel.  12 months..  atorvastatin.............  09- 09-    [facility] Embedded Care Due Messages. Reference number: [id number].   9/17/2024 8:40:57 PM CDT

## 2024-09-18 NOTE — TELEPHONE ENCOUNTER
Provider Staff:  Action required for this patient    Requires labs      Please see care gap opportunities below in Care Due Message.    Thanks!  [facility]     Appointments      Date Provider   Last Visit   Visit date not found Jessica Chinchilla MD   Next Visit   10/1/2024 Jessica Chinchilla MD     Refill Decision Note   Mitchell Mcmillan  is requesting a refill authorization.  Brief Assessment and Rationale for Refill:  Approve     Medication Therapy Plan:         Comments:     Note composed:2:09 PM 09/18/2024             Appointments     Last Visit   Visit date not found Jessica Chinchilla MD   Next Visit   10/1/2024 Jessica Chinchilla MD

## 2024-09-19 ENCOUNTER — TELEPHONE (OUTPATIENT)
Dept: FAMILY MEDICINE | Facility: CLINIC | Age: 69
End: 2024-09-19
Payer: MEDICARE

## 2024-09-19 ENCOUNTER — OFFICE VISIT (OUTPATIENT)
Dept: ORTHOPEDICS | Facility: CLINIC | Age: 69
End: 2024-09-19
Payer: MEDICARE

## 2024-09-19 VITALS — BODY MASS INDEX: 30.93 KG/M2 | HEIGHT: 76 IN | WEIGHT: 254 LBS

## 2024-09-19 DIAGNOSIS — Z96.651 STATUS POST TOTAL RIGHT KNEE REPLACEMENT USING CEMENT: Primary | ICD-10-CM

## 2024-09-19 DIAGNOSIS — M24.661 ARTHROFIBROSIS OF KNEE JOINT, RIGHT: ICD-10-CM

## 2024-09-19 PROCEDURE — 1159F MED LIST DOCD IN RCRD: CPT | Mod: HCNC,CPTII,S$GLB, | Performed by: PHYSICIAN ASSISTANT

## 2024-09-19 PROCEDURE — 3288F FALL RISK ASSESSMENT DOCD: CPT | Mod: HCNC,CPTII,S$GLB, | Performed by: PHYSICIAN ASSISTANT

## 2024-09-19 PROCEDURE — 1160F RVW MEDS BY RX/DR IN RCRD: CPT | Mod: HCNC,CPTII,S$GLB, | Performed by: PHYSICIAN ASSISTANT

## 2024-09-19 PROCEDURE — 99024 POSTOP FOLLOW-UP VISIT: CPT | Mod: HCNC,S$GLB,, | Performed by: PHYSICIAN ASSISTANT

## 2024-09-19 PROCEDURE — 4010F ACE/ARB THERAPY RXD/TAKEN: CPT | Mod: HCNC,CPTII,S$GLB, | Performed by: PHYSICIAN ASSISTANT

## 2024-09-19 PROCEDURE — 99999 PR PBB SHADOW E&M-EST. PATIENT-LVL III: CPT | Mod: PBBFAC,HCNC,, | Performed by: PHYSICIAN ASSISTANT

## 2024-09-19 PROCEDURE — 1101F PT FALLS ASSESS-DOCD LE1/YR: CPT | Mod: HCNC,CPTII,S$GLB, | Performed by: PHYSICIAN ASSISTANT

## 2024-09-19 PROCEDURE — 1126F AMNT PAIN NOTED NONE PRSNT: CPT | Mod: HCNC,CPTII,S$GLB, | Performed by: PHYSICIAN ASSISTANT

## 2024-09-19 RX ORDER — CYCLOBENZAPRINE HCL 10 MG
10 TABLET ORAL 3 TIMES DAILY PRN
Qty: 30 TABLET | Refills: 1 | Status: SHIPPED | OUTPATIENT
Start: 2024-09-19

## 2024-09-19 NOTE — PROGRESS NOTES
Patient ID: Mitchell Mcmillan is a 69 y.o. male.    Chief Complaint: Post-op Evaluation (R TK Manipulation 6/12/24 (R TKA 3/6/24)/)      HPI: Mitchell Mcmillan  is a 69 y.o. male who c/o Post-op Evaluation (R TK Manipulation 6/12/24 (R TKA 3/6/24)/)     Post op visit 1   Patient notes pain is 6/10   The patient is doing okay since surgery he states he has still having a lot of pain and stiffness   He states he is compliant with therapy as wearing a soft brace to help with swelling  He feels the range of motion has improved since undergoing the manipulation   He has not using any devices to assist with ambulation    Patient is presently denying any shortness of breath, chest pain, fever/chills, nausea/vomiting, loss of taste or smell, numbness/tingling or sensation changes, loss of bladder or bowel function, loss of taste/smell.     8/23/2024  Patient presents today for range-of-motion check/postop visit 2  Patient states he has seen vast improvement and is very pleased with his results   He has completed therapy  He is still using his lymphedema bracing and pumps to help with swelling  He does still have a brace that he can use to support on an as-needed basis   He is walking without any devices   He is thankful for us for helping him    9/19/2024    Patient presents today for range-of-motion check status post right TKA manipulation performed.  He states he has completed therapy he has been able to advance activity of daily living and thus his quality of life.  He notes pain is 0/10 but he is still experiencing some muscle spasms especially at night and inquires about a refill.  He is satisfied with the range of motion that he has achieved.      Additionally he notes some concerns of skin changes to the lateral aspect of the right knee.  It looks to be forming like a keratosis.  He states he has an appointment with his primary care doctor the 1st of October ask that he discuss this with him.  Additionally if he needs a  referral for dermal be happy to place that for him.    Surgery: Right Total Knee manipulation was performed 06/12/2024   Right total knee was performed 03/06/2024    Past Medical History:   Diagnosis Date    Arthritis     knees    Asymmetric SNHL (sensorineural hearing loss) 08/17/2017    Chronic left hip pain 2006    Dislocated left hip, job injury    Chronic prostatitis     per pt, last episode 2013    Elevated PSA, less than 10 ng/ml     per pt last elevation 2013    Gastroesophageal reflux disease 8/3/2022    Hyperlipidemia 2010    Hypertension 2011    Joint effusion of knee     per pt rt knee, seen by Dr. Duke 2013    Midline low back pain without sciatica 6/13/2016    Obesity     Special screening for malignant neoplasms, colon 10/5/2016    Tobacco dependence     Trouble in sleeping      Past Surgical History:   Procedure Laterality Date    arthrocentesis Right 2013    Per pt , knee by Dr. BONNIE Duke    COLONOSCOPY N/A 10/05/2016    Normal exam repeat 10 yrs. Procedure: COLONOSCOPY;  Surgeon: Alexander Goetz MD;  Location: [facility];  Service: Endoscopy;  Laterality: N/A;    HIP ARTHROPLASTY Left 04/16/2006    Per pt, dislocated left hip/ hardware placed  by Dr. Lisa    INJECTION Right 6/12/2024    Procedure: INJECTION;  Surgeon: Ellis Mora MD;  Location: [facility] OR;  Service: Orthopedics;  Laterality: Right;    left orchiectomy      MANIPULATION WITH ANESTHESIA Right 6/12/2024    Procedure: MANIPULATION, WITH ANESTHESIA;  Surgeon: Ellis Mora MD;  Location: [facility] OR;  Service: Orthopedics;  Laterality: Right;    PROSTATE BIOPSY  2012    procedure performed at [facility] due to elevated PSA - benign    TOTAL KNEE ARTHROPLASTY Left 5/31/2023    Procedure: ARTHROPLASTY, KNEE, TOTAL;  Surgeon: Ellis Mora MD;  Location: [facility] OR;  Service: Orthopedics;  Laterality: Left;    TOTAL KNEE ARTHROPLASTY Right 3/6/2024    Procedure: ARTHROPLASTY, KNEE, TOTAL;  Surgeon: Abby  Ellis MIRELES MD;  Location: [facility] OR;  Service: Orthopedics;  Laterality: Right;     Family History   Problem Relation Name Age of Onset    Depression Mother      Early death Mother      Hypertension Mother      Heart disease Mother  53         from MI    Arthritis Father      Stroke Father      Diabetes Sister      No Known Problems Brother      Hypertension Sister      Hypertension Sister      No Known Problems Brother      No Known Problems Brother      No Known Problems Daughter      Cancer Neg Hx      COPD Neg Hx      Kidney disease Neg Hx       Social History     Socioeconomic History    Marital status:      Spouse name: Nida    Number of children: 0   Occupational History    Occupation: Disabled since      Comment: Dislocated left hip   Tobacco Use    Smoking status: Former     Current packs/day: 0.00     Average packs/day: 0.1 packs/day for 46.0 years (4.6 ttl pk-yrs)     Types: Cigarettes     Start date: 6/3/2022     Quit date: 10/19/2022     Years since quittin.9    Smokeless tobacco: Never   Substance and Sexual Activity    Alcohol use: Yes     Alcohol/week: 2.0 standard drinks of alcohol     Types: 2 Glasses of wine per week    Drug use: No    Sexual activity: Yes     Partners: Female     Birth control/protection: None   Social History Narrative    Disabled since .  Job injury.  Dislocated left hip.  Has 4 brothers and 4 sisters. Wears seatbelt.     Social Determinants of Health     Financial Resource Strain: Low Risk  (2022)    Overall Financial Resource Strain (CARDIA)     Difficulty of Paying Living Expenses: Not hard at all   Food Insecurity: No Food Insecurity (2022)    Hunger Vital Sign     Worried About Running Out of Food in the Last Year: Never true     Ran Out of Food in the Last Year: Never true   Transportation Needs: No Transportation Needs (2022)    PRAPARE - Transportation     Lack of Transportation (Medical): No     Lack of Transportation (Non-Medical):  No   Physical Activity: Insufficiently Active (9/19/2023)    Exercise Vital Sign     Days of Exercise per Week: 2 days     Minutes of Exercise per Session: 10 min   Stress: No Stress Concern Present (9/30/2022)    [employer] - Occupational Stress Questionnaire     Feeling of Stress : Only a little   Housing Stability: Low Risk  (9/30/2022)    Housing Stability Vital Sign     Unable to Pay for Housing in the Last Year: No     Number of Places Lived in the Last Year: 1     Unstable Housing in the Last Year: No     Medication List with Changes/Refills   Current Medications    ATORVASTATIN (LIPITOR) 40 MG TABLET    TAKE 1 TABLET EVERY DAY    CELECOXIB (CELEBREX) 200 MG CAPSULE    Take 1 capsule (200 mg total) by mouth once daily. Take with food    DICLOFENAC SODIUM (VOLTAREN) 1 % GEL    Apply 2 g topically 3 (three) times daily as needed.    FINASTERIDE (PROSCAR) 5 MG TABLET    TAKE 1 TABLET EVERY DAY    FLUTICASONE PROPIONATE (FLONASE) 50 MCG/ACTUATION NASAL SPRAY    2 sprays (100 mcg total) by Each Nostril route once daily.    GABAPENTIN (NEURONTIN) 300 MG CAPSULE    Take 1 capsule (300 mg total) by mouth 2 (two) times daily.    LISINOPRIL-HYDROCHLOROTHIAZIDE (PRINZIDE,ZESTORETIC) 20-25 MG TAB    TAKE 1 TABLET EVERY DAY    OMEGA-3 FATTY ACIDS/FISH OIL (FISH OIL-OMEGA-3 FATTY ACIDS) 300-1,000 MG CAPSULE    Take 1 capsule by mouth once daily.    ONDANSETRON (ZOFRAN-ODT) 4 MG TBDL    Dissolve 2 tablets (8 mg total) by mouth every 8 (eight) hours as needed (Nausea).    OXYCODONE-ACETAMINOPHEN (PERCOCET)  MG PER TABLET    Take 1 tablet by mouth every 8 (eight) hours as needed for Pain.    PANTOPRAZOLE (PROTONIX) 40 MG TABLET    Take 1 tablet (40 mg total) by mouth once daily.    SILDENAFIL (VIAGRA) 100 MG TABLET    Take 1 tablet (100 mg total) by mouth daily as needed for Erectile Dysfunction.    TAMSULOSIN (FLOMAX) 0.4 MG CAP    TAKE 1 CAPSULE EVERY DAY   Changed and/or Refilled  Medications    Modified Medication Previous Medication    CYCLOBENZAPRINE (FLEXERIL) 10 MG TABLET cyclobenzaprine (FLEXERIL) 10 MG tablet       Take 1 tablet (10 mg total) by mouth 3 (three) times daily as needed for Muscle spasms.    Take 1 tablet (10 mg total) by mouth 3 (three) times daily as needed for Muscle spasms.     Review of patient's allergies indicates:  No Known Allergies    Objective:     Right Lower Extremity  NVI  WWP foot  Comp soft  Cap refill < 2 sec  Calf NT, Soft  (-) Simi sign  RENATO  ROM : Patient shows range of motion of 3-100/110  Wiggles toes  DF/PF intact  Sensation intact  Inc C/D/I  No SOI  Eczema/keratoses seen to the lateral aspect of the right knee.  Patient has been putting Vaseline and cocoa butter due to roughness we will continue to monitor.    IMAGING  No imaging obtained today    Assessment:       Encounter Diagnoses   Name Primary?    Status post total right knee replacement using cement Yes    Arthrofibrosis of knee joint, right           Plan:       Mitchell was seen today for post-op evaluation.    Diagnoses and all orders for this visit:    Status post total right knee replacement using cement    Arthrofibrosis of knee joint, right    Other orders  -     cyclobenzaprine (FLEXERIL) 10 MG tablet; Take 1 tablet (10 mg total) by mouth 3 (three) times daily as needed for Muscle spasms.        Mitchell Mcmillan is an established pt here for postop follow-up after right total knee replacement by Dr. Mora.  Continue current care and medication regimen.  I will refill his muscle relaxer for him today.  I encouraged him to continue to use the pumps and wrappings for swelling on an as-needed basis.  Ask that he keep his scheduled appointments.     No follow-ups on file.    The patient understands, chooses and consents to this plan and accepts all   the risks which include but are not limited to the risks mentioned above.     Disclaimer: This note was prepared using a voice recognition  system and is likely to have sound alike errors within the text.

## 2024-09-19 NOTE — TELEPHONE ENCOUNTER
----- Message from Jyoti Batres sent at 9/19/2024  9:15 AM CDT -----  Regarding: Rx Refill Advise  Contact: [phone]  Mitchell Mcmillan calling regarding Patient Advice (message) for # pt is calling to speak with nurse regarding his pharmacy does not have the medication that provider sent in yesterday I advise that I see the medication was sent to [facility] he said he is at the pharmacy right now and they only have his muscle relaxer from his appt this morning pls advise         [facility] [place] [place] [facility] [address]  [address] [place] [address]  Phone: [phone] Fax: [phone]

## 2024-09-19 NOTE — TELEPHONE ENCOUNTER
Spoke w pt via telephone. Pt states he went to the pharmacy and pharmacy rep stated they didn't have any rx ready to pickup at this time. Advised pt to speak with pharmacy staff to confirm if Protonix hasn't been filled yet, or if it is out of stock. Pt voiced understanding.

## 2024-09-30 DIAGNOSIS — I10 ESSENTIAL HYPERTENSION: ICD-10-CM

## 2024-09-30 RX ORDER — LISINOPRIL AND HYDROCHLOROTHIAZIDE 20; 25 MG/1; MG/1
TABLET ORAL
Qty: 90 TABLET | Refills: 0 | Status: SHIPPED | OUTPATIENT
Start: 2024-09-30

## 2024-09-30 NOTE — TELEPHONE ENCOUNTER
No care due was identified.  [facility] Embedded Care Due Messages. Reference number: [id number].   9/30/2024 10:33:35 AM CDT

## 2024-09-30 NOTE — TELEPHONE ENCOUNTER
Refill Decision Note   Mitchell Mcmillan  is requesting a refill authorization.  Brief Assessment and Rationale for Refill:  Approve     Medication Therapy Plan:         Comments:     Note composed:3:25 PM 09/30/2024

## 2024-10-01 ENCOUNTER — OFFICE VISIT (OUTPATIENT)
Dept: FAMILY MEDICINE | Facility: CLINIC | Age: 69
End: 2024-10-01
Attending: FAMILY MEDICINE
Payer: MEDICARE

## 2024-10-01 ENCOUNTER — LAB VISIT (OUTPATIENT)
Dept: LAB | Facility: HOSPITAL | Age: 69
End: 2024-10-01
Attending: FAMILY MEDICINE
Payer: MEDICARE

## 2024-10-01 VITALS
RESPIRATION RATE: 18 BRPM | DIASTOLIC BLOOD PRESSURE: 82 MMHG | OXYGEN SATURATION: 95 % | HEIGHT: 76 IN | HEART RATE: 74 BPM | WEIGHT: 264.56 LBS | BODY MASS INDEX: 32.22 KG/M2 | SYSTOLIC BLOOD PRESSURE: 138 MMHG | TEMPERATURE: 98 F

## 2024-10-01 DIAGNOSIS — N40.0 BENIGN PROSTATIC HYPERPLASIA, UNSPECIFIED WHETHER LOWER URINARY TRACT SYMPTOMS PRESENT: ICD-10-CM

## 2024-10-01 DIAGNOSIS — K21.9 GASTROESOPHAGEAL REFLUX DISEASE, UNSPECIFIED WHETHER ESOPHAGITIS PRESENT: ICD-10-CM

## 2024-10-01 DIAGNOSIS — I10 ESSENTIAL HYPERTENSION: ICD-10-CM

## 2024-10-01 DIAGNOSIS — Z23 NEED FOR VACCINATION: ICD-10-CM

## 2024-10-01 DIAGNOSIS — N52.9 ERECTILE DYSFUNCTION, UNSPECIFIED ERECTILE DYSFUNCTION TYPE: ICD-10-CM

## 2024-10-01 DIAGNOSIS — Z00.00 ANNUAL PHYSICAL EXAM: Primary | ICD-10-CM

## 2024-10-01 DIAGNOSIS — R97.20 ELEVATED PSA: ICD-10-CM

## 2024-10-01 DIAGNOSIS — I70.0 AORTIC ATHEROSCLEROSIS: ICD-10-CM

## 2024-10-01 DIAGNOSIS — M17.0 PRIMARY OSTEOARTHRITIS OF BOTH KNEES: ICD-10-CM

## 2024-10-01 DIAGNOSIS — E78.5 HYPERLIPIDEMIA, UNSPECIFIED HYPERLIPIDEMIA TYPE: ICD-10-CM

## 2024-10-01 DIAGNOSIS — L30.9 DERMATITIS: ICD-10-CM

## 2024-10-01 DIAGNOSIS — E66.811 OBESITY (BMI 30.0-34.9): ICD-10-CM

## 2024-10-01 LAB
ALBUMIN SERPL BCP-MCNC: 3.8 G/DL (ref 3.5–5.2)
ALP SERPL-CCNC: 68 U/L (ref 55–135)
ALT SERPL W/O P-5'-P-CCNC: 11 U/L (ref 10–44)
AMORPH CRY UR QL COMP ASSIST: NORMAL
ANION GAP SERPL CALC-SCNC: 11 MMOL/L (ref 8–16)
AST SERPL-CCNC: 15 U/L (ref 10–40)
BASOPHILS # BLD AUTO: 0.03 K/UL (ref 0–0.2)
BASOPHILS NFR BLD: 0.4 % (ref 0–1.9)
BILIRUB SERPL-MCNC: 0.4 MG/DL (ref 0.1–1)
BILIRUB UR QL STRIP: NEGATIVE
BUN SERPL-MCNC: 12 MG/DL (ref 8–23)
CALCIUM SERPL-MCNC: 9.5 MG/DL (ref 8.7–10.5)
CHLORIDE SERPL-SCNC: 108 MMOL/L (ref 95–110)
CHOLEST SERPL-MCNC: 170 MG/DL (ref 120–199)
CHOLEST/HDLC SERPL: 3 {RATIO} (ref 2–5)
CLARITY UR REFRACT.AUTO: ABNORMAL
CO2 SERPL-SCNC: 25 MMOL/L (ref 23–29)
COLOR UR AUTO: ABNORMAL
CREAT SERPL-MCNC: 0.9 MG/DL (ref 0.5–1.4)
DIFFERENTIAL METHOD BLD: ABNORMAL
EOSINOPHIL # BLD AUTO: 0.1 K/UL (ref 0–0.5)
EOSINOPHIL NFR BLD: 1 % (ref 0–8)
ERYTHROCYTE [DISTWIDTH] IN BLOOD BY AUTOMATED COUNT: 15.2 % (ref 11.5–14.5)
EST. GFR  (NO RACE VARIABLE): >60 ML/MIN/1.73 M^2
GLUCOSE SERPL-MCNC: 94 MG/DL (ref 70–110)
GLUCOSE UR QL STRIP: NEGATIVE
HCT VFR BLD AUTO: 36.8 % (ref 40–54)
HDLC SERPL-MCNC: 57 MG/DL (ref 40–75)
HDLC SERPL: 33.5 % (ref 20–50)
HGB BLD-MCNC: 11.5 G/DL (ref 14–18)
HGB UR QL STRIP: NEGATIVE
IMM GRANULOCYTES # BLD AUTO: 0.01 K/UL (ref 0–0.04)
IMM GRANULOCYTES NFR BLD AUTO: 0.1 % (ref 0–0.5)
KETONES UR QL STRIP: NEGATIVE
LDLC SERPL CALC-MCNC: 90.8 MG/DL (ref 63–159)
LEUKOCYTE ESTERASE UR QL STRIP: NEGATIVE
LYMPHOCYTES # BLD AUTO: 3.2 K/UL (ref 1–4.8)
LYMPHOCYTES NFR BLD: 45.6 % (ref 18–48)
MCH RBC QN AUTO: 26.8 PG (ref 27–31)
MCHC RBC AUTO-ENTMCNC: 31.3 G/DL (ref 32–36)
MCV RBC AUTO: 86 FL (ref 82–98)
MICROSCOPIC COMMENT: NORMAL
MONOCYTES # BLD AUTO: 0.6 K/UL (ref 0.3–1)
MONOCYTES NFR BLD: 8.4 % (ref 4–15)
NEUTROPHILS # BLD AUTO: 3.1 K/UL (ref 1.8–7.7)
NEUTROPHILS NFR BLD: 44.5 % (ref 38–73)
NITRITE UR QL STRIP: NEGATIVE
NONHDLC SERPL-MCNC: 113 MG/DL
NRBC BLD-RTO: 0 /100 WBC
PH UR STRIP: 6 [PH] (ref 5–8)
PLATELET # BLD AUTO: 272 K/UL (ref 150–450)
PMV BLD AUTO: 11.2 FL (ref 9.2–12.9)
POTASSIUM SERPL-SCNC: 4.3 MMOL/L (ref 3.5–5.1)
PROT SERPL-MCNC: 7.4 G/DL (ref 6–8.4)
PROT UR QL STRIP: ABNORMAL
RBC # BLD AUTO: 4.29 M/UL (ref 4.6–6.2)
SODIUM SERPL-SCNC: 144 MMOL/L (ref 136–145)
SP GR UR STRIP: 1.03 (ref 1–1.03)
TRIGL SERPL-MCNC: 111 MG/DL (ref 30–150)
URN SPEC COLLECT METH UR: ABNORMAL
WBC # BLD AUTO: 7.04 K/UL (ref 3.9–12.7)

## 2024-10-01 PROCEDURE — 36415 COLL VENOUS BLD VENIPUNCTURE: CPT | Mod: HCNC,PO | Performed by: FAMILY MEDICINE

## 2024-10-01 PROCEDURE — 99397 PER PM REEVAL EST PAT 65+ YR: CPT | Mod: 25,HCNC,S$GLB, | Performed by: FAMILY MEDICINE

## 2024-10-01 PROCEDURE — 3079F DIAST BP 80-89 MM HG: CPT | Mod: HCNC,CPTII,S$GLB, | Performed by: FAMILY MEDICINE

## 2024-10-01 PROCEDURE — 90653 IIV ADJUVANT VACCINE IM: CPT | Mod: HCNC,S$GLB,, | Performed by: FAMILY MEDICINE

## 2024-10-01 PROCEDURE — 1159F MED LIST DOCD IN RCRD: CPT | Mod: HCNC,CPTII,S$GLB, | Performed by: FAMILY MEDICINE

## 2024-10-01 PROCEDURE — 85025 COMPLETE CBC W/AUTO DIFF WBC: CPT | Mod: HCNC | Performed by: FAMILY MEDICINE

## 2024-10-01 PROCEDURE — 81001 URINALYSIS AUTO W/SCOPE: CPT | Mod: HCNC | Performed by: FAMILY MEDICINE

## 2024-10-01 PROCEDURE — 80061 LIPID PANEL: CPT | Mod: HCNC | Performed by: FAMILY MEDICINE

## 2024-10-01 PROCEDURE — 1101F PT FALLS ASSESS-DOCD LE1/YR: CPT | Mod: HCNC,CPTII,S$GLB, | Performed by: FAMILY MEDICINE

## 2024-10-01 PROCEDURE — 84443 ASSAY THYROID STIM HORMONE: CPT | Mod: HCNC | Performed by: FAMILY MEDICINE

## 2024-10-01 PROCEDURE — 80053 COMPREHEN METABOLIC PANEL: CPT | Mod: HCNC | Performed by: FAMILY MEDICINE

## 2024-10-01 PROCEDURE — G0008 ADMIN INFLUENZA VIRUS VAC: HCPCS | Mod: HCNC,S$GLB,, | Performed by: FAMILY MEDICINE

## 2024-10-01 PROCEDURE — 1125F AMNT PAIN NOTED PAIN PRSNT: CPT | Mod: HCNC,CPTII,S$GLB, | Performed by: FAMILY MEDICINE

## 2024-10-01 PROCEDURE — 99999 PR PBB SHADOW E&M-EST. PATIENT-LVL IV: CPT | Mod: PBBFAC,HCNC,, | Performed by: FAMILY MEDICINE

## 2024-10-01 PROCEDURE — 3008F BODY MASS INDEX DOCD: CPT | Mod: HCNC,CPTII,S$GLB, | Performed by: FAMILY MEDICINE

## 2024-10-01 PROCEDURE — 3288F FALL RISK ASSESSMENT DOCD: CPT | Mod: HCNC,CPTII,S$GLB, | Performed by: FAMILY MEDICINE

## 2024-10-01 PROCEDURE — 1160F RVW MEDS BY RX/DR IN RCRD: CPT | Mod: HCNC,CPTII,S$GLB, | Performed by: FAMILY MEDICINE

## 2024-10-01 PROCEDURE — 3075F SYST BP GE 130 - 139MM HG: CPT | Mod: HCNC,CPTII,S$GLB, | Performed by: FAMILY MEDICINE

## 2024-10-01 PROCEDURE — 4010F ACE/ARB THERAPY RXD/TAKEN: CPT | Mod: HCNC,CPTII,S$GLB, | Performed by: FAMILY MEDICINE

## 2024-10-01 RX ORDER — TRIAMCINOLONE ACETONIDE 1 MG/G
CREAM TOPICAL 2 TIMES DAILY
Qty: 28.4 G | Refills: 1 | Status: SHIPPED | OUTPATIENT
Start: 2024-10-01

## 2024-10-01 NOTE — PROGRESS NOTES
HISTORY OF PRESENT ILLNESS:  Mr. Mcmillan comes in today for annual wellness examination. He is not fasting but has taken medications today.     END OF LIFE DECISION:  He does not have a living will but desires life support.    Current Outpatient Medications   Medication Sig    atorvastatin (LIPITOR) 40 MG tablet TAKE 1 TABLET EVERY DAY    celecoxib (CELEBREX) 200 MG capsule Take 1 capsule (200 mg total) by mouth once daily. Take with food    cyclobenzaprine (FLEXERIL) 10 MG tablet Take 1 tablet (10 mg total) by mouth 3 (three) times daily as needed for Muscle spasms.    diclofenac sodium (VOLTAREN) 1 % Gel Apply 2 g topically 3 (three) times daily as needed.    finasteride (PROSCAR) 5 mg tablet TAKE 1 TABLET EVERY DAY    gabapentin (NEURONTIN) 300 MG capsule Take 1 capsule (300 mg total) by mouth 2 (two) times daily.    lisinopriL-hydrochlorothiazide (PRINZIDE,ZESTORETIC) 20-25 mg Tab TAKE 1 TABLET EVERY DAY    omega-3 fatty acids/fish oil (FISH OIL-OMEGA-3 FATTY ACIDS) 300-1,000 mg capsule Take 1 capsule by mouth once daily.    ondansetron (ZOFRAN-ODT) 4 MG TbDL Dissolve 2 tablets (8 mg total) by mouth every 8 (eight) hours as needed (Nausea).    pantoprazole (PROTONIX) 40 MG tablet Take 1 tablet (40 mg total) by mouth once daily.    sildenafiL (VIAGRA) 100 MG tablet Take 1 tablet (100 mg total) by mouth daily as needed for Erectile Dysfunction.    tamsulosin (FLOMAX) 0.4 mg Cap TAKE 1 CAPSULE EVERY DAY            SCREENINGS:          Cholesterol: September 19, 2023.        FFS/Colonoscopy: October 5, 2016 - okay; repeat in 10 years.        Prostate/PSA: March 15, 2023 with urologist Dr. Mcbride. February 2, 2024 - 4.2.                    Dexa Scan: Never.        Eye Exam: August 24, 2017 with Dr. Piper but reports [facility] since then but [facility].  He wears glasses.                   PPD: Negative in the past.                   Immunizations:  Td/Tdap - Unsure.  Advised insurance-covered  benefit only if injury.                              Zostavax - March 2, 2016.                              Shingrix - October 25, 2023.                              Pneumovax - February 17, 2016. September 19, 2022.                              Prevnar-13 shot - March 1, 2019.                              Prevnar-20 shot - October 25, 2023.                              Seasonal Flu - October 25, 2023. He desires.                              RSV - Never. Advised patient available at local pharmacy.                              Covid-19 vaccine series - February 18, 2021, March 18, 2021, October 29, 2021, May 13, 2022,                                         November 14, 2022, October 25, 2023.            ROS:  GENERAL: No fever, chills, fatigue or unusual weight change. Appetite normal. Exercises 3 times per week - 10 minutes each time.  Monitors diet. Weight 118.1 kg (260 lb 5.8 oz) at September 19, 2023 visit.  SKIN: No rashes, itching, changes in mole, color or texture of skin or easy bruising. Reports itchy rash at lower part of right knee following knee surgery in March 2024 and applies cocoa butter, Vaseline without help.   HEAD: No headaches or recent head trauma.  EYES: No change in vision,   no pain, diplopia, redness or discharge. Wears glasses.  EARS: Denies ear pain, discharge, vertigo but reports decreased hearing.  NOSE: No epistaxis or rhinitis. Nontender external nose.   MOUTH & THROAT: No hoarseness or change in voice. No excessive gum bleeding or mouth sores.  No sore throat.  NODES: Denies swollen glands.  CHEST: Denies BUTCHER, wheezing, cough, hemoptysis or sputum production.   CARDIOVASCULAR: Denies chest pain, No palpitations.  Reports not recently performs home blood pressure checks.  Saw Dr. Smyth, cardiologist, on April 24, 2024 for surveillance of essential hypertension, hyperlipidemia, unspecified hyperlipidemia type, arthritis, obesity (BMI 30.0-34.9) with 1-year follow-up  "advised.  ABDOMEN: Denies diarrhea, constipation, vomiting, abdominal pain, or blood in stool.  GENITOURINARY: No flank pain, dysuria or hematuria.No nocturia or frequency. No lesions, pain or swelling in genital area. Performs monthly self testicular exam. Saw urologist Dr. Mcbride on March 15, 2023 for surveillance of elevated PSA, BPH with 1-year follow up advised   ENDOCRINE: Denies diabetes or thyroid problems.  HEME/LYMPH: Denies bleeding problems.  PERIPHERAL VASCULAR: No claudication or cyanosis  MUSCULOSKELETAL: No joint stiffness, pain or swelling except report chronic arthritis hip and knee pains. No edema.  Saw TERENCE Florez with orthopedic department on September 19, 2024 for s/p right total knee replacement in March 2024 and scheduled to see Dr. Mora, orthopedist, on November 7, 2024.  NEUROLOGIC: No history of seizures, tremors, alteration of gait or coordination.  PSYCHIATRIC: Denies mood swings, depression, anxiety, homicidal or suicidal thoughts. Denies sleep problems.    PE:   VS:  /82   Pulse 74   Temp 97.7 °F (36.5 °C) (Tympanic)   Resp 18   Ht 6' 4" (1.93 m)   Wt 120 kg (264 lb 8.8 oz)   SpO2 95%   BMI 32.20 kg/m²   APPEARANCE:  Well nourished, well developed male, obese and pleasant, alert and oriented in no acute distress.    HEAD: Non tender . Full range of motion.  EYES: PERRL, conjunctiva pink, lids no edema. He wears glasses.  EARS: External canal patent, no swelling or redness. TM's shiny and clear.  NOSE: Mucosa and turbinates pink, not swollen. No discharge  THROAT: No pharyngeal erythema or exudate. No stridor.    NECK: Supple, no mass, thyroid not enlarged, no mass. No carotid bruit.  NODES: No cervical, axillary lymph node enlargement.  CHEST: Normal respiratory effort. Lungs clear to auscultation.  CARDIOVASCULAR: Normal S1, S2. No rubs, murmurs or gallops.No edema.Pedal pulses palpable bilaterally.  ABDOMEN: Bowel sounds present. Not distended. Soft. No tenderness, " masses or organomegaly.  BREAST: Nontender, no asymmetry, nipple discharge, abnormal masses, nodules, lumps.  GENITALIA: Not examined per patient request.  RECTAL: Not examined per patient request.  MUSCULOSKELETAL: No joint deformities or stiffness. He is ambulatory without problems.  SKIN: No rashes or suspicious lesions, normal color and turgor. Dry, scaly hyperpigmented rash just beneath right knee without drainage noted.  NEUROLOGIC:   Cranial Nerves: II-XII grossly intact.  DTR's: Knees, Ankles 2+ and equal bilaterally Gait & Posture: Normal gait and fine motion.  PSYCHIATRIC: Patient alert, oriented x 3. Mood/Affect normal without acute anxiety and depression noted.Judgement and insight-good as he makes appropriate decisions on today's examination.    DIAGNOSIS:    ICD-10-CM ICD-9-CM    1. Annual physical exam  Z00.00 V70.0       2. Essential hypertension  I10 401.9 Ambulatory referral/consult to Optometry      Lipid Panel      Comprehensive Metabolic Panel      CBC Auto Differential      TSH      Urinalysis      3. Hyperlipidemia, unspecified hyperlipidemia type  E78.5 272.4       4. Aortic atherosclerosis  I70.0 440.0       5. Gastroesophageal reflux disease, unspecified whether esophagitis present  K21.9 530.81       6. Elevated PSA  R97.20 790.93       7. Benign prostatic hyperplasia, unspecified whether lower urinary tract symptoms present  N40.0 600.00       8. Erectile dysfunction, unspecified erectile dysfunction type  N52.9 607.84       9. Primary osteoarthritis of both knees  M17.0 715.16       10. Dermatitis  L30.9 692.9 triamcinolone acetonide 0.1% (KENALOG) 0.1 % cream      11. Obesity (BMI 30.0-34.9)  E66.811 278.00       12. Need for vaccination  Z23 V05.9 Influenza - Trivalent (Adjuvanted)        PLAN:   1. Age-appropriate counseling-appropriate low-sodium, low-cholesterol, low carbohydrate diet and exercise daily, monthly self testicular examination, annual wellness examination.  2. Patient  advised to call for results.  3. Continue current medications.  4. Try Kenalog 0.1% cream - apply twice daily for up to 3 weeks; medication precautions discussed with patient.  If not improved in 3 weeks, see/consult with dermatology.  5. Keep follow up with specialists.  6. Follow up in about 6 months (around 4/1/2025) for hypertension follow up.    40 minutes of total time spent on the encounter, which includes face to face time and non-face to face time preparing to see the patient (eg, review of tests), Obtaining and/or reviewing separately obtained history, Documenting clinical information in the electronic or other health record, Independently interpreting results (not separately reported) and communicating results to the patient/family/caregiver, or Care coordination (not separately reported).      This note is  generated with speech recognition software and is subject to transcription error and sound alike phrases that may be missed by proofreading.

## 2024-10-02 LAB — TSH SERPL DL<=0.005 MIU/L-ACNC: 1.44 UIU/ML (ref 0.4–4)

## 2024-10-08 ENCOUNTER — TELEPHONE (OUTPATIENT)
Dept: FAMILY MEDICINE | Facility: CLINIC | Age: 69
End: 2024-10-08
Payer: MEDICARE

## 2024-10-08 DIAGNOSIS — J30.2 SEASONAL ALLERGIC RHINITIS, UNSPECIFIED TRIGGER: Primary | ICD-10-CM

## 2024-10-08 RX ORDER — FLUTICASONE PROPIONATE 50 MCG
2 SPRAY, SUSPENSION (ML) NASAL DAILY
Qty: 16 G | Refills: 0 | Status: SHIPPED | OUTPATIENT
Start: 2024-10-08

## 2024-10-08 NOTE — TELEPHONE ENCOUNTER
Pt states he is having cold sx after his flu shot. Pt states congestion has been present for a few days. Pt denied fever. Denies trying anything OTC so far. Please advise.

## 2024-10-08 NOTE — TELEPHONE ENCOUNTER
----- Message from Anabella sent at 10/8/2024 12:36 PM CDT -----  Contact: pt  Type:   Request to have something called in for his cold    Name of Caller: pt  Best Call Back Number: [phone]  Additional Information:  pt is calling in rgd to having caught a cold after his flu shot, and need the dr to call in something at [facility] for him.       [facility] [address] [place], [place] - [address]  [address] [address]  Phone: [phone] Fax: [phone]

## 2024-10-08 NOTE — TELEPHONE ENCOUNTER
I have put the following orders and/or medications to this note.  Please advise pt.    No orders of the defined types were placed in this encounter.      Medications Ordered This Encounter   Medications    fluticasone propionate (FLONASE) 50 mcg/actuation nasal spray     Si sprays (100 mcg total) by Each Nostril route once daily.     Dispense:  16 g     Refill:  0

## 2024-10-08 NOTE — TELEPHONE ENCOUNTER
----- Message from Anabella sent at 10/8/2024 12:35 PM CDT -----  Contact: pt  Type:  Test Results    Who Called: pt  Name of Test (Lab/Mammo/Etc):  lab  Date of Test:  10/1  Ordering Provider:  sachi  Where the [facility] performed[facility]  Would the patient rather a call back or a response via [facility]? phone  Best Call Back Number:  [phone]  Additional Information:

## 2024-10-09 DIAGNOSIS — E78.5 HYPERLIPIDEMIA, UNSPECIFIED HYPERLIPIDEMIA TYPE: ICD-10-CM

## 2024-10-09 RX ORDER — ATORVASTATIN CALCIUM 40 MG/1
TABLET, FILM COATED ORAL
Qty: 90 TABLET | Refills: 3 | Status: SHIPPED | OUTPATIENT
Start: 2024-10-09

## 2024-10-09 NOTE — TELEPHONE ENCOUNTER
No care due was identified.  [facility] Embedded Care Due Messages. Reference number: [id number].   10/09/2024 10:59:04 AM CDT

## 2024-10-10 NOTE — TELEPHONE ENCOUNTER
Refill Decision Note   Mitchell Mcmillan  is requesting a refill authorization.  Brief Assessment and Rationale for Refill:  Approve     Medication Therapy Plan:         Comments:     Note composed:8:38 PM 10/09/2024

## 2024-11-06 PROBLEM — M25.561 ACUTE PAIN OF RIGHT KNEE: Status: RESOLVED | Noted: 2023-05-16 | Resolved: 2024-11-06

## 2024-11-06 PROBLEM — Z01.818 PREOPERATIVE EXAMINATION: Status: RESOLVED | Noted: 2023-05-16 | Resolved: 2024-11-06

## 2024-11-07 ENCOUNTER — OFFICE VISIT (OUTPATIENT)
Dept: FAMILY MEDICINE | Facility: CLINIC | Age: 69
End: 2024-11-07
Payer: MEDICARE

## 2024-11-07 ENCOUNTER — HOSPITAL ENCOUNTER (OUTPATIENT)
Dept: RADIOLOGY | Facility: HOSPITAL | Age: 69
Discharge: HOME | End: 2024-11-07
Attending: INTERNAL MEDICINE
Payer: MEDICARE

## 2024-11-07 VITALS
BODY MASS INDEX: 32.46 KG/M2 | WEIGHT: 266.56 LBS | SYSTOLIC BLOOD PRESSURE: 130 MMHG | TEMPERATURE: 97 F | DIASTOLIC BLOOD PRESSURE: 80 MMHG | HEART RATE: 85 BPM | OXYGEN SATURATION: 96 % | HEIGHT: 76 IN

## 2024-11-07 DIAGNOSIS — R07.89 RIGHT-SIDED CHEST WALL PAIN: ICD-10-CM

## 2024-11-07 DIAGNOSIS — R22.9 SKIN NODULE: ICD-10-CM

## 2024-11-07 DIAGNOSIS — R07.89 RIGHT-SIDED CHEST WALL PAIN: Primary | ICD-10-CM

## 2024-11-07 LAB
OHS QRS DURATION: 98 MS
OHS QTC CALCULATION: 446 MS

## 2024-11-07 PROCEDURE — 71046 X-RAY EXAM CHEST 2 VIEWS: CPT | Mod: TC,HCNC,FY,PO

## 2024-11-07 PROCEDURE — 71046 X-RAY EXAM CHEST 2 VIEWS: CPT | Mod: 26,HCNC,, | Performed by: STUDENT IN AN ORGANIZED HEALTH CARE EDUCATION/TRAINING PROGRAM

## 2024-11-07 PROCEDURE — 93010 ELECTROCARDIOGRAM REPORT: CPT | Mod: HCNC,S$GLB,, | Performed by: INTERNAL MEDICINE

## 2024-11-07 PROCEDURE — 71100 X-RAY EXAM RIBS UNI 2 VIEWS: CPT | Mod: TC,HCNC,FY,PO,RT

## 2024-11-07 PROCEDURE — 99999 PR PBB SHADOW E&M-EST. PATIENT-LVL V: CPT | Mod: PBBFAC,HCNC,, | Performed by: INTERNAL MEDICINE

## 2024-11-07 PROCEDURE — 71100 X-RAY EXAM RIBS UNI 2 VIEWS: CPT | Mod: 26,HCNC,RT, | Performed by: STUDENT IN AN ORGANIZED HEALTH CARE EDUCATION/TRAINING PROGRAM

## 2024-11-07 RX ORDER — MELOXICAM 15 MG/1
15 TABLET ORAL DAILY PRN
Qty: 20 TABLET | Refills: 0 | Status: SHIPPED | OUTPATIENT
Start: 2024-11-07

## 2024-11-07 NOTE — PROGRESS NOTES
Subjective:       Patient ID: Mitchell Mcmillan is a 69 y.o. male.    Chief Complaint: Chest Pain (X 1 month)    1 month right side chest pain at mid lateral ribs----------comes and goes depending on positional------------no trauma---------no cough, no SOB--------and skin nodule----    Chest Pain   Pertinent negatives include no abdominal pain, back pain, cough, diaphoresis, dizziness, fever, headaches, nausea, numbness, palpitations, shortness of breath, vomiting or weakness.   Pertinent negatives for past medical history include no seizures.     Past Medical History:   Diagnosis Date    Arthritis     knees    Asymmetric SNHL (sensorineural hearing loss) 08/17/2017    Chronic left hip pain 2006    Dislocated left hip, job injury    Chronic prostatitis     per pt, last episode 2013    Elevated PSA, less than 10 ng/ml     per pt last elevation 2013    Gastroesophageal reflux disease 8/3/2022    Hyperlipidemia 2010    Hypertension 2011    Joint effusion of knee     per pt rt knee, seen by Dr. Duke 2013    Midline low back pain without sciatica 6/13/2016    Obesity     Special screening for malignant neoplasms, colon 10/5/2016    Tobacco dependence     Trouble in sleeping      Past Surgical History:   Procedure Laterality Date    arthrocentesis Right 2013    Per pt , knee by Dr. BONNIE Duke    COLONOSCOPY N/A 10/05/2016    Normal exam repeat 10 yrs. Procedure: COLONOSCOPY;  Surgeon: Alexander Goetz MD;  Location: [facility];  Service: Endoscopy;  Laterality: N/A;    HIP ARTHROPLASTY Left 04/16/2006    Per pt, dislocated left hip/ hardware placed  by Dr. Lisa    INJECTION Right 6/12/2024    Procedure: INJECTION;  Surgeon: Ellis Mora MD;  Location: [facility] [place];  Service: Orthopedics;  Laterality: Right;    left orchiectomy      MANIPULATION WITH ANESTHESIA Right 6/12/2024    Procedure: MANIPULATION, WITH ANESTHESIA;  Surgeon: Ellis Mora MD;  Location: [facility] [place] OR;  Service: Orthopedics;  Laterality:  Right;    PROSTATE BIOPSY  2012    procedure performed at [facility] due to elevated PSA - benign    TOTAL KNEE ARTHROPLASTY Left 2023    Procedure: ARTHROPLASTY, KNEE, TOTAL;  Surgeon: Ellis Mora MD;  Location: [facility] OR;  Service: Orthopedics;  Laterality: Left;    TOTAL KNEE ARTHROPLASTY Right 3/6/2024    Procedure: ARTHROPLASTY, KNEE, TOTAL;  Surgeon: Ellis Mora MD;  Location: [facility] OR;  Service: Orthopedics;  Laterality: Right;     Family History   Problem Relation Name Age of Onset    Depression Mother      Early death Mother      Hypertension Mother      Heart disease Mother  53         from MI    Arthritis Father      Stroke Father      Diabetes Sister      No Known Problems Brother      Hypertension Sister      Hypertension Sister      No Known Problems Brother      No Known Problems Brother      No Known Problems Daughter      Cancer Neg Hx      COPD Neg Hx      Kidney disease Neg Hx       Social History     Socioeconomic History    Marital status:      Spouse name: Nida    Number of children: 0   Occupational History    Occupation: Disabled since      Comment: Dislocated left hip   Tobacco Use    Smoking status: Former     Current packs/day: 0.00     Average packs/day: 0.1 packs/day for 46.0 years (4.6 ttl pk-yrs)     Types: Cigarettes     Start date: 6/3/2022     Quit date: 10/19/2022     Years since quittin.0     Passive exposure: Never    Smokeless tobacco: Never   Substance and Sexual Activity    Alcohol use: Yes     Alcohol/week: 2.0 standard drinks of alcohol     Types: 2 Glasses of wine per week    Drug use: No    Sexual activity: Yes     Partners: Female     Birth control/protection: None   Social History Narrative    Disabled since .  Job injury.  Dislocated left hip.  Has 4 brothers and 4 sisters. Wears seatbelt.     Social Drivers of Health     Financial Resource Strain: Low Risk  (2022)    Overall Financial Resource Strain ([facility])      Difficulty of Paying Living Expenses: Not hard at all   Food Insecurity: No Food Insecurity (9/30/2022)    Hunger Vital Sign     Worried About Running Out of Food in the Last Year: Never true     Ran Out of Food in the Last Year: Never true   Transportation Needs: No Transportation Needs (9/30/2022)    PRAPARE - Transportation     Lack of Transportation (Medical): No     Lack of Transportation (Non-Medical): No   Physical Activity: Insufficiently Active (10/1/2024)    Exercise Vital Sign     Days of Exercise per Week: 3 days     Minutes of Exercise per Session: 10 min   Stress: No Stress Concern Present (9/30/2022)    [employer] - Occupational Stress Questionnaire     Feeling of Stress : Only a little   Housing Stability: Low Risk  (9/30/2022)    Housing Stability Vital Sign     Unable to Pay for Housing in the Last Year: No     Number of Places Lived in the Last Year: 1     Unstable Housing in the Last Year: No     Review of Systems   Constitutional:  Negative for activity change, appetite change, chills, diaphoresis, fatigue, fever and unexpected weight change.   HENT:  Negative for drooling, ear discharge, ear pain, facial swelling, hearing loss, mouth sores, nosebleeds, postnasal drip, rhinorrhea, sinus pressure, sneezing, sore throat, tinnitus, trouble swallowing and voice change.    Eyes:  Negative for photophobia, redness and visual disturbance.   Respiratory:  Negative for apnea, cough, choking, chest tightness, shortness of breath and wheezing.    Cardiovascular:  Positive for chest pain. Negative for palpitations and leg swelling.   Gastrointestinal:  Negative for abdominal distention, abdominal pain, anal bleeding, blood in stool, constipation, diarrhea, nausea, rectal pain and vomiting.   Endocrine: Negative for cold intolerance, heat intolerance, polydipsia, polyphagia and polyuria.   Genitourinary:  Negative for difficulty urinating, dysuria, enuresis, flank pain,  frequency, genital sores, hematuria and urgency.   Musculoskeletal:  Positive for arthralgias. Negative for back pain, gait problem, joint swelling, myalgias, neck pain and neck stiffness.   Skin:  Negative for color change, pallor, rash and wound.   Allergic/Immunologic: Negative for food allergies and immunocompromised state.   Neurological:  Negative for dizziness, tremors, seizures, syncope, facial asymmetry, speech difficulty, weakness, light-headedness, numbness and headaches.   Hematological:  Negative for adenopathy. Does not bruise/bleed easily.   Psychiatric/Behavioral:  Negative for agitation, behavioral problems, confusion, decreased concentration, dysphoric mood, hallucinations, self-injury, sleep disturbance and suicidal ideas. The patient is not nervous/anxious and is not hyperactive.        Objective:      Physical Exam  Vitals and nursing note reviewed.   Constitutional:       General: He is not in acute distress.     Appearance: Normal appearance. He is well-developed. He is not diaphoretic.   HENT:      Head: Normocephalic and atraumatic.      Mouth/Throat:      Pharynx: No oropharyngeal exudate.   Eyes:      General: No scleral icterus.     Pupils: Pupils are equal, round, and reactive to light.   Neck:      Thyroid: No thyromegaly.      Vascular: No carotid bruit or JVD.      Trachea: No tracheal deviation.   Cardiovascular:      Rate and Rhythm: Normal rate and regular rhythm.      Heart sounds: Normal heart sounds.   Pulmonary:      Effort: Pulmonary effort is normal. No respiratory distress.      Breath sounds: Normal breath sounds. No wheezing or rales.   Chest:      Chest wall: No tenderness.   Abdominal:      General: Bowel sounds are normal. There is no distension.      Palpations: Abdomen is soft.      Tenderness: There is no abdominal tenderness. There is no guarding or rebound.   Musculoskeletal:         General: Normal range of motion.      Cervical back: Normal range of motion and  neck supple.      Comments: Reproducible tenderness right anterior to lateral chest wall. No rash. Positional. And small skin nodule right anterior chest wall-   Lymphadenopathy:      Cervical: No cervical adenopathy.   Skin:     General: Skin is warm and dry.      Coloration: Skin is not pale.      Findings: No erythema or rash.   Neurological:      Mental Status: He is alert and oriented to person, place, and time.   Psychiatric:         Behavior: Behavior normal.         Thought Content: Thought content normal.         Judgment: Judgment normal.         CMP  Sodium   Date Value Ref Range Status   10/01/2024 144 136 - 145 mmol/L Final     Potassium   Date Value Ref Range Status   10/01/2024 4.3 3.5 - 5.1 mmol/L Final     Chloride   Date Value Ref Range Status   10/01/2024 108 95 - 110 mmol/L Final     CO2   Date Value Ref Range Status   10/01/2024 25 23 - 29 mmol/L Final     Glucose   Date Value Ref Range Status   10/01/2024 94 70 - 110 mg/dL Final     BUN   Date Value Ref Range Status   10/01/2024 12 8 - 23 mg/dL Final     Creatinine   Date Value Ref Range Status   10/01/2024 0.9 0.5 - 1.4 mg/dL Final     Calcium   Date Value Ref Range Status   10/01/2024 9.5 8.7 - 10.5 mg/dL Final     Total Protein   Date Value Ref Range Status   10/01/2024 7.4 6.0 - 8.4 g/dL Final     Albumin   Date Value Ref Range Status   10/01/2024 3.8 3.5 - 5.2 g/dL Final     Total Bilirubin   Date Value Ref Range Status   10/01/2024 0.4 0.1 - 1.0 mg/dL Final     Comment:     For infants and newborns, interpretation of results should be based  on gestational age, weight and in agreement with clinical  observations.    Premature Infant recommended reference ranges:  Up to 24 hours.............<8.0 mg/dL  Up to 48 hours............<12.0 mg/dL  3-5 days..................<15.0 mg/dL  6-29 days.................<15.0 mg/dL       Alkaline Phosphatase   Date Value Ref Range Status   10/01/2024 68 55 - 135 U/L Final     AST   Date Value Ref Range  Status   10/01/2024 15 10 - 40 U/L Final     ALT   Date Value Ref Range Status   10/01/2024 11 10 - 44 U/L Final     Anion Gap   Date Value Ref Range Status   10/01/2024 11 8 - 16 mmol/L Final     eGFR if    Date Value Ref Range Status   08/28/2020 >60.0 >60 mL/min/1.73 m^2 Final     eGFR if non    Date Value Ref Range Status   08/28/2020 >60.0 >60 mL/min/1.73 m^2 Final     Comment:     Calculation used to obtain the estimated glomerular filtration  rate (eGFR) is the CKD-EPI equation.        Lab Results   Component Value Date    WBC 7.04 10/01/2024    HGB 11.5 (L) 10/01/2024    HCT 36.8 (L) 10/01/2024    MCV 86 10/01/2024     10/01/2024     Lab Results   Component Value Date    CHOL 170 10/01/2024     Lab Results   Component Value Date    HDL 57 10/01/2024     Lab Results   Component Value Date    LDLCALC 90.8 10/01/2024     Lab Results   Component Value Date    TRIG 111 10/01/2024     Lab Results   Component Value Date    CHOLHDL 33.5 10/01/2024     Lab Results   Component Value Date    TSH 1.440 10/01/2024     Lab Results   Component Value Date    HGBA1C 5.4 09/19/2023     Assessment:       1. Right-sided chest wall pain    2. Skin nodule        Plan:   Right-sided chest wall pain----------mobic-----------hold celebrex while on mobic--------follow-  -     EKG 12-lead----------neg-  -     X-Ray Chest PA And Lateral; Future; Expected date: 11/07/2024  -     X-Ray Ribs 2 View Right; Future; Expected date: 11/07/2024  -     meloxicam (MOBIC) 15 MG tablet; Take 1 tablet (15 mg total) by mouth daily as needed.  Dispense: 20 tablet; Refill: 0    Skin nodule  -     Ambulatory referral/consult to General Surgery; Future; Expected date: 11/14/2024      As above---------f/u 1 month---------------go to er for worsening symptoms---------

## 2024-11-19 ENCOUNTER — OFFICE VISIT (OUTPATIENT)
Dept: OPHTHALMOLOGY | Facility: CLINIC | Age: 69
End: 2024-11-19
Attending: FAMILY MEDICINE
Payer: MEDICARE

## 2024-11-19 ENCOUNTER — PATIENT MESSAGE (OUTPATIENT)
Dept: OPHTHALMOLOGY | Facility: CLINIC | Age: 69
End: 2024-11-19

## 2024-11-19 DIAGNOSIS — I10 ESSENTIAL HYPERTENSION: ICD-10-CM

## 2024-11-19 DIAGNOSIS — H25.13 CATARACT, NUCLEAR SCLEROTIC SENILE, BILATERAL: Primary | ICD-10-CM

## 2024-11-19 DIAGNOSIS — H52.7 REFRACTIVE ERRORS: ICD-10-CM

## 2024-11-19 PROCEDURE — 1159F MED LIST DOCD IN RCRD: CPT | Mod: HCNC,CPTII,S$GLB, | Performed by: OPTOMETRIST

## 2024-11-19 PROCEDURE — 99999 PR PBB SHADOW E&M-EST. PATIENT-LVL III: CPT | Mod: PBBFAC,HCNC,, | Performed by: OPTOMETRIST

## 2024-11-19 PROCEDURE — 4010F ACE/ARB THERAPY RXD/TAKEN: CPT | Mod: HCNC,CPTII,S$GLB, | Performed by: OPTOMETRIST

## 2024-11-19 PROCEDURE — 92014 COMPRE OPH EXAM EST PT 1/>: CPT | Mod: HCNC,S$GLB,, | Performed by: OPTOMETRIST

## 2024-11-19 PROCEDURE — 92015 DETERMINE REFRACTIVE STATE: CPT | Mod: HCNC,S$GLB,, | Performed by: OPTOMETRIST

## 2024-11-19 NOTE — PROGRESS NOTES
SUBJECTIVE  Mitchell Mcmillan is 69 y.o. male  Corrected distance visual acuity was 20/20 -1 in the right eye and 20/30 +1 in the left eye. Corrected near visual acuity was J3 in the right eye and J2 in the left eye.   Chief Complaint   Patient presents with    Hypertensive Eye Exam     No specialty comments available.      Cataract    Eye Problem     Ou runs water OD worse           HPI     Hypertensive Eye Exam     Additional comments: No specialty comments available.             Eye Problem     Additional comments: Ou runs water OD worse            Comments    Pt here for HTN eye exam   Pt states his vision has gotten worse has noticed changes at night   Pt states his vision is hazy  Denies flashes of light and floaters           Last edited by Brisa Birch on 11/19/2024  8:01 AM.         Assessment /Plan :  1. Cataract, nuclear sclerotic senile, bilateral  Cataracts are not visually significant and not affecting activities of daily living.   Annual observation is recommended at this time.   Patient to call or return to clinic with any significant change in vision prior to next visit.    2. Essential hypertension  - Ambulatory referral/consult to Optometry  No HTN Retinopathy, monitor annually.     3. Refractive errors  Dispense Final Rx for glasses.  RTC 1 year  Discussed above and answered questions.             Intermediate Repair And Graft Additional Text (Will Appearing After The Standard Complex Repair Text): The intermediate repair was not sufficient to completely close the primary defect. The remaining additional defect was repaired with the graft mentioned below.

## 2024-12-12 ENCOUNTER — TELEPHONE (OUTPATIENT)
Dept: OPHTHALMOLOGY | Facility: CLINIC | Age: 69
End: 2024-12-12
Payer: MEDICARE

## 2024-12-12 NOTE — TELEPHONE ENCOUNTER
Returned call, no answer    ----- Message from Med Assistant Dill sent at 12/12/2024  1:23 PM CST -----  Contact: Tracy/Sridhar    ----- Message -----  From: Kristel Louie  Sent: 12/12/2024  12:05 PM CST  To: [facility] Staff    Tracy is calling in regards to getting a PA on eye glasses for the pt.Due to it being out of network.please call pt back at [phone]    [facility] [phone] fax [phone]    Thanks  [facility]

## 2024-12-16 ENCOUNTER — OFFICE VISIT (OUTPATIENT)
Dept: FAMILY MEDICINE | Facility: CLINIC | Age: 69
End: 2024-12-16
Payer: MEDICARE

## 2024-12-16 VITALS
HEIGHT: 76 IN | BODY MASS INDEX: 32.92 KG/M2 | OXYGEN SATURATION: 96 % | SYSTOLIC BLOOD PRESSURE: 132 MMHG | TEMPERATURE: 97 F | DIASTOLIC BLOOD PRESSURE: 82 MMHG | HEART RATE: 89 BPM | WEIGHT: 270.31 LBS

## 2024-12-16 DIAGNOSIS — R22.9 SKIN NODULE: ICD-10-CM

## 2024-12-16 DIAGNOSIS — R07.89 RIGHT-SIDED CHEST WALL PAIN: Primary | ICD-10-CM

## 2024-12-16 PROCEDURE — 1101F PT FALLS ASSESS-DOCD LE1/YR: CPT | Mod: HCNC,CPTII,S$GLB, | Performed by: INTERNAL MEDICINE

## 2024-12-16 PROCEDURE — 3079F DIAST BP 80-89 MM HG: CPT | Mod: HCNC,CPTII,S$GLB, | Performed by: INTERNAL MEDICINE

## 2024-12-16 PROCEDURE — 99213 OFFICE O/P EST LOW 20 MIN: CPT | Mod: HCNC,S$GLB,, | Performed by: INTERNAL MEDICINE

## 2024-12-16 PROCEDURE — 3075F SYST BP GE 130 - 139MM HG: CPT | Mod: HCNC,CPTII,S$GLB, | Performed by: INTERNAL MEDICINE

## 2024-12-16 PROCEDURE — 4010F ACE/ARB THERAPY RXD/TAKEN: CPT | Mod: HCNC,CPTII,S$GLB, | Performed by: INTERNAL MEDICINE

## 2024-12-16 PROCEDURE — 99999 PR PBB SHADOW E&M-EST. PATIENT-LVL IV: CPT | Mod: PBBFAC,HCNC,, | Performed by: INTERNAL MEDICINE

## 2024-12-16 PROCEDURE — 3008F BODY MASS INDEX DOCD: CPT | Mod: HCNC,CPTII,S$GLB, | Performed by: INTERNAL MEDICINE

## 2024-12-16 PROCEDURE — 3288F FALL RISK ASSESSMENT DOCD: CPT | Mod: HCNC,CPTII,S$GLB, | Performed by: INTERNAL MEDICINE

## 2024-12-16 PROCEDURE — 1126F AMNT PAIN NOTED NONE PRSNT: CPT | Mod: HCNC,CPTII,S$GLB, | Performed by: INTERNAL MEDICINE

## 2024-12-16 PROCEDURE — 1159F MED LIST DOCD IN RCRD: CPT | Mod: HCNC,CPTII,S$GLB, | Performed by: INTERNAL MEDICINE

## 2024-12-16 NOTE — PROGRESS NOTES
Subjective:       Patient ID: Mitchell Mcmillan is a 69 y.o. male.    Chief Complaint: Follow-up (4 week)    F/u right chest pain-----resolved--------and no longer with skin nodule---------no new symptoms today-----    Follow-up  Associated symptoms include arthralgias. Pertinent negatives include no abdominal pain, chest pain, chills, coughing, diaphoresis, fatigue, fever, headaches, joint swelling, myalgias, nausea, neck pain, numbness, rash, sore throat, vomiting or weakness.     Past Medical History:   Diagnosis Date    Arthritis     knees    Asymmetric SNHL (sensorineural hearing loss) 08/17/2017    Chronic left hip pain 2006    Dislocated left hip, job injury    Chronic prostatitis     per pt, last episode 2013    Elevated PSA, less than 10 ng/ml     per pt last elevation 2013    Gastroesophageal reflux disease 8/3/2022    Hyperlipidemia 2010    Hypertension 2011    Joint effusion of knee     per pt rt knee, seen by Dr. Duke 2013    Midline low back pain without sciatica 6/13/2016    Obesity     Special screening for malignant neoplasms, colon 10/5/2016    Tobacco dependence     Trouble in sleeping      Past Surgical History:   Procedure Laterality Date    arthrocentesis Right 2013    Per pt , knee by Dr. BONNIE Duke    COLONOSCOPY N/A 10/05/2016    Normal exam repeat 10 yrs. Procedure: COLONOSCOPY;  Surgeon: Alexander Goetz MD;  Location: [facility];  Service: Endoscopy;  Laterality: N/A;    HIP ARTHROPLASTY Left 04/16/2006    Per pt, dislocated left hip/ hardware placed  by Dr. Lisa    INJECTION Right 6/12/2024    Procedure: INJECTION;  Surgeon: Ellis Mora MD;  Location: [facility];  Service: Orthopedics;  Laterality: Right;    left orchiectomy      MANIPULATION WITH ANESTHESIA Right 6/12/2024    Procedure: MANIPULATION, WITH ANESTHESIA;  Surgeon: Ellis Mora MD;  Location: [facility];  Service: Orthopedics;  Laterality: Right;    PROSTATE BIOPSY  2012    procedure performed at [facility]  center due to elevated PSA - benign    TOTAL KNEE ARTHROPLASTY Left 2023    Procedure: ARTHROPLASTY, KNEE, TOTAL;  Surgeon: Ellis Mora MD;  Location: [facility] OR;  Service: Orthopedics;  Laterality: Left;    TOTAL KNEE ARTHROPLASTY Right 3/6/2024    Procedure: ARTHROPLASTY, KNEE, TOTAL;  Surgeon: Ellis Mora MD;  Location: [facility] OR;  Service: Orthopedics;  Laterality: Right;     Family History   Problem Relation Name Age of Onset    Depression Mother      Early death Mother      Hypertension Mother      Heart disease Mother  53         from MI    Arthritis Father      Stroke Father      Diabetes Sister      No Known Problems Brother      Hypertension Sister      Hypertension Sister      No Known Problems Brother      No Known Problems Brother      No Known Problems Daughter      Cancer Neg Hx      COPD Neg Hx      Kidney disease Neg Hx       Social History     Socioeconomic History    Marital status:      Spouse name: Nida    Number of children: 0   Occupational History    Occupation: Disabled since      Comment: Dislocated left hip   Tobacco Use    Smoking status: Former     Current packs/day: 0.00     Average packs/day: 0.1 packs/day for 46.0 years (4.6 ttl pk-yrs)     Types: Cigarettes     Start date: 6/3/2022     Quit date: 10/19/2022     Years since quittin.1     Passive exposure: Never    Smokeless tobacco: Never   Substance and Sexual Activity    Alcohol use: Yes     Alcohol/week: 2.0 standard drinks of alcohol     Types: 2 Glasses of wine per week    Drug use: No    Sexual activity: Yes     Partners: Female     Birth control/protection: None   Social History Narrative    Disabled since .  Job injury.  Dislocated left hip.  Has 4 brothers and 4 sisters. Wears seatbelt.     Social Drivers of Health     Financial Resource Strain: Low Risk  (2022)    Overall Financial Resource Strain (CARDIA)     Difficulty of Paying Living Expenses: Not hard at all   Food  Insecurity: No Food Insecurity (9/30/2022)    Hunger Vital Sign     Worried About Running Out of Food in the Last Year: Never true     Ran Out of Food in the Last Year: Never true   Transportation Needs: No Transportation Needs (9/30/2022)    PRAPARE - Transportation     Lack of Transportation (Medical): No     Lack of Transportation (Non-Medical): No   Physical Activity: Insufficiently Active (10/1/2024)    Exercise Vital Sign     Days of Exercise per Week: 3 days     Minutes of Exercise per Session: 10 min   Stress: No Stress Concern Present (9/30/2022)    [employer] [place] [employer] - Occupational Stress Questionnaire     Feeling of Stress : Only a little   Housing Stability: Low Risk  (9/30/2022)    Housing Stability Vital Sign     Unable to Pay for Housing in the Last Year: No     Number of Places Lived in the Last Year: 1     Unstable Housing in the Last Year: No     Review of Systems   Constitutional:  Negative for activity change, appetite change, chills, diaphoresis, fatigue, fever and unexpected weight change.   HENT:  Negative for drooling, ear discharge, ear pain, facial swelling, hearing loss, mouth sores, nosebleeds, postnasal drip, rhinorrhea, sinus pressure, sneezing, sore throat, tinnitus, trouble swallowing and voice change.    Eyes:  Negative for photophobia, redness and visual disturbance.   Respiratory:  Negative for apnea, cough, choking, chest tightness, shortness of breath and wheezing.    Cardiovascular:  Negative for chest pain, palpitations and leg swelling.   Gastrointestinal:  Negative for abdominal distention, abdominal pain, anal bleeding, blood in stool, constipation, diarrhea, nausea, rectal pain and vomiting.   Endocrine: Negative for cold intolerance, heat intolerance, polydipsia, polyphagia and polyuria.   Genitourinary:  Negative for difficulty urinating, dysuria, enuresis, flank pain, frequency, genital sores, hematuria and urgency.   Musculoskeletal:  Positive for  arthralgias. Negative for back pain, gait problem, joint swelling, myalgias, neck pain and neck stiffness.   Skin:  Negative for color change, pallor, rash and wound.   Allergic/Immunologic: Negative for food allergies and immunocompromised state.   Neurological:  Negative for dizziness, tremors, seizures, syncope, facial asymmetry, speech difficulty, weakness, light-headedness, numbness and headaches.   Hematological:  Negative for adenopathy. Does not bruise/bleed easily.   Psychiatric/Behavioral:  Negative for agitation, behavioral problems, confusion, decreased concentration, dysphoric mood, hallucinations, self-injury, sleep disturbance and suicidal ideas. The patient is not nervous/anxious and is not hyperactive.        Objective:      Physical Exam  Vitals and nursing note reviewed.   Constitutional:       General: He is not in acute distress.     Appearance: Normal appearance. He is well-developed. He is not diaphoretic.   HENT:      Head: Normocephalic and atraumatic.      Mouth/Throat:      Pharynx: No oropharyngeal exudate.   Eyes:      General: No scleral icterus.     Pupils: Pupils are equal, round, and reactive to light.   Neck:      Thyroid: No thyromegaly.      Vascular: No carotid bruit or JVD.      Trachea: No tracheal deviation.   Cardiovascular:      Rate and Rhythm: Normal rate and regular rhythm.      Heart sounds: Normal heart sounds.   Pulmonary:      Effort: Pulmonary effort is normal. No respiratory distress.      Breath sounds: Normal breath sounds. No wheezing or rales.   Chest:      Chest wall: No tenderness.   Abdominal:      General: Bowel sounds are normal. There is no distension.      Palpations: Abdomen is soft.      Tenderness: There is no abdominal tenderness. There is no guarding or rebound.   Musculoskeletal:         General: No tenderness. Normal range of motion.      Cervical back: Normal range of motion and neck supple.   Lymphadenopathy:      Cervical: No cervical  adenopathy.   Skin:     General: Skin is warm and dry.      Coloration: Skin is not pale.      Findings: No erythema or rash.   Neurological:      Mental Status: He is alert and oriented to person, place, and time.      Cranial Nerves: No cranial nerve deficit.      Coordination: Coordination normal.   Psychiatric:         Behavior: Behavior normal.         Thought Content: Thought content normal.         Judgment: Judgment normal.         CMP  Sodium   Date Value Ref Range Status   10/01/2024 144 136 - 145 mmol/L Final     Potassium   Date Value Ref Range Status   10/01/2024 4.3 3.5 - 5.1 mmol/L Final     Chloride   Date Value Ref Range Status   10/01/2024 108 95 - 110 mmol/L Final     CO2   Date Value Ref Range Status   10/01/2024 25 23 - 29 mmol/L Final     Glucose   Date Value Ref Range Status   10/01/2024 94 70 - 110 mg/dL Final     BUN   Date Value Ref Range Status   10/01/2024 12 8 - 23 mg/dL Final     Creatinine   Date Value Ref Range Status   10/01/2024 0.9 0.5 - 1.4 mg/dL Final     Calcium   Date Value Ref Range Status   10/01/2024 9.5 8.7 - 10.5 mg/dL Final     Total Protein   Date Value Ref Range Status   10/01/2024 7.4 6.0 - 8.4 g/dL Final     Albumin   Date Value Ref Range Status   10/01/2024 3.8 3.5 - 5.2 g/dL Final     Total Bilirubin   Date Value Ref Range Status   10/01/2024 0.4 0.1 - 1.0 mg/dL Final     Comment:     For infants and newborns, interpretation of results should be based  on gestational age, weight and in agreement with clinical  observations.    Premature Infant recommended reference ranges:  Up to 24 hours.............<8.0 mg/dL  Up to 48 hours............<12.0 mg/dL  3-5 days..................<15.0 mg/dL  6-29 days.................<15.0 mg/dL       Alkaline Phosphatase   Date Value Ref Range Status   10/01/2024 68 55 - 135 U/L Final     AST   Date Value Ref Range Status   10/01/2024 15 10 - 40 U/L Final     ALT   Date Value Ref Range Status   10/01/2024 11 10 - 44 U/L Final      Anion Gap   Date Value Ref Range Status   10/01/2024 11 8 - 16 mmol/L Final     eGFR if    Date Value Ref Range Status   08/28/2020 >60.0 >60 mL/min/1.73 m^2 Final     eGFR if non    Date Value Ref Range Status   08/28/2020 >60.0 >60 mL/min/1.73 m^2 Final     Comment:     Calculation used to obtain the estimated glomerular filtration  rate (eGFR) is the CKD-EPI equation.        Lab Results   Component Value Date    WBC 7.04 10/01/2024    HGB 11.5 (L) 10/01/2024    HCT 36.8 (L) 10/01/2024    MCV 86 10/01/2024     10/01/2024     Lab Results   Component Value Date    CHOL 170 10/01/2024     Lab Results   Component Value Date    HDL 57 10/01/2024     Lab Results   Component Value Date    LDLCALC 90.8 10/01/2024     Lab Results   Component Value Date    TRIG 111 10/01/2024     Lab Results   Component Value Date    CHOLHDL 33.5 10/01/2024     Lab Results   Component Value Date    TSH 1.440 10/01/2024     Lab Results   Component Value Date    HGBA1C 5.4 09/19/2023     Assessment:       1. Right-sided chest wall pain    2. Skin nodule        Plan:   Right-sided chest wall pain---------------------resolved------    Skin nodule-----resolved-      F/u PCP-

## 2024-12-27 ENCOUNTER — PATIENT MESSAGE (OUTPATIENT)
Dept: OPTOMETRY | Facility: CLINIC | Age: 69
End: 2024-12-27
Payer: MEDICARE

## 2025-01-29 NOTE — TELEPHONE ENCOUNTER
Attempted to contact patient regarding a medication that he is trying to get refilled lvm for him to call back.

## 2025-01-29 NOTE — TELEPHONE ENCOUNTER
Prescription sent to ----- Message from Christen sent at 1/29/2025  2:02 PM CST -----  Contact: [phone]  Patient is returning a phone call.    Who left a message for the patient: Lyssa Martinez MA    Does patient know what this is regarding:  yes     Would you like a call back, or a response through your [facility] portal?:   call back     Comments:

## 2025-01-30 RX ORDER — SILDENAFIL 100 MG/1
100 TABLET, FILM COATED ORAL DAILY PRN
Qty: 30 TABLET | Refills: 10 | Status: SHIPPED | OUTPATIENT
Start: 2025-01-30

## 2025-02-04 RX ORDER — FINASTERIDE 5 MG/1
5 TABLET, FILM COATED ORAL DAILY
Qty: 90 TABLET | Refills: 3 | Status: SHIPPED | OUTPATIENT
Start: 2025-02-04

## 2025-02-04 NOTE — TELEPHONE ENCOUNTER
Contacted patient in regards to wrong medication being sent to the pharmacy sent the right medication to  to be filled. Lvm   ----- Message from Violeta sent at 2/4/2025 10:28 AM CST -----  Regarding: Wrong RX refill sent  Pt states the wrong RX was sent to the pharmacy. He needs Finasteride 5 mg with 90 day refill to [facility] at [facility]

## 2025-02-22 DIAGNOSIS — Z00.00 ENCOUNTER FOR MEDICARE ANNUAL WELLNESS EXAM: ICD-10-CM

## 2025-03-10 ENCOUNTER — OFFICE VISIT (OUTPATIENT)
Dept: FAMILY MEDICINE | Facility: CLINIC | Age: 70
End: 2025-03-10
Attending: FAMILY MEDICINE
Payer: MEDICARE

## 2025-03-10 VITALS
HEART RATE: 75 BPM | TEMPERATURE: 98 F | RESPIRATION RATE: 18 BRPM | SYSTOLIC BLOOD PRESSURE: 124 MMHG | HEIGHT: 76 IN | BODY MASS INDEX: 33.15 KG/M2 | DIASTOLIC BLOOD PRESSURE: 76 MMHG | OXYGEN SATURATION: 99 % | WEIGHT: 272.25 LBS

## 2025-03-10 DIAGNOSIS — I10 ESSENTIAL HYPERTENSION: Primary | ICD-10-CM

## 2025-03-10 DIAGNOSIS — E66.811 OBESITY (BMI 30.0-34.9): ICD-10-CM

## 2025-03-10 DIAGNOSIS — K21.9 GASTROESOPHAGEAL REFLUX DISEASE, UNSPECIFIED WHETHER ESOPHAGITIS PRESENT: ICD-10-CM

## 2025-03-10 DIAGNOSIS — N40.0 BENIGN PROSTATIC HYPERPLASIA, UNSPECIFIED WHETHER LOWER URINARY TRACT SYMPTOMS PRESENT: ICD-10-CM

## 2025-03-10 DIAGNOSIS — M17.0 PRIMARY OSTEOARTHRITIS OF BOTH KNEES: ICD-10-CM

## 2025-03-10 DIAGNOSIS — E78.5 HYPERLIPIDEMIA, UNSPECIFIED HYPERLIPIDEMIA TYPE: ICD-10-CM

## 2025-03-10 PROCEDURE — 99999 PR PBB SHADOW E&M-EST. PATIENT-LVL IV: CPT | Mod: PBBFAC,HCNC,, | Performed by: FAMILY MEDICINE

## 2025-03-10 PROCEDURE — 3078F DIAST BP <80 MM HG: CPT | Mod: HCNC,CPTII,S$GLB, | Performed by: FAMILY MEDICINE

## 2025-03-10 PROCEDURE — 3288F FALL RISK ASSESSMENT DOCD: CPT | Mod: HCNC,CPTII,S$GLB, | Performed by: FAMILY MEDICINE

## 2025-03-10 PROCEDURE — 3008F BODY MASS INDEX DOCD: CPT | Mod: HCNC,CPTII,S$GLB, | Performed by: FAMILY MEDICINE

## 2025-03-10 PROCEDURE — 1160F RVW MEDS BY RX/DR IN RCRD: CPT | Mod: HCNC,CPTII,S$GLB, | Performed by: FAMILY MEDICINE

## 2025-03-10 PROCEDURE — 3074F SYST BP LT 130 MM HG: CPT | Mod: HCNC,CPTII,S$GLB, | Performed by: FAMILY MEDICINE

## 2025-03-10 PROCEDURE — 1126F AMNT PAIN NOTED NONE PRSNT: CPT | Mod: HCNC,CPTII,S$GLB, | Performed by: FAMILY MEDICINE

## 2025-03-10 PROCEDURE — 99214 OFFICE O/P EST MOD 30 MIN: CPT | Mod: HCNC,S$GLB,, | Performed by: FAMILY MEDICINE

## 2025-03-10 PROCEDURE — G2211 COMPLEX E/M VISIT ADD ON: HCPCS | Mod: HCNC,S$GLB,, | Performed by: FAMILY MEDICINE

## 2025-03-10 PROCEDURE — 1159F MED LIST DOCD IN RCRD: CPT | Mod: HCNC,CPTII,S$GLB, | Performed by: FAMILY MEDICINE

## 2025-03-10 PROCEDURE — 1101F PT FALLS ASSESS-DOCD LE1/YR: CPT | Mod: HCNC,CPTII,S$GLB, | Performed by: FAMILY MEDICINE

## 2025-03-10 RX ORDER — PANTOPRAZOLE SODIUM 40 MG/1
40 TABLET, DELAYED RELEASE ORAL DAILY
Qty: 90 TABLET | Refills: 1 | Status: SHIPPED | OUTPATIENT
Start: 2025-03-10

## 2025-03-10 NOTE — PROGRESS NOTES
Mitchell Mcmillan    Chief Complaint   Patient presents with    Hypertension    Follow-up       History of Present Illness:   Mr. Mcmillan comes in today for hypertension follow-up.  He states he is not fasting but has taken medications today.  He states he continues to take lisinopril-HCT 20-25 mg daily for blood pressure control; atorvastatin 40 mg nightly for high cholesterol control; Proscar 5 mg daily for BPH.  He also states he takes Protonix 40 mg daily prn when he eats tomato-based foods and requests refill.  He states he exercises 3 times a week, 10-15 minutes each time.  He states he has not really been monitoring his diet as he eats sweets and eats late at night.  He states he performs home blood pressure checks once a month with last reading of 122/77.      Otherwise, he reports no acute problems. He denies having fever, chills, fatigue, appetite changes; shortness of breath, cough, wheezing; chest pain, palpitations, leg swelling; abdominal pain, nausea, vomiting, diarrhea, constipation; unusual urinary symptoms; polydipsia, polyphagia, polyuria, hot or cold intolerance; back pain; acute visual changes, numbness, headache; anxiety, depression, homicidal or suicidal thoughts.      Saw Dr. Smyth, cardiologist, on April 24, 2024 for surveillance of essential hypertension, hyperlipidemia, unspecified hyperlipidemia type, arthritis, obesity (BMI 30.0-34.9) with 1-year follow-up advised. Saw urologist Dr. Mcbride on March 15, 2023 for surveillance of elevated PSA, BPH with 1-year follow up advised. Saw TERENCE Florez with orthopedic department on September 19, 2024 for s/p right total knee replacement in March 2024       Labs:                     WBC                      7.04                10/01/2024                 HGB                      11.5 (L)            10/01/2024                 HCT                      36.8 (L)            10/01/2024                 PLT                      272                 10/01/2024                  CHOL                     170                 10/01/2024                 TRIG                     111                 10/01/2024                 HDL                      57                  10/01/2024                 ALT                      11                  10/01/2024                 AST                      15                  10/01/2024                 NA                       144                 10/01/2024                 K                        4.3                 10/01/2024                 CL                       108                 10/01/2024                 CREATININE               0.9                 10/01/2024                 BUN                      12                  10/01/2024                 CO2                      25                  10/01/2024                 TSH                      1.440               10/01/2024                 PSA                      7.3 (H)             03/10/2022                 HGBA1C                   5.4                 09/19/2023               LDLCALC                  90.8                10/01/2024                CALCIUM                  9.5                 10/01/2024                 ANIONGAP                 11                  10/01/2024                 EGFRNORACEVR             >60.0               10/01/2024                   Current Outpatient Medications   Medication Sig    atorvastatin (LIPITOR) 40 MG tablet TAKE 1 TABLET EVERY DAY    cyclobenzaprine (FLEXERIL) 10 MG tablet Take 1 tablet (10 mg total) by mouth 3 (three) times daily as needed for Muscle spasms.    diclofenac sodium (VOLTAREN) 1 % Gel Apply 2 g topically 3 (three) times daily as needed.    finasteride (PROSCAR) 5 mg tablet Take 1 tablet (5 mg total) by mouth once daily.    lisinopriL-hydrochlorothiazide (PRINZIDE,ZESTORETIC) 20-25 mg Tab TAKE 1 TABLET EVERY DAY    ondansetron (ZOFRAN-ODT) 4 MG TbDL Dissolve 2 tablets (8 mg total) by mouth every 8 (eight) hours as needed (Nausea).     pantoprazole (PROTONIX) 40 MG tablet Take 1 tablet (40 mg total) by mouth once daily.    sildenafiL (VIAGRA) 100 MG tablet Take 1 tablet (100 mg total) by mouth daily as needed for Erectile Dysfunction.    tamsulosin (FLOMAX) 0.4 mg Cap TAKE 1 CAPSULE EVERY DAY    gabapentin (NEURONTIN) 300 MG capsule Take 1 capsule (300 mg total) by mouth 2 (two) times daily. (Patient not taking: Reported on 3/10/2025)       Review of Systems   Constitutional:  Negative for activity change, appetite change, chills, diaphoresis, fatigue and fever.        Wt 120 kg (264 lb 8.8 oz) at October 1, 2024 visit.   Eyes:  Negative for visual disturbance.   Respiratory:  Negative for cough, shortness of breath and wheezing.    Cardiovascular:  Negative for chest pain, palpitations and leg swelling.        See history of present illness.   Gastrointestinal:  Negative for abdominal pain, constipation, diarrhea, nausea and vomiting.        See history of present illness.   Endocrine: Negative for cold intolerance, heat intolerance, polydipsia, polyphagia and polyuria.   Genitourinary:  Negative for difficulty urinating.   Musculoskeletal:  Negative for arthralgias and back pain.   Neurological:  Negative for numbness and headaches.   Psychiatric/Behavioral:  Negative for dysphoric mood and suicidal ideas. The patient is not nervous/anxious.         Negative for homicidal ideas.       Objective:  Physical Exam  Vitals reviewed.   Constitutional:       General: He is not in acute distress.     Appearance: Normal appearance. He is well-developed. He is obese. He is not ill-appearing, toxic-appearing or diaphoretic.      Comments: Pleasant.   HENT:      Mouth/Throat:      Mouth: Mucous membranes are moist.   Neck:      Thyroid: No thyromegaly.   Cardiovascular:      Rate and Rhythm: Normal rate and regular rhythm.      Pulses:           Dorsalis pedis pulses are 3+ on the right side and 3+ on the left side.      Heart sounds: Normal heart sounds.  No murmur heard.  Pulmonary:      Effort: Pulmonary effort is normal. No respiratory distress.      Breath sounds: Normal breath sounds. No wheezing.   Chest:      Chest wall: No tenderness.   Abdominal:      General: Bowel sounds are normal. There is no distension.      Palpations: Abdomen is soft. There is no mass.      Tenderness: There is no abdominal tenderness. There is no guarding or rebound.   Musculoskeletal:         General: No swelling or tenderness. Normal range of motion.      Cervical back: Normal range of motion and neck supple. No tenderness.      Comments: He is ambulatory without problems.    Feet:      Left foot:      Protective Sensation: 5 sites tested.  5 sites sensed.      Skin integrity: No ulcer or skin breakdown.   Lymphadenopathy:      Cervical: No cervical adenopathy.   Neurological:      General: No focal deficit present.      Mental Status: He is alert and oriented to person, place, and time.   Psychiatric:         Mood and Affect: Mood normal.         Behavior: Behavior normal.         Thought Content: Thought content normal.         Judgment: Judgment normal.         ASSESSMENT:  1. Essential hypertension    2. Hyperlipidemia, unspecified hyperlipidemia type    3. Gastroesophageal reflux disease, unspecified whether esophagitis present    4. Benign prostatic hyperplasia, unspecified whether lower urinary tract symptoms present    5. Primary osteoarthritis of both knees    6. Obesity (BMI 30.0-34.9)        PLAN:  Mitchell was seen today for hypertension and follow-up.    Diagnoses and all orders for this visit:    Essential hypertension - stable on medication    Hyperlipidemia, unspecified hyperlipidemia type - stable on medication    Gastroesophageal reflux disease, unspecified whether esophagitis present  -     pantoprazole (PROTONIX) 40 MG tablet; Take 1 tablet (40 mg total) by mouth once daily.    Benign prostatic hyperplasia, unspecified whether lower urinary tract symptoms present  - stable on medication and managed by urology    Primary osteoarthritis of both knees - stable and managed by orthopedist    Obesity (BMI 30.0-34.9) - stable and managed with diet and exercise      No labs today.  Continue current medications, follow low sodium, low cholesterol, low carb diet, daily walks.  Prescription refill as noted above.  Use OTC extra-strength Tylenol for pain as directed.  Keep follow up with specialists.  Follow up in about 7 months (around 10/2/2025) for physical.

## 2025-05-07 NOTE — TELEPHONE ENCOUNTER
----- Message from Evelyne sent at 5/7/2025  2:59 PM CDT -----  Regarding: refill  Contact: Mitchell  Type:  RX Refill RequestWho Called: Mitchell Refill or New Rx: refill RX Name and Strength:tamsulosin (FLOMAX) 0.4 mg CapHow is the patient currently taking it? (ex. 1XDay):Is this a 30 day or 90 day RX:Preferred Pharmacy with phone number: [facility] [phone] [facility] [address]: [phone] Fax: [phone] Local or Mail Order: local Ordering Provider:  BONNIE Morgan Would the patient rather a call back or a response via [facility]? [facility] Call Back Number: [phone] (home)  Additional Information: The patient called Thursday last week but it has not been filled. Please send the refill request.  escalated

## 2025-05-08 RX ORDER — TAMSULOSIN HYDROCHLORIDE 0.4 MG/1
1 CAPSULE ORAL DAILY
Qty: 90 CAPSULE | Refills: 0 | Status: SHIPPED | OUTPATIENT
Start: 2025-05-08

## 2025-05-08 NOTE — TELEPHONE ENCOUNTER
MA called Mr. Medrano to schedule next sunny with natalee, per patient he wanted to cancel his cardiology sunny because he though that it was a urology sunny, MA called Rian and spoke with Lalita in scheduling and Cardiology sunny was successfully canceled. Call took place at 10:27AM this am

## 2025-07-31 ENCOUNTER — OFFICE VISIT (OUTPATIENT)
Dept: FAMILY MEDICINE | Facility: CLINIC | Age: 70
End: 2025-07-31
Payer: MEDICARE

## 2025-07-31 VITALS
WEIGHT: 260.13 LBS | TEMPERATURE: 97 F | HEART RATE: 84 BPM | HEIGHT: 76 IN | BODY MASS INDEX: 31.68 KG/M2 | OXYGEN SATURATION: 98 % | RESPIRATION RATE: 18 BRPM | DIASTOLIC BLOOD PRESSURE: 60 MMHG | SYSTOLIC BLOOD PRESSURE: 110 MMHG

## 2025-07-31 DIAGNOSIS — K21.9 GASTROESOPHAGEAL REFLUX DISEASE, UNSPECIFIED WHETHER ESOPHAGITIS PRESENT: ICD-10-CM

## 2025-07-31 DIAGNOSIS — I70.0 AORTIC ATHEROSCLEROSIS: ICD-10-CM

## 2025-07-31 DIAGNOSIS — I10 ESSENTIAL HYPERTENSION: ICD-10-CM

## 2025-07-31 DIAGNOSIS — Z74.09 OTHER REDUCED MOBILITY: ICD-10-CM

## 2025-07-31 DIAGNOSIS — E78.5 HYPERLIPIDEMIA, UNSPECIFIED HYPERLIPIDEMIA TYPE: ICD-10-CM

## 2025-07-31 DIAGNOSIS — E66.811 OBESITY (BMI 30.0-34.9): ICD-10-CM

## 2025-07-31 DIAGNOSIS — N40.0 BENIGN PROSTATIC HYPERPLASIA, UNSPECIFIED WHETHER LOWER URINARY TRACT SYMPTOMS PRESENT: ICD-10-CM

## 2025-07-31 DIAGNOSIS — M54.50 CHRONIC MIDLINE LOW BACK PAIN WITHOUT SCIATICA: ICD-10-CM

## 2025-07-31 DIAGNOSIS — H90.3 SENSORINEURAL HEARING LOSS (SNHL) OF BOTH EARS: ICD-10-CM

## 2025-07-31 DIAGNOSIS — G89.29 CHRONIC MIDLINE LOW BACK PAIN WITHOUT SCIATICA: ICD-10-CM

## 2025-07-31 DIAGNOSIS — Z00.00 ENCOUNTER FOR MEDICARE ANNUAL WELLNESS EXAM: Primary | ICD-10-CM

## 2025-07-31 DIAGNOSIS — R97.20 ELEVATED PSA: ICD-10-CM

## 2025-07-31 PROCEDURE — 99999 PR PBB SHADOW E&M-EST. PATIENT-LVL IV: CPT | Mod: PBBFAC,,, | Performed by: NURSE PRACTITIONER

## 2025-07-31 NOTE — PATIENT INSTRUCTIONS
Counseling and Referral of Other Preventative  (Italic type indicates deductible and co-insurance are waived)    Patient Name: Mitchell Mcmillan  Today's Date: 7/31/2025    Health Maintenance       Date Due Completion Date    RSV Vaccine (Age 60+ and Pregnant patients) (1 - Risk 60-74 years 1-dose series) Never done ---    Shingles Vaccine (3 of 3) 12/20/2023 10/25/2023    COVID-19 Vaccine (8 - 2024-25 season) 09/01/2024 10/25/2023    Influenza Vaccine (1) 09/01/2025 10/1/2024    Lipid Panel 10/01/2025 10/1/2024    High Dose Statin 07/31/2026 7/31/2025    Colorectal Cancer Screening 10/05/2026 10/5/2016        No orders of the defined types were placed in this encounter.      The following information is provided to all patients.  This information is to help you find resources for any of the problems found today that may be affecting your health:                  Living healthy guide: www.[facility].[place].gov      Understanding Diabetes: www.diabetes.org      Eating healthy: www.cdc.gov/healthyweight      CDC home safety checklist: www.cdc.gov/steadi/patient.html      Agency on Aging: www.goea.[place].gov      Alcoholics anonymous (AA): www.aa.org      Physical Activity: www.bharathi.nih.gov/sq6gdyg      Tobacco use: www.quitwithusla.org

## 2025-07-31 NOTE — PROGRESS NOTES
"  Mitchell Mcmillan presented for a  Medicare AWV and comprehensive Health Risk Assessment today. The following components were reviewed and updated:    Medical history  Family History  Social history  Allergies and Current Medications  Health Risk Assessment  Health Maintenance  Care Team         ** See Completed Assessments for Annual Wellness Visit within the encounter summary.**         The following assessments were completed:  Living Situation  CAGE  Depression Screening  Timed Get Up and Go  Whisper Test  Cognitive Function Screening  Nutrition Screening  ADL Screening  PAQ Screening      Opioid documentation:      Patient does not have a current opioid prescription.        Vitals:    07/31/25 1243   BP: 110/60   Patient Position: Sitting   Pulse: 84   Resp: 18   Temp: 97 °F (36.1 °C)   SpO2: 98%   Weight: 118 kg (260 lb 2.3 oz)   Height: 6' 4" (1.93 m)     Body mass index is 31.67 kg/m².  Physical Exam  Vitals and nursing note reviewed.   Constitutional:       General: He is not in acute distress.     Appearance: He is well-developed.   HENT:      Head: Normocephalic and atraumatic.   Eyes:      Pupils: Pupils are equal, round, and reactive to light.   Neck:      Vascular: No carotid bruit.   Cardiovascular:      Rate and Rhythm: Normal rate and regular rhythm.      Pulses: Normal pulses.      Heart sounds: Normal heart sounds. No murmur heard.     No gallop.      Comments: Trace ble edema    Pulmonary:      Effort: Pulmonary effort is normal.      Breath sounds: Normal breath sounds.   Abdominal:      General: Bowel sounds are normal. There is no distension.      Palpations: Abdomen is soft.      Tenderness: There is no abdominal tenderness.   Musculoskeletal:         General: Normal range of motion.   Skin:     General: Skin is warm and dry.   Neurological:      Motor: No abnormal muscle tone.   Psychiatric:         Speech: Speech normal.         Behavior: Behavior normal.         Thought Content: Thought " content normal.         Judgment: Judgment normal.       Current Outpatient Medications   Medication Instructions    atorvastatin (LIPITOR) 40 MG tablet TAKE 1 TABLET EVERY DAY    cyclobenzaprine (FLEXERIL) 10 mg, Oral, 3 times daily PRN    diclofenac sodium (VOLTAREN) 2 g, Topical (Top), 3 times daily PRN    finasteride (PROSCAR) 5 mg, Oral, Daily    gabapentin (NEURONTIN) 300 mg, Oral, 2 times daily    lisinopriL-hydrochlorothiazide (PRINZIDE,ZESTORETIC) 20-25 mg Tab TAKE 1 TABLET EVERY DAY    ondansetron (ZOFRAN-ODT) 4 MG TbDL Dissolve 2 tablets (8 mg total) by mouth every 8 (eight) hours as needed (Nausea).    pantoprazole (PROTONIX) 40 mg, Oral, Daily    sildenafiL (VIAGRA) 100 mg, Oral, Daily PRN    tamsulosin (FLOMAX) 0.4 mg, Oral, Daily             Diagnoses and health risks identified today and associated recommendations/orders:    1. Encounter for Medicare annual wellness exam  Referral to Enhanced Annual Wellness Visit (eAWV) W+1    Reviwed the need for vaccines    2. Aortic atherosclerosis  Chronic and Stable with Lipitor  Discussed and recommend  low fat/carb/chol diet. Cardio exercise as tolerated. Life style modifications.  . Continue current treatment plan as previously prescribed with your PCP    3. Hyperlipidemia, unspecified hyperlipidemia type  Chronic and Stable with Lipitor  Discussed and recommend  low fat/carb/chol diet. Cardio exercise as tolerated. Life style modifications.  . Continue current treatment plan as previously prescribed with your PCP    4. Essential hypertension  Chronic and Stable on Lisinopril-hctz  States BP has been up  ragen 130- 150 SBP at home  BP checked today low- pt states he did took a Vigara  Pt scheduled to see PCP     5. Elevated PSA  6. Benign prostatic hyperplasia, unspecified whether lower urinary tract symptoms present  Chronic and Stable on Flomax and Proscar. Continue current treatment plan as previously prescribed with your urologist    7. Gastroesophageal  reflux disease, unspecified whether esophagitis present  Chronic and Stable. Continue current treatment plan as previously prescribed with your PCP    8. Sensorineural hearing loss (SNHL) of both ears  Chronic and Stable.decline additional testing    9. Obesity (BMI 30.0-34.9)  Body mass index is 31.67 kg/m².  Discussed and recommend  low fat/carb/chol diet. Cardio exercise as tolerated. Life style modifications.    10 Chronic midline low back pain without sciatica   Chronic and Stable on Neurontin. Continue current treatment plan as previously prescribed with your PCP    I offered to discuss advanced care planning, including how to pick a person who would make decisions for you if you were unable to make them for yourself, called a health care power of , and what kind of decisions you might make such as use of life sustaining treatments such as ventilators and tube feeding when faced with a life limiting illness recorded on a living will that they will need to know. (How you want to be cared for as you near the end of your natural life)     X Patient is interested in learning more about how to make advanced directives.  I provided them paperwork and offered to discuss this with them.    Provided Mitchell with a 5-10 year written screening schedule and personal prevention plan. Recommendations were developed using the USPSTF age appropriate recommendations. Education, counseling, and referrals were provided as needed. After Visit Summary printed and given to patient which includes a list of additional screenings\tests needed.    Follow up in about 1 year (around 7/31/2026).  Gisselle Palomino NP     9

## 2025-08-07 ENCOUNTER — LAB VISIT (OUTPATIENT)
Dept: LAB | Facility: HOSPITAL | Age: 70
End: 2025-08-07
Attending: UROLOGY
Payer: MEDICARE

## 2025-08-07 ENCOUNTER — OFFICE VISIT (OUTPATIENT)
Dept: UROLOGY | Facility: CLINIC | Age: 70
End: 2025-08-07
Payer: MEDICARE

## 2025-08-07 VITALS — BODY MASS INDEX: 31.78 KG/M2 | HEIGHT: 76 IN | WEIGHT: 261 LBS

## 2025-08-07 DIAGNOSIS — R97.20 ELEVATED PSA: ICD-10-CM

## 2025-08-07 DIAGNOSIS — N40.1 BENIGN PROSTATIC HYPERPLASIA WITH URINARY FREQUENCY: ICD-10-CM

## 2025-08-07 DIAGNOSIS — R35.0 BENIGN PROSTATIC HYPERPLASIA WITH URINARY FREQUENCY: ICD-10-CM

## 2025-08-07 DIAGNOSIS — R97.20 ELEVATED PSA: Primary | ICD-10-CM

## 2025-08-07 DIAGNOSIS — N52.03 COMBINED ARTERIAL INSUFFICIENCY AND CORPORO-VENOUS OCCLUSIVE ERECTILE DYSFUNCTION: ICD-10-CM

## 2025-08-07 LAB — PSA SERPL-MCNC: 6.88 NG/ML

## 2025-08-07 PROCEDURE — 99999 PR PBB SHADOW E&M-EST. PATIENT-LVL III: CPT | Mod: PBBFAC,,, | Performed by: UROLOGY

## 2025-08-07 PROCEDURE — 99214 OFFICE O/P EST MOD 30 MIN: CPT | Mod: S$GLB,,, | Performed by: UROLOGY

## 2025-08-07 PROCEDURE — 1159F MED LIST DOCD IN RCRD: CPT | Mod: CPTII,S$GLB,, | Performed by: UROLOGY

## 2025-08-07 PROCEDURE — 3008F BODY MASS INDEX DOCD: CPT | Mod: CPTII,S$GLB,, | Performed by: UROLOGY

## 2025-08-07 PROCEDURE — 1126F AMNT PAIN NOTED NONE PRSNT: CPT | Mod: CPTII,S$GLB,, | Performed by: UROLOGY

## 2025-08-07 PROCEDURE — 1160F RVW MEDS BY RX/DR IN RCRD: CPT | Mod: CPTII,S$GLB,, | Performed by: UROLOGY

## 2025-08-07 PROCEDURE — 1101F PT FALLS ASSESS-DOCD LE1/YR: CPT | Mod: CPTII,S$GLB,, | Performed by: UROLOGY

## 2025-08-07 PROCEDURE — 36415 COLL VENOUS BLD VENIPUNCTURE: CPT

## 2025-08-07 PROCEDURE — 3288F FALL RISK ASSESSMENT DOCD: CPT | Mod: CPTII,S$GLB,, | Performed by: UROLOGY

## 2025-08-07 PROCEDURE — 84153 ASSAY OF PSA TOTAL: CPT

## 2025-08-07 RX ORDER — TAMSULOSIN HYDROCHLORIDE 0.4 MG/1
1 CAPSULE ORAL DAILY
Qty: 90 CAPSULE | Refills: 3 | Status: SHIPPED | OUTPATIENT
Start: 2025-08-07

## 2025-08-07 NOTE — PROGRESS NOTES
Chief Complaint:   Encounter Diagnoses   Name Primary?    Elevated PSA Yes    Benign prostatic hyperplasia with urinary frequency     Combined arterial insufficiency and corporo-venous occlusive erectile dysfunction          HPI:   8/7/25- currently voiding well on medications, he is due for a PSA.  Viagra still assists.    8/1/18:  63 yo man referred by Dr. Chinchilla for elevated PSA.  Prostate biopsy was done long ago, 7-8 years.  No abd/pelvic pain and no exac/rel factors.  No hematuria.  No urolithiasis.  Nocturia x2. No urinary bother.  No  history.  Normal sexual function.    Allergies:  Patient has no known allergies.    Medications:  has a current medication list which includes the following prescription(s): atorvastatin, cyclobenzaprine, diclofenac sodium, finasteride, gabapentin, lisinopril-hydrochlorothiazide, ondansetron, pantoprazole, sildenafil, and tamsulosin.    Review of Systems:  General: No fever, chills, fatigability, or weight loss.  Skin: No rashes, itching, or changes in color or texture of skin.  Chest: Denies BUTCHER, cyanosis, wheezing, cough, and sputum production.  Abdomen: Appetite fine. No weight loss. Denies diarrhea, abdominal pain, hematemesis, or blood in stool.  Musculoskeletal: No joint stiffness or swelling. Denies back pain.  : As above.  All other review of systems negative.    PMH:   has a past medical history of Arthritis, Asymmetric SNHL (sensorineural hearing loss) (08/17/2017), Chronic left hip pain (2006), Chronic prostatitis, Elevated PSA, less than 10 ng/ml, Gastroesophageal reflux disease (8/3/2022), Hyperlipidemia (2010), Hypertension (2011), Joint effusion of knee, Midline low back pain without sciatica (6/13/2016), Obesity, Special screening for malignant neoplasms, colon (10/5/2016), Tobacco dependence, and Trouble in sleeping.    PSH:   has a past surgical history that includes Hip Arthroplasty (Left, 04/16/2006); arthrocentesis (Right, 2013); Prostate biopsy (2012);  Colonoscopy (N/A, 10/05/2016); left orchiectomy; Total knee arthroplasty (Left, 5/31/2023); Total knee arthroplasty (Right, 3/6/2024); Manipulation with anesthesia (Right, 6/12/2024); and injection (Right, 6/12/2024).    FamHx: family history includes Arthritis in his father; Depression in his mother; Diabetes in his sister; Early death in his mother; Heart disease (age of onset: 53) in his mother; Hypertension in his mother, sister, and sister; No Known Problems in his brother, brother, brother, and daughter; Stroke in his father.    SocHx:  reports that he quit smoking about 2 years ago. His smoking use included cigarettes. He started smoking about 3 years ago. He has a 4.6 pack-year smoking history. He has never been exposed to tobacco smoke. He has never used smokeless tobacco. He reports current alcohol use of about 2.0 standard drinks of alcohol per week. He reports that he does not use drugs.      Physical Exam:  There were no vitals filed for this visit.    General: A&Ox3, no apparent distress, no deformities  Neck: No masses, normal thyroid  Lungs: normal inspiration, no use of accessory muscles  Heart: normal pulse, no arrhythmias  Abdomen: Soft, NT, ND  Skin: The skin is warm and dry. No jaundice.  Ext: No c/c/e.    Labs/Studies:   Reported history of negative biopsy years ago.  PSA 4.2 2/24  PSA 5.5 3/23  PSA 7.3 3/22  PSA 5.1 3/21  PSA 7.6 3/20    Impression/Plan:     1. BPH- tamsulosin and finasteride appear to be controlling his symptoms, call with any issues prior to his next appointment.    2. Elevated PSA- PSA today and proceed as appropriate.  See me in 1 year with a PSA.    3. Erectile dysfunction- sildenafil 100 mg works well and can still get spontaneous erections.

## 2025-08-25 ENCOUNTER — TELEPHONE (OUTPATIENT)
Dept: FAMILY MEDICINE | Facility: CLINIC | Age: 70
End: 2025-08-25
Payer: MEDICARE

## 2025-08-25 ENCOUNTER — OFFICE VISIT (OUTPATIENT)
Dept: FAMILY MEDICINE | Facility: CLINIC | Age: 70
End: 2025-08-25
Attending: FAMILY MEDICINE
Payer: MEDICARE

## 2025-08-25 VITALS
HEIGHT: 76 IN | SYSTOLIC BLOOD PRESSURE: 132 MMHG | OXYGEN SATURATION: 98 % | WEIGHT: 268.31 LBS | DIASTOLIC BLOOD PRESSURE: 74 MMHG | BODY MASS INDEX: 32.67 KG/M2 | HEART RATE: 84 BPM | TEMPERATURE: 99 F | RESPIRATION RATE: 18 BRPM

## 2025-08-25 DIAGNOSIS — M17.0 PRIMARY OSTEOARTHRITIS OF BOTH KNEES: ICD-10-CM

## 2025-08-25 DIAGNOSIS — I10 ESSENTIAL HYPERTENSION: Primary | ICD-10-CM

## 2025-08-25 DIAGNOSIS — E66.811 OBESITY (BMI 30.0-34.9): ICD-10-CM

## 2025-08-25 DIAGNOSIS — M19.011 ARTHRITIS OF RIGHT SHOULDER: ICD-10-CM

## 2025-08-25 PROCEDURE — 1159F MED LIST DOCD IN RCRD: CPT | Mod: CPTII,HCNC,S$GLB, | Performed by: FAMILY MEDICINE

## 2025-08-25 PROCEDURE — 3075F SYST BP GE 130 - 139MM HG: CPT | Mod: CPTII,HCNC,S$GLB, | Performed by: FAMILY MEDICINE

## 2025-08-25 PROCEDURE — 3288F FALL RISK ASSESSMENT DOCD: CPT | Mod: CPTII,HCNC,S$GLB, | Performed by: FAMILY MEDICINE

## 2025-08-25 PROCEDURE — 99999 PR PBB SHADOW E&M-EST. PATIENT-LVL IV: CPT | Mod: PBBFAC,HCNC,, | Performed by: FAMILY MEDICINE

## 2025-08-25 PROCEDURE — 3078F DIAST BP <80 MM HG: CPT | Mod: CPTII,HCNC,S$GLB, | Performed by: FAMILY MEDICINE

## 2025-08-25 PROCEDURE — 1101F PT FALLS ASSESS-DOCD LE1/YR: CPT | Mod: CPTII,HCNC,S$GLB, | Performed by: FAMILY MEDICINE

## 2025-08-25 PROCEDURE — 99214 OFFICE O/P EST MOD 30 MIN: CPT | Mod: HCNC,S$GLB,, | Performed by: FAMILY MEDICINE

## 2025-08-25 PROCEDURE — G2211 COMPLEX E/M VISIT ADD ON: HCPCS | Mod: HCNC,S$GLB,, | Performed by: FAMILY MEDICINE

## 2025-08-25 PROCEDURE — 1160F RVW MEDS BY RX/DR IN RCRD: CPT | Mod: CPTII,HCNC,S$GLB, | Performed by: FAMILY MEDICINE

## 2025-08-25 PROCEDURE — 3008F BODY MASS INDEX DOCD: CPT | Mod: CPTII,HCNC,S$GLB, | Performed by: FAMILY MEDICINE

## 2025-08-25 PROCEDURE — 1125F AMNT PAIN NOTED PAIN PRSNT: CPT | Mod: CPTII,HCNC,S$GLB, | Performed by: FAMILY MEDICINE

## 2025-08-25 RX ORDER — DICLOFENAC SODIUM 10 MG/G
2 GEL TOPICAL 2 TIMES DAILY
Qty: 100 G | Refills: 2 | Status: SHIPPED | OUTPATIENT
Start: 2025-08-25

## 2025-08-26 ENCOUNTER — TELEPHONE (OUTPATIENT)
Dept: FAMILY MEDICINE | Facility: CLINIC | Age: 70
End: 2025-08-26

## 2025-08-26 ENCOUNTER — CLINICAL SUPPORT (OUTPATIENT)
Dept: FAMILY MEDICINE | Facility: CLINIC | Age: 70
End: 2025-08-26
Payer: MEDICARE

## 2025-08-26 VITALS — SYSTOLIC BLOOD PRESSURE: 143 MMHG | DIASTOLIC BLOOD PRESSURE: 87 MMHG

## 2025-08-26 DIAGNOSIS — M17.11 ARTHRITIS OF KNEE, RIGHT: ICD-10-CM

## 2025-08-26 DIAGNOSIS — M17.12 ARTHRITIS OF KNEE, LEFT: ICD-10-CM

## 2025-08-26 DIAGNOSIS — I10 ESSENTIAL HYPERTENSION: Primary | ICD-10-CM

## 2025-08-26 PROCEDURE — 99999 PR PBB SHADOW E&M-EST. PATIENT-LVL I: CPT | Mod: PBBFAC,HCNC,,

## 2025-08-27 ENCOUNTER — TELEPHONE (OUTPATIENT)
Dept: FAMILY MEDICINE | Facility: CLINIC | Age: 70
End: 2025-08-27
Payer: MEDICARE

## 2025-08-27 RX ORDER — GABAPENTIN 300 MG/1
300 CAPSULE ORAL 2 TIMES DAILY PRN
Qty: 60 CAPSULE | Refills: 2 | Status: SHIPPED | OUTPATIENT
Start: 2025-08-27

## 2025-09-02 ENCOUNTER — OFFICE VISIT (OUTPATIENT)
Dept: FAMILY MEDICINE | Facility: CLINIC | Age: 70
End: 2025-09-02
Payer: MEDICARE

## 2025-09-02 VITALS
WEIGHT: 266.31 LBS | TEMPERATURE: 97 F | SYSTOLIC BLOOD PRESSURE: 142 MMHG | HEIGHT: 76 IN | BODY MASS INDEX: 32.43 KG/M2 | DIASTOLIC BLOOD PRESSURE: 86 MMHG | OXYGEN SATURATION: 96 % | HEART RATE: 71 BPM

## 2025-09-02 DIAGNOSIS — E78.5 HYPERLIPIDEMIA, UNSPECIFIED HYPERLIPIDEMIA TYPE: ICD-10-CM

## 2025-09-02 DIAGNOSIS — I10 ESSENTIAL HYPERTENSION: ICD-10-CM

## 2025-09-02 DIAGNOSIS — N40.1 BENIGN PROSTATIC HYPERPLASIA WITH URINARY FREQUENCY: ICD-10-CM

## 2025-09-02 DIAGNOSIS — E66.811 OBESITY (BMI 30.0-34.9): ICD-10-CM

## 2025-09-02 DIAGNOSIS — R35.0 BENIGN PROSTATIC HYPERPLASIA WITH URINARY FREQUENCY: ICD-10-CM

## 2025-09-02 DIAGNOSIS — R30.0 DYSURIA: Primary | ICD-10-CM

## 2025-09-02 LAB
BACTERIA #/AREA URNS AUTO: ABNORMAL /HPF
BILIRUB UR QL STRIP.AUTO: NEGATIVE
CLARITY UR: CLEAR
COLOR UR AUTO: YELLOW
GLUCOSE UR QL STRIP: NEGATIVE
HGB UR QL STRIP: ABNORMAL
HYALINE CASTS UR QL AUTO: 0 /LPF (ref 0–1)
KETONES UR QL STRIP: NEGATIVE
LEUKOCYTE ESTERASE UR QL STRIP: NEGATIVE
MICROSCOPIC COMMENT: ABNORMAL
NITRITE UR QL STRIP: NEGATIVE
PH UR STRIP: 6 [PH]
PROT UR QL STRIP: ABNORMAL
RBC #/AREA URNS AUTO: 27 /HPF (ref 0–4)
SP GR UR STRIP: 1.02
UROBILINOGEN UR STRIP-ACNC: NEGATIVE EU/DL
WBC #/AREA URNS AUTO: 2 /HPF (ref 0–5)

## 2025-09-02 PROCEDURE — 3077F SYST BP >= 140 MM HG: CPT | Mod: CPTII,HCNC,S$GLB,

## 2025-09-02 PROCEDURE — 1159F MED LIST DOCD IN RCRD: CPT | Mod: CPTII,HCNC,S$GLB,

## 2025-09-02 PROCEDURE — 81003 URINALYSIS AUTO W/O SCOPE: CPT | Mod: HCNC

## 2025-09-02 PROCEDURE — 1101F PT FALLS ASSESS-DOCD LE1/YR: CPT | Mod: CPTII,HCNC,S$GLB,

## 2025-09-02 PROCEDURE — 99213 OFFICE O/P EST LOW 20 MIN: CPT | Mod: HCNC,S$GLB,,

## 2025-09-02 PROCEDURE — 3288F FALL RISK ASSESSMENT DOCD: CPT | Mod: CPTII,HCNC,S$GLB,

## 2025-09-02 PROCEDURE — 3079F DIAST BP 80-89 MM HG: CPT | Mod: CPTII,HCNC,S$GLB,

## 2025-09-02 PROCEDURE — 1125F AMNT PAIN NOTED PAIN PRSNT: CPT | Mod: CPTII,HCNC,S$GLB,

## 2025-09-02 PROCEDURE — 99999 PR PBB SHADOW E&M-EST. PATIENT-LVL III: CPT | Mod: PBBFAC,HCNC,,

## 2025-09-02 PROCEDURE — 1160F RVW MEDS BY RX/DR IN RCRD: CPT | Mod: CPTII,HCNC,S$GLB,

## 2025-09-02 PROCEDURE — 3008F BODY MASS INDEX DOCD: CPT | Mod: CPTII,HCNC,S$GLB,

## 2025-09-02 RX ORDER — NITROFURANTOIN 25; 75 MG/1; MG/1
100 CAPSULE ORAL 2 TIMES DAILY
Qty: 14 CAPSULE | Refills: 0 | Status: SHIPPED | OUTPATIENT
Start: 2025-09-02 | End: 2025-09-09

## 2025-09-03 LAB — HOLD SPECIMEN: NORMAL

## (undated) DEVICE — SUT VICRYL CP-1 VCP268H

## (undated) DEVICE — DRAPE INCISE IOBAN 2 23X33IN

## (undated) DEVICE — TUBING MEDI-VAC 20FT .25IN

## (undated) DEVICE — YANKAUER FLEX NO VENT REG CAP

## (undated) DEVICE — BLADE SURG CARBON STEEL #10

## (undated) DEVICE — HOOD FLYTE PEELWY STERISHIELD

## (undated) DEVICE — SPONGE COTTON TRAY 4X4IN

## (undated) DEVICE — COVER TABLE HVY DTY 60X90IN

## (undated) DEVICE — DRAPE THREE-QTR REINF 53X77IN

## (undated) DEVICE — BLADE EZ CLEAN 2 1/2

## (undated) DEVICE — SUT VICRYL 1 OB 36 CTX

## (undated) DEVICE — KIT IRR SUCTION HND PIECE

## (undated) DEVICE — DRAPE ORTH SPLIT 77X108IN

## (undated) DEVICE — APPLICATOR CHLORAPREP ORN 26ML

## (undated) DEVICE — EVACUATOR PENCIL SMOKE NEPTUNE

## (undated) DEVICE — NDL SPINAL 18GX3.5 SPINOCAN

## (undated) DEVICE — GOWN NONREINF SET-IN SLV 2XL

## (undated) DEVICE — ALCOHOL 70% ANTISEPTIC ISO 4OZ

## (undated) DEVICE — SOL NACL IRR 3000ML

## (undated) DEVICE — SYR 30CC LUER LOCK

## (undated) DEVICE — MANIFOLD 4 PORT

## (undated) DEVICE — DRESSING PICO 7 TWO 10X30CM

## (undated) DEVICE — GLOVE SURG PLYSPHRN ORTH SZ7.5

## (undated) DEVICE — DRAPE U SPLIT SHEET 54X76IN

## (undated) DEVICE — DRAPE FULL SHEET 70X100IN

## (undated) DEVICE — POUCH INSTRUMENT 2 POCKET

## (undated) DEVICE — SUT STRATAFIX PGAPCL 3 FS-1

## (undated) DEVICE — PACK BASIC SETUP SC BR

## (undated) DEVICE — DRESSING PICO 7 TWO 10X40CM

## (undated) DEVICE — GLOVE SURGICAL LATEX SZ 8

## (undated) DEVICE — SPONGE LAP 18X18 PREWASHED

## (undated) DEVICE — ELECTRODE REM PLYHSV RETURN 9

## (undated) DEVICE — DRAPE STERI INSTRUMENT 1018

## (undated) DEVICE — TIP SUCTION YANKAUER

## (undated) DEVICE — BLADE SAG 18.0X1.27X100

## (undated) DEVICE — CONTAINER SPECIMEN OR STER 4OZ

## (undated) DEVICE — SOCKINETTE IMPERVIOUS 12X48IN

## (undated) DEVICE — ALCOHOL 70% ISOP RUBBING 4OZ

## (undated) DEVICE — GOWN POLY REINF BRTH SLV XL

## (undated) DEVICE — DRAPE T EXTRM SURG 121X128X90

## (undated) DEVICE — BNDG COFLEX FOAM LF2 ST 4X5YD

## (undated) DEVICE — SUT VICRYL 2-0 27 CT-1

## (undated) DEVICE — SOL IRR NACL .9% 3000ML

## (undated) DEVICE — PAD ABD 8X10 STERILE

## (undated) DEVICE — BANDAGE ACE DOUBLE STER 6IN

## (undated) DEVICE — SEE MEDLINE ITEM 157216

## (undated) DEVICE — STAPLER SKIN PROXIMATE WIDE

## (undated) DEVICE — HEADREST ROUND DISP FOAM 9IN

## (undated) DEVICE — BANDAGE ACE ELASTIC 6"

## (undated) DEVICE — GAUZE SPONGE 4X4 12PLY

## (undated) DEVICE — SUT 0 27IN COATED VICRYL CP

## (undated) DEVICE — DRAPE STERI U-SHAPED 47X51IN

## (undated) DEVICE — TAPE SILK 3IN

## (undated) DEVICE — COVER LIGHT HANDLE 80/CA

## (undated) DEVICE — PAD ABDOMINAL STERILE 8X10IN

## (undated) DEVICE — BLADE SAG DUAL 18MMX1.27MMX90M

## (undated) DEVICE — MIXER BONE CEMENT

## (undated) DEVICE — CHLORAPREP W TINT 26ML APPL

## (undated) DEVICE — TOWEL OR DISP STRL BLUE 4/PK

## (undated) DEVICE — SKIN MARKER DEVON 160

## (undated) DEVICE — GLOVE BIOGEL PI ORTHO PRO SZ 8

## (undated) DEVICE — BANDAGE ADHESIVE PLASTIC 7/8IN

## (undated) DEVICE — GOWN POLY REINF X-LONG XL

## (undated) DEVICE — SUPPORT ULNA NERVE PROTECTOR

## (undated) DEVICE — COVER PROXIMA MAYO STAND